# Patient Record
Sex: MALE | Race: BLACK OR AFRICAN AMERICAN | NOT HISPANIC OR LATINO | Employment: FULL TIME | URBAN - METROPOLITAN AREA
[De-identification: names, ages, dates, MRNs, and addresses within clinical notes are randomized per-mention and may not be internally consistent; named-entity substitution may affect disease eponyms.]

---

## 2017-01-24 ENCOUNTER — INITIAL CONSULT (OUTPATIENT)
Dept: DERMATOLOGY | Facility: CLINIC | Age: 57
End: 2017-01-24
Payer: COMMERCIAL

## 2017-01-24 DIAGNOSIS — L98.9 DISEASE OF SKIN AND SUBCUTANEOUS TISSUE: Primary | ICD-10-CM

## 2017-01-24 PROCEDURE — 87107 FUNGI IDENTIFICATION MOLD: CPT | Mod: 59

## 2017-01-24 PROCEDURE — 99202 OFFICE O/P NEW SF 15 MIN: CPT | Mod: S$GLB,,, | Performed by: DERMATOLOGY

## 2017-01-24 PROCEDURE — 87101 SKIN FUNGI CULTURE: CPT

## 2017-01-24 PROCEDURE — 99999 PR PBB SHADOW E&M-EST. PATIENT-LVL II: CPT | Mod: PBBFAC,,, | Performed by: DERMATOLOGY

## 2017-01-24 PROCEDURE — 1159F MED LIST DOCD IN RCRD: CPT | Mod: S$GLB,,, | Performed by: DERMATOLOGY

## 2017-01-24 NOTE — LETTER
January 24, 2017      Vaishali Camacho MD  [address]  [place] [address]           [facility] - Dermatology  [address]  [place] [address]  Phone: [phone]  Fax: [phone]          Patient: Royer Castillo   MR Number: [id number]   YOB: 1960   Date of Visit: 1/24/2017       Dear Dr. Vaishali Camacho:    Thank you for referring Royer Castillo to me for evaluation. Attached you will find relevant portions of my assessment and plan of care.    If you have questions, please do not hesitate to call me. I look forward to following Royer Castillo along with you.    Sincerely,    Radha Cook MD    Enclosure  CC:  No Recipients    If you would like to receive this communication electronically, please contact [email or web address] or [phone] to request more information on [employer] access.    For providers and/or their staff who would like to refer a patient to [facility], please contact us through our one-stop-shop provider referral line, [facility], at [phone].    If you feel you have received this communication in error or would no longer like to receive these types of communications, please e-mail [email or web address]

## 2017-01-24 NOTE — PROGRESS NOTES
Subjective:       Patient ID:  Royer Castillo is a 56 y.o. male who presents for   Chief Complaint   Patient presents with    Rash     both hands x 3 years, dry peeling Tac cream in the past did not help    Nail Problem     left 3rd and 2nd digits x 3 years, lamisil in the past x2 courses did not help     Rash  - Initial  Affected locations: right foot, left foot and left hand  Duration: 3 years  Signs / symptoms: itching  Aggravated by: nothing  Treatments tried: took 2 course of lamisil po (30 days then 60 days) in 2014 and 2015.  Improvement on treatment: no relief    Nail Problem         Review of Systems   Skin: Positive for itching and rash.   Hematologic/Lymphatic: Does not bruise/bleed easily.        Objective:    Physical Exam   Constitutional: He appears well-developed and well-nourished. No distress.   Neurological: He is alert and oriented to person, place, and time. He is not disoriented.   Psychiatric: He has a normal mood and affect.   Skin:   Areas Examined (abnormalities noted in diagram):   RUE Inspected  LUE Inspection Performed  RLE Inspected  LLE Inspection Performed  Nails and Digits Inspection Performed                 Diagram Legend     Erythematous scaling macule/papule c/w actinic keratosis       Vascular papule c/w angioma      Pigmented verrucoid papule/plaque c/w seborrheic keratosis      Yellow umbilicated papule c/w sebaceous hyperplasia      Irregularly shaped tan macule c/w lentigo     1-2 mm smooth white papules consistent with Milia      Movable subcutaneous cyst with punctum c/w epidermal inclusion cyst      Subcutaneous movable cyst c/w pilar cyst      Firm pink to brown papule c/w dermatofibroma      Pedunculated fleshy papule(s) c/w skin tag(s)      Evenly pigmented macule c/w junctional nevus     Mildly variegated pigmented, slightly irregular-bordered macule c/w mildly atypical nevus      Flesh colored to evenly pigmented papule c/w intradermal nevus       Pink  pearly papule/plaque c/w basal cell carcinoma      Erythematous hyperkeratotic cursted plaque c/w SCC      Surgical scar with no sign of skin cancer recurrence      Open and closed comedones      Inflammatory papules and pustules      Verrucoid papule consistent consistent with wart     Erythematous eczematous patches and plaques     Dystrophic onycholytic nail with subungual debris c/w onychomycosis     Umbilicated papule    Erythematous-base heme-crusted tan verrucoid plaque consistent with inflamed seborrheic keratosis     Erythematous Silvery Scaling Plaque c/w Psoriasis     See annotation    Lab Results   Component Value Date    ALT 22 11/28/2016    AST 19 11/28/2016    ALKPHOS 61 11/28/2016    BILITOT 0.6 11/28/2016     Lab Results   Component Value Date    WBC 7.73 10/27/2016    HGB 14.6 10/27/2016    HCT 43.2 10/27/2016    MCV 90 10/27/2016     10/27/2016       Assessment / Plan:        Disease of skin and subcutaneous tissue - R/O tinea manum with onychomycosis  -     Fungal culture , skin, hair, or nails - left hand  -     Fungal culture , skin, hair, or nails - left fingernails    If +, consider lamisil 250mg qd x 3 months vs sporonox               Return if symptoms worsen or fail to improve.

## 2017-02-17 ENCOUNTER — PATIENT MESSAGE (OUTPATIENT)
Dept: DERMATOLOGY | Facility: CLINIC | Age: 57
End: 2017-02-17

## 2017-02-23 LAB
FUNGUS BLD CULT: NORMAL
FUNGUS BLD CULT: NORMAL

## 2017-02-24 ENCOUNTER — TELEPHONE (OUTPATIENT)
Dept: DERMATOLOGY | Facility: CLINIC | Age: 57
End: 2017-02-24

## 2017-02-24 NOTE — TELEPHONE ENCOUNTER
----- Message from Brittney Faith sent at 2/23/2017  4:03 PM CST -----  Contact: pt   JAM-pt- pt is returning Dr. Cook's call can you please call pt at [phone].    BARRIE

## 2017-02-24 NOTE — TELEPHONE ENCOUNTER
Spoke to pt.Inform pt MATHEW is out today but did send her a message letting her know he was returning her call.

## 2017-02-27 ENCOUNTER — PATIENT MESSAGE (OUTPATIENT)
Dept: DERMATOLOGY | Facility: CLINIC | Age: 57
End: 2017-02-27

## 2017-03-01 ENCOUNTER — TELEPHONE (OUTPATIENT)
Dept: DERMATOLOGY | Facility: CLINIC | Age: 57
End: 2017-03-01

## 2017-03-01 RX ORDER — TERBINAFINE HYDROCHLORIDE 250 MG/1
250 TABLET ORAL DAILY
Qty: 90 TABLET | Refills: 0 | Status: SHIPPED | OUTPATIENT
Start: 2017-03-01 | End: 2017-03-31

## 2017-03-01 NOTE — TELEPHONE ENCOUNTER
Fungus Cult, skin, hair or nails TRICHOPHYTON RUBRUM         Called pt. Pt states he took 1 month of lamisil then 2 months consecutively last fall.     Discussed option of sporonox, but will defer 2/2 drug drug interactions.     Lab Results   Component Value Date    ALT 22 11/28/2016    AST 19 11/28/2016    ALKPHOS 61 11/28/2016    BILITOT 0.6 11/28/2016     Will send Rx for lamisil 250mg qd x 3 months    F/u in 3 months with lft's

## 2017-03-08 DIAGNOSIS — Z79.899 ENCOUNTER FOR LONG-TERM (CURRENT) USE OF HIGH-RISK MEDICATION: Primary | ICD-10-CM

## 2017-03-28 ENCOUNTER — OFFICE VISIT (OUTPATIENT)
Dept: INTERNAL MEDICINE | Facility: CLINIC | Age: 57
End: 2017-03-28
Payer: COMMERCIAL

## 2017-03-28 VITALS
TEMPERATURE: 98 F | WEIGHT: 198 LBS | RESPIRATION RATE: 17 BRPM | HEIGHT: 69 IN | HEART RATE: 57 BPM | SYSTOLIC BLOOD PRESSURE: 114 MMHG | BODY MASS INDEX: 29.33 KG/M2 | DIASTOLIC BLOOD PRESSURE: 72 MMHG

## 2017-03-28 DIAGNOSIS — B35.1 ONYCHOMYCOSIS: ICD-10-CM

## 2017-03-28 DIAGNOSIS — I10 BENIGN ESSENTIAL HYPERTENSION: Primary | ICD-10-CM

## 2017-03-28 DIAGNOSIS — R73.03 PRE-DIABETES: ICD-10-CM

## 2017-03-28 DIAGNOSIS — E78.5 HYPERLIPIDEMIA, UNSPECIFIED HYPERLIPIDEMIA TYPE: ICD-10-CM

## 2017-03-28 PROCEDURE — 3074F SYST BP LT 130 MM HG: CPT | Mod: S$GLB,,, | Performed by: INTERNAL MEDICINE

## 2017-03-28 PROCEDURE — 1160F RVW MEDS BY RX/DR IN RCRD: CPT | Mod: S$GLB,,, | Performed by: INTERNAL MEDICINE

## 2017-03-28 PROCEDURE — 3078F DIAST BP <80 MM HG: CPT | Mod: S$GLB,,, | Performed by: INTERNAL MEDICINE

## 2017-03-28 PROCEDURE — 99999 PR PBB SHADOW E&M-EST. PATIENT-LVL III: CPT | Mod: PBBFAC,,, | Performed by: INTERNAL MEDICINE

## 2017-03-28 PROCEDURE — 99214 OFFICE O/P EST MOD 30 MIN: CPT | Mod: S$GLB,,, | Performed by: INTERNAL MEDICINE

## 2017-03-28 NOTE — PROGRESS NOTES
"Subjective:       Patient ID: Royer Castillo is a 56 y.o. male.    Chief Complaint: follow up for HTN, HLD    HPI   HTN - on lisinopril 10mg daily. Has been drastically changing diet. BP 100s-110s. Occasional dizziness that pt attributed to sinus. Assoc w/ getting up too fast or doing something strenuous. Has been doing cardio and exercising regularly.     HLD pravastatin 40mg daily - off x 1 mo. Currently on lamisil 250mg daily for onychomycosis. Plan is for 3 mo of lamisil. Follows w/ Dr. Cook. Reports that the nails are growing back normal.     Review of Systems  no cp/sob/lomeli. Comprehensive review of systems otherwise negative. See history/subjective section for more details.    Objective:      Physical Exam    /72  Pulse (!) 57  Temp 98.1 °F (36.7 °C) (Oral)   Resp 17  Ht 5' 9" (1.753 m)  Wt 89.8 kg (197 lb 15.6 oz)  BMI 29.24 kg/m2    Gen - a+ox4, nad  heent - perrl, op clear. MMM.   Neck - no lad  cv - rrr, no m/r  Chest - ctab, no wheezing/rhonchi/crackles  abd - s/nt/nd/+bs  eXT -2 + b radial pulses. No LE edema.   MSK - no spinal tenderness to palpation. Normal gait.   Skin - L hand scaliness improved. Thickened nails also improving.     Labs reviewed.     Assessment/Plan     Royer was seen today for follow-up.    Diagnoses and all orders for this visit:    Benign essential hypertension - stop lisinopril due to improving BP due to diet and exercise and also as pt w/ some dizziness. Pt to continue and monitor BP at home to make sure it does not get elevated again.   -     Comprehensive metabolic panel; Future    Pre-diabetes - watching carb intake.   -     Hemoglobin A1c; Future    Hyperlipidemia, unspecified hyperlipidemia type - hold pravastatin till done w/ lamisil.  -     Comprehensive metabolic panel; Future    Onychomycosis - on lamisil daily. F/u w/ derm. Hepatic panel today as he's been on lamisil for > 1 mo.  -     Comprehensive metabolic panel; Future      Return in about " 6 months (around 9/28/2017).      Vaishali Camacho MD  Department of Internal Medicine - [address]  10:05 AM

## 2017-04-27 DIAGNOSIS — A60.00 GENITAL HERPES: ICD-10-CM

## 2017-04-28 RX ORDER — ACYCLOVIR 200 MG/1
CAPSULE ORAL
Qty: 60 CAPSULE | Refills: 0 | Status: SHIPPED | OUTPATIENT
Start: 2017-04-28 | End: 2017-05-26 | Stop reason: SDUPTHER

## 2017-05-26 DIAGNOSIS — A60.00 GENITAL HERPES: ICD-10-CM

## 2017-05-28 RX ORDER — ACYCLOVIR 200 MG/1
CAPSULE ORAL
Qty: 60 CAPSULE | Refills: 0 | Status: SHIPPED | OUTPATIENT
Start: 2017-05-28 | End: 2017-07-07 | Stop reason: SDUPTHER

## 2017-06-06 ENCOUNTER — LAB VISIT (OUTPATIENT)
Dept: LAB | Facility: HOSPITAL | Age: 57
End: 2017-06-06
Attending: DERMATOLOGY
Payer: COMMERCIAL

## 2017-06-06 ENCOUNTER — OFFICE VISIT (OUTPATIENT)
Dept: DERMATOLOGY | Facility: CLINIC | Age: 57
End: 2017-06-06
Payer: COMMERCIAL

## 2017-06-06 DIAGNOSIS — B35.3 TINEA PEDIS OF RIGHT FOOT: ICD-10-CM

## 2017-06-06 DIAGNOSIS — B35.1 ONYCHOMYCOSIS: ICD-10-CM

## 2017-06-06 DIAGNOSIS — Z79.899 ENCOUNTER FOR LONG-TERM (CURRENT) USE OF HIGH-RISK MEDICATION: ICD-10-CM

## 2017-06-06 DIAGNOSIS — B35.2 TINEA MANUS: Primary | ICD-10-CM

## 2017-06-06 LAB
ALBUMIN SERPL BCP-MCNC: 3.9 G/DL
ALP SERPL-CCNC: 63 U/L
ALT SERPL W/O P-5'-P-CCNC: 17 U/L
AST SERPL-CCNC: 17 U/L
BILIRUB DIRECT SERPL-MCNC: 0.2 MG/DL
BILIRUB SERPL-MCNC: 0.6 MG/DL
PROT SERPL-MCNC: 6.8 G/DL

## 2017-06-06 PROCEDURE — 87101 SKIN FUNGI CULTURE: CPT

## 2017-06-06 PROCEDURE — 99999 PR PBB SHADOW E&M-EST. PATIENT-LVL II: CPT | Mod: PBBFAC,,, | Performed by: DERMATOLOGY

## 2017-06-06 PROCEDURE — 80076 HEPATIC FUNCTION PANEL: CPT

## 2017-06-06 PROCEDURE — 87107 FUNGI IDENTIFICATION MOLD: CPT | Mod: 59

## 2017-06-06 PROCEDURE — 99213 OFFICE O/P EST LOW 20 MIN: CPT | Mod: S$GLB,,, | Performed by: DERMATOLOGY

## 2017-06-06 PROCEDURE — 36415 COLL VENOUS BLD VENIPUNCTURE: CPT

## 2017-06-06 NOTE — PROGRESS NOTES
Subjective:       Patient ID:  Royer Castillo is a 57 y.o. male who presents for   Chief Complaint   Patient presents with    Follow-up     improving, no new areas not itchy      Pt here for rash F/U.     Last seen here on 1/24/17. Skin and Nail culture + T. Rubrum.     Last dose of Lamisil yesterday (3 months of 250mg qday) with minimal improvement noted. Still itches occ. -- uses lamisil cream. Has taken Lamisil x 2 courses prior.     Pt does not have diabetes      Rash  - Follow-up  Symptom course: unchanged (3 years)  Currently using: Lamisil PO, Last dose yesterday. Also using Topical Lamisil.  Affected locations: right foot, left foot and left hand  Affected locations: right foot, left foot and left hand  Duration: 3 years  Signs / symptoms: itching  Signs / symptoms: itching (Mild)  Aggravated by: nothing  Treatments tried: took 2 course of lamisil po (30 days then 60 days) in 2014 and 2015.  Improvement on treatment: no relief    Nail Problem         Review of Systems   Skin: Positive for itching (occ) and rash.        Objective:    Physical Exam   Constitutional: He appears well-developed and well-nourished. No distress.   Neurological: He is alert and oriented to person, place, and time. He is not disoriented.   Psychiatric: He has a normal mood and affect.   Skin:   Areas Examined (abnormalities noted in diagram):   RUE Inspected  LUE Inspection Performed  RLE Inspected  LLE Inspection Performed  Nails and Digits Inspection Performed                 Diagram Legend     Erythematous scaling macule/papule c/w actinic keratosis       Vascular papule c/w angioma      Pigmented verrucoid papule/plaque c/w seborrheic keratosis      Yellow umbilicated papule c/w sebaceous hyperplasia      Irregularly shaped tan macule c/w lentigo     1-2 mm smooth white papules consistent with Milia      Movable subcutaneous cyst with punctum c/w epidermal inclusion cyst      Subcutaneous movable cyst c/w pilar cyst       Firm pink to brown papule c/w dermatofibroma      Pedunculated fleshy papule(s) c/w skin tag(s)      Evenly pigmented macule c/w junctional nevus     Mildly variegated pigmented, slightly irregular-bordered macule c/w mildly atypical nevus      Flesh colored to evenly pigmented papule c/w intradermal nevus       Pink pearly papule/plaque c/w basal cell carcinoma      Erythematous hyperkeratotic cursted plaque c/w SCC      Surgical scar with no sign of skin cancer recurrence      Open and closed comedones      Inflammatory papules and pustules      Verrucoid papule consistent consistent with wart     Erythematous eczematous patches and plaques     Dystrophic onycholytic nail with subungual debris c/w onychomycosis     Umbilicated papule    Erythematous-base heme-crusted tan verrucoid plaque consistent with inflamed seborrheic keratosis     Erythematous Silvery Scaling Plaque c/w Psoriasis     See annotation      Assessment / Plan:        Tinea manus  -     Fungal culture , skin, hair, or nails - left hand    Tinea pedis of right foot  -     Fungal culture , skin, hair, or nails - right foot  Cont lamisil cream until cx back    Onychomycosis             Return if symptoms worsen or fail to improve.

## 2017-06-29 LAB
FUNGUS BLD CULT: NORMAL
FUNGUS BLD CULT: NORMAL

## 2017-07-02 ENCOUNTER — PATIENT MESSAGE (OUTPATIENT)
Dept: DERMATOLOGY | Facility: CLINIC | Age: 57
End: 2017-07-02

## 2017-07-06 DIAGNOSIS — A60.00 GENITAL HERPES: ICD-10-CM

## 2017-07-06 RX ORDER — ACYCLOVIR 200 MG/1
CAPSULE ORAL
Qty: 60 CAPSULE | Refills: 0 | Status: CANCELLED | OUTPATIENT
Start: 2017-07-06

## 2017-07-07 ENCOUNTER — PATIENT MESSAGE (OUTPATIENT)
Dept: INTERNAL MEDICINE | Facility: CLINIC | Age: 57
End: 2017-07-07

## 2017-07-07 DIAGNOSIS — A60.00 GENITAL HERPES SIMPLEX, UNSPECIFIED SITE: ICD-10-CM

## 2017-07-07 RX ORDER — ACYCLOVIR 200 MG/1
CAPSULE ORAL
Qty: 60 CAPSULE | Refills: 0 | Status: SHIPPED | OUTPATIENT
Start: 2017-07-07 | End: 2017-07-25 | Stop reason: SDUPTHER

## 2017-07-25 ENCOUNTER — PATIENT MESSAGE (OUTPATIENT)
Dept: DERMATOLOGY | Facility: CLINIC | Age: 57
End: 2017-07-25

## 2017-07-25 DIAGNOSIS — A60.00 GENITAL HERPES SIMPLEX, UNSPECIFIED SITE: ICD-10-CM

## 2017-07-26 RX ORDER — ACYCLOVIR 200 MG/1
CAPSULE ORAL
Qty: 60 CAPSULE | Refills: 6 | Status: SHIPPED | OUTPATIENT
Start: 2017-07-26 | End: 2018-04-09 | Stop reason: SDUPTHER

## 2017-07-31 ENCOUNTER — TELEPHONE (OUTPATIENT)
Dept: DERMATOLOGY | Facility: CLINIC | Age: 57
End: 2017-07-31

## 2017-07-31 DIAGNOSIS — B35.1 ONYCHOMYCOSIS: Primary | ICD-10-CM

## 2017-07-31 NOTE — TELEPHONE ENCOUNTER
Fungus Cult, skin, hair or nails  TRICHOPHYTON RUBRUM     Failed lamisil.     Itraconazole 200mg bid x 7 days q month x 3    Pt understands he needs to d/c pravachol while taking sporonox.     Needs lft's in 6 weeks.     F/u 4 months

## 2017-08-01 ENCOUNTER — TELEPHONE (OUTPATIENT)
Dept: DERMATOLOGY | Facility: CLINIC | Age: 57
End: 2017-08-01

## 2017-08-01 NOTE — TELEPHONE ENCOUNTER
Spoke to pt.Scheduled labs in 6 weeks, and a new recall for 4 months f/u with Dr. Cook.Sent reminder to pt.

## 2017-10-04 ENCOUNTER — TELEPHONE (OUTPATIENT)
Dept: INTERNAL MEDICINE | Facility: CLINIC | Age: 57
End: 2017-10-04

## 2017-10-04 DIAGNOSIS — Z00.00 ANNUAL PHYSICAL EXAM: Primary | ICD-10-CM

## 2017-10-07 ENCOUNTER — LAB VISIT (OUTPATIENT)
Dept: LAB | Facility: HOSPITAL | Age: 57
End: 2017-10-07
Attending: INTERNAL MEDICINE
Payer: COMMERCIAL

## 2017-10-07 DIAGNOSIS — Z00.00 ANNUAL PHYSICAL EXAM: ICD-10-CM

## 2017-10-07 LAB
ALBUMIN SERPL BCP-MCNC: 3.7 G/DL
ALP SERPL-CCNC: 61 U/L
ALT SERPL W/O P-5'-P-CCNC: 20 U/L
ANION GAP SERPL CALC-SCNC: 8 MMOL/L
AST SERPL-CCNC: 17 U/L
BASOPHILS # BLD AUTO: 0.02 K/UL
BASOPHILS NFR BLD: 0.3 %
BILIRUB SERPL-MCNC: 0.6 MG/DL
BUN SERPL-MCNC: 12 MG/DL
CALCIUM SERPL-MCNC: 8.8 MG/DL
CHLORIDE SERPL-SCNC: 105 MMOL/L
CHOLEST SERPL-MCNC: 187 MG/DL
CHOLEST/HDLC SERPL: 3.5 {RATIO}
CO2 SERPL-SCNC: 27 MMOL/L
CREAT SERPL-MCNC: 1 MG/DL
DIFFERENTIAL METHOD: NORMAL
EOSINOPHIL # BLD AUTO: 0.2 K/UL
EOSINOPHIL NFR BLD: 2.4 %
ERYTHROCYTE [DISTWIDTH] IN BLOOD BY AUTOMATED COUNT: 12.2 %
EST. GFR  (AFRICAN AMERICAN): >60 ML/MIN/1.73 M^2
EST. GFR  (NON AFRICAN AMERICAN): >60 ML/MIN/1.73 M^2
ESTIMATED AVG GLUCOSE: 117 MG/DL
GLUCOSE SERPL-MCNC: 102 MG/DL
HBA1C MFR BLD HPLC: 5.7 %
HCT VFR BLD AUTO: 42.4 %
HDLC SERPL-MCNC: 53 MG/DL
HDLC SERPL: 28.3 %
HGB BLD-MCNC: 14.3 G/DL
LDLC SERPL CALC-MCNC: 120 MG/DL
LYMPHOCYTES # BLD AUTO: 1.2 K/UL
LYMPHOCYTES NFR BLD: 19.9 %
MCH RBC QN AUTO: 30 PG
MCHC RBC AUTO-ENTMCNC: 33.7 G/DL
MCV RBC AUTO: 89 FL
MONOCYTES # BLD AUTO: 0.7 K/UL
MONOCYTES NFR BLD: 11.6 %
NEUTROPHILS # BLD AUTO: 4 K/UL
NEUTROPHILS NFR BLD: 65.5 %
NONHDLC SERPL-MCNC: 134 MG/DL
PLATELET # BLD AUTO: 224 K/UL
PMV BLD AUTO: 11.4 FL
POTASSIUM SERPL-SCNC: 4.3 MMOL/L
PROT SERPL-MCNC: 6.6 G/DL
RBC # BLD AUTO: 4.77 M/UL
SODIUM SERPL-SCNC: 140 MMOL/L
TRIGL SERPL-MCNC: 70 MG/DL
TSH SERPL DL<=0.005 MIU/L-ACNC: 1.6 UIU/ML
WBC # BLD AUTO: 6.13 K/UL

## 2017-10-07 PROCEDURE — 83036 HEMOGLOBIN GLYCOSYLATED A1C: CPT

## 2017-10-07 PROCEDURE — 80053 COMPREHEN METABOLIC PANEL: CPT

## 2017-10-07 PROCEDURE — 84443 ASSAY THYROID STIM HORMONE: CPT

## 2017-10-07 PROCEDURE — 36415 COLL VENOUS BLD VENIPUNCTURE: CPT

## 2017-10-07 PROCEDURE — 85025 COMPLETE CBC W/AUTO DIFF WBC: CPT

## 2017-10-07 PROCEDURE — 80061 LIPID PANEL: CPT

## 2017-10-10 ENCOUNTER — OFFICE VISIT (OUTPATIENT)
Dept: INTERNAL MEDICINE | Facility: CLINIC | Age: 57
End: 2017-10-10
Payer: COMMERCIAL

## 2017-10-10 VITALS
WEIGHT: 202 LBS | HEIGHT: 69 IN | OXYGEN SATURATION: 96 % | HEART RATE: 48 BPM | BODY MASS INDEX: 29.92 KG/M2 | SYSTOLIC BLOOD PRESSURE: 136 MMHG | DIASTOLIC BLOOD PRESSURE: 80 MMHG | TEMPERATURE: 98 F

## 2017-10-10 DIAGNOSIS — K21.9 GASTROESOPHAGEAL REFLUX DISEASE, ESOPHAGITIS PRESENCE NOT SPECIFIED: ICD-10-CM

## 2017-10-10 DIAGNOSIS — Z00.00 ANNUAL PHYSICAL EXAM: Primary | ICD-10-CM

## 2017-10-10 DIAGNOSIS — R73.03 PRE-DIABETES: ICD-10-CM

## 2017-10-10 PROCEDURE — 99999 PR PBB SHADOW E&M-EST. PATIENT-LVL III: CPT | Mod: PBBFAC,,, | Performed by: INTERNAL MEDICINE

## 2017-10-10 PROCEDURE — 99396 PREV VISIT EST AGE 40-64: CPT | Mod: S$GLB,,, | Performed by: INTERNAL MEDICINE

## 2017-10-10 NOTE — PROGRESS NOTES
INTERNAL MEDICINE ANNUAL VISIT NOTE      CHIEF COMPLAINT     Chief Complaint   Patient presents with    Annual Exam     HPI     Royer Castillo is a 57 y.o. C male who presents for annual exam.    Drastic weight loss previously and HTN resolved on diet so stopped on lisinopril. Still exercising - ellipitical, biking (20 miles twice a week), running daily for about an hour of aerobic. No SOB/CP/lightheadedness/palpitations.     HLD - pravastatin 40mg daily. Was not taking it x 4 mo while on itraconazole (just finished yesterday).    Past Medical History:  Past Medical History:   Diagnosis Date    Cellulitis     left foot    Diverticulosis     Genital herpes     Hyperlipidemia     Onychomycosis     Pre-diabetes        Past Surgical History:  History reviewed. No pertinent surgical history.    Allergies:  Review of patient's allergies indicates:  No Known Allergies    meds reviewed    Family History:  Family History   Problem Relation Age of Onset    Cancer Mother     Heart disease Father 86    Heart disease Sister 64    Hyperlipidemia Sister        Social History:  Social History   Substance Use Topics    Smoking status: Never Smoker    Smokeless tobacco: Never Used    Alcohol use No       Review of Systems:  Review of Systems   Constitutional: Negative for chills and fever.   HENT: Negative.    Respiratory: Negative for shortness of breath and wheezing.    Cardiovascular: Negative for chest pain and palpitations.   Gastrointestinal: Negative for abdominal pain (increased reflux. takes pepcid as needed, works.), constipation, diarrhea, nausea and vomiting.   Genitourinary: Negative.    Musculoskeletal: Positive for arthralgias (hip and shoulder. tolerable). Negative for back pain.   Skin: Negative for rash.   Neurological: Negative.    Psychiatric/Behavioral: Negative.        Health Maintainence:   Td 2014  Flu - yearly through work.   C-SCOPE 11/13/14 - repeat in 5 yrs. Benign polyp.  "Diverticulosis.   Hep C screening 2015 - negative.    PHYSICAL EXAM     /80   Pulse (!) 48   Temp 97.9 °F (36.6 °C) (Oral)   Ht 5' 9" (1.753 m)   Wt 91.6 kg (202 lb)   SpO2 96%   BMI 29.83 kg/m²     GEN - A+OX4, NAD   HEENT - PERRL, EOMI, OP clear. MMM.   Neck - No thyromegaly or cervical LAD. No thyroid masses felt.  CV - RRR, no m/r   Chest - CTAB, no wheezing or rhonchi  Abd - S/NT/ND/+BS.   Ext - 2+LDP, palp RDP, 1+L PT and 2+ R PT and 2+ radial pulses. No LE edema.   Neuro - PERRL, EOMI, no nystagmus, eyebrow raise, facial sensation, hearing, m of mastication, smile, palatal raise, shoulder shrug, tongue protrusion symmetric and intact. 5/5 BUE and BLE strength. Sensation to light touch intact throughout. 2+ DTRs. Normal gait.   MSK - No spinal tenderness to palpation. Normal gait.   Skin - No rash.    LABS     Previous labs reviewed.    ASSESSMENT/PLAN     Royer Castillo is a 57 y.o. male with  Royer was seen today for annual exam.    Diagnoses and all orders for this visit:    Annual physical exam - can stop pravastatin.     Pre-diabetes - watch diet and decrease carb intake.    Gastroesophageal reflux disease, esophagitis presence not specified  Do not lay down for at least 2 hours after eating. Eat smaller, more frequent meals. Avoid trigger foods such as red sauce, caffeine, fatty foods, spicy foods, alcohol, etc.   Take pepcid over the counter as needed. If symptoms are uncontrolled or worsens, please let me know and we can bring you in to be re-evaluated.    will get flu vaccine at work.   RTC in 12 months, sooner if needed and depending on labs.    Vaishali Camacho MD  Department of Internal Medicine - [facility]  8:40 AM  "

## 2017-10-10 NOTE — PATIENT INSTRUCTIONS
Do not lay down for at least 2 hours after eating. Eat smaller, more frequent meals. Avoid trigger foods such as red sauce, caffeine, fatty foods, spicy foods, alcohol, etc.     Take pepcid over the counter as needed. If symptoms are uncontrolled or worsens, please let me know and we can bring you in to be re-evaluated.

## 2018-03-12 ENCOUNTER — PATIENT MESSAGE (OUTPATIENT)
Dept: INTERNAL MEDICINE | Facility: CLINIC | Age: 58
End: 2018-03-12

## 2018-03-13 ENCOUNTER — PATIENT MESSAGE (OUTPATIENT)
Dept: INTERNAL MEDICINE | Facility: CLINIC | Age: 58
End: 2018-03-13

## 2018-03-13 RX ORDER — LISINOPRIL 10 MG/1
10 TABLET ORAL DAILY
Qty: 90 TABLET | Refills: 3 | Status: SHIPPED | OUTPATIENT
Start: 2018-03-13 | End: 2018-07-13

## 2018-03-13 RX ORDER — LISINOPRIL 10 MG/1
10 TABLET ORAL DAILY
Qty: 90 TABLET | Refills: 3
Start: 2018-03-13 | End: 2018-03-13 | Stop reason: SDUPTHER

## 2018-04-06 ENCOUNTER — PATIENT MESSAGE (OUTPATIENT)
Dept: INTERNAL MEDICINE | Facility: CLINIC | Age: 58
End: 2018-04-06

## 2018-04-09 DIAGNOSIS — A60.00 GENITAL HERPES SIMPLEX, UNSPECIFIED SITE: ICD-10-CM

## 2018-04-09 NOTE — TELEPHONE ENCOUNTER
Can you have pt take his BP daily and write it down. Please make sure he has a follow up appt in the next mo. Thanks!

## 2018-04-10 RX ORDER — ACYCLOVIR 200 MG/1
CAPSULE ORAL
Qty: 60 CAPSULE | Refills: 0 | Status: SHIPPED | OUTPATIENT
Start: 2018-04-10 | End: 2018-05-18 | Stop reason: SDUPTHER

## 2018-05-18 DIAGNOSIS — A60.00 GENITAL HERPES SIMPLEX, UNSPECIFIED SITE: ICD-10-CM

## 2018-05-18 RX ORDER — ACYCLOVIR 200 MG/1
CAPSULE ORAL
Qty: 60 CAPSULE | Refills: 0 | Status: SHIPPED | OUTPATIENT
Start: 2018-05-18 | End: 2018-06-24 | Stop reason: SDUPTHER

## 2018-06-24 DIAGNOSIS — A60.00 GENITAL HERPES SIMPLEX, UNSPECIFIED SITE: ICD-10-CM

## 2018-06-25 RX ORDER — ACYCLOVIR 200 MG/1
CAPSULE ORAL
Qty: 60 CAPSULE | Refills: 5 | Status: SHIPPED | OUTPATIENT
Start: 2018-06-25 | End: 2019-01-24 | Stop reason: SDUPTHER

## 2018-07-10 ENCOUNTER — TELEPHONE (OUTPATIENT)
Dept: INTERNAL MEDICINE | Facility: CLINIC | Age: 58
End: 2018-07-10

## 2018-07-10 NOTE — TELEPHONE ENCOUNTER
----- Message from Rei Fischer sent at 7/10/2018  9:35 AM CDT -----  Contact: Patient [phone]  Patient is returning a phone call.  Who left a message for the patient: Keerthi  Does patient know what this is regarding:  Yes, he was waiting for a callback about a sooner appointment

## 2018-07-10 NOTE — TELEPHONE ENCOUNTER
----- Message from Cecelia YESENIA Baez sent at 7/9/2018  4:35 PM CDT -----  Contact: PT Portal Request      Appointment Request From: Vasyl Castillo    With Provider: Vaishali Camacho MD [-Primary Care Physician-]    Would Accept With:Only the person I've selected    Preferred Date Range: From 7/8/2018 To 8/31/2018    Preferred Times: Any    Reason for visit: Request an Appt    Comments:  Need to Make appointment for my wellness Visit  and Follow up Check up

## 2018-07-13 ENCOUNTER — OFFICE VISIT (OUTPATIENT)
Dept: INTERNAL MEDICINE | Facility: CLINIC | Age: 58
End: 2018-07-13
Payer: COMMERCIAL

## 2018-07-13 VITALS
HEIGHT: 69 IN | BODY MASS INDEX: 29.81 KG/M2 | HEART RATE: 95 BPM | OXYGEN SATURATION: 95 % | SYSTOLIC BLOOD PRESSURE: 128 MMHG | WEIGHT: 201.25 LBS | DIASTOLIC BLOOD PRESSURE: 88 MMHG

## 2018-07-13 DIAGNOSIS — Z12.5 PROSTATE CANCER SCREENING: ICD-10-CM

## 2018-07-13 DIAGNOSIS — R73.03 PRE-DIABETES: ICD-10-CM

## 2018-07-13 DIAGNOSIS — K21.9 GASTROESOPHAGEAL REFLUX DISEASE, ESOPHAGITIS PRESENCE NOT SPECIFIED: ICD-10-CM

## 2018-07-13 DIAGNOSIS — A60.00 GENITAL HERPES SIMPLEX, UNSPECIFIED SITE: ICD-10-CM

## 2018-07-13 DIAGNOSIS — Z01.89 ROUTINE LAB DRAW: ICD-10-CM

## 2018-07-13 DIAGNOSIS — Z00.00 ENCOUNTER FOR HEALTH MAINTENANCE EXAMINATION IN ADULT: Primary | ICD-10-CM

## 2018-07-13 LAB
BILIRUB UR QL STRIP: NEGATIVE
CLARITY UR REFRACT.AUTO: CLEAR
COLOR UR AUTO: YELLOW
GLUCOSE UR QL STRIP: NEGATIVE
HGB UR QL STRIP: NEGATIVE
KETONES UR QL STRIP: ABNORMAL
LEUKOCYTE ESTERASE UR QL STRIP: NEGATIVE
NITRITE UR QL STRIP: NEGATIVE
PH UR STRIP: 5 [PH] (ref 5–8)
PROT UR QL STRIP: NEGATIVE
SP GR UR STRIP: 1.02 (ref 1–1.03)
URN SPEC COLLECT METH UR: ABNORMAL
UROBILINOGEN UR STRIP-ACNC: NEGATIVE EU/DL

## 2018-07-13 PROCEDURE — 99999 PR PBB SHADOW E&M-EST. PATIENT-LVL III: CPT | Mod: PBBFAC,,, | Performed by: NURSE PRACTITIONER

## 2018-07-13 PROCEDURE — 99396 PREV VISIT EST AGE 40-64: CPT | Mod: S$GLB,,, | Performed by: NURSE PRACTITIONER

## 2018-07-13 PROCEDURE — 3074F SYST BP LT 130 MM HG: CPT | Mod: CPTII,S$GLB,, | Performed by: NURSE PRACTITIONER

## 2018-07-13 PROCEDURE — 3079F DIAST BP 80-89 MM HG: CPT | Mod: CPTII,S$GLB,, | Performed by: NURSE PRACTITIONER

## 2018-07-13 PROCEDURE — 81003 URINALYSIS AUTO W/O SCOPE: CPT

## 2018-07-13 RX ORDER — PROMETHAZINE HYDROCHLORIDE AND DEXTROMETHORPHAN HYDROBROMIDE 6.25; 15 MG/5ML; MG/5ML
5 SYRUP ORAL NIGHTLY PRN
Refills: 0 | COMMUNITY
Start: 2018-07-09 | End: 2018-07-13

## 2018-07-13 RX ORDER — CARBINOXAMINE MALEATE 4 MG/1
4 TABLET ORAL 2 TIMES DAILY
Refills: 1 | COMMUNITY
Start: 2018-07-09 | End: 2019-11-04 | Stop reason: ALTCHOICE

## 2018-07-13 RX ORDER — LISINOPRIL 2.5 MG/1
2.5 TABLET ORAL DAILY
Refills: 1 | COMMUNITY
Start: 2018-07-09 | End: 2018-09-20

## 2018-07-13 RX ORDER — FLUTICASONE PROPIONATE 50 MCG
2 SPRAY, SUSPENSION (ML) NASAL 2 TIMES DAILY
Refills: 3 | COMMUNITY
Start: 2018-07-09 | End: 2019-11-04 | Stop reason: ALTCHOICE

## 2018-07-13 NOTE — PROGRESS NOTES
Subjective:       Patient ID: Royer Castillo is a 58 y.o. male.    Chief Complaint: Annual Exam    Pt presents for annual exam today, PCP Dr. Camacho.    Pt has no complaints today, recently got over an URI, was seen at an urgent care, resolving.    Pt is fasting for labs today.      Review of Systems   Constitutional: Negative for activity change, appetite change and unexpected weight change.   HENT: Negative for hearing loss and voice change.    Eyes: Negative for visual disturbance.   Respiratory: Negative for apnea, cough, chest tightness and shortness of breath.    Cardiovascular: Negative for chest pain, palpitations and leg swelling.   Gastrointestinal: Negative for abdominal distention, abdominal pain, blood in stool, constipation, diarrhea, nausea and vomiting.   Endocrine: Negative for cold intolerance, heat intolerance, polydipsia, polyphagia and polyuria.   Genitourinary: Negative for difficulty urinating, dysuria and penile pain.   Musculoskeletal: Negative for arthralgias and myalgias.   Skin: Negative for color change, pallor, rash and wound.   Allergic/Immunologic: Negative for environmental allergies, food allergies and immunocompromised state.   Neurological: Negative for dizziness and weakness.   Hematological: Negative for adenopathy. Does not bruise/bleed easily.   Psychiatric/Behavioral: Negative for agitation, behavioral problems, sleep disturbance and suicidal ideas.       Review of patient's allergies indicates:  No Known Allergies      Current Outpatient Prescriptions:     acyclovir (ZOVIRAX) 200 MG capsule, TAKE 1 CAPSULE(200 MG) BY MOUTH TWICE DAILY, Disp: 60 capsule, Rfl: 5    carbinoxamine maleate 4 mg Tab, Take 4 mg by mouth 2 (two) times daily., Disp: , Rfl: 1    fluticasone (FLONASE) 50 mcg/actuation nasal spray, 2 sprays by Each Nare route 2 (two) times daily., Disp: , Rfl: 3    lisinopril (PRINIVIL,ZESTRIL) 2.5 MG tablet, Take 2.5 mg by mouth once daily., Disp: , Rfl:  "1    Patient Active Problem List   Diagnosis    Pre-diabetes    Genital herpes    Gastroesophageal reflux disease     Past Medical History:   Diagnosis Date    Cellulitis     left foot    Diverticulosis     Genital herpes     Hyperlipidemia     Onychomycosis     Pre-diabetes      No past surgical history on file.    Social History     Social History    Marital status:      Spouse name: N/A    Number of children: N/A    Years of education: N/A     Occupational History     [employer]     Social History Main Topics    Smoking status: Never Smoker    Smokeless tobacco: Never Used    Alcohol use No    Drug use: No    Sexual activity: Yes     Partners: Female     Other Topics Concern    None     Social History Narrative    Lives w/ wife and daughter - 24 y/o.      Family History   Problem Relation Age of Onset    Cancer Mother     Heart disease Father 86    Heart disease Sister 64    Hyperlipidemia Sister        Objective:       Vitals:    07/13/18 1314 07/13/18 1337   BP: (!) 128/90 128/88   Pulse: 95    SpO2: 95%    Weight: 91.3 kg (201 lb 4.5 oz)    Height: 5' 9" (1.753 m)    PainSc: 0-No pain        Body mass index is 29.72 kg/m².    Physical Exam   Constitutional: He is oriented to person, place, and time. He appears well-developed and well-nourished.   overweight   HENT:   Head: Normocephalic.   Eyes: Conjunctivae, EOM and lids are normal. Pupils are equal, round, and reactive to light. Lids are everted and swept, no foreign bodies found.   Neck: Trachea normal, normal range of motion and full passive range of motion without pain. Neck supple. No JVD present. Carotid bruit is not present.   Cardiovascular: Normal rate, regular rhythm, normal heart sounds, intact distal pulses and normal pulses.    Pulmonary/Chest: Effort normal and breath sounds normal.   Abdominal: Soft. Normal appearance and bowel sounds are normal. There is no hepatosplenomegaly. There is no CVA " tenderness.   Musculoskeletal: Normal range of motion.   Neurological: He is alert and oriented to person, place, and time.   Skin: Skin is warm, dry and intact. Capillary refill takes less than 2 seconds.   Psychiatric: He has a normal mood and affect. His speech is normal and behavior is normal. Judgment and thought content normal. Cognition and memory are normal.   Vitals reviewed.      Assessment:       1. Encounter for health maintenance examination in adult     2. Pre-diabetes  Comprehensive metabolic panel    Lipid panel    Hemoglobin A1c    TSH    Urinalysis   3. Gastroesophageal reflux disease, esophagitis presence not specified  CBC auto differential   4. Genital herpes simplex, unspecified site     5. Prostate cancer screening  PSA, Screening   6. Routine lab draw  CBC auto differential    Comprehensive metabolic panel    Lipid panel    Hemoglobin A1c    PSA, Screening    TSH    Urinalysis   7. BMI 29.0-29.9,adult           Plan:     Royer was seen today for annual exam.    Diagnoses and all orders for this visit:    Encounter for health maintenance examination in adult  Exam done    Health Maintenance updated    Pre-diabetes   Follow low carb, low fat, high fiber diet and exercise to prevent the development of T2DM    -     Hemoglobin A1c; Future      Gastroesophageal reflux disease, esophagitis presence not specified  -     CBC auto differential; Future    GERD stable on meds, education on dietary triggers done    Genital herpes simplex, unspecified site  Stable on antivirals, no recent outbreaks    Prostate cancer screening  -     PSA, Screening; Future    Routine lab draw  -     CBC auto differential; Future  -     Comprehensive metabolic panel; Future  -     Lipid panel; Future  -     Hemoglobin A1c; Future  -     PSA, Screening; Future  -     TSH; Future  -     Urinalysis    BMI 29.0-29.9,adult  BMI reviewed    If labs ok, RTC in 1 yr for annual PE pending labs

## 2018-07-16 ENCOUNTER — TELEPHONE (OUTPATIENT)
Dept: INTERNAL MEDICINE | Facility: CLINIC | Age: 58
End: 2018-07-16

## 2018-07-16 NOTE — TELEPHONE ENCOUNTER
----- Message from Lolita Rodriguez DNP sent at 7/16/2018  7:57 AM CDT -----  Call pt with labs.     Labs look ok except the following.    A1C=5.8 which is in the prediabetic range. Follow a low Carb diet and exercise to prevent the development of T2DM.    Total cholester is high compared to 9 months ago, Total was 242, goal < 200. LDL bad cholesterol is also up at 169.2, goal < 100. Need to follow a low fat, high fiber, low carb diet and exercise. This will need to be rechecked with PCP in 6 months after lifestyle changes, if still elevated then, may need to consider medication.

## 2018-07-16 NOTE — TELEPHONE ENCOUNTER
----- Message from Rei Fischer sent at 7/16/2018  9:05 AM CDT -----  Contact: Patient [phone]  Patient is returning a phone call.  Who left a message for the patient: Bethany  Does patient know what this is regarding:  No

## 2018-09-20 ENCOUNTER — HOSPITAL ENCOUNTER (EMERGENCY)
Facility: OTHER | Age: 58
Discharge: HOME | End: 2018-09-20
Attending: EMERGENCY MEDICINE
Payer: COMMERCIAL

## 2018-09-20 VITALS
HEIGHT: 69 IN | SYSTOLIC BLOOD PRESSURE: 135 MMHG | DIASTOLIC BLOOD PRESSURE: 87 MMHG | WEIGHT: 187 LBS | TEMPERATURE: 99 F | OXYGEN SATURATION: 99 % | RESPIRATION RATE: 16 BRPM | BODY MASS INDEX: 27.7 KG/M2 | HEART RATE: 78 BPM

## 2018-09-20 DIAGNOSIS — S51.811A LACERATION OF RIGHT FOREARM, INITIAL ENCOUNTER: Primary | ICD-10-CM

## 2018-09-20 PROCEDURE — 12032 INTMD RPR S/A/T/EXT 2.6-7.5: CPT

## 2018-09-20 PROCEDURE — 99283 EMERGENCY DEPT VISIT LOW MDM: CPT | Mod: 25

## 2018-09-20 PROCEDURE — 25000003 PHARM REV CODE 250: Performed by: EMERGENCY MEDICINE

## 2018-09-20 RX ORDER — IBUPROFEN 600 MG/1
600 TABLET ORAL EVERY 6 HOURS PRN
Qty: 20 TABLET | Refills: 0 | Status: SHIPPED | OUTPATIENT
Start: 2018-09-20 | End: 2019-07-15

## 2018-09-20 RX ORDER — LIDOCAINE HYDROCHLORIDE AND EPINEPHRINE 10; 10 MG/ML; UG/ML
10 INJECTION, SOLUTION INFILTRATION; PERINEURAL
Status: COMPLETED | OUTPATIENT
Start: 2018-09-20 | End: 2018-09-20

## 2018-09-20 RX ADMIN — LIDOCAINE HYDROCHLORIDE,EPINEPHRINE BITARTRATE 10 ML: 10; .01 INJECTION, SOLUTION INFILTRATION; PERINEURAL at 12:09

## 2018-09-20 NOTE — ED TRIAGE NOTES
Pt reports having AC dropped on R wrist. Pt with laceration to R wrist. Tendons exposed with normal movement to R hand/R thumb. Bleeding controlled. Reports UTD on tetanus. Pain controlled.
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
## 2018-10-22 ENCOUNTER — TELEPHONE (OUTPATIENT)
Dept: INTERNAL MEDICINE | Facility: CLINIC | Age: 58
End: 2018-10-22

## 2018-10-22 RX ORDER — ALBUTEROL SULFATE 90 UG/1
2 AEROSOL, METERED RESPIRATORY (INHALATION) EVERY 6 HOURS PRN
Qty: 18 G | Refills: 3 | Status: SHIPPED | OUTPATIENT
Start: 2018-10-22 | End: 2019-11-04 | Stop reason: ALTCHOICE

## 2018-12-04 ENCOUNTER — LAB VISIT (OUTPATIENT)
Dept: LAB | Facility: HOSPITAL | Age: 58
End: 2018-12-04
Attending: INTERNAL MEDICINE
Payer: COMMERCIAL

## 2018-12-04 ENCOUNTER — OFFICE VISIT (OUTPATIENT)
Dept: INTERNAL MEDICINE | Facility: CLINIC | Age: 58
End: 2018-12-04
Payer: COMMERCIAL

## 2018-12-04 VITALS
HEART RATE: 51 BPM | HEIGHT: 69 IN | WEIGHT: 183.88 LBS | SYSTOLIC BLOOD PRESSURE: 130 MMHG | BODY MASS INDEX: 27.24 KG/M2 | TEMPERATURE: 98 F | DIASTOLIC BLOOD PRESSURE: 70 MMHG

## 2018-12-04 DIAGNOSIS — R73.03 PRE-DIABETES: ICD-10-CM

## 2018-12-04 DIAGNOSIS — E78.49 OTHER HYPERLIPIDEMIA: Primary | ICD-10-CM

## 2018-12-04 DIAGNOSIS — E78.49 OTHER HYPERLIPIDEMIA: ICD-10-CM

## 2018-12-04 LAB
CHOLEST SERPL-MCNC: 187 MG/DL
CHOLEST/HDLC SERPL: 4.5 {RATIO}
ESTIMATED AVG GLUCOSE: 114 MG/DL
HBA1C MFR BLD HPLC: 5.6 %
HDLC SERPL-MCNC: 42 MG/DL
HDLC SERPL: 22.5 %
LDLC SERPL CALC-MCNC: 130.2 MG/DL
NONHDLC SERPL-MCNC: 145 MG/DL
TRIGL SERPL-MCNC: 74 MG/DL

## 2018-12-04 PROCEDURE — 99213 OFFICE O/P EST LOW 20 MIN: CPT | Mod: S$GLB,,, | Performed by: INTERNAL MEDICINE

## 2018-12-04 PROCEDURE — 3078F DIAST BP <80 MM HG: CPT | Mod: CPTII,S$GLB,, | Performed by: INTERNAL MEDICINE

## 2018-12-04 PROCEDURE — 3075F SYST BP GE 130 - 139MM HG: CPT | Mod: CPTII,S$GLB,, | Performed by: INTERNAL MEDICINE

## 2018-12-04 PROCEDURE — 36415 COLL VENOUS BLD VENIPUNCTURE: CPT

## 2018-12-04 PROCEDURE — 99999 PR PBB SHADOW E&M-EST. PATIENT-LVL III: CPT | Mod: PBBFAC,,, | Performed by: INTERNAL MEDICINE

## 2018-12-04 PROCEDURE — 80061 LIPID PANEL: CPT

## 2018-12-04 PROCEDURE — 3008F BODY MASS INDEX DOCD: CPT | Mod: CPTII,S$GLB,, | Performed by: INTERNAL MEDICINE

## 2018-12-04 PROCEDURE — 83036 HEMOGLOBIN GLYCOSYLATED A1C: CPT

## 2018-12-04 NOTE — PROGRESS NOTES
"Subjective:       Patient ID: Royer Castillo is a 58 y.o. male.    Chief Complaint: Follow-up    HPI   Complete dietary changes. Cut out red meats and fats. Cardio and weight training. Lost about 20lbs.    Was not on medicine for PDM or cholesterol.     Review of Systems  Comprehensive review of systems otherwise negative. See history/subjective section for more details.    Objective:      Physical Exam    /70 (BP Location: Left arm, Patient Position: Sitting, BP Method: Medium (Manual))   Pulse (!) 51   Temp 97.9 °F (36.6 °C)   Ht 5' 9" (1.753 m)   Wt 83.4 kg (183 lb 13.8 oz)   BMI 27.15 kg/m²     GEN - A+OX4, NAD   HEENT - PERRL, EOMI, OP clear  Neck - No thyromegaly or cervical LAD. No thyroid masses felt.  CV - RRR, no m/r   Chest - CTAB, no wheezing or rhonchi  Abd - S/NT/ND/+BS.   Ext - 2+BDP and radial pulses. No C/C/E.  Skin - No rash.    Assessment/Plan     Royer was seen today for follow-up.    Diagnoses and all orders for this visit:    Other hyperlipidemia - off medicine (previously on pravastatin). 20lbs weight loss. On diet and exercise.   -     Lipid panel; Future    Pre-diabetes - cont diet and exercise. Repeat a1c.  -     Hemoglobin A1c; Future    Has flu vaccine at outside facility last mo.    Follow-up in about 1 year (around 12/4/2019). sooner if needed depending on labs.      Vaishali Camacho MD  Department of Internal Medicine - [facility]  7:27 AM  "

## 2018-12-28 ENCOUNTER — PATIENT MESSAGE (OUTPATIENT)
Dept: INTERNAL MEDICINE | Facility: CLINIC | Age: 58
End: 2018-12-28

## 2019-01-24 DIAGNOSIS — A60.00 GENITAL HERPES SIMPLEX, UNSPECIFIED SITE: ICD-10-CM

## 2019-01-25 RX ORDER — ACYCLOVIR 200 MG/1
CAPSULE ORAL
Qty: 60 CAPSULE | Refills: 3 | Status: SHIPPED | OUTPATIENT
Start: 2019-01-25 | End: 2019-07-02 | Stop reason: SDUPTHER

## 2019-04-25 ENCOUNTER — PATIENT MESSAGE (OUTPATIENT)
Dept: INTERNAL MEDICINE | Facility: CLINIC | Age: 59
End: 2019-04-25

## 2019-05-21 ENCOUNTER — TELEPHONE (OUTPATIENT)
Dept: INTERNAL MEDICINE | Facility: CLINIC | Age: 59
End: 2019-05-21

## 2019-05-21 NOTE — TELEPHONE ENCOUNTER
----- Message from Katie Mishra sent at 5/21/2019  4:53 PM CDT -----  Contact: Pt Portal Request  From: Royer Castillo     Sent: 5/21/2019  7:53 AM CDT       To: Patient Appointment Schedule Request Mailing List  Subject: Appointment Request    Appointment Request From: Royer Castillo    With Provider: Vaishali Camacho MD [[facility] - Internal Medicine]    Preferred Date Range: 8/1/2019 - 8/31/2019    Preferred Times: Any time    Reason for visit: Existing Patient    Comments:  Annual follow up

## 2019-05-22 ENCOUNTER — PATIENT MESSAGE (OUTPATIENT)
Dept: INTERNAL MEDICINE | Facility: CLINIC | Age: 59
End: 2019-05-22

## 2019-07-01 ENCOUNTER — OFFICE VISIT (OUTPATIENT)
Dept: URGENT CARE | Facility: CLINIC | Age: 59
End: 2019-07-01
Payer: OTHER MISCELLANEOUS

## 2019-07-01 VITALS
BODY MASS INDEX: 27.11 KG/M2 | DIASTOLIC BLOOD PRESSURE: 87 MMHG | WEIGHT: 183 LBS | SYSTOLIC BLOOD PRESSURE: 152 MMHG | HEART RATE: 73 BPM | OXYGEN SATURATION: 98 % | HEIGHT: 69 IN | RESPIRATION RATE: 20 BRPM

## 2019-07-01 DIAGNOSIS — M25.561 PAIN AND SWELLING OF KNEE, RIGHT: ICD-10-CM
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
## 2019-07-01 DIAGNOSIS — S83.421A SPRAIN OF LATERAL COLLATERAL LIGAMENT OF RIGHT KNEE, INITIAL ENCOUNTER: ICD-10-CM

## 2019-07-01 DIAGNOSIS — M25.461 PAIN AND SWELLING OF KNEE, RIGHT: ICD-10-CM

## 2019-07-01 PROCEDURE — 99203 PR OFFICE/OUTPT VISIT, NEW, LEVL III, 30-44 MIN: ICD-10-PCS | Mod: S$GLB,,, | Performed by: NURSE PRACTITIONER

## 2019-07-01 PROCEDURE — 99203 OFFICE O/P NEW LOW 30 MIN: CPT | Mod: S$GLB,,, | Performed by: NURSE PRACTITIONER

## 2019-07-01 PROCEDURE — 73564 XR KNEE COMP 4 OR MORE VIEWS RIGHT: ICD-10-PCS | Mod: RT,S$GLB,, | Performed by: RADIOLOGY

## 2019-07-01 PROCEDURE — 73564 X-RAY EXAM KNEE 4 OR MORE: CPT | Mod: RT,S$GLB,, | Performed by: RADIOLOGY

## 2019-07-01 RX ORDER — IBUPROFEN 200 MG
400 TABLET ORAL EVERY 6 HOURS PRN
Refills: 0 | COMMUNITY
Start: 2019-07-01 | End: 2019-07-08 | Stop reason: SDUPTHER

## 2019-07-01 RX ORDER — DEXTROMETHORPHAN HYDROBROMIDE, GUAIFENESIN 5; 100 MG/5ML; MG/5ML
650 LIQUID ORAL EVERY 8 HOURS
Refills: 0 | COMMUNITY
Start: 2019-07-01 | End: 2019-07-08 | Stop reason: SDUPTHER

## 2019-07-01 NOTE — PROGRESS NOTES
Subjective:       Patient ID: Royer Castillo is a 59 y.o. male.    Chief Complaint: Knee Injury    Pt states that walked into a hole which caused him to twist his right knee. Reports that he felt a pop which caused severe pain. Denies a h/o knee injury in the past, has been taking tylenol. States the pain is making him limp.  States the pain is worse with squatting and kneeling.    Knee Injury   This is a new problem. The current episode started yesterday. The problem occurs constantly. The problem has been unchanged. Associated symptoms include joint swelling. Pertinent negatives include no abdominal pain, chest pain, neck pain, numbness or weakness. The symptoms are aggravated by twisting, bending and walking. He has tried relaxation and acetaminophen (elevation ) for the symptoms. The treatment provided no relief.   Knee Pain    The incident occurred 12 to 24 hours ago. The incident occurred at work. The injury mechanism was a twisting injury. The pain is present in the right knee. The quality of the pain is described as aching. The pain is at a severity of 5/10. The pain is moderate. The pain has been constant since onset. Associated symptoms include an inability to bear weight. Pertinent negatives include no numbness. He reports no foreign bodies present. The symptoms are aggravated by movement. He has tried elevation for the symptoms. The treatment provided no relief.     Review of Systems   Constitution: Negative for malaise/fatigue.   HENT: Negative for nosebleeds.    Cardiovascular: Negative for chest pain and syncope.   Respiratory: Negative for shortness of breath.    Musculoskeletal: Positive for joint pain, joint swelling and stiffness. Negative for back pain and neck pain.   Gastrointestinal: Negative for abdominal pain.   Genitourinary: Negative for hematuria.   Neurological: Negative for dizziness, numbness and weakness.   All other systems reviewed and are negative.      Objective:       Physical Exam   Constitutional: He is oriented to person, place, and time. He appears well-developed and well-nourished. He is cooperative.  Non-toxic appearance. He does not appear ill. No distress.   Hypertensive in clinic   HENT:   Head: Normocephalic and atraumatic.   Right Ear: Hearing and external ear normal.   Left Ear: Hearing and external ear normal.   Eyes: Conjunctivae and lids are normal. Right eye exhibits no discharge. Left eye exhibits no discharge. No scleral icterus.   Sclera clear bilat   Neck: Trachea normal, normal range of motion, full passive range of motion without pain and phonation normal. Neck supple.   Cardiovascular: Normal rate, intact distal pulses and normal pulses.   Pulses:       Dorsalis pedis pulses are 2+ on the right side.        Posterior tibial pulses are 2+ on the right side.   Pulmonary/Chest: Effort normal. No respiratory distress.   Abdominal: Normal appearance. He exhibits no pulsatile midline mass.   Musculoskeletal: He exhibits tenderness. He exhibits no edema or deformity.        Right hip: Normal.        Right knee: He exhibits decreased range of motion and swelling. Tenderness found. Lateral joint line and LCL tenderness noted.        Right ankle: Normal.        Legs:  Antalgic gait is appreciated on exam.   Neurological: He is alert and oriented to person, place, and time. He displays normal reflexes. He exhibits normal muscle tone. Coordination normal.   Skin: Skin is warm, dry and intact. He is not diaphoretic. No erythema. No pallor.   Psychiatric: He has a normal mood and affect. His speech is normal and behavior is normal. Judgment and thought content normal. Cognition and memory are normal.   Nursing note and vitals reviewed.    X-ray Knee Complete 4 Or More Views Right    Result Date: 7/1/2019  EXAMINATION: XR KNEE COMP 4 OR MORE VIEWS RIGHT CLINICAL HISTORY: Civilian activity done for income or pay TECHNIQUE: AP, oblique, lateral and sunrise views of the  right knee. COMPARISON: None FINDINGS: There is mild narrowing of the medial femoral tibial compartment.  Small osteophytes.  No acute fracture or effusion.  There is some soft tissue calcifications about the anterior aspect of the patella as seen on the lateral.  No convincing fracture.     Mild degenerative change.  There are soft tissue calcifications anterior to the patella as seen on the lateral.  Correlation with point tenderness would be helpful. Electronically signed by: Olman Solis MD Date:    07/01/2019 Time:    09:42  Assessment:       1. Work related injury    2. Sprain of lateral collateral ligament of right knee, initial encounter    3. Pain and swelling of knee, right        Plan:         Medications Ordered This Encounter   Medications    acetaminophen (TYLENOL) 650 MG TbSR     Sig: Take 1 tablet (650 mg total) by mouth every 8 (eight) hours.     Refill:  0    ibuprofen (ADVIL,MOTRIN) 200 MG tablet     Sig: Take 2 tablets (400 mg total) by mouth every 6 (six) hours as needed for Pain.     Refill:  0     Patient Instructions: Attention not to aggravate affected area, Apply ice 24-48 hours then apply heat/warm soaks, Elevated affected area, Use splint as directed(please take tylenol or ibuprofen for pain and discomfort.)   Restrictions: No driving company vehicles, No Prolonged standing/walking, Avoid frequent bending/lifting/twisting, Avoid climbing/kneeling/squatting, Sit or stand as needed(No lifting/pulling/pushing over 5 lb; light duty; 07/01/2019)  Follow up in about 1 week (around 7/8/2019).

## 2019-07-02 ENCOUNTER — LAB VISIT (OUTPATIENT)
Dept: LAB | Facility: HOSPITAL | Age: 59
End: 2019-07-02
Attending: INTERNAL MEDICINE
Payer: COMMERCIAL

## 2019-07-02 ENCOUNTER — OFFICE VISIT (OUTPATIENT)
Dept: INTERNAL MEDICINE | Facility: CLINIC | Age: 59
End: 2019-07-02
Payer: COMMERCIAL

## 2019-07-02 ENCOUNTER — TELEPHONE (OUTPATIENT)
Dept: INTERNAL MEDICINE | Facility: CLINIC | Age: 59
End: 2019-07-02

## 2019-07-02 VITALS
WEIGHT: 188.69 LBS | HEIGHT: 69 IN | SYSTOLIC BLOOD PRESSURE: 132 MMHG | DIASTOLIC BLOOD PRESSURE: 86 MMHG | HEART RATE: 62 BPM | BODY MASS INDEX: 27.95 KG/M2 | TEMPERATURE: 98 F

## 2019-07-02 DIAGNOSIS — A60.00 GENITAL HERPES SIMPLEX, UNSPECIFIED SITE: ICD-10-CM

## 2019-07-02 DIAGNOSIS — Z12.5 PROSTATE CANCER SCREENING: ICD-10-CM

## 2019-07-02 DIAGNOSIS — Z00.00 ANNUAL PHYSICAL EXAM: ICD-10-CM

## 2019-07-02 DIAGNOSIS — Z00.00 ANNUAL PHYSICAL EXAM: Primary | ICD-10-CM

## 2019-07-02 DIAGNOSIS — Z12.11 COLON CANCER SCREENING: ICD-10-CM

## 2019-07-02 LAB
ALBUMIN SERPL BCP-MCNC: 4.3 G/DL (ref 3.5–5.2)
ALP SERPL-CCNC: 68 U/L (ref 55–135)
ALT SERPL W/O P-5'-P-CCNC: 24 U/L (ref 10–44)
ANION GAP SERPL CALC-SCNC: 8 MMOL/L (ref 8–16)
AST SERPL-CCNC: 19 U/L (ref 10–40)
BASOPHILS # BLD AUTO: 0.02 K/UL (ref 0–0.2)
BASOPHILS NFR BLD: 0.3 % (ref 0–1.9)
BILIRUB SERPL-MCNC: 0.9 MG/DL (ref 0.1–1)
BUN SERPL-MCNC: 11 MG/DL (ref 6–20)
CALCIUM SERPL-MCNC: 9.9 MG/DL (ref 8.7–10.5)
CHLORIDE SERPL-SCNC: 102 MMOL/L (ref 95–110)
CHOLEST SERPL-MCNC: 228 MG/DL (ref 120–199)
CHOLEST/HDLC SERPL: 4.7 {RATIO} (ref 2–5)
CO2 SERPL-SCNC: 29 MMOL/L (ref 23–29)
COMPLEXED PSA SERPL-MCNC: 0.49 NG/ML (ref 0–4)
CREAT SERPL-MCNC: 1 MG/DL (ref 0.5–1.4)
DIFFERENTIAL METHOD: NORMAL
EOSINOPHIL # BLD AUTO: 0.1 K/UL (ref 0–0.5)
EOSINOPHIL NFR BLD: 1.3 % (ref 0–8)
ERYTHROCYTE [DISTWIDTH] IN BLOOD BY AUTOMATED COUNT: 12.3 % (ref 11.5–14.5)
EST. GFR  (AFRICAN AMERICAN): >60 ML/MIN/1.73 M^2
EST. GFR  (NON AFRICAN AMERICAN): >60 ML/MIN/1.73 M^2
ESTIMATED AVG GLUCOSE: 120 MG/DL (ref 68–131)
GLUCOSE SERPL-MCNC: 99 MG/DL (ref 70–110)
HBA1C MFR BLD HPLC: 5.8 % (ref 4–5.6)
HCT VFR BLD AUTO: 43.7 % (ref 40–54)
HDLC SERPL-MCNC: 49 MG/DL (ref 40–75)
HDLC SERPL: 21.5 % (ref 20–50)
HGB BLD-MCNC: 14.9 G/DL (ref 14–18)
LDLC SERPL CALC-MCNC: 161 MG/DL (ref 63–159)
LYMPHOCYTES # BLD AUTO: 1.8 K/UL (ref 1–4.8)
LYMPHOCYTES NFR BLD: 24.7 % (ref 18–48)
MCH RBC QN AUTO: 30.5 PG (ref 27–31)
MCHC RBC AUTO-ENTMCNC: 34.1 G/DL (ref 32–36)
MCV RBC AUTO: 90 FL (ref 82–98)
MONOCYTES # BLD AUTO: 0.5 K/UL (ref 0.3–1)
MONOCYTES NFR BLD: 7.6 % (ref 4–15)
NEUTROPHILS # BLD AUTO: 4.7 K/UL (ref 1.8–7.7)
NEUTROPHILS NFR BLD: 66.1 % (ref 38–73)
NONHDLC SERPL-MCNC: 179 MG/DL
PLATELET # BLD AUTO: 247 K/UL (ref 150–350)
PMV BLD AUTO: 10.2 FL (ref 9.2–12.9)
POTASSIUM SERPL-SCNC: 4.7 MMOL/L (ref 3.5–5.1)
PROT SERPL-MCNC: 7.1 G/DL (ref 6–8.4)
RBC # BLD AUTO: 4.88 M/UL (ref 4.6–6.2)
SODIUM SERPL-SCNC: 139 MMOL/L (ref 136–145)
TRIGL SERPL-MCNC: 90 MG/DL (ref 30–150)
WBC # BLD AUTO: 7.13 K/UL (ref 3.9–12.7)

## 2019-07-02 PROCEDURE — 84153 ASSAY OF PSA TOTAL: CPT

## 2019-07-02 PROCEDURE — 80061 LIPID PANEL: CPT

## 2019-07-02 PROCEDURE — 3079F DIAST BP 80-89 MM HG: CPT | Mod: CPTII,S$GLB,, | Performed by: INTERNAL MEDICINE

## 2019-07-02 PROCEDURE — 85025 COMPLETE CBC W/AUTO DIFF WBC: CPT

## 2019-07-02 PROCEDURE — 80053 COMPREHEN METABOLIC PANEL: CPT

## 2019-07-02 PROCEDURE — 3079F PR MOST RECENT DIASTOLIC BLOOD PRESSURE 80-89 MM HG: ICD-10-PCS | Mod: CPTII,S$GLB,, | Performed by: INTERNAL MEDICINE

## 2019-07-02 PROCEDURE — 99396 PREV VISIT EST AGE 40-64: CPT | Mod: S$GLB,,, | Performed by: INTERNAL MEDICINE

## 2019-07-02 PROCEDURE — 3075F SYST BP GE 130 - 139MM HG: CPT | Mod: CPTII,S$GLB,, | Performed by: INTERNAL MEDICINE

## 2019-07-02 PROCEDURE — 99999 PR PBB SHADOW E&M-EST. PATIENT-LVL III: ICD-10-PCS | Mod: PBBFAC,,, | Performed by: INTERNAL MEDICINE

## 2019-07-02 PROCEDURE — 99396 PR PREVENTIVE VISIT,EST,40-64: ICD-10-PCS | Mod: S$GLB,,, | Performed by: INTERNAL MEDICINE

## 2019-07-02 PROCEDURE — 36415 COLL VENOUS BLD VENIPUNCTURE: CPT

## 2019-07-02 PROCEDURE — 99999 PR PBB SHADOW E&M-EST. PATIENT-LVL III: CPT | Mod: PBBFAC,,, | Performed by: INTERNAL MEDICINE

## 2019-07-02 PROCEDURE — 83036 HEMOGLOBIN GLYCOSYLATED A1C: CPT

## 2019-07-02 PROCEDURE — 3075F PR MOST RECENT SYSTOLIC BLOOD PRESS GE 130-139MM HG: ICD-10-PCS | Mod: CPTII,S$GLB,, | Performed by: INTERNAL MEDICINE

## 2019-07-02 RX ORDER — ACYCLOVIR 200 MG/1
CAPSULE ORAL
Qty: 60 CAPSULE | Refills: 11 | Status: SHIPPED | OUTPATIENT
Start: 2019-07-02 | End: 2020-07-24
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
## 2019-07-03 ENCOUNTER — TELEPHONE (OUTPATIENT)
Dept: INTERNAL MEDICINE | Facility: CLINIC | Age: 59
End: 2019-07-03

## 2019-07-03 NOTE — TELEPHONE ENCOUNTER
Please call and notify pt:    PSA, Blood counts, liver, kidney and thyroid functions are normal.    His a1c is back in the prediabetes range and cholesterol are high again. We can either restart the cholesterol medicine at this time or we can do the trial of diet/exercise/weight loss again and recheck levels in 3 mo carly Odom and determine if meds necessary.

## 2019-07-03 NOTE — TELEPHONE ENCOUNTER
----- Message from Shalonda Simpson sent at 7/3/2019  4:14 PM CDT -----  Contact: self/[phone]  Type: Returning a call    Who left a message? Maddy MATA     When did the practice call? Today     Comments: Please call and advise.         Thank You

## 2019-07-08 ENCOUNTER — OFFICE VISIT (OUTPATIENT)
Dept: URGENT CARE | Facility: CLINIC | Age: 59
End: 2019-07-08
Payer: OTHER MISCELLANEOUS

## 2019-07-08 VITALS
RESPIRATION RATE: 18 BRPM | TEMPERATURE: 98 F | OXYGEN SATURATION: 98 % | HEIGHT: 69 IN | HEART RATE: 60 BPM | SYSTOLIC BLOOD PRESSURE: 151 MMHG | WEIGHT: 188.69 LBS | DIASTOLIC BLOOD PRESSURE: 88 MMHG | BODY MASS INDEX: 27.95 KG/M2
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
## 2019-07-08 DIAGNOSIS — M25.561 PAIN AND SWELLING OF KNEE, RIGHT: ICD-10-CM

## 2019-07-08 DIAGNOSIS — M25.461 PAIN AND SWELLING OF KNEE, RIGHT: ICD-10-CM

## 2019-07-08 DIAGNOSIS — S83.421D SPRAIN OF LATERAL COLLATERAL LIGAMENT OF RIGHT KNEE, SUBSEQUENT ENCOUNTER: ICD-10-CM

## 2019-07-08 PROCEDURE — 99214 OFFICE O/P EST MOD 30 MIN: CPT | Mod: S$GLB,,, | Performed by: NURSE PRACTITIONER

## 2019-07-08 PROCEDURE — 99214 PR OFFICE/OUTPT VISIT, EST, LEVL IV, 30-39 MIN: ICD-10-PCS | Mod: S$GLB,,, | Performed by: NURSE PRACTITIONER

## 2019-07-08 RX ORDER — DEXTROMETHORPHAN HYDROBROMIDE, GUAIFENESIN 5; 100 MG/5ML; MG/5ML
650 LIQUID ORAL EVERY 8 HOURS
Refills: 0 | COMMUNITY
Start: 2019-07-08 | End: 2019-07-15 | Stop reason: SDUPTHER

## 2019-07-08 RX ORDER — IBUPROFEN 200 MG
400 TABLET ORAL EVERY 6 HOURS PRN
Refills: 0 | COMMUNITY
Start: 2019-07-08 | End: 2019-07-15

## 2019-07-08 NOTE — PROGRESS NOTES
"Subjective:       Patient ID: Royer Castillo is a 59 y.o. male.    Vitals:  height is 5' 9" (1.753 m) and weight is 85.6 kg (188 lb 11.4 oz). His oral temperature is 98 °F (36.7 °C). His blood pressure is 151/88 (abnormal) and his pulse is 60. His respiration is 18 and oxygen saturation is 98%.     Chief Complaint: Knee Injury    Patient here today for a follow up visit on Right knee injury. Patient reports the pain is a little better since last visit.    Knee Injury   This is a chronic problem. The current episode started 1 to 4 weeks ago. The problem occurs intermittently. The problem has been gradually improving. Associated symptoms include arthralgias, joint swelling and weakness. Pertinent negatives include no abdominal pain, numbness or vertigo. The symptoms are aggravated by bending, standing and walking. He has tried acetaminophen, NSAIDs, position changes and ice for the symptoms. The treatment provided mild relief.       HENT: Negative for facial swelling and facial trauma.    Neck: Negative for neck stiffness.   Cardiovascular: Negative for chest trauma.   Eyes: Negative for eye trauma, double vision and blurred vision.   Gastrointestinal: Negative for abdominal trauma, abdominal pain and rectal bleeding.   Genitourinary: Negative for hematuria, genital trauma and pelvic pain.   Musculoskeletal: Positive for pain, joint pain, joint swelling, abnormal ROM of joint and pain with walking. Negative for trauma.        Right knee   Skin: Negative for color change, wound, abrasion, laceration and erythema.   Neurological: Negative for dizziness, history of vertigo, light-headedness, coordination disturbances, altered mental status, loss of consciousness and numbness.   Hematologic/Lymphatic: Negative for history of bleeding disorder.   Psychiatric/Behavioral: Negative for altered mental status.       Objective:      Physical Exam   Constitutional: He is oriented to person, place, and time. He appears " well-developed and well-nourished. He is cooperative.  Non-toxic appearance. He does not appear ill. No distress.   Hypertensive in clinic   HENT:   Head: Normocephalic and atraumatic.   Right Ear: Hearing and external ear normal.   Left Ear: Hearing and external ear normal.   Eyes: Conjunctivae and lids are normal. Right eye exhibits no discharge. Left eye exhibits no discharge. No scleral icterus.   Sclera clear bilat   Neck: Trachea normal, normal range of motion, full passive range of motion without pain and phonation normal. Neck supple.   Cardiovascular: Normal rate, intact distal pulses and normal pulses.   Pulses:       Dorsalis pedis pulses are 2+ on the right side.        Posterior tibial pulses are 2+ on the right side.   Pulmonary/Chest: Effort normal. No respiratory distress.   Abdominal: Normal appearance. He exhibits no pulsatile midline mass.   Musculoskeletal: He exhibits tenderness. He exhibits no edema or deformity.        Right hip: Normal.        Right knee: He exhibits decreased range of motion and swelling. Tenderness found. Lateral joint line and LCL tenderness noted.        Right ankle: Normal.        Legs:  Neurological: He is alert and oriented to person, place, and time. He displays normal reflexes. He exhibits normal muscle tone. Coordination normal.   Skin: Skin is warm, dry and intact. He is not diaphoretic. No erythema. No pallor.   Psychiatric: He has a normal mood and affect. His speech is normal and behavior is normal. Judgment and thought content normal. Cognition and memory are normal.   Nursing note and vitals reviewed.      Assessment:       1. Work related injury    2. Sprain of lateral collateral ligament of right knee, subsequent encounter    3. Pain and swelling of knee, right        Plan:         Work related injury  -     ibuprofen (ADVIL,MOTRIN) 200 MG tablet; Take 2 tablets (400 mg total) by mouth every 6 (six) hours as needed for Pain.; Refill: 0  -     acetaminophen  (TYLENOL) 650 MG TbSR; Take 1 tablet (650 mg total) by mouth every 8 (eight) hours.; Refill: 0  -     MRI Knee Without Contrast Right; Future; Expected date: 07/08/2019    Sprain of lateral collateral ligament of right knee, subsequent encounter  -     ibuprofen (ADVIL,MOTRIN) 200 MG tablet; Take 2 tablets (400 mg total) by mouth every 6 (six) hours as needed for Pain.; Refill: 0  -     acetaminophen (TYLENOL) 650 MG TbSR; Take 1 tablet (650 mg total) by mouth every 8 (eight) hours.; Refill: 0  -     MRI Knee Without Contrast Right; Future; Expected date: 07/08/2019    Pain and swelling of knee, right  -     ibuprofen (ADVIL,MOTRIN) 200 MG tablet; Take 2 tablets (400 mg total) by mouth every 6 (six) hours as needed for Pain.; Refill: 0  -     acetaminophen (TYLENOL) 650 MG TbSR; Take 1 tablet (650 mg total) by mouth every 8 (eight) hours.; Refill: 0  -     MRI Knee Without Contrast Right; Future; Expected date: 07/08/2019

## 2019-07-08 NOTE — LETTER
[facility] [address] [address]  Phone: [phone]  Fax: [phone]  [facility]: [id number]    Pt Name: Royer Castillo  Injury Date: 06/30/2019   Employee ID:  Date of First Treatment: 07/08/2019   Company: Networked reference to record EEP 1000[[facility] DEPARTMENT      Appointment Time: 08:00 AM Arrived: 8am   Provider: Sandie Manning NP Time Out:835am     Office Treatment:   1. Work related injury    2. Sprain of lateral collateral ligament of right knee, subsequent encounter    3. Pain and swelling of knee, right      Medications Ordered This Encounter   Medications    acetaminophen (TYLENOL) 650 MG TbSR    ibuprofen (ADVIL,MOTRIN) 200 MG tablet      Patient Instructions: Attention not to aggravate affected area, Daily home exercises/warm soaks, Apply ice 24-48 hours then apply heat/warm soaks, Elevated affected area, MRI to be scheduled once authorized, Use splint as directed(Please take Tylenol or ibuprofen for pain and discomfort)    Restrictions: Sedentary work only(07/08/19)     Return Appointment: 07/15/19 at 815am

## 2019-07-10 ENCOUNTER — HOSPITAL ENCOUNTER (OUTPATIENT)
Dept: RADIOLOGY | Facility: HOSPITAL | Age: 59
Discharge: HOME | End: 2019-07-10
Attending: NURSE PRACTITIONER
Payer: OTHER MISCELLANEOUS

## 2019-07-10 DIAGNOSIS — S83.421D SPRAIN OF LATERAL COLLATERAL LIGAMENT OF RIGHT KNEE, SUBSEQUENT ENCOUNTER: ICD-10-CM

## 2019-07-10 DIAGNOSIS — M25.561 PAIN AND SWELLING OF KNEE, RIGHT: ICD-10-CM
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
## 2019-07-10 DIAGNOSIS — M25.461 PAIN AND SWELLING OF KNEE, RIGHT: ICD-10-CM

## 2019-07-10 PROCEDURE — 73721 MRI JNT OF LWR EXTRE W/O DYE: CPT | Mod: TC,RT

## 2019-07-10 PROCEDURE — 73721 MRI JNT OF LWR EXTRE W/O DYE: CPT | Mod: 26,RT,, | Performed by: RADIOLOGY

## 2019-07-10 PROCEDURE — 73721 MRI KNEE WITHOUT CONTRAST RIGHT: ICD-10-PCS | Mod: 26,RT,, | Performed by: RADIOLOGY

## 2019-07-11 ENCOUNTER — TELEPHONE (OUTPATIENT)
Dept: URGENT CARE | Facility: CLINIC | Age: 59
End: 2019-07-11

## 2019-07-11 DIAGNOSIS — S83.241D TEAR OF MEDIAL MENISCUS OF RIGHT KNEE, CURRENT, UNSPECIFIED TEAR TYPE, SUBSEQUENT ENCOUNTER: Primary | ICD-10-CM

## 2019-07-11 DIAGNOSIS — S83.281D TEAR OF LATERAL MENISCUS OF RIGHT KNEE, CURRENT, UNSPECIFIED TEAR TYPE, SUBSEQUENT ENCOUNTER: ICD-10-CM

## 2019-07-11 NOTE — TELEPHONE ENCOUNTER
called with MRI results. he has a medial and lateral menical tear. ref to ortho placed in [facility]. LM to call clinic.-NICA Simms      Mri Knee Without Contrast Right    Result Date: 7/10/2019  EXAMINATION: MRI KNEE WITHOUT CONTRAST RIGHT CLINICAL HISTORY: Knee trauma, xray neg or Segond fx, internal derangement suspected;Civilian activity done for income or pay TECHNIQUE: Multiplanar, multisequence images were performed about the knee. COMPARISON: Radiograph 07/01/2019 FINDINGS: Low grade tear at the origin of the ACL. The PCL is intact. The MCL and LCL complex are intact. There is a horizontal tear at the junction of body and posterior horn of the medial meniscus which appears to extend to the inferior surface of the meniscus. There is truncation of the inner edge of the body of the lateral meniscus with a small apparent horizontal tear at the body of the lateral meniscus.  Small complex para meniscal cyst noted adjacent to the body and anterior horn of the lateral meniscus. The quadriceps and patellar tendons appear normal. The patellar cartilage is intact.  The trochlear cartilage is intact. The medial knee compartment cartilage is intact. Focal high-grade area of chondromalacia involving the posterior lateral femoral condyle cartilage, an area measuring 1.0 x 0.7 cm.  No subchondral edema. No osseous lesions, no fracture. No Baker cyst. There is edema of the soft tissue just posterior to the PCL along the posterior capsule where I suspect a small  partially ossified osseous body which can be seen on the radiograph projecting posterior to the knee joint. There is edema of the prepatellar soft tissues. There is edema of the Kager's fat. No joint effusion.     Horizontal tear involving the body of the lateral meniscus with truncation of its inner edge and adjacent para meniscal cyst. Horizontal tear at the junction of body and posterior horn of the medial meniscus. Small focal area of high-grade  chondromalacia posterolateral femoral condyle without subchondral edema. Ill-defined soft tissue edema posterior to the PCL along the posterior capsule where I suspect a small partially ossified body as seen on the radiograph. Electronically signed by: Chandni Samaniego MD Date:    07/10/2019 Time:    14:07

## 2019-07-15 ENCOUNTER — OFFICE VISIT (OUTPATIENT)
Dept: URGENT CARE | Facility: CLINIC | Age: 59
End: 2019-07-15
Payer: OTHER MISCELLANEOUS

## 2019-07-15 VITALS
HEART RATE: 72 BPM | SYSTOLIC BLOOD PRESSURE: 142 MMHG | BODY MASS INDEX: 27.85 KG/M2 | DIASTOLIC BLOOD PRESSURE: 86 MMHG | HEIGHT: 69 IN | WEIGHT: 188 LBS | RESPIRATION RATE: 20 BRPM | OXYGEN SATURATION: 98 % | TEMPERATURE: 99 F

## 2019-07-15 DIAGNOSIS — S83.241D TEAR OF MEDIAL MENISCUS OF RIGHT KNEE, CURRENT, UNSPECIFIED TEAR TYPE, SUBSEQUENT ENCOUNTER: ICD-10-CM
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
## 2019-07-15 DIAGNOSIS — S83.281D TEAR OF LATERAL MENISCUS OF RIGHT KNEE, CURRENT, UNSPECIFIED TEAR TYPE, SUBSEQUENT ENCOUNTER: ICD-10-CM

## 2019-07-15 DIAGNOSIS — M25.461 PAIN AND SWELLING OF KNEE, RIGHT: ICD-10-CM

## 2019-07-15 DIAGNOSIS — M25.561 PAIN AND SWELLING OF KNEE, RIGHT: ICD-10-CM

## 2019-07-15 PROCEDURE — 99214 PR OFFICE/OUTPT VISIT, EST, LEVL IV, 30-39 MIN: ICD-10-PCS | Mod: S$GLB,,, | Performed by: NURSE PRACTITIONER

## 2019-07-15 PROCEDURE — 99214 OFFICE O/P EST MOD 30 MIN: CPT | Mod: S$GLB,,, | Performed by: NURSE PRACTITIONER

## 2019-07-15 RX ORDER — IBUPROFEN 200 MG
400 TABLET ORAL EVERY 6 HOURS PRN
Refills: 0 | COMMUNITY
Start: 2019-07-15 | End: 2019-11-04 | Stop reason: ALTCHOICE

## 2019-07-15 RX ORDER — DEXTROMETHORPHAN HYDROBROMIDE, GUAIFENESIN 5; 100 MG/5ML; MG/5ML
650 LIQUID ORAL EVERY 8 HOURS
Refills: 0 | COMMUNITY
Start: 2019-07-15 | End: 2019-11-04 | Stop reason: ALTCHOICE

## 2019-07-15 NOTE — PROGRESS NOTES
Subjective:       Patient ID: Royer Castillo is a 59 y.o. male.    Chief Complaint: Work Related Injury    3 weeks ago pt stepped inside a whole at work and twisted his right knee. Pt states he is doing a little better, its able to bear weight fully. He hasnt taken anything for the pain.    Knee Injury   This is a new problem. The current episode started 1 to 4 weeks ago. The problem occurs constantly. The problem has been gradually improving. Associated symptoms include joint swelling. Pertinent negatives include no abdominal pain, chest pain, neck pain, numbness or weakness. The symptoms are aggravated by walking, twisting and bending. He has tried immobilization and relaxation for the symptoms. The treatment provided mild relief.     Review of Systems   Constitution: Negative for malaise/fatigue.   HENT: Negative for nosebleeds.    Cardiovascular: Negative for chest pain and syncope.   Respiratory: Negative for shortness of breath.    Musculoskeletal: Positive for joint pain and joint swelling. Negative for back pain and neck pain.   Gastrointestinal: Negative for abdominal pain.   Genitourinary: Negative for hematuria.   Neurological: Negative for dizziness, numbness and weakness.   All other systems reviewed and are negative.      Objective:      Physical Exam   Constitutional: He is oriented to person, place, and time. He appears well-developed and well-nourished. He is cooperative.  Non-toxic appearance. He does not appear ill. No distress.   Hypertensive in clinic   HENT:   Head: Normocephalic and atraumatic.   Right Ear: Hearing and external ear normal.   Left Ear: Hearing and external ear normal.   Eyes: Conjunctivae and lids are normal. Right eye exhibits no discharge. Left eye exhibits no discharge. No scleral icterus.   Sclera clear bilat   Neck: Trachea normal, normal range of motion, full passive range of motion without pain and phonation normal. Neck supple.   Cardiovascular: Normal rate,  intact distal pulses and normal pulses.   Pulses:       Dorsalis pedis pulses are 2+ on the right side.        Posterior tibial pulses are 2+ on the right side.   Pulmonary/Chest: Effort normal. No respiratory distress.   Abdominal: Normal appearance. He exhibits no pulsatile midline mass.   Musculoskeletal: He exhibits tenderness. He exhibits no edema or deformity.        Right hip: Normal.        Right knee: He exhibits decreased range of motion and swelling. Tenderness found. Lateral joint line and LCL tenderness noted.        Right ankle: Normal.        Legs:  There is no change in assessment from previous visit. Ortho referral was placed.   Neurological: He is alert and oriented to person, place, and time. He displays normal reflexes. He exhibits normal muscle tone. Coordination normal.   Skin: Skin is warm, dry and intact. He is not diaphoretic. No erythema. No pallor.   Psychiatric: He has a normal mood and affect. His speech is normal and behavior is normal. Judgment and thought content normal. Cognition and memory are normal.   Nursing note and vitals reviewed.      Assessment:       1. Work related injury    2. Tear of lateral meniscus of right knee, current, unspecified tear type, subsequent encounter    3. Tear of medial meniscus of right knee, current, unspecified tear type, subsequent encounter    4. Pain and swelling of knee, right        Plan:         Medications Ordered This Encounter   Medications    acetaminophen (TYLENOL) 650 MG TbSR     Sig: Take 1 tablet (650 mg total) by mouth every 8 (eight) hours.     Refill:  0    ibuprofen (ADVIL,MOTRIN) 200 MG tablet     Sig: Take 2 tablets (400 mg total) by mouth every 6 (six) hours as needed for Pain.     Refill:  0     Patient Instructions: Attention not to aggravate affected area, Apply ice 24-48 hours then apply heat/warm soaks, Elevated affected area, Referral to specialist to be scheduled, once authorized, Use splint as directed(you may take  tylenol or ibuprofen for pain and discomfort)   Restrictions: Sedentary work only(light duty; 07/15/19)  Follow up in about 2 weeks (around 7/29/2019).

## 2019-07-15 NOTE — LETTER
[facility] [address] [address]  Phone: [phone]  Fax: [phone]  [facility]: [id number]    Pt Name: Royer Castillo  Injury Date: 06/30/2019   Employee ID:  Date of First Treatment: 07/15/2019   Company: [employer] reference to record [employer] DEPARTMENT      Appointment Time: 08:00 AM Arrived: 8am   Provider: Sandie Manning NP Time Out:820am     Office Treatment:   1. Work related injury    2. Tear of lateral meniscus of right knee, current, unspecified tear type, subsequent encounter    3. Tear of medial meniscus of right knee, current, unspecified tear type, subsequent encounter    4. Pain and swelling of knee, right      Medications Ordered This Encounter   Medications    acetaminophen (TYLENOL) 650 MG TbSR    ibuprofen (ADVIL,MOTRIN) 200 MG tablet      Patient Instructions: Attention not to aggravate affected area, Apply ice 24-48 hours then apply heat/warm soaks, Elevated affected area, Referral to specialist to be scheduled, once authorized, Use splint as directed(you may take tylenol or ibuprofen for pain and discomfort)    Restrictions: Sedentary work only(light duty; 07/15/19)     Return Appointment: 07/29/19 at 8am

## 2019-07-29 ENCOUNTER — OFFICE VISIT (OUTPATIENT)
Dept: URGENT CARE | Facility: CLINIC | Age: 59
End: 2019-07-29
Payer: OTHER MISCELLANEOUS

## 2019-07-29 VITALS
HEART RATE: 61 BPM | HEIGHT: 69 IN | WEIGHT: 188 LBS | SYSTOLIC BLOOD PRESSURE: 151 MMHG | DIASTOLIC BLOOD PRESSURE: 101 MMHG | TEMPERATURE: 97 F | BODY MASS INDEX: 27.85 KG/M2 | OXYGEN SATURATION: 98 % | RESPIRATION RATE: 20 BRPM

## 2019-07-29 DIAGNOSIS — S83.241S ACUTE MEDIAL MENISCAL TEAR, RIGHT, SEQUELA: ICD-10-CM

## 2019-07-29 DIAGNOSIS — S83.281S ACUTE LATERAL MENISCAL TEAR, RIGHT, SEQUELA: Primary | ICD-10-CM
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
## 2019-07-29 PROCEDURE — 99214 PR OFFICE/OUTPT VISIT, EST, LEVL IV, 30-39 MIN: ICD-10-PCS | Mod: S$GLB,,, | Performed by: PHYSICIAN ASSISTANT

## 2019-07-29 PROCEDURE — 99214 OFFICE O/P EST MOD 30 MIN: CPT | Mod: S$GLB,,, | Performed by: PHYSICIAN ASSISTANT

## 2019-07-29 NOTE — LETTER
[facility] [address] [address]  Phone: [phone]  Fax: [phone]  [facility]: [id number]    Pt Name: Royer Castillo  Injury Date: 06/30/2019   Employee ID:  Date of First Treatment: 07/29/2019   Company: [employer] reference to record [employer] DEPARTMENT      Appointment Time: 07:45 AM Arrived: 8:08 am   Provider: Royer Monson PA-C Time Out:9:08 am     Office Treatment:   1. Acute lateral meniscal tear, right, sequela    2. Acute medial meniscal tear, right, sequela    3. Work related injury          Patient Instructions: Referral to specialist to be scheduled, once authorized, Daily home exercises/warm soaks    Restrictions: Discharged to Ortho/Neuro/Opthomologist/Surgeon, Disabled until next office visit     Return Appointment:

## 2019-07-29 NOTE — PROGRESS NOTES
Subjective:       Patient ID: Royer Castillo is a 59 y.o. male.    Chief Complaint: Work Related Injury    Pt is returning from a knee injury. He states its feeling a lot better since he has been off of it. He has taken anything.    Knee Injury   This is a new problem. The current episode started 1 to 4 weeks ago. The problem occurs constantly. The problem has been gradually improving. Pertinent negatives include no abdominal pain, chest pain, chills, coughing, fever, neck pain, numbness or rash. Nothing aggravates the symptoms. He has tried NSAIDs for the symptoms.     Review of Systems   Constitution: Negative for chills and fever.   HENT: Negative for hearing loss and nosebleeds.    Eyes: Negative for blurred vision and redness.   Cardiovascular: Negative for chest pain and syncope.   Respiratory: Negative for cough and shortness of breath.    Hematologic/Lymphatic: Negative for bleeding problem.   Skin: Negative for color change and rash.   Musculoskeletal: Positive for joint pain. Negative for back pain and neck pain.   Gastrointestinal: Negative for abdominal pain.   Neurological: Negative for numbness and paresthesias.   Psychiatric/Behavioral: The patient is not nervous/anxious.        Objective:      Physical Exam   Constitutional: He appears well-developed and well-nourished. He is active. No distress.   HENT:   Head: Normocephalic and atraumatic.   Right Ear: Hearing and external ear normal.   Left Ear: Hearing and external ear normal.   Nose: Nose normal. No nasal deformity. No epistaxis.   Mouth/Throat: Oropharynx is clear and moist and mucous membranes are normal.   Eyes: Conjunctivae and lids are normal. No scleral icterus.   Neck: Trachea normal and normal range of motion.   Cardiovascular: Intact distal pulses and normal pulses.   Pulmonary/Chest: Effort normal. No stridor. No respiratory distress.   Musculoskeletal:        Right knee: He exhibits decreased range of motion. Tenderness found.    Neurological: He is alert. He has normal strength. He is not disoriented. No sensory deficit. GCS eye subscore is 4. GCS verbal subscore is 5. GCS motor subscore is 6.   Skin: Skin is warm, dry and intact. Capillary refill takes less than 2 seconds. He is not diaphoretic.   Psychiatric: He has a normal mood and affect. His speech is normal and behavior is normal. He is attentive.   Nursing note and vitals reviewed.        X-ray Knee Complete 4 Or More Views Right    Result Date: 7/1/2019  EXAMINATION: XR KNEE COMP 4 OR MORE VIEWS RIGHT CLINICAL HISTORY: Civilian activity done for income or pay TECHNIQUE: AP, oblique, lateral and sunrise views of the right knee. COMPARISON: None FINDINGS: There is mild narrowing of the medial femoral tibial compartment.  Small osteophytes.  No acute fracture or effusion.  There is some soft tissue calcifications about the anterior aspect of the patella as seen on the lateral.  No convincing fracture.     Mild degenerative change.  There are soft tissue calcifications anterior to the patella as seen on the lateral.  Correlation with point tenderness would be helpful. Electronically signed by: Olman Solis MD Date:    07/01/2019 Time:    09:42    Mri Knee Without Contrast Right    Result Date: 7/10/2019  EXAMINATION: MRI KNEE WITHOUT CONTRAST RIGHT CLINICAL HISTORY: Knee trauma, xray neg or Segond fx, internal derangement suspected;Civilian activity done for income or pay TECHNIQUE: Multiplanar, multisequence images were performed about the knee. COMPARISON: Radiograph 07/01/2019 FINDINGS: Low grade tear at the origin of the ACL. The PCL is intact. The MCL and LCL complex are intact. There is a horizontal tear at the junction of body and posterior horn of the medial meniscus which appears to extend to the inferior surface of the meniscus. There is truncation of the inner edge of the body of the lateral meniscus with a small apparent horizontal tear at the body of the lateral  meniscus.  Small complex para meniscal cyst noted adjacent to the body and anterior horn of the lateral meniscus. The quadriceps and patellar tendons appear normal. The patellar cartilage is intact.  The trochlear cartilage is intact. The medial knee compartment cartilage is intact. Focal high-grade area of chondromalacia involving the posterior lateral femoral condyle cartilage, an area measuring 1.0 x 0.7 cm.  No subchondral edema. No osseous lesions, no fracture. No Baker cyst. There is edema of the soft tissue just posterior to the PCL along the posterior capsule where I suspect a small  partially ossified osseous body which can be seen on the radiograph projecting posterior to the knee joint. There is edema of the prepatellar soft tissues. There is edema of the Kager's fat. No joint effusion.     Horizontal tear involving the body of the lateral meniscus with truncation of its inner edge and adjacent para meniscal cyst. Horizontal tear at the junction of body and posterior horn of the medial meniscus. Small focal area of high-grade chondromalacia posterolateral femoral condyle without subchondral edema. Ill-defined soft tissue edema posterior to the PCL along the posterior capsule where I suspect a small partially ossified body as seen on the radiograph. Electronically signed by: Chandni Samaniego MD Date:    07/10/2019 Time:    14:07    Assessment:       1. Acute lateral meniscal tear, right, sequela    2. Acute medial meniscal tear, right, sequela    3. Work related injury        Plan:            Patient Instructions: Referral to specialist to be scheduled, once authorized, Daily home exercises/warm soaks   Restrictions: Discharged to Ortho/Neuro/Opthomologist/Surgeon, Disabled until next office visit  Follow up if symptoms worsen or fail to improve.

## 2019-08-09 NOTE — PROGRESS NOTES
Subjective:          Chief Complaint: Royer Castillo is a 59 y.o. male who had concerns including Pain of the Right Knee.    Patient is a 59-year-old  who presents for evaluation of right knee pain. Patient reports that he sustained a knee injury on June 30th, when he was stepping on even ground while at work twisted his knee heard a pop and had immediate pain and swelling. He has not tried any physical therapy injections or anti-inflammatories since the injury.  He denies any right knee injuries or any right knee pain prior to this injury.  He has no past medical history that does not take any medications has not had any prior surgeries.  He denies any groin pain or any calf pain. Pain has been consistent since the injury.          Review of Systems   Constitution: Negative for fever and night sweats.   HENT: Negative for hearing loss.    Eyes: Negative for blurred vision and visual disturbance.   Cardiovascular: Negative for chest pain and leg swelling.   Respiratory: Negative for shortness of breath.    Endocrine: Negative for polyuria.   Hematologic/Lymphatic: Negative for bleeding problem.   Skin: Negative for rash.   Musculoskeletal: Negative for back pain, joint pain, joint swelling, muscle cramps and muscle weakness.   Gastrointestinal: Negative for melena.   Genitourinary: Negative for hematuria.   Neurological: Negative for loss of balance, numbness and paresthesias.   Psychiatric/Behavioral: Negative for altered mental status.                   Objective:        General: Royer is well-developed, well-nourished, appears stated age, in no acute distress, alert and oriented to time, place and person.     General    Vitals reviewed.  Constitutional: He is oriented to person, place, and time. He appears well-developed and well-nourished. No distress.   HENT:   Mouth/Throat: No oropharyngeal exudate.   Eyes: Right eye exhibits no discharge. Left eye exhibits no discharge.   Neck: Normal range  of motion.   Pulmonary/Chest: Effort normal and breath sounds normal. No respiratory distress.   Neurological: He is alert and oriented to person, place, and time. He has normal reflexes. No cranial nerve deficit. Coordination normal.   Psychiatric: He has a normal mood and affect. His behavior is normal. Judgment and thought content normal.     General Musculoskeletal Exam   Gait: normal       Right Knee Exam     Inspection   Erythema: absent  Scars: absent  Swelling: absent  Effusion: absent  Deformity: absent  Bruising: absent    Tenderness   The patient is tender to palpation of the no tenderness and LCL.    Range of Motion   Extension: 0   Flexion: 140     Tests   Meniscus   Dena:  Medial - negative Lateral - negative  Ligament Examination Lachman: normal (-1 to 2mm) PCL-Posterior Drawer: normal (0 to 2mm)     MCL - Valgus: normal (0 to 2mm)  LCL - Varus: normalPivot Shift: normal (Equal)Reverse Pivot Shift: normal (Equal)Dial Test at 30 degrees: normal (< 5 degrees)Dial Test at 90 degrees: normal (< 5 degrees)  Posterior Sag Test: negative  Posterolateral Corner: unstable (>15 degrees difference)  Patella   Patellar apprehension: negative  Passive Patellar Tilt: neutral  Patellar Tracking: normal  Patellar Glide (quadrants): Lateral - 1   Medial - 2  Q-Angle at 90 degrees: normal  Patellar Grind: negative  J-Sign: none    Other   Meniscal Cyst: absent  Popliteal (Baker's) Cyst: absent  Sensation: normal    Left Knee Exam     Inspection   Erythema: absent  Scars: absent  Swelling: absent  Effusion: absent  Deformity: absent  Bruising: absent    Tenderness   The patient is experiencing no tenderness.     Range of Motion   Extension: 0   Flexion: 140     Tests   Meniscus   Dena:  Medial - negative Lateral - negative  Stability Lachman: normal (-1 to 2mm) PCL-Posterior Drawer: normal (0 to 2mm)  MCL - Valgus: normal (0 to 2mm)  LCL - Varus: normal (0 to 2mm)Pivot Shift: normal (Equal)Reverse Pivot Shift:  normal (Equal)Dial Test at 30 degrees: normal (< 5 degrees)Dial Test at 90 degrees: normal (< 5 degrees)  Posterior Sag Test: negative  Posterolateral Corner: unstable (>15 degrees difference)  Patella   Patellar apprehension: negative  Passive Patellar Tilt: neutral  Patellar Tracking: normal  Patellar Glide (Quadrants): Lateral - 1 Medial - 2  Q-Angle at 90 degrees: normal  Patellar Grind: negative  J-Sign: J sign absent    Other   Meniscal Cyst: absent  Popliteal (Baker's) Cyst: absent  Sensation: normal    Right Hip Exam     Tests   Owen: negative  Left Hip Exam     Tests   Owen: negative          Muscle Strength   Right Lower Extremity   Quadriceps:  5/5   Hamstrin/5   Left Lower Extremity   Quadriceps:  5/5   Hamstrin/5     Reflexes     Left Side  Quadriceps:  2+  Achilles:  2+    Right Side   Quadriceps:  2+  Achilles:  2+    Vascular Exam     Right Pulses  Dorsalis Pedis:      2+  Posterior Tibial:      2+        Left Pulses  Dorsalis Pedis:      2+  Posterior Tibial:      2+          IMAGING  MRI of the right knee from July 10, 2019 was personally reviewed and reveals increased signal in the posterior horn of the medial meniscus.  ACL and PCL are intact. No evidence of chondral injury or damage.  There is a slight increase in signal around the lateral collateral ligament, however the LCL appears to be intact.    Radiographs ordered and reviewed today in clinic of the bilateral knee demonstrates no fracture, dislocation, swelling or degenerative changes noted.  No evidence of cortical avulsion off the lateral knee.  Mild opacity present in the body of the lateral meniscus of the Right knee, consistent with pseudogout.        Assessment:       Encounter Diagnoses   Name Primary?    Pain in both knees, unspecified chronicity     Acute internal derangement of right knee     Tear of LCL (lateral collateral ligament) of knee, right, initial encounter Yes          Plan:       1.  Right Knee brace  provided    2.  PT prescribed for modalities, ROM, strengthening, and gait training    3.  Recommended light duty work for the next 6 wks    4.  Celebrex 200 mg BID, 60 pills provided    4.  RTC in 6 wks for interval exam, and likely return to work without restrictions if symptoms have improved                  [facility] patient questionnaires have been collected today.

## 2019-08-12 ENCOUNTER — HOSPITAL ENCOUNTER (OUTPATIENT)
Dept: RADIOLOGY | Facility: HOSPITAL | Age: 59
Discharge: HOME | End: 2019-08-12
Attending: ORTHOPAEDIC SURGERY
Payer: COMMERCIAL

## 2019-08-12 ENCOUNTER — OFFICE VISIT (OUTPATIENT)
Dept: SPORTS MEDICINE | Facility: CLINIC | Age: 59
End: 2019-08-12
Payer: OTHER MISCELLANEOUS

## 2019-08-12 VITALS
BODY MASS INDEX: 27.85 KG/M2 | DIASTOLIC BLOOD PRESSURE: 96 MMHG | WEIGHT: 188 LBS | HEIGHT: 69 IN | SYSTOLIC BLOOD PRESSURE: 157 MMHG | HEART RATE: 78 BPM

## 2019-08-12 DIAGNOSIS — M25.561 PAIN IN BOTH KNEES, UNSPECIFIED CHRONICITY: ICD-10-CM

## 2019-08-12 DIAGNOSIS — M25.562 PAIN IN BOTH KNEES, UNSPECIFIED CHRONICITY: ICD-10-CM

## 2019-08-12 DIAGNOSIS — S83.421A TEAR OF LCL (LATERAL COLLATERAL LIGAMENT) OF KNEE, RIGHT, INITIAL ENCOUNTER: Primary | ICD-10-CM

## 2019-08-12 DIAGNOSIS — M23.91 ACUTE INTERNAL DERANGEMENT OF RIGHT KNEE: ICD-10-CM

## 2019-08-12 PROCEDURE — 99999 PR PBB SHADOW E&M-EST. PATIENT-LVL III: CPT | Mod: PBBFAC,,, | Performed by: ORTHOPAEDIC SURGERY

## 2019-08-12 PROCEDURE — 73564 PR  X-RAY KNEE 4+ VIEW: ICD-10-PCS | Mod: 26,LT,, | Performed by: RADIOLOGY

## 2019-08-12 PROCEDURE — 99999 PR PBB SHADOW E&M-EST. PATIENT-LVL III: ICD-10-PCS | Mod: PBBFAC,,, | Performed by: ORTHOPAEDIC SURGERY

## 2019-08-12 PROCEDURE — 99214 PR OFFICE/OUTPT VISIT, EST, LEVL IV, 30-39 MIN: ICD-10-PCS | Mod: S$GLB,,, | Performed by: ORTHOPAEDIC SURGERY

## 2019-08-12 PROCEDURE — 73564 X-RAY EXAM KNEE 4 OR MORE: CPT | Mod: 26,LT,, | Performed by: RADIOLOGY

## 2019-08-12 PROCEDURE — 73564 X-RAY EXAM KNEE 4 OR MORE: CPT | Mod: TC,50,FY,PO

## 2019-08-12 PROCEDURE — 73564 X-RAY EXAM KNEE 4 OR MORE: CPT | Mod: 26,RT,, | Performed by: RADIOLOGY

## 2019-08-12 PROCEDURE — 99214 OFFICE O/P EST MOD 30 MIN: CPT | Mod: S$GLB,,, | Performed by: ORTHOPAEDIC SURGERY

## 2019-08-12 RX ORDER — CELECOXIB 200 MG/1
200 CAPSULE ORAL 2 TIMES DAILY
Qty: 60 CAPSULE | Refills: 0 | Status: SHIPPED | OUTPATIENT
Start: 2019-08-12 | End: 2019-11-04 | Stop reason: ALTCHOICE

## 2019-08-12 NOTE — LETTER
August 12, 2019      Sandie Manning, YUMIKO  [address] [place] [address]           [place] [address] [address] [facility] [address]  Phone: [phone]          Patient: Royer Castillo   MR Number: [id number]   YOB: 1960   Date of Visit: 8/12/2019       Dear Sandie Manning:    Thank you for referring Royer Castillo to me for evaluation. Attached you will find relevant portions of my assessment and plan of care.    If you have questions, please do not hesitate to call me. I look forward to following Royer Castillo along with you.    Sincerely,    Casey Rebolledo MD    Enclosure  CC:  No Recipients    If you would like to receive this communication electronically, please contact [email or web address] or [phone] to request more information on [employer] Link access.    For providers and/or their staff who would like to refer a patient to [facility], please contact us through our one-stop-shop provider referral line, [facility] Martins, at [phone].    If you feel you have received this communication in error or would no longer like to receive these types of communications, please e-mail [email or web address]

## 2019-08-12 NOTE — LETTER
Patient: Royer Castillo   YOB: 1960   Clinic Number: [id number]   Today's Date: August 12, 2019     To: Patient   Fax Number: N/A        Work Status Summary     Date of Injury: June 30, 2019  Diagnosis/ICD-10: S83.421A    Work Status: ABLE to work --- transitional duty (as follows): LIGHT WORK: Lifting 20 lbs maximum with frequent lifting and/or carrying of objects weighing up to 10 lbs. Even though the weight lifted may be only a negligible amount, a job is in this category when it requires walking or standing to a significant degree, or when it involves sitting most of the time with a degree of pushing and pulling of arm and/or leg controls.    Therapy Recomendations: Physical Therapy 2 times a week for 6 weeks.         Next Appointment: No follow-ups on file. Royer will be seen by Dr. Gerard Tello.    Comments: Recommend light duty for the next 6 weeks.    ____________________________         August 12, 2019    Gerard Tello MD  Signed (Provider)

## 2019-08-14 ENCOUNTER — TELEPHONE (OUTPATIENT)
Dept: SPORTS MEDICINE | Facility: CLINIC | Age: 59
End: 2019-08-14

## 2019-08-14 NOTE — TELEPHONE ENCOUNTER
Received STD forms. Forms completed. Patient notified. He will come to pick them up. They are at the  for him.    Janie Crowley MA  [facility]

## 2019-08-14 NOTE — TELEPHONE ENCOUNTER
Medical Condtion/Body Part Injured: R Knee  Estimated Date of Onset of Condition or Injury: 6/30/19  Last Day of Work: 6/30/19    Employer Name: [employer]   Employee Job Title:   Job Functions/Tasks: Fire Fighting Duties    When do you plan to return to work: 8/12/19  Are you trying to return to work: NoRestrictions/Restrictions: Restriction as listed: Transitional Duty/Light Duty    How do you want the forms returned:     Colleen Amedee MA [facility]

## 2019-08-22 ENCOUNTER — CLINICAL SUPPORT (OUTPATIENT)
Dept: REHABILITATION | Facility: HOSPITAL | Age: 59
End: 2019-08-22
Attending: ORTHOPAEDIC SURGERY
Payer: OTHER MISCELLANEOUS

## 2019-08-22 DIAGNOSIS — S86.911D STRAIN OF RIGHT KNEE, SUBSEQUENT ENCOUNTER: ICD-10-CM

## 2019-08-22 PROBLEM — S86.911A STRAIN OF RIGHT KNEE: Status: ACTIVE | Noted: 2019-08-22

## 2019-08-22 PROCEDURE — 97110 THERAPEUTIC EXERCISES: CPT

## 2019-08-22 PROCEDURE — 97161 PT EVAL LOW COMPLEX 20 MIN: CPT

## 2019-08-23 NOTE — PLAN OF CARE
[facility]  Physical Therapy Initial Evaluation    Name: Royer Castillo  Clinic Number: [id number]    Therapy Diagnosis:   Encounter Diagnosis   Name Primary?    Strain of right knee, subsequent encounter      Physician: Gerard Tello MD    Physician Orders: PT Eval and Treat   Medical Diagnosis: S83.421A (ICD-10-CM) - Tear of LCL (lateral collateral ligament) of knee, right, initial encounter  Evaluation Date: 8/22/2019  Authorization Period Expiration: 9/14/2019  Plan of Care Certification Period: 12/12/2019  Visit # / Visits authorized: 1/ 12  Date of Surgery: NA  Precautions: Standard    Time In: 1000a  Time Out: 1100a  Total Billable Time: 60 minutes    Subjective     Date of onset: 6/30/2019  History of current condition - Vasyl reports: that he sustained a fall at work when walking on an uneven surface on 6/30/2019.  Patient states that he went to urgent care the next day.  He states that he has been trying to mostly rest and use his brace.  He states that he feels much better, but still has difficulty and pain with squatting, getting in and out of car, stairs.  He states that he is okay walking and standing for the most part.  He states that he is working on ROM as well.     Past Medical History:   Diagnosis Date    Cellulitis     left foot    Diverticulosis     Genital herpes     Hyperlipidemia     Onychomycosis     Pre-diabetes      Royer Castillo  has no past surgical history on file.    Royer has a current medication list which includes the following prescription(s): acetaminophen, acyclovir, albuterol, carbinoxamine maleate, celecoxib, fluticasone propionate, and ibuprofen.    Review of patient's allergies indicates:  No Known Allergies     Prior Therapy: No  Social History: Lives in a house  Occupation: Fir chief  Prior Level of Function: No limitation  Current Level of Function: Limited ambulation, standing, squatting, stairs, work    Pain:  Current  1/10, worst 4/10, best 0/10   Location: right knee   Description: Aching, Dull and Tight    Pts goals: To return to work safely    Objective     Functional tests:    SL Squat: Pain and valg\us   DL Squat: Pain at endrange     Range of Motion:   Knee Right (AROM/PROM) Left (AROM/PROM)   Flexion 135 degrees 143 degrees   Extension 6 degrees 7 degrees     Lower Extremity Strength:  Right LE  Left LE    Quadriceps: 5/5 Quadriceps: 5/5   Hamstrings: 5/5 Hamstrings: 5/5   Hip flexion (supine): 4+/5 Hip flexion (supine): 5/5   Hip extension:  4/5 Hip extension: 4+/5   Glute Med: 3+/5 Glute Med:  4+/5     Special Tests:   Right Left   Valgus Stress Test - -   Varus Stress test + -   Lachman's test - -   Posterior Lachman - -   Janet's Test - -   Thessaly's Test - -     Patellar Mobility: Slightly limited in M/L on affected side     TREATMENT     Treatment Time In: 1020a  Treatment Time Out: 1055a  Total Treatment time separate from Evaluation time:35 minutes    Vasyl received therapeutic exercises to develop strength, endurance, ROM and core stabilization for 35 minutes including:  SLR - 3 x 10  Half squat - 3 x 10, SB  SL bridge - 2 x 10  Hip abduction - 3 x 15    Vasyl received the following manual therapy techniques for 0 minutes including:    Home Exercises and Patient Education Provided    Education provided re: therapeutic diagnosis and plan of care    Written Home Exercises Provided: Yes  Exercises were reviewed and Vasyl was able to demonstrate them prior to the end of the session.   Pt received a written copy of exercises to perform at home. Vasyl demonstrated good  understanding of the education provided.     See EMR under patient instructions for exercises given.     Assessment     Royer is a 59 y.o. male referred to outpatient Physical Therapy with a medical diagnosis of right knee LCL tear after a fall at work on 6/30/2019. Pt presents with objective deficits in affected knee flexion AROM, hamstrings and  quadriceps flexibility, gluteus medius and yfn strength that limits functional ability to stand, walk, stairs, squat, carry.    Pt prognosis is Excellent.   Pt will benefit from skilled outpatient Physical Therapy to address the deficits stated above and in the chart below, provide pt/family education, and to maximize pt's level of independence.     Plan of care discussed with patient: Yes  Pt's spiritual, cultural and educational needs considered and patient is agreeable to the plan of care and goals as stated below:     Anticipated Barriers for therapy: None    Medical Necessity is demonstrated by the following  History  Co-morbidities and personal factors that may impact the plan of care Co-morbidities:   See chart2    Personal Factors:   no deficits     low   Examination  Body Structures and Functions, activity limitations and participation restrictions that may impact the plan of care Body Regions:   lower extremities    Body Systems:    strength  balance  gait    Participation Restrictions:   None    Activity limitations:   Learning and applying knowledge  no deficits    General Tasks and Commands  no deficits    Communication  no deficits    Mobility  lifting and carrying objects  walking  moving around using equipment (WC)    Self care  no deficits    Domestic Life  no deficits    Interactions/Relationships  no deficits    Life Areas  no deficits    Community and Social Life  no deficits         low   Clinical Presentation stable and uncomplicated low   Decision Making/ Complexity Score: low       Goals:  Short Term Goals: 4 weeks   1. The patient will demonstrate 140 degrees of affected knee flexion to increase ease with stairs, squatting.  2. The patient will demonstrate 4/5 strength of affected gluteus medius to increase ease with squatting, stairs.    Long Term Goals: 16 weeks   1. The patient will demonstrate 143 degrees of affected knee flexion AROM to increase ease with squatting to lift objects  at work.  2. The patient will demonstrate ability to pull 100# sled 40' with no discomfort.  3. The patient will demonstrate 4+/5 strength of affected gluteus medius to increase ease with squatting, running at work.    Plan   Certification Period/Plan of care expiration: 8/22/2019 to 12/12/2019.    Outpatient Physical Therapy 2 times weekly for 20 weeks to include the following interventions: manual therapy as needed, therapeutic exercises to address functional deficits, modalities prn, wound care prn, dry needling prn, treatment by a skilled PTA at times.    Moises Hernandez, PT

## 2019-08-27 ENCOUNTER — CLINICAL SUPPORT (OUTPATIENT)
Dept: REHABILITATION | Facility: HOSPITAL | Age: 59
End: 2019-08-27
Attending: ORTHOPAEDIC SURGERY
Payer: OTHER MISCELLANEOUS

## 2019-08-27 DIAGNOSIS — S86.911D STRAIN OF RIGHT KNEE, SUBSEQUENT ENCOUNTER: ICD-10-CM

## 2019-08-27 PROCEDURE — 97110 THERAPEUTIC EXERCISES: CPT

## 2019-08-28 ENCOUNTER — TELEPHONE (OUTPATIENT)
Dept: SPORTS MEDICINE | Facility: CLINIC | Age: 59
End: 2019-08-28

## 2019-08-28 NOTE — PROGRESS NOTES
"                            Physical Therapy Daily Treatment Note     Name: Royer Castillo  Clinic Number: [id number]    Therapy Diagnosis:   Encounter Diagnosis   Name Primary?    Strain of right knee, subsequent encounter      Physician: Gerard Tello MD    Visit Date: 8/27/2019  Physician Orders: PT Eval and Treat   Medical Diagnosis: S83.421A (ICD-10-CM) - Tear of LCL (lateral collateral ligament) of knee, right, initial encounter  Evaluation Date: 8/22/2019  Authorization Period Expiration: 9/14/2019  Plan of Care Certification Period: 12/12/2019  Visit # / Visits authorized: 1/ 12  Date of Surgery: NA  Precautions: Standard    Time In: 1000a  Time Out: 1100a  Total Billable Time: 60 minutes    Subjective      Pt reports: he was compliant with home exercise program given last session.   Response to previous treatment: Patient states that he is still sore and has returned to light duty work    Pain: 2/10  Location: right knee      Objective     Vasyl received therapeutic exercises to develop strength, endurance, ROM and flexibility for 55 minutes including:  Bicycle for ROM - 10'  SLR - 2 x 10, 3"  Bridge with add - 2 x 10, 3"  HS stretch - 3 x 30"  Step up with balance - 2 x 10, 6"  SL KB pass - 2 x 10, 10#  SL RDL - 2 x 10  Squat with knee ext - 2 x 15  DL HR with band - 20x, green  SL press - 2 x 10, 80#    Vasyl received the following manual therapy techniques for 0 minutes, including:      Home Exercises Provided and Patient Education Provided     Education provided:   Continue current HEP    Written Home Exercises Provided: Continue with current HEP  Exercises were reviewed and Vasyl was able to demonstrate them prior to the end of the session.      Pt received a written copy of exercises to perform at home.   See EMR under patient instructions for exercises given.     Vasyl demonstrated good  understanding of the education provided.     Assessment     Patient demonstrates improvement in ability to " perform knee flexion AROM, still painful at endrange and with palpation of LCL  Vasyl is progressing well towards his goals.   Pt prognosis is Excellent.     Pt will continue to benefit from skilled outpatient physical therapy to address the deficits listed in the problem list box on initial evaluation, provide pt/family education and to maximize pt's level of independence in the home and community environment.     Pt's spiritual, cultural and educational needs considered and pt agreeable to plan of care and goals.    Anticipated barriers to physical therapy: None    Goals:   Short Term Goals: 4 weeks   1. The patient will demonstrate 140 degrees of affected knee flexion to increase ease with stairs, squatting.  2. The patient will demonstrate 4/5 strength of affected gluteus medius to increase ease with squatting, stairs.     Long Term Goals: 16 weeks   1. The patient will demonstrate 143 degrees of affected knee flexion AROM to increase ease with squatting to lift objects at work.  2. The patient will demonstrate ability to pull 100# sled 40' with no discomfort.  3. The patient will demonstrate 4+/5 strength of affected gluteus medius to increase ease with squatting, running at work.    Plan     Progress patient flexibility and knee flexion ROM for improved patellofemoral mechanics in weightbearing activity    Moises Hernandez, PT

## 2019-08-28 NOTE — TELEPHONE ENCOUNTER
Received [employer] Transitional/Light Duty forms. Forms completed. Patient notified they would be at the  for him to .    Janie Crowley MA  [facility]

## 2019-08-29 ENCOUNTER — CLINICAL SUPPORT (OUTPATIENT)
Dept: REHABILITATION | Facility: HOSPITAL | Age: 59
End: 2019-08-29
Attending: ORTHOPAEDIC SURGERY
Payer: OTHER MISCELLANEOUS

## 2019-08-29 DIAGNOSIS — S86.911D STRAIN OF RIGHT KNEE, SUBSEQUENT ENCOUNTER: ICD-10-CM

## 2019-08-29 PROCEDURE — 97110 THERAPEUTIC EXERCISES: CPT

## 2019-08-29 NOTE — PROGRESS NOTES
"                            Physical Therapy Daily Treatment Note     Name: Royer Castillo  Clinic Number: [id number]    Therapy Diagnosis:   Encounter Diagnosis   Name Primary?    Strain of right knee, subsequent encounter      Physician: Gerard Tello MD    Visit Date: 8/29/2019  Physician Orders: PT Eval and Treat   Medical Diagnosis: S83.421A (ICD-10-CM) - Tear of LCL (lateral collateral ligament) of knee, right, initial encounter  Evaluation Date: 8/22/2019  Authorization Period Expiration: 9/14/2019  Plan of Care Certification Period: 12/12/2019  Visit # / Visits authorized: 3/ 12  Date of Surgery: NA  Precautions: Standard    Time In: 850a  Time Out: 1000a  Total Billable Time: 60 minutes    Subjective      Pt reports: he was compliant with home exercise program given last session.   Response to previous treatment: Patient states that he had increased pain with stairs since last visit    Pain: 2/10  Location: right knee      Objective     Vasyl received therapeutic exercises to develop strength, endurance, ROM and flexibility for 55 minutes including:  Bicycle for ROM - 10'  SLR - 2 x 10, 3"  Bridge with add - 2 x 10, 3"  HS stretch - 3 x 30"  Step up with balance - 2 x 10, 6"  SL KB pass - 2 x 10, 10#  Shuttle DLP - 20x, 2.5c  Shuttle SLP - 2 x 15, 1.5c  Plyo toss - 2 x 20, single leg, 6#  Tandem chop - 2 x 10, 17  DL HR with band - 20x, green  SL press - 2 x 10, 80#    Vasyl received the following manual therapy techniques for 0 minutes, including:      Home Exercises Provided and Patient Education Provided     Education provided:   Continue current HEP    Written Home Exercises Provided: Continue with current HEP  Exercises were reviewed and Vasyl was able to demonstrate them prior to the end of the session.      Pt received a written copy of exercises to perform at home.   See EMR under patient instructions for exercises given.     Vasyl demonstrated good  understanding of the education provided. "     Assessment     Patient demonstrates improved single leg tibiofemoral control with verbal cueing for step ups    Vasyl is progressing well towards his goals.   Pt prognosis is Excellent.     Pt will continue to benefit from skilled outpatient physical therapy to address the deficits listed in the problem list box on initial evaluation, provide pt/family education and to maximize pt's level of independence in the home and community environment.     Pt's spiritual, cultural and educational needs considered and pt agreeable to plan of care and goals.    Anticipated barriers to physical therapy: None    Goals:   Short Term Goals: 4 weeks   1. The patient will demonstrate 140 degrees of affected knee flexion to increase ease with stairs, squatting.  2. The patient will demonstrate 4/5 strength of affected gluteus medius to increase ease with squatting, stairs.     Long Term Goals: 16 weeks   1. The patient will demonstrate 143 degrees of affected knee flexion AROM to increase ease with squatting to lift objects at work.  2. The patient will demonstrate ability to pull 100# sled 40' with no discomfort.  3. The patient will demonstrate 4+/5 strength of affected gluteus medius to increase ease with squatting, running at work.    Plan     Progress patient flexibility and knee flexion ROM for improved patellofemoral mechanics in weightbearing activity    Moises Hernandez, PT

## 2019-09-04 ENCOUNTER — CLINICAL SUPPORT (OUTPATIENT)
Dept: REHABILITATION | Facility: HOSPITAL | Age: 59
End: 2019-09-04
Attending: ORTHOPAEDIC SURGERY
Payer: OTHER MISCELLANEOUS

## 2019-09-04 DIAGNOSIS — S86.911D STRAIN OF RIGHT KNEE, SUBSEQUENT ENCOUNTER: ICD-10-CM

## 2019-09-04 PROCEDURE — 97110 THERAPEUTIC EXERCISES: CPT

## 2019-09-05 NOTE — PROGRESS NOTES
"                            Physical Therapy Daily Treatment Note     Name: Royer Castillo  Clinic Number: [id number]    Therapy Diagnosis:   Encounter Diagnosis   Name Primary?    Strain of right knee, subsequent encounter      Physician: Gerard Tello MD    Visit Date: 9/4/2019  Physician Orders: PT Eval and Treat   Medical Diagnosis: S83.421A (ICD-10-CM) - Tear of LCL (lateral collateral ligament) of knee, right, initial encounter  Evaluation Date: 8/22/2019  Authorization Period Expiration: 9/14/2019  Plan of Care Certification Period: 12/12/2019  Visit # / Visits authorized: 4/ 12  Date of Surgery: NA  Precautions: Standard    Time In: 100p  Time Out: 200p  Total Billable Time: 60 minutes    Subjective      Pt reports: he was compliant with home exercise program given last session.   Response to previous treatment: Patient states that he has not had pain with stairs, improved general stiffness    Pain: 2/10  Location: right knee      Objective     Vasyl received therapeutic exercises to develop strength, endurance, ROM and flexibility for 55 minutes including:  Bicycle for ROM - 10'  SLR - 2 x 10, 3"  Bridge with add - 2 x 10, 3"  HS stretch - 3 x 30"  Step up with balance - 2 x 10, 6"  SL KB pass - 2 x 10, 10#  Goblet squat - 2 x 15, 15#  Plyo toss - 2 x 20, single leg, 6#  Heel tap - 2 x 10, 6"  DL HR with band - 20x, green  SL press - 2 x 10, 100#    Vasyl received the following manual therapy techniques for 0 minutes, including:      Home Exercises Provided and Patient Education Provided     Education provided:   Continue current HEP    Written Home Exercises Provided: Continue with current HEP  Exercises were reviewed and Vasyl was able to demonstrate them prior to the end of the session.      Pt received a written copy of exercises to perform at home.   See EMR under patient instructions for exercises given.     Vasyl demonstrated good  understanding of the education provided.     Assessment "     Patient demonstrates decreased discomfort with stepping down and closed chain strength    Vasyl is progressing well towards his goals.   Pt prognosis is Excellent.     Pt will continue to benefit from skilled outpatient physical therapy to address the deficits listed in the problem list box on initial evaluation, provide pt/family education and to maximize pt's level of independence in the home and community environment.     Pt's spiritual, cultural and educational needs considered and pt agreeable to plan of care and goals.    Anticipated barriers to physical therapy: None    Goals:   Short Term Goals: 4 weeks   1. The patient will demonstrate 140 degrees of affected knee flexion to increase ease with stairs, squatting.  2. The patient will demonstrate 4/5 strength of affected gluteus medius to increase ease with squatting, stairs.     Long Term Goals: 16 weeks   1. The patient will demonstrate 143 degrees of affected knee flexion AROM to increase ease with squatting to lift objects at work.  2. The patient will demonstrate ability to pull 100# sled 40' with no discomfort.  3. The patient will demonstrate 4+/5 strength of affected gluteus medius to increase ease with squatting, running at work.    Plan     Progress patient flexibility and knee flexion ROM for improved patellofemoral mechanics in weightbearing activity    Moises Hernandez, PT

## 2019-09-06 ENCOUNTER — CLINICAL SUPPORT (OUTPATIENT)
Dept: REHABILITATION | Facility: HOSPITAL | Age: 59
End: 2019-09-06
Attending: ORTHOPAEDIC SURGERY
Payer: OTHER MISCELLANEOUS

## 2019-09-06 DIAGNOSIS — S86.911D STRAIN OF RIGHT KNEE, SUBSEQUENT ENCOUNTER: ICD-10-CM

## 2019-09-06 PROCEDURE — 97110 THERAPEUTIC EXERCISES: CPT

## 2019-09-06 NOTE — PROGRESS NOTES
"                            Physical Therapy Daily Treatment Note     Name: Royer Castillo  Clinic Number: [id number]    Therapy Diagnosis:   Encounter Diagnosis   Name Primary?    Strain of right knee, subsequent encounter      Physician: Gerard Tello MD    Visit Date: 9/6/2019  Physician Orders: PT Eval and Treat   Medical Diagnosis: S83.421A (ICD-10-CM) - Tear of LCL (lateral collateral ligament) of knee, right, initial encounter  Evaluation Date: 8/22/2019  Authorization Period Expiration: 9/14/2019  Plan of Care Certification Period: 12/12/2019  Visit # / Visits authorized: 5/ 12  Date of Surgery: NA  Precautions: Standard    Time In: 100p  Time Out: 200p  Total Billable Time: 60 minutes    Subjective      Pt reports: he was compliant with home exercise program given last session.   Response to previous treatment: Patient states that he has noticed a lot less soreness and better motion    Pain: 2/10  Location: right knee      Objective     Vasyl received therapeutic exercises to develop strength, endurance, ROM and flexibility for 55 minutes including:  Bicycle for ROM - 10'  HS stretch - 3 x 30"  Step up with balance - 2 x 10, 12"  SL KB pass - 2 x 10, 10#  Goblet squat - 2 x 15, 15#  Lateral lunge - 2 x 15  Heel tap - 2 x 10, 6"  DL HR with band - 20x, green  SL press - 2 x 10, 140# ecc  SL RDL - 2 x 10, 10#    Vasyl received the following manual therapy techniques for 0 minutes, including:      Home Exercises Provided and Patient Education Provided     Education provided:   Continue current HEP    Written Home Exercises Provided: Continue with current HEP  Exercises were reviewed and Vasyl was able to demonstrate them prior to the end of the session.      Pt received a written copy of exercises to perform at home.   See EMR under patient instructions for exercises given.     Vasyl demonstrated good  understanding of the education provided.     Assessment     Patient demonstrates improvement in eccentric " quadriceps control, decreased tenderness to palpation    Vasyl is progressing well towards his goals.   Pt prognosis is Excellent.     Pt will continue to benefit from skilled outpatient physical therapy to address the deficits listed in the problem list box on initial evaluation, provide pt/family education and to maximize pt's level of independence in the home and community environment.     Pt's spiritual, cultural and educational needs considered and pt agreeable to plan of care and goals.    Anticipated barriers to physical therapy: None    Goals:   Short Term Goals: 4 weeks   1. The patient will demonstrate 140 degrees of affected knee flexion to increase ease with stairs, squatting.  2. The patient will demonstrate 4/5 strength of affected gluteus medius to increase ease with squatting, stairs.     Long Term Goals: 16 weeks   1. The patient will demonstrate 143 degrees of affected knee flexion AROM to increase ease with squatting to lift objects at work.  2. The patient will demonstrate ability to pull 100# sled 40' with no discomfort.  3. The patient will demonstrate 4+/5 strength of affected gluteus medius to increase ease with squatting, running at work.    Plan     Progress patient flexibility and knee flexion ROM for improved patellofemoral mechanics in weightbearing activity    Moises Hernandez, PT

## 2019-09-10 ENCOUNTER — CLINICAL SUPPORT (OUTPATIENT)
Dept: REHABILITATION | Facility: HOSPITAL | Age: 59
End: 2019-09-10
Payer: OTHER MISCELLANEOUS

## 2019-09-10 DIAGNOSIS — S86.911D STRAIN OF RIGHT KNEE, SUBSEQUENT ENCOUNTER: ICD-10-CM

## 2019-09-10 PROCEDURE — 97110 THERAPEUTIC EXERCISES: CPT

## 2019-09-10 NOTE — PROGRESS NOTES
"                            Physical Therapy Daily Treatment Note     Name: Royer Castillo  Clinic Number: [id number]    Therapy Diagnosis:   Encounter Diagnosis   Name Primary?    Strain of right knee, subsequent encounter      Physician: Gerard Tello MD    Visit Date: 9/10/2019  Physician Orders: PT Eval and Treat   Medical Diagnosis: S83.421A (ICD-10-CM) - Tear of LCL (lateral collateral ligament) of knee, right, initial encounter  Evaluation Date: 8/22/2019  Authorization Period Expiration: 9/14/2019  Plan of Care Certification Period: 12/12/2019  Visit # / Visits authorized: 7/ 12  Date of Surgery: NA  Precautions: Standard    Time In: 800a  Time Out: 905a  Total Billable Time: 65 minutes    Subjective      Pt reports: he was compliant with home exercise program given last session.   Response to previous treatment: Patient states that he is feeling good, no pain and no tenderness    Pain: 2/10  Location: right knee      Objective     Vasyl received therapeutic exercises to develop strength, endurance, ROM and flexibility for 55 minutes including:  Bicycle for ROM - 10'  HS stretch - 3 x 30"  Step up with balance - 2 x 10, 12"  SL KB pass - 2 x 10, 10#  Goblet squat - 2 x 15, 15#  Lateral lunge - 2 x 15  Heel tap - 2 x 10, 6"  DL HR with band - 20x, green  SL press - 2 x 10, 140# ecc  SL RDL - 2 x 10, 10#    Vasyl received the following manual therapy techniques for 0 minutes, including:      Home Exercises Provided and Patient Education Provided     Education provided:   Continue current HEP    Written Home Exercises Provided: Continue with current HEP  Exercises were reviewed and Vasyl was able to demonstrate them prior to the end of the session.      Pt received a written copy of exercises to perform at home.   See EMR under patient instructions for exercises given.     Vasyl demonstrated good  understanding of the education provided.     Assessment     Patient demonstrates improving stability in single " leg stance and improved endurance of quadriceps    Vasyl is progressing well towards his goals.   Pt prognosis is Excellent.     Pt will continue to benefit from skilled outpatient physical therapy to address the deficits listed in the problem list box on initial evaluation, provide pt/family education and to maximize pt's level of independence in the home and community environment.     Pt's spiritual, cultural and educational needs considered and pt agreeable to plan of care and goals.    Anticipated barriers to physical therapy: None    Goals:   Short Term Goals: 4 weeks   1. The patient will demonstrate 140 degrees of affected knee flexion to increase ease with stairs, squatting.  2. The patient will demonstrate 4/5 strength of affected gluteus medius to increase ease with squatting, stairs.     Long Term Goals: 16 weeks   1. The patient will demonstrate 143 degrees of affected knee flexion AROM to increase ease with squatting to lift objects at work.  2. The patient will demonstrate ability to pull 100# sled 40' with no discomfort.  3. The patient will demonstrate 4+/5 strength of affected gluteus medius to increase ease with squatting, running at work.    Plan     Progress patient flexibility and knee flexion ROM for improved patellofemoral mechanics in weightbearing activity    Moises Hernandez, PT

## 2019-09-13 ENCOUNTER — CLINICAL SUPPORT (OUTPATIENT)
Dept: REHABILITATION | Facility: HOSPITAL | Age: 59
End: 2019-09-13
Payer: OTHER MISCELLANEOUS

## 2019-09-13 DIAGNOSIS — S86.911D STRAIN OF RIGHT KNEE, SUBSEQUENT ENCOUNTER: ICD-10-CM

## 2019-09-13 PROCEDURE — 97110 THERAPEUTIC EXERCISES: CPT

## 2019-09-13 NOTE — PROGRESS NOTES
"                            Physical Therapy Daily Treatment Note     Name: Royer Castillo  Clinic Number: [id number]    Therapy Diagnosis:   Encounter Diagnosis   Name Primary?    Strain of right knee, subsequent encounter      Physician: Gerard Tello MD    Visit Date: 9/13/2019  Physician Orders: PT Eval and Treat   Medical Diagnosis: S83.421A (ICD-10-CM) - Tear of LCL (lateral collateral ligament) of knee, right, initial encounter  Evaluation Date: 8/22/2019  Authorization Period Expiration: 9/14/2019  Plan of Care Certification Period: 12/12/2019  Visit # / Visits authorized: 8/ 12  Date of Surgery: NA  Precautions: Standard    Time In: 800a  Time Out: 905a  Total Billable Time: 65 minutes    Subjective      Pt reports: he was compliant with home exercise program given last session.   Response to previous treatment: Patient states that he is ready to work again, feels stronger but still is concerned with walking over and around debris at work    Pain: 2/10  Location: right knee      Objective     Vasyl received therapeutic exercises to develop strength, endurance, ROM and flexibility for 55 minutes including:  Elliptical warm up 15'  SL shuttle leg press 2x10 3 bands  **all wearing  1-2 15# weight belts:  Marching step ups w/60# sand bag over shoulders 3x30"  --SA sandbag carry down/back  Slide board reverse lunges 3x8ea  Balance w/offset KB perturbations 3x30"ea  Offset KB carry w/hurdles 3x FWD/BKWD, 3x S/S  Slide board quadruped mtn climbers 3x8  HS stretch - 3 x 30"    Vasyl received the following manual therapy techniques for 0 minutes, including:      Home Exercises Provided and Patient Education Provided     Education provided:   Continue current HEP    Written Home Exercises Provided: Continue with current HEP  Exercises were reviewed and Vasyl was able to demonstrate them prior to the end of the session.      Pt received a written copy of exercises to perform at home.   See EMR under patient " instructions for exercises given.     Vasyl demonstrated good  understanding of the education provided.     Assessment     Patient demonstrates improvement in LE endurance, still slight laxity with varus stress of affected knee    Vasyl is progressing well towards his goals.   Pt prognosis is Excellent.     Pt will continue to benefit from skilled outpatient physical therapy to address the deficits listed in the problem list box on initial evaluation, provide pt/family education and to maximize pt's level of independence in the home and community environment.     Pt's spiritual, cultural and educational needs considered and pt agreeable to plan of care and goals.    Anticipated barriers to physical therapy: None    Goals:   Short Term Goals: 4 weeks   1. The patient will demonstrate 140 degrees of affected knee flexion to increase ease with stairs, squatting.  2. The patient will demonstrate 4/5 strength of affected gluteus medius to increase ease with squatting, stairs.     Long Term Goals: 16 weeks   1. The patient will demonstrate 143 degrees of affected knee flexion AROM to increase ease with squatting to lift objects at work.  2. The patient will demonstrate ability to pull 100# sled 40' with no discomfort.  3. The patient will demonstrate 4+/5 strength of affected gluteus medius to increase ease with squatting, running at work.    Plan     Progress patient flexibility and knee flexion ROM for improved patellofemoral mechanics in weightbearing activity    Moises Hernandez, PT

## 2019-09-17 ENCOUNTER — CLINICAL SUPPORT (OUTPATIENT)
Dept: REHABILITATION | Facility: HOSPITAL | Age: 59
End: 2019-09-17
Payer: OTHER MISCELLANEOUS

## 2019-09-17 DIAGNOSIS — S86.911D STRAIN OF RIGHT KNEE, SUBSEQUENT ENCOUNTER: ICD-10-CM

## 2019-09-17 PROCEDURE — 97110 THERAPEUTIC EXERCISES: CPT

## 2019-09-17 NOTE — PROGRESS NOTES
"                            Physical Therapy Daily Treatment Note     Name: Royer Castillo  Clinic Number: [id number]    Therapy Diagnosis:   Encounter Diagnosis   Name Primary?    Strain of right knee, subsequent encounter      Physician: Gerard Tello MD    Visit Date: 9/17/2019  Physician Orders: PT Eval and Treat   Medical Diagnosis: S83.421A (ICD-10-CM) - Tear of LCL (lateral collateral ligament) of knee, right, initial encounter  Evaluation Date: 8/22/2019  Authorization Period Expiration: 9/14/2019  Plan of Care Certification Period: 12/12/2019  Visit # / Visits authorized: 9/ 12  Date of Surgery: NA  Precautions: Standard    Time In: 800a  Time Out: 905a  Total Billable Time: 65 minutes    Subjective      Pt reports: he was compliant with home exercise program given last session.   Response to previous treatment: Patient states that he is gaining confidence in his leg daily    Pain: 2/10  Location: right knee      Objective     Vasyl received therapeutic exercises to develop strength, endurance, ROM and flexibility for 55 minutes including:  Elliptical warm up 15'  SL squat - 2 x 20  Resisted walk - fwd/back - 2 laps, green  Lunge perturbation - 2 x 20"  HS curl on SB - 2 x 10  Marching step ups - 2 x 1', 12"  --SA sandbag carry down/back with perturbation, 60#  Side lunge with press - 2 x 10, 10#  Balance w/offset KB perturbations 3x30"ea  Ambulation with offset kettlebell - 2 laps, 35#/25#  HS stretch - 3 x 30"    Vasyl received the following manual therapy techniques for 0 minutes, including:      Home Exercises Provided and Patient Education Provided     Education provided:   Continue current HEP    Written Home Exercises Provided: Continue with current HEP  Exercises were reviewed and Vasyl was able to demonstrate them prior to the end of the session.      Pt received a written copy of exercises to perform at home.   See EMR under patient instructions for exercises given.     Vasyl demonstrated good  " understanding of the education provided.     Assessment     Patient demonstrates continued improvement in LE strength, good tolerance for resistance to perturbation    Vasyl is progressing well towards his goals.   Pt prognosis is Excellent.     Pt will continue to benefit from skilled outpatient physical therapy to address the deficits listed in the problem list box on initial evaluation, provide pt/family education and to maximize pt's level of independence in the home and community environment.     Pt's spiritual, cultural and educational needs considered and pt agreeable to plan of care and goals.    Anticipated barriers to physical therapy: None    Goals:   Short Term Goals: 4 weeks   1. The patient will demonstrate 140 degrees of affected knee flexion to increase ease with stairs, squatting.  2. The patient will demonstrate 4/5 strength of affected gluteus medius to increase ease with squatting, stairs.     Long Term Goals: 16 weeks   1. The patient will demonstrate 143 degrees of affected knee flexion AROM to increase ease with squatting to lift objects at work.  2. The patient will demonstrate ability to pull 100# sled 40' with no discomfort.  3. The patient will demonstrate 4+/5 strength of affected gluteus medius to increase ease with squatting, running at work.    Plan     Progress patient flexibility and knee flexion ROM for improved patellofemoral mechanics in weightbearing activity    Moises Hernandez, PT

## 2019-09-19 ENCOUNTER — CLINICAL SUPPORT (OUTPATIENT)
Dept: REHABILITATION | Facility: HOSPITAL | Age: 59
End: 2019-09-19
Payer: OTHER MISCELLANEOUS

## 2019-09-19 DIAGNOSIS — S86.911D STRAIN OF RIGHT KNEE, SUBSEQUENT ENCOUNTER: ICD-10-CM

## 2019-09-19 PROCEDURE — 97110 THERAPEUTIC EXERCISES: CPT

## 2019-09-20 NOTE — PROGRESS NOTES
"                            Physical Therapy Daily Treatment Note     Name: Royer Castillo  Clinic Number: [id number]    Therapy Diagnosis:   Encounter Diagnosis   Name Primary?    Strain of right knee, subsequent encounter      Physician: Gerard Tello MD    Visit Date: 9/19/2019  Physician Orders: PT Eval and Treat   Medical Diagnosis: S83.421A (ICD-10-CM) - Tear of LCL (lateral collateral ligament) of knee, right, initial encounter  Evaluation Date: 8/22/2019  Authorization Period Expiration: 9/14/2019  Plan of Care Certification Period: 12/12/2019  Visit # / Visits authorized: 10/ 12  Date of Surgery: NA  Precautions: Standard    Time In: 850a  Time Out: 1005a  Total Billable Time: 65 minutes    Subjective      Pt reports: he was compliant with home exercise program given last session.   Response to previous treatment: Patient states that his endurance for being on his feet is getting a lot better    Pain: 2/10  Location: right knee      Objective     Vasyl received therapeutic exercises to develop strength, endurance, ROM and flexibility for 55 minutes including:  Elliptical warm up 15'  SL squat - 2 x 20  Resisted walk - fwd/back - 2 laps, green  Lunge perturbation - 2 x 20"  HS curl on SB - 2 x 10  Marching step ups - 2 x 1', 12"  --SA sandbag carry down/back with perturbation, 60#  Side lunge with press - 2 x 10, 10#  Balance w/offset KB perturbations 3x30"ea  Ambulation with offset kettlebell - 2 laps, 35#/25#  HS stretch - 3 x 30"    Vasyl received the following manual therapy techniques for 0 minutes, including:      Home Exercises Provided and Patient Education Provided     Education provided:   Continue current HEP    Written Home Exercises Provided: Continue with current HEP  Exercises were reviewed and Vasyl was able to demonstrate them prior to the end of the session.      Pt received a written copy of exercises to perform at home.   See EMR under patient instructions for exercises given. "     Vasyl demonstrated good  understanding of the education provided.     Assessment     Patient demonstrates slight laxity with varus stress, no pain    Vasyl is progressing well towards his goals.   Pt prognosis is Excellent.     Pt will continue to benefit from skilled outpatient physical therapy to address the deficits listed in the problem list box on initial evaluation, provide pt/family education and to maximize pt's level of independence in the home and community environment.     Pt's spiritual, cultural and educational needs considered and pt agreeable to plan of care and goals.    Anticipated barriers to physical therapy: None    Goals:   Short Term Goals: 4 weeks   1. The patient will demonstrate 140 degrees of affected knee flexion to increase ease with stairs, squatting.  2. The patient will demonstrate 4/5 strength of affected gluteus medius to increase ease with squatting, stairs.     Long Term Goals: 16 weeks   1. The patient will demonstrate 143 degrees of affected knee flexion AROM to increase ease with squatting to lift objects at work.  2. The patient will demonstrate ability to pull 100# sled 40' with no discomfort.  3. The patient will demonstrate 4+/5 strength of affected gluteus medius to increase ease with squatting, running at work.    Plan     Progress loading and instability exercises for safe return to work, patient may benefit from continued therapy 1x/week with return to work in 1-2 weeks    Moises Hernandez, PT

## 2019-09-20 NOTE — PROGRESS NOTES
Subjective:          Chief Complaint: Royer Castillo is a 59 y.o. male who is following for right knee pain after a work sustained injury on 6/30/19.  He was last seen 8/12/19 at which time we initiated conservative management consisting of physical therapy, bracing and NSAID therapy (discontinued Celebrex secondary to reflux).  Reports moderate relief.  Does have some mild residual tenderness near the distal aspect of the IT band.  Continues to work with therapy; has two more visits and then wants to transition to a home exercise program. No new complaints.       Review of Systems   Constitution: Negative for fever and night sweats.   HENT: Negative for hearing loss.    Eyes: Negative for blurred vision and visual disturbance.   Cardiovascular: Negative for chest pain and leg swelling.   Respiratory: Negative for shortness of breath.    Endocrine: Negative for polyuria.   Hematologic/Lymphatic: Negative for bleeding problem.   Skin: Negative for rash.   Musculoskeletal: Negative for back pain, joint pain, joint swelling, muscle cramps and muscle weakness.   Gastrointestinal: Negative for melena.   Genitourinary: Negative for hematuria.   Neurological: Negative for loss of balance, numbness and paresthesias.   Psychiatric/Behavioral: Negative for altered mental status.     Other  How many nights a week are you awakened by your affected body part?: 0  Was the patient's HEIGHT measured or patient reported?: Patient Reported  Was the patient's WEIGHT measured or patient reported?: Measured      Objective:        General: Royer is well-developed, well-nourished, appears stated age, in no acute distress, alert and oriented to time, place and person.     General    Vitals reviewed.  Constitutional: He is oriented to person, place, and time. He appears well-developed and well-nourished. No distress.   HENT:   Mouth/Throat: No oropharyngeal exudate.   Eyes: Right eye exhibits no discharge. Left eye exhibits no  discharge.   Neck: Normal range of motion.   Pulmonary/Chest: Effort normal and breath sounds normal. No respiratory distress.   Neurological: He is alert and oriented to person, place, and time. He has normal reflexes. No cranial nerve deficit. Coordination normal.   Psychiatric: He has a normal mood and affect. His behavior is normal. Judgment and thought content normal.     General Musculoskeletal Exam   Gait: normal       Right Knee Exam     Inspection   Erythema: absent  Scars: absent  Swelling: absent  Effusion: absent  Deformity: absent  Bruising: absent    Tenderness   The patient is tender to palpation of the LCL and iliotibial band.    Range of Motion   Extension: 0   Flexion: 140     Tests   Meniscus   Dena:  Medial - negative Lateral - negative  Ligament Examination Lachman: normal (-1 to 2mm) PCL-Posterior Drawer: normal (0 to 2mm)     MCL - Valgus: normal (0 to 2mm)  LCL - Varus: normalPivot Shift: normal (Equal)Reverse Pivot Shift: normal (Equal)Dial Test at 30 degrees: normal (< 5 degrees)Dial Test at 90 degrees: normal (< 5 degrees)  Posterior Sag Test: negative  Posterolateral Corner: unstable (>15 degrees difference)  Patella   Patellar apprehension: negative  Passive Patellar Tilt: neutral  Patellar Tracking: normal  Patellar Glide (quadrants): Lateral - 1   Medial - 2  Q-Angle at 90 degrees: normal  Patellar Grind: negative  J-Sign: none    Other   Meniscal Cyst: absent  Popliteal (Baker's) Cyst: absent  Sensation: normal    Left Knee Exam     Inspection   Erythema: absent  Scars: absent  Swelling: absent  Effusion: absent  Deformity: absent  Bruising: absent    Tenderness   The patient is experiencing no tenderness.     Range of Motion   Extension: 0   Flexion: 140     Tests   Meniscus   Dena:  Medial - negative Lateral - negative  Stability Lachman: normal (-1 to 2mm) PCL-Posterior Drawer: normal (0 to 2mm)  MCL - Valgus: normal (0 to 2mm)  LCL - Varus: normal (0 to 2mm)Pivot Shift:  normal (Equal)Reverse Pivot Shift: normal (Equal)Dial Test at 30 degrees: normal (< 5 degrees)Dial Test at 90 degrees: normal (< 5 degrees)  Posterior Sag Test: negative  Posterolateral Corner: unstable (>15 degrees difference)  Patella   Patellar apprehension: negative  Passive Patellar Tilt: neutral  Patellar Tracking: normal  Patellar Glide (Quadrants): Lateral - 1 Medial - 2  Q-Angle at 90 degrees: normal  Patellar Grind: negative  J-Sign: J sign absent    Other   Meniscal Cyst: absent  Popliteal (Baker's) Cyst: absent  Sensation: normal    Right Hip Exam     Tests   Owen: negative  Left Hip Exam     Tests   Owen: negative          Muscle Strength   Right Lower Extremity   Hip Abduction: 4/5   Quadriceps:  5/5   Hamstrin/5   Left Lower Extremity   Quadriceps:  5/5   Hamstrin/5     Reflexes     Left Side  Quadriceps:  2+  Achilles:  2+    Right Side   Quadriceps:  2+  Achilles:  2+    Vascular Exam     Right Pulses  Dorsalis Pedis:      2+  Posterior Tibial:      2+        Left Pulses  Dorsalis Pedis:      2+  Posterior Tibial:      2+          IMAGING  MRI of the right knee from July 10, 2019 was personally reviewed and reveals increased signal in the posterior horn of the medial meniscus.  ACL and PCL are intact. No evidence of chondral injury or damage.  There is a slight increase in signal around the lateral collateral ligament, however the LCL appears to be intact.          Assessment:       Encounter Diagnoses   Name Primary?    Acute internal derangement of right knee Yes    Sprain of lateral collateral ligament of right knee, subsequent encounter     It band syndrome, right     Acute gastritis without hemorrhage, unspecified gastritis type           Plan:       1.  IKDC, SF-12 and KOOS was not filled out today in clinic.     RTC in 3 months with Joan Ley PA-C Patient will not fill out IKDC, SF-12 and KOOS on return.    2. Continue knee brace as needed    3.  Finish PT prescribed for  modalities, ROM, strengthening, and gait training; can then transition to home exercise program at that time    4.  Return to work without restrictions on 9/30/19, paperwork provided    5.  Mobic 15mg qDaily, Voltaren gel and omeprazole prescribed at today's visit

## 2019-09-22 ENCOUNTER — PATIENT OUTREACH (OUTPATIENT)
Dept: ADMINISTRATIVE | Facility: OTHER | Age: 59
End: 2019-09-22

## 2019-09-23 ENCOUNTER — OFFICE VISIT (OUTPATIENT)
Dept: SPORTS MEDICINE | Facility: CLINIC | Age: 59
End: 2019-09-23
Payer: OTHER MISCELLANEOUS

## 2019-09-23 VITALS
BODY MASS INDEX: 28.14 KG/M2 | HEIGHT: 69 IN | WEIGHT: 190 LBS | SYSTOLIC BLOOD PRESSURE: 155 MMHG | HEART RATE: 75 BPM | DIASTOLIC BLOOD PRESSURE: 97 MMHG

## 2019-09-23 DIAGNOSIS — M23.91 ACUTE INTERNAL DERANGEMENT OF RIGHT KNEE: Primary | ICD-10-CM

## 2019-09-23 DIAGNOSIS — K29.00 ACUTE GASTRITIS WITHOUT HEMORRHAGE, UNSPECIFIED GASTRITIS TYPE: ICD-10-CM

## 2019-09-23 DIAGNOSIS — M76.31 IT BAND SYNDROME, RIGHT: ICD-10-CM

## 2019-09-23 DIAGNOSIS — S83.421D SPRAIN OF LATERAL COLLATERAL LIGAMENT OF RIGHT KNEE, SUBSEQUENT ENCOUNTER: ICD-10-CM

## 2019-09-23 PROCEDURE — 99213 PR OFFICE/OUTPT VISIT, EST, LEVL III, 20-29 MIN: ICD-10-PCS | Mod: S$GLB,,, | Performed by: ORTHOPAEDIC SURGERY

## 2019-09-23 PROCEDURE — 99999 PR PBB SHADOW E&M-EST. PATIENT-LVL III: CPT | Mod: PBBFAC,,, | Performed by: ORTHOPAEDIC SURGERY

## 2019-09-23 PROCEDURE — 99213 OFFICE O/P EST LOW 20 MIN: CPT | Mod: S$GLB,,, | Performed by: ORTHOPAEDIC SURGERY

## 2019-09-23 PROCEDURE — 99999 PR PBB SHADOW E&M-EST. PATIENT-LVL III: ICD-10-PCS | Mod: PBBFAC,,, | Performed by: ORTHOPAEDIC SURGERY

## 2019-09-23 RX ORDER — OMEPRAZOLE 40 MG/1
40 CAPSULE, DELAYED RELEASE ORAL DAILY
Qty: 30 CAPSULE | Refills: 11 | Status: SHIPPED | OUTPATIENT
Start: 2019-09-23 | End: 2020-12-16

## 2019-09-23 RX ORDER — MELOXICAM 15 MG/1
15 TABLET ORAL DAILY
Qty: 30 TABLET | Refills: 6 | Status: SHIPPED | OUTPATIENT
Start: 2019-09-23 | End: 2020-03-01

## 2019-09-23 RX ORDER — DICLOFENAC SODIUM 10 MG/G
2 GEL TOPICAL 2 TIMES DAILY
Qty: 1 TUBE | Refills: 3 | Status: SHIPPED | OUTPATIENT
Start: 2019-09-23 | End: 2019-12-23

## 2019-09-24 ENCOUNTER — CLINICAL SUPPORT (OUTPATIENT)
Dept: REHABILITATION | Facility: HOSPITAL | Age: 59
End: 2019-09-24
Payer: OTHER MISCELLANEOUS

## 2019-09-24 DIAGNOSIS — S86.911D STRAIN OF RIGHT KNEE, SUBSEQUENT ENCOUNTER: ICD-10-CM

## 2019-09-24 PROCEDURE — 97110 THERAPEUTIC EXERCISES: CPT

## 2019-09-24 NOTE — PROGRESS NOTES
"                            Physical Therapy Daily Treatment Note     Name: Royer Castillo  Clinic Number: [id number]    Therapy Diagnosis:   Encounter Diagnosis   Name Primary?    Strain of right knee, subsequent encounter      Physician: Gerard Tello MD    Visit Date: 9/24/2019  Physician Orders: PT Eval and Treat   Medical Diagnosis: S83.421A (ICD-10-CM) - Tear of LCL (lateral collateral ligament) of knee, right, initial encounter  Evaluation Date: 8/22/2019  Authorization Period Expiration: 9/14/2019  Plan of Care Certification Period: 12/12/2019  Visit # / Visits authorized: 11/ 12  Date of Surgery: NA  Precautions: Standard    Time In: 740a  Time Out: 845a  Total Billable Time: 65 minutes    Subjective      Pt reports: he was compliant with home exercise program given last session.   Response to previous treatment: Patient states that he had some pain after cutting his grass this past weekend for about 24 hrs    Pain: 2/10  Location: right knee      Objective     Vasyl received therapeutic exercises to develop strength, endurance, ROM and flexibility for 55 minutes including:  Elliptical warm up 15'  SL squat - 2 x 20  Resisted walk - fwd/back - 2 laps, green  Lunge perturbation - 2 x 20"  HS curl on SB - 2 x 10  Marching step ups - 2 x 1', 12"  --SA sandbag carry down/back with perturbation, 60#  Side lunge with press - 2 x 10, 10#  Balance w/offset KB perturbations 3x30"ea  Ambulation with offset kettlebell - 2 laps, 35#/25#  HS stretch - 3 x 30"    Vasyl received the following manual therapy techniques for 0 minutes, including:      Home Exercises Provided and Patient Education Provided     Education provided:   Continue current HEP    Written Home Exercises Provided: Continue with current HEP  Exercises were reviewed and Vasyl was able to demonstrate them prior to the end of the session.      Pt received a written copy of exercises to perform at home.   See EMR under patient instructions for exercises " given.     Vasyl demonstrated good  understanding of the education provided.     Assessment     Patient demonstrates no pain with increased load, progressing strength in closed chain    Vasyl is progressing well towards his goals.   Pt prognosis is Excellent.     Pt will continue to benefit from skilled outpatient physical therapy to address the deficits listed in the problem list box on initial evaluation, provide pt/family education and to maximize pt's level of independence in the home and community environment.     Pt's spiritual, cultural and educational needs considered and pt agreeable to plan of care and goals.    Anticipated barriers to physical therapy: None    Goals:   Short Term Goals: 4 weeks   1. The patient will demonstrate 140 degrees of affected knee flexion to increase ease with stairs, squatting.  2. The patient will demonstrate 4/5 strength of affected gluteus medius to increase ease with squatting, stairs.     Long Term Goals: 16 weeks   1. The patient will demonstrate 143 degrees of affected knee flexion AROM to increase ease with squatting to lift objects at work.  2. The patient will demonstrate ability to pull 100# sled 40' with no discomfort.  3. The patient will demonstrate 4+/5 strength of affected gluteus medius to increase ease with squatting, running at work.    Plan     Plan for discharge HEP next visit    Moises Hernandez, PT

## 2019-09-26 ENCOUNTER — CLINICAL SUPPORT (OUTPATIENT)
Dept: REHABILITATION | Facility: HOSPITAL | Age: 59
End: 2019-09-26
Payer: OTHER MISCELLANEOUS

## 2019-09-26 DIAGNOSIS — S86.911D STRAIN OF RIGHT KNEE, SUBSEQUENT ENCOUNTER: ICD-10-CM

## 2019-09-26 PROBLEM — S83.421A SPRAIN OF LATERAL COLLATERAL LIGAMENT OF RIGHT KNEE: Status: ACTIVE | Noted: 2019-09-26

## 2019-09-26 PROBLEM — K29.00 ACUTE GASTRITIS WITHOUT HEMORRHAGE: Status: ACTIVE | Noted: 2019-09-26

## 2019-09-26 PROBLEM — M76.31 IT BAND SYNDROME, RIGHT: Status: ACTIVE | Noted: 2019-09-26

## 2019-09-26 PROCEDURE — 97110 THERAPEUTIC EXERCISES: CPT

## 2019-09-26 NOTE — PROGRESS NOTES
"                            Physical Therapy Daily Treatment Note     Name: Royer Castillo  Clinic Number: [id number]    Therapy Diagnosis:   Encounter Diagnosis   Name Primary?    Strain of right knee, subsequent encounter      Physician: Gerard Tello MD    Visit Date: 9/26/2019  Physician Orders: PT Eval and Treat   Medical Diagnosis: S83.421A (ICD-10-CM) - Tear of LCL (lateral collateral ligament) of knee, right, initial encounter  Evaluation Date: 8/22/2019  Authorization Period Expiration: 9/14/2019  Plan of Care Certification Period: 12/12/2019  Visit # / Visits authorized: 12/ 12  Date of Surgery: NA  Precautions: Standard    Time In: 900a  Time Out: 1005a  Total Billable Time: 65 minutes    Subjective      Pt reports: he was compliant with home exercise program given last session.   Response to previous treatment: Patient states that he is still having some soreness and discomfort at times, but feels better overall    Pain: 2/10  Location: right knee      Objective     Vasyl received therapeutic exercises to develop strength, endurance, ROM and flexibility for 55 minutes including:    Elliptical warm up 15'  Ecc focus quad extension 2 x 10 15#  Step ups - marching x 10  Step ups - lateral x 10  Step ups - constant tension x 10  DL leg press x 10  DL -> SL leg press x 10  SL leg press x 10  Split squat w/KB x 10  Suitcase deadlift from 12" x 10  SL RDL x 10  Quadruped plank 2 x 8  Goblet lateral lunge 2 x 10  Rodey carry 3x    Vasyl received the following manual therapy techniques for 0 minutes, including:      Home Exercises Provided and Patient Education Provided     Education provided:   Continue current HEP    Written Home Exercises Provided: Continue with current HEP  Exercises were reviewed and Vasyl was able to demonstrate them prior to the end of the session.      Pt received a written copy of exercises to perform at home.   See EMR under patient instructions for exercises given.     Vasyl " demonstrated good  understanding of the education provided.     Assessment     Patient demonstrates slight remaining laxity and pain with palpation, is returning to work next week and was given finalized HEP    Vasyl is progressing well towards his goals.   Pt prognosis is Excellent.     Pt will continue to benefit from skilled outpatient physical therapy to address the deficits listed in the problem list box on initial evaluation, provide pt/family education and to maximize pt's level of independence in the home and community environment.     Pt's spiritual, cultural and educational needs considered and pt agreeable to plan of care and goals.    Anticipated barriers to physical therapy: None    Goals:   Short Term Goals: 4 weeks   1. The patient will demonstrate 140 degrees of affected knee flexion to increase ease with stairs, squatting.  2. The patient will demonstrate 4/5 strength of affected gluteus medius to increase ease with squatting, stairs.     Long Term Goals: 16 weeks   1. The patient will demonstrate 143 degrees of affected knee flexion AROM to increase ease with squatting to lift objects at work.  2. The patient will demonstrate ability to pull 100# sled 40' with no discomfort.  3. The patient will demonstrate 4+/5 strength of affected gluteus medius to increase ease with squatting, running at work.    Plan     Plan for discharge HEP next visit    Moises Hernandez, PT

## 2019-10-16 ENCOUNTER — TELEPHONE (OUTPATIENT)
Dept: SPORTS MEDICINE | Facility: CLINIC | Age: 59
End: 2019-10-16

## 2019-10-16 NOTE — TELEPHONE ENCOUNTER
Patient notified that his forms for the [employer] have been completed. Patient will come to pick them up. Placed forms at the .    [facility]

## 2019-11-04 ENCOUNTER — LAB VISIT (OUTPATIENT)
Dept: LAB | Facility: HOSPITAL | Age: 59
End: 2019-11-04
Attending: PHYSICIAN ASSISTANT
Payer: COMMERCIAL

## 2019-11-04 ENCOUNTER — OFFICE VISIT (OUTPATIENT)
Dept: INTERNAL MEDICINE | Facility: CLINIC | Age: 59
End: 2019-11-04
Payer: COMMERCIAL

## 2019-11-04 VITALS
HEIGHT: 69 IN | HEART RATE: 70 BPM | TEMPERATURE: 98 F | WEIGHT: 208.75 LBS | SYSTOLIC BLOOD PRESSURE: 128 MMHG | OXYGEN SATURATION: 99 % | DIASTOLIC BLOOD PRESSURE: 80 MMHG | BODY MASS INDEX: 30.92 KG/M2

## 2019-11-04 DIAGNOSIS — E78.49 OTHER HYPERLIPIDEMIA: ICD-10-CM

## 2019-11-04 DIAGNOSIS — R73.03 PRE-DIABETES: ICD-10-CM

## 2019-11-04 DIAGNOSIS — E78.49 OTHER HYPERLIPIDEMIA: Primary | ICD-10-CM

## 2019-11-04 LAB
ALBUMIN SERPL BCP-MCNC: 4.1 G/DL (ref 3.5–5.2)
ALP SERPL-CCNC: 68 U/L (ref 55–135)
ALT SERPL W/O P-5'-P-CCNC: 21 U/L (ref 10–44)
ANION GAP SERPL CALC-SCNC: 9 MMOL/L (ref 8–16)
AST SERPL-CCNC: 20 U/L (ref 10–40)
BILIRUB SERPL-MCNC: 0.6 MG/DL (ref 0.1–1)
BUN SERPL-MCNC: 11 MG/DL (ref 6–20)
CALCIUM SERPL-MCNC: 9.3 MG/DL (ref 8.7–10.5)
CHLORIDE SERPL-SCNC: 105 MMOL/L (ref 95–110)
CHOLEST SERPL-MCNC: 197 MG/DL (ref 120–199)
CHOLEST/HDLC SERPL: 4.7 {RATIO} (ref 2–5)
CO2 SERPL-SCNC: 29 MMOL/L (ref 23–29)
CREAT SERPL-MCNC: 1 MG/DL (ref 0.5–1.4)
EST. GFR  (AFRICAN AMERICAN): >60 ML/MIN/1.73 M^2
EST. GFR  (NON AFRICAN AMERICAN): >60 ML/MIN/1.73 M^2
ESTIMATED AVG GLUCOSE: 123 MG/DL (ref 68–131)
GLUCOSE SERPL-MCNC: 96 MG/DL (ref 70–110)
HBA1C MFR BLD HPLC: 5.9 % (ref 4–5.6)
HDLC SERPL-MCNC: 42 MG/DL (ref 40–75)
HDLC SERPL: 21.3 % (ref 20–50)
LDLC SERPL CALC-MCNC: 132.6 MG/DL (ref 63–159)
NONHDLC SERPL-MCNC: 155 MG/DL
POTASSIUM SERPL-SCNC: 4.5 MMOL/L (ref 3.5–5.1)
PROT SERPL-MCNC: 7.2 G/DL (ref 6–8.4)
SODIUM SERPL-SCNC: 143 MMOL/L (ref 136–145)
TRIGL SERPL-MCNC: 112 MG/DL (ref 30–150)

## 2019-11-04 PROCEDURE — 80053 COMPREHEN METABOLIC PANEL: CPT

## 2019-11-04 PROCEDURE — 3079F PR MOST RECENT DIASTOLIC BLOOD PRESSURE 80-89 MM HG: ICD-10-PCS | Mod: CPTII,S$GLB,, | Performed by: PHYSICIAN ASSISTANT

## 2019-11-04 PROCEDURE — 83036 HEMOGLOBIN GLYCOSYLATED A1C: CPT

## 2019-11-04 PROCEDURE — 3079F DIAST BP 80-89 MM HG: CPT | Mod: CPTII,S$GLB,, | Performed by: PHYSICIAN ASSISTANT

## 2019-11-04 PROCEDURE — 3074F PR MOST RECENT SYSTOLIC BLOOD PRESSURE < 130 MM HG: ICD-10-PCS | Mod: CPTII,S$GLB,, | Performed by: PHYSICIAN ASSISTANT

## 2019-11-04 PROCEDURE — 99999 PR PBB SHADOW E&M-EST. PATIENT-LVL IV: CPT | Mod: PBBFAC,,, | Performed by: PHYSICIAN ASSISTANT

## 2019-11-04 PROCEDURE — 3008F BODY MASS INDEX DOCD: CPT | Mod: CPTII,S$GLB,, | Performed by: PHYSICIAN ASSISTANT

## 2019-11-04 PROCEDURE — 3008F PR BODY MASS INDEX (BMI) DOCUMENTED: ICD-10-PCS | Mod: CPTII,S$GLB,, | Performed by: PHYSICIAN ASSISTANT

## 2019-11-04 PROCEDURE — 36415 COLL VENOUS BLD VENIPUNCTURE: CPT

## 2019-11-04 PROCEDURE — 80061 LIPID PANEL: CPT

## 2019-11-04 PROCEDURE — 99999 PR PBB SHADOW E&M-EST. PATIENT-LVL IV: ICD-10-PCS | Mod: PBBFAC,,, | Performed by: PHYSICIAN ASSISTANT

## 2019-11-04 PROCEDURE — 3074F SYST BP LT 130 MM HG: CPT | Mod: CPTII,S$GLB,, | Performed by: PHYSICIAN ASSISTANT

## 2019-11-04 PROCEDURE — 99213 PR OFFICE/OUTPT VISIT, EST, LEVL III, 20-29 MIN: ICD-10-PCS | Mod: S$GLB,,, | Performed by: PHYSICIAN ASSISTANT

## 2019-11-04 PROCEDURE — 99213 OFFICE O/P EST LOW 20 MIN: CPT | Mod: S$GLB,,, | Performed by: PHYSICIAN ASSISTANT

## 2019-11-04 NOTE — PROGRESS NOTES
Subjective:       Patient ID: Royer Castillo is a 59 y.o. male.    Chief Complaint: Follow-up (cholesterol)    Patient presents for cholesterol check. Labs were elevated when checked in July 2019 so he presents for repeat blood work. He is fasting today and ready to complete these labs. He states he recently got his flu shot. He has no other ocmplaints today.     As of July 2019:    The 10-year ASCVD risk score (Bridger SU Jr., et al., 2013) is: 8.2%    Values used to calculate the score:      Age: 59 years      Sex: Male      Is Non- : Yes      Diabetic: No      Tobacco smoker: No      Systolic Blood Pressure: 128 mmHg      Is BP treated: No      HDL Cholesterol: 49 mg/dL      Total Cholesterol: 228 mg/dL    Review of Systems   Constitutional: Positive for activity change.   HENT: Negative for congestion, hearing loss, rhinorrhea and trouble swallowing.    Eyes: Negative for discharge and visual disturbance.   Respiratory: Negative for chest tightness and wheezing.    Cardiovascular: Negative for chest pain and palpitations.   Gastrointestinal: Negative for blood in stool, constipation, diarrhea and vomiting.   Endocrine: Negative for polydipsia and polyuria.   Genitourinary: Negative for difficulty urinating, hematuria and urgency.   Musculoskeletal: Positive for arthralgias and joint swelling. Negative for neck pain.   Neurological: Negative for weakness and headaches.   Psychiatric/Behavioral: Negative for confusion and dysphoric mood.       Objective:      Physical Exam   Constitutional: He is oriented to person, place, and time. He appears well-developed and well-nourished. No distress.   HENT:   Head: Normocephalic and atraumatic.   Nose: Nose normal.   Mouth/Throat: Oropharynx is clear and moist.   Eyes: Pupils are equal, round, and reactive to light. Conjunctivae and EOM are normal.   Neck: Normal range of motion. Neck supple.   Cardiovascular: Normal rate, regular rhythm and  normal heart sounds.   No murmur heard.  Pulmonary/Chest: Effort normal and breath sounds normal. No respiratory distress.   Abdominal: Soft. Bowel sounds are normal. He exhibits no distension.   Musculoskeletal: He exhibits no edema or deformity.   Neurological: He is alert and oriented to person, place, and time.   Skin: Skin is warm and dry.       Assessment:       1. Other hyperlipidemia    2. Pre-diabetes        Plan:         Royer was seen today for follow-up.    Diagnoses and all orders for this visit:    Other hyperlipidemia  -     Comprehensive metabolic panel; Future  -     Lipid panel; Future    Pre-diabetes  -     Hemoglobin A1c; Future    Will call with results.

## 2019-11-14 DIAGNOSIS — Z12.11 SPECIAL SCREENING FOR MALIGNANT NEOPLASMS, COLON: Primary | ICD-10-CM

## 2019-11-14 RX ORDER — POLYETHYLENE GLYCOL 3350, SODIUM SULFATE ANHYDROUS, SODIUM BICARBONATE, SODIUM CHLORIDE, POTASSIUM CHLORIDE 236; 22.74; 6.74; 5.86; 2.97 G/4L; G/4L; G/4L; G/4L; G/4L
4 POWDER, FOR SOLUTION ORAL ONCE
Qty: 4000 ML | Refills: 0 | Status: SHIPPED | OUTPATIENT
Start: 2019-11-14 | End: 2019-11-14

## 2019-12-11 ENCOUNTER — HOSPITAL ENCOUNTER (OUTPATIENT)
Facility: HOSPITAL | Age: 59
Discharge: HOME | End: 2019-12-11
Attending: INTERNAL MEDICINE | Admitting: INTERNAL MEDICINE
Payer: COMMERCIAL

## 2019-12-11 ENCOUNTER — ANESTHESIA (OUTPATIENT)
Dept: ENDOSCOPY | Facility: HOSPITAL | Age: 59
End: 2019-12-11
Payer: COMMERCIAL

## 2019-12-11 ENCOUNTER — ANESTHESIA EVENT (OUTPATIENT)
Dept: ENDOSCOPY | Facility: HOSPITAL | Age: 59
End: 2019-12-11
Payer: COMMERCIAL

## 2019-12-11 VITALS
BODY MASS INDEX: 28.14 KG/M2 | WEIGHT: 190 LBS | SYSTOLIC BLOOD PRESSURE: 159 MMHG | HEART RATE: 64 BPM | DIASTOLIC BLOOD PRESSURE: 99 MMHG | OXYGEN SATURATION: 97 % | HEIGHT: 69 IN | TEMPERATURE: 98 F | RESPIRATION RATE: 18 BRPM

## 2019-12-11 DIAGNOSIS — Z86.010 PERSONAL HISTORY OF COLONIC POLYPS: Primary | ICD-10-CM

## 2019-12-11 DIAGNOSIS — K63.5 COLON POLYPS: ICD-10-CM

## 2019-12-11 PROCEDURE — 63600175 PHARM REV CODE 636 W HCPCS: Performed by: NURSE ANESTHETIST, CERTIFIED REGISTERED

## 2019-12-11 PROCEDURE — 45385 COLONOSCOPY W/LESION REMOVAL: CPT | Mod: 33,,, | Performed by: INTERNAL MEDICINE

## 2019-12-11 PROCEDURE — E9220 PRA ENDO ANESTHESIA: ICD-10-PCS | Mod: 33,,, | Performed by: NURSE ANESTHETIST, CERTIFIED REGISTERED

## 2019-12-11 PROCEDURE — 45385 PR COLONOSCOPY,REMV LESN,SNARE: ICD-10-PCS | Mod: 33,,, | Performed by: INTERNAL MEDICINE

## 2019-12-11 PROCEDURE — 88305 TISSUE EXAM BY PATHOLOGIST: CPT | Performed by: PATHOLOGY

## 2019-12-11 PROCEDURE — 63600175 PHARM REV CODE 636 W HCPCS: Performed by: INTERNAL MEDICINE

## 2019-12-11 PROCEDURE — 88305 TISSUE EXAM BY PATHOLOGIST: CPT | Mod: 26,,, | Performed by: PATHOLOGY

## 2019-12-11 PROCEDURE — 37000008 HC ANESTHESIA 1ST 15 MINUTES: Performed by: INTERNAL MEDICINE

## 2019-12-11 PROCEDURE — 27200997: Performed by: INTERNAL MEDICINE

## 2019-12-11 PROCEDURE — 37000009 HC ANESTHESIA EA ADD 15 MINS: Performed by: INTERNAL MEDICINE

## 2019-12-11 PROCEDURE — 27201089 HC SNARE, DISP (ANY): Performed by: INTERNAL MEDICINE

## 2019-12-11 PROCEDURE — 45385 COLONOSCOPY W/LESION REMOVAL: CPT | Performed by: INTERNAL MEDICINE

## 2019-12-11 PROCEDURE — E9220 PRA ENDO ANESTHESIA: HCPCS | Mod: 33,,, | Performed by: NURSE ANESTHETIST, CERTIFIED REGISTERED

## 2019-12-11 PROCEDURE — 88305 TISSUE EXAM BY PATHOLOGIST: ICD-10-PCS | Mod: 26,,, | Performed by: PATHOLOGY

## 2019-12-11 RX ORDER — LIDOCAINE HCL/PF 100 MG/5ML
SYRINGE (ML) INTRAVENOUS
Status: DISCONTINUED | OUTPATIENT
Start: 2019-12-11 | End: 2019-12-11

## 2019-12-11 RX ORDER — PROPOFOL 10 MG/ML
VIAL (ML) INTRAVENOUS
Status: DISCONTINUED | OUTPATIENT
Start: 2019-12-11 | End: 2019-12-11

## 2019-12-11 RX ORDER — PROPOFOL 10 MG/ML
VIAL (ML) INTRAVENOUS CONTINUOUS PRN
Status: DISCONTINUED | OUTPATIENT
Start: 2019-12-11 | End: 2019-12-11

## 2019-12-11 RX ORDER — SODIUM CHLORIDE 9 MG/ML
INJECTION, SOLUTION INTRAVENOUS CONTINUOUS
Status: DISCONTINUED | OUTPATIENT
Start: 2019-12-11 | End: 2019-12-11 | Stop reason: HOSPADM

## 2019-12-11 RX ADMIN — SODIUM CHLORIDE: 0.9 INJECTION, SOLUTION INTRAVENOUS at 11:12

## 2019-12-11 RX ADMIN — PROPOFOL 150 MCG/KG/MIN: 10 INJECTION, EMULSION INTRAVENOUS at 11:12

## 2019-12-11 RX ADMIN — LIDOCAINE HYDROCHLORIDE 50 MG: 20 INJECTION, SOLUTION INTRAVENOUS at 11:12

## 2019-12-11 RX ADMIN — PROPOFOL 100 MG: 10 INJECTION, EMULSION INTRAVENOUS at 11:12

## 2019-12-11 NOTE — [employer] PATIENT INSTRUCTIONS
Discharge Summary/Instructions after an Endoscopic Procedure  Patient Name: Royer Castillo  Patient MRN: [id number]  Patient YOB: 1960 Wednesday, December 11, 2019  Devaughn Johnson MD  RESTRICTIONS:  During your procedure today, you received medications for sedation.  These   medications may affect your judgment, balance and coordination.  Therefore,   for 24 hours, you have the following restrictions:   - DO NOT drive a car, operate machinery, make legal/financial decisions,   sign important papers or drink alcohol.    ACTIVITY:  Today: no heavy lifting, straining or running due to procedural   sedation/anesthesia.  The following day: return to full activity including work.  DIET:  Eat and drink normally unless instructed otherwise.     TREATMENT FOR COMMON SIDE EFFECTS:  - Mild abdominal pain, nausea, belching, bloating or excessive gas:  rest,   eat lightly and use a heating pad.  - Sore Throat: treat with throat lozenges and/or gargle with warm salt   water.  - Because air was used during the procedure, expelling large amounts of air   from your rectum or belching is normal.  - If a bowel prep was taken, you may not have a bowel movement for 1-3 days.    This is normal.  SYMPTOMS TO WATCH FOR AND REPORT TO YOUR PHYSICIAN:  1. Abdominal pain or bloating, other than gas cramps.  2. Chest pain.  3. Back pain.  4. Signs of infection such as: chills or fever occurring within 24 hours   after the procedure.  5. Rectal bleeding, which would show as bright red, maroon, or black stools.   (A tablespoon of blood from the rectum is not serious, especially if   hemorrhoids are present.)  6. Vomiting.  7. Weakness or dizziness.  GO DIRECTLY TO THE NEAREST EMERGENCY ROOM IF YOU HAVE ANY OF THE FOLLOWING:      Difficulty breathing              Chills and/or fever over 101 F   Persistent vomiting and/or vomiting blood   Severe abdominal pain   Severe chest pain   Black, tarry stools   Bleeding- more than one  tablespoon   Any other symptom or condition that you feel may need urgent attention  Your doctor recommends these additional instructions:  If any biopsies were taken, your doctors clinic will contact you in 1 to 2   weeks with any results.  - Patient has a contact number available for emergencies.  The signs and   symptoms of potential delayed complications were discussed with the   patient.  Return to normal activities tomorrow.  Written discharge   instructions were provided to the patient.   - Discharge patient to home.   - Await pathology results.   - Repeat colonoscopy in 5 years for surveillance.   - The findings and recommendations were discussed with the designated   responsible adult.   For questions, problems or results please call your physician - Devaughn Johnson MD at Work:  [phone].  [facility], EMERGENCY ROOM PHONE NUMBER: [phone]  IF A COMPLICATION OR EMERGENCY SITUATION ARISES AND YOU ARE UNABLE TO REACH   YOUR PHYSICIAN - GO DIRECTLY TO THE EMERGENCY ROOM.  Devaughn Johnson MD  12/11/2019 11:50:04 AM  This report has been verified and signed electronically.  [employer]

## 2019-12-11 NOTE — ANESTHESIA PREPROCEDURE EVALUATION
12/11/2019  Royer Castillo is a 59 y.o., male.    Past Medical History:   Diagnosis Date    Cellulitis     left foot    Diverticulosis     Genital herpes     Hyperlipidemia     Onychomycosis     Pre-diabetes      History reviewed. No pertinent surgical history.    Anesthesia Evaluation    I have reviewed the Patient Summary Reports.    I have reviewed the Nursing Notes.   I have reviewed the Medications.     Review of Systems  Anesthesia Hx:  No problems with previous Anesthesia   Denies Personal Hx of Anesthesia complications.   Social:  No Alcohol Use, Non-Smoker    Hematology/Oncology:  Hematology Normal   Oncology Normal     EENT/Dental:EENT/Dental Normal   Cardiovascular:  Cardiovascular Normal Exercise tolerance: good     Pulmonary:  Pulmonary Normal    Renal/:  Renal/ Normal     Hepatic/GI:   GERD    Musculoskeletal:  Musculoskeletal Normal    Neurological:  Neurology Normal    Endocrine:  Endocrine Normal    Dermatological:  Skin Normal    Psych:  Psychiatric Normal           Physical Exam  General:  Well nourished    Airway/Jaw/Neck:  Airway Findings: Mouth Opening: Normal Tongue: Normal  General Airway Assessment: Adult  Mallampati: II  Improves to II, I with phonation.  TM Distance: Normal, at least 6 cm  Jaw/Neck Findings:  Neck ROM: Normal ROM     Eyes/Ears/Nose:  EYES/EARS/NOSE FINDINGS: Normal   Dental:  Dental Findings: In tact   Chest/Lungs:  Chest/Lungs Findings: Clear to auscultation, Normal Respiratory Rate     Heart/Vascular:  Heart Findings: Rate: Normal  Rhythm: Regular Rhythm  Sounds: Normal  Heart murmur: negative    Abdomen:  Abdomen Findings: Normal      Mental Status:  Mental Status Findings:  Cooperative, Alert and Oriented         Anesthesia Plan  Type of Anesthesia, risks & benefits discussed:  Anesthesia Type:  general  Patient's Preference: general  Intra-op  Monitoring Plan: standard ASA monitors  Intra-op Monitoring Plan Comments:   Post Op Pain Control Plan: IV/PO Opioids PRN  Post Op Pain Control Plan Comments:   Induction:   IV  Beta Blocker:  Patient is not currently on a Beta-Blocker (No further documentation required).       Informed Consent: Patient understands risks and agrees with Anesthesia plan.  Questions answered. Anesthesia consent signed with patient.  ASA Score: 2     Day of Surgery Review of History & Physical:  There are no significant changes.  H&P update referred to the provider.         Ready For Surgery From Anesthesia Perspective.

## 2019-12-11 NOTE — TRANSFER OF CARE
"Anesthesia Transfer of Care Note    Patient: Royer Castillo    Procedure(s) Performed: Procedure(s) (LRB):  COLONOSCOPY (N/A)    Patient location: OPS    Anesthesia Type: general    Transport from OR: Transported from OR on room air with adequate spontaneous ventilation    Post pain: adequate analgesia    Post assessment: no apparent anesthetic complications and tolerated procedure well    Post vital signs: stable    Level of consciousness: sedated    Nausea/Vomiting: no nausea/vomiting    Complications: none          Last vitals:   Visit Vitals  BP (!) 188/102 (BP Location: Left arm, Patient Position: Lying)   Pulse 66   Temp 36.7 °C (98.1 °F) (Temporal)   Resp 14   Ht 5' 9" (1.753 m)   Wt 86.2 kg (190 lb)   SpO2 96%   BMI 28.06 kg/m²     "

## 2019-12-11 NOTE — H&P
Short Stay Endoscopy History and Physical    PCP - Vaishali Camacho MD    Procedure - Colonoscopy  ASA - per anesthesia  Mallampati - per anesthesia  History of Anesthesia problems - no  Family history Anesthesia problems - no   Plan of anesthesia - General    HPI:  This is a 59 y.o. male here for evaluation of : personal history of colon polyps      ROS:  Constitutional: No fevers, chills, No weight loss  CV: No chest pain  Pulm: No cough, No shortness of breath  GI: see HPI  Derm: No rash    Medical History:  has a past medical history of Cellulitis, Diverticulosis, Genital herpes, Hyperlipidemia, Onychomycosis, and Pre-diabetes.    Surgical History:  has no past surgical history on file.    Family History: family history includes Cancer in his mother; Heart disease (age of onset: 64) in his sister; Heart disease (age of onset: 86) in his father; Hyperlipidemia in his sister.. Otherwise no colon cancer, inflammatory bowel disease, or GI malignancies.    Social History:  reports that he has never smoked. He has never used smokeless tobacco. He reports that he does not drink alcohol or use drugs.    Review of patient's allergies indicates:  No Known Allergies    Medications:   Medications Prior to Admission   Medication Sig Dispense Refill Last Dose    omeprazole (PRILOSEC) 40 MG capsule Take 1 capsule (40 mg total) by mouth once daily. 30 capsule 11 Past Week at Unknown time    acyclovir (ZOVIRAX) 200 MG capsule TAKE 1 CAPSULE(200 MG) BY MOUTH TWICE DAILY 60 capsule 11 More than a month at Unknown time    diclofenac sodium (VOLTAREN) 1 % Gel Apply 2 g topically 2 (two) times daily. for 10 days 1 Tube 3          Physical Exam:    Vital Signs:   Vitals:    12/11/19 1110   BP: (!) 188/102   Pulse: 66   Resp: 14   Temp: 98.1 °F (36.7 °C)       General Appearance: Well appearing in no acute distress  Eyes:    No scleral icterus  ENT: Neck supple, Lips, mucosa, and tongue normal; teeth and gums normal  Lungs: CTA  bilaterally  Heart:  S1, S2 normal, no murmurs heard  Abdomen: Soft, non tender, non distended with positive bowel sounds. No hepatosplenomegaly, ascites, or mass.  Extremities: 2+ pulses, no clubbing, cyanosis or edema  Skin: No rash      Labs:  Lab Results   Component Value Date    WBC 7.13 07/02/2019    HGB 14.9 07/02/2019    HCT 43.7 07/02/2019     07/02/2019    CHOL 197 11/04/2019    TRIG 112 11/04/2019    HDL 42 11/04/2019    ALT 21 11/04/2019    AST 20 11/04/2019     11/04/2019    K 4.5 11/04/2019     11/04/2019    CREATININE 1.0 11/04/2019    BUN 11 11/04/2019    CO2 29 11/04/2019    TSH 1.517 07/13/2018    PSA 0.49 07/02/2019    HGBA1C 5.9 (H) 11/04/2019       I have explained the risks and benefits of endoscopy procedures to the patient including but not limited to bleeding, perforation, infection, and death.  The patient was asked if they understand and allowed to ask any further questions to their satisfaction.    Devaughn Johnson MD

## 2019-12-11 NOTE — ANESTHESIA RELEASE NOTE
"Anesthesia Release from PACU Note    Patient: Royer Castillo    Procedure(s) Performed: Procedure(s) (LRB):  COLONOSCOPY (N/A)    Anesthesia type: general    Post pain: Adequate analgesia    Post assessment: no apparent anesthetic complications and tolerated procedure well    Last Vitals:   Visit Vitals  BP (!) 159/99 (BP Location: Left arm, Patient Position: Lying)   Pulse 64   Temp 36.7 °C (98.1 °F) (Temporal)   Resp 18   Ht 5' 9" (1.753 m)   Wt 86.2 kg (190 lb)   SpO2 97%   BMI 28.06 kg/m²       Post vital signs: stable    Level of consciousness: awake and alert     Nausea/Vomiting: no nausea/no vomiting    Complications: none    Airway Patency: patent    Respiratory: unassisted, spontaneous ventilation, room air    Cardiovascular: stable and blood pressure at baseline    Hydration: euvolemic  "

## 2019-12-11 NOTE — ANESTHESIA POSTPROCEDURE EVALUATION
Anesthesia Post Evaluation    Patient: Royer Castillo    Procedure(s) Performed: Procedure(s) (LRB):  COLONOSCOPY (N/A)    Final Anesthesia Type: general    Patient location during evaluation: GI PACU  Patient participation: Yes- Able to Participate  Level of consciousness: awake and alert  Post-procedure vital signs: reviewed and stable  Pain management: adequate  Airway patency: patent    PONV status at discharge: No PONV  Anesthetic complications: no      Cardiovascular status: hemodynamically stable  Respiratory status: unassisted, spontaneous ventilation and room air  Hydration status: euvolemic  Follow-up not needed.          Vitals Value Taken Time   /99 12/11/2019 12:22 PM   Temp 36.7 °C (98.1 °F) 12/11/2019 11:53 AM   Pulse 64 12/11/2019 12:22 PM   Resp 18 12/11/2019 12:22 PM   SpO2 97 % 12/11/2019 12:22 PM         Event Time     Out of Recovery 12:28:10          Pain/Jim Score: Jim Score: 10 (12/11/2019 12:08 PM)

## 2019-12-20 ENCOUNTER — TELEPHONE (OUTPATIENT)
Dept: SPORTS MEDICINE | Facility: CLINIC | Age: 59
End: 2019-12-20

## 2019-12-23 ENCOUNTER — PATIENT MESSAGE (OUTPATIENT)
Dept: INTERNAL MEDICINE | Facility: CLINIC | Age: 59
End: 2019-12-23

## 2019-12-23 ENCOUNTER — OFFICE VISIT (OUTPATIENT)
Dept: INTERNAL MEDICINE | Facility: CLINIC | Age: 59
End: 2019-12-23
Payer: COMMERCIAL

## 2019-12-23 VITALS
HEIGHT: 69 IN | DIASTOLIC BLOOD PRESSURE: 88 MMHG | BODY MASS INDEX: 30.96 KG/M2 | HEART RATE: 84 BPM | TEMPERATURE: 98 F | SYSTOLIC BLOOD PRESSURE: 160 MMHG | WEIGHT: 209 LBS

## 2019-12-23 DIAGNOSIS — R73.03 PREDIABETES: ICD-10-CM

## 2019-12-23 DIAGNOSIS — I10 BENIGN ESSENTIAL HYPERTENSION: Primary | ICD-10-CM

## 2019-12-23 DIAGNOSIS — M25.552 LEFT HIP PAIN: ICD-10-CM

## 2019-12-23 PROBLEM — K29.00 ACUTE GASTRITIS WITHOUT HEMORRHAGE: Status: RESOLVED | Noted: 2019-09-26 | Resolved: 2019-12-23

## 2019-12-23 LAB
FINAL PATHOLOGIC DIAGNOSIS: NORMAL
GROSS: NORMAL

## 2019-12-23 PROCEDURE — 3079F DIAST BP 80-89 MM HG: CPT | Mod: CPTII,S$GLB,, | Performed by: INTERNAL MEDICINE

## 2019-12-23 PROCEDURE — 3008F BODY MASS INDEX DOCD: CPT | Mod: CPTII,S$GLB,, | Performed by: INTERNAL MEDICINE

## 2019-12-23 PROCEDURE — 99214 PR OFFICE/OUTPT VISIT, EST, LEVL IV, 30-39 MIN: ICD-10-PCS | Mod: S$GLB,,, | Performed by: INTERNAL MEDICINE

## 2019-12-23 PROCEDURE — 3008F PR BODY MASS INDEX (BMI) DOCUMENTED: ICD-10-PCS | Mod: CPTII,S$GLB,, | Performed by: INTERNAL MEDICINE

## 2019-12-23 PROCEDURE — 3079F PR MOST RECENT DIASTOLIC BLOOD PRESSURE 80-89 MM HG: ICD-10-PCS | Mod: CPTII,S$GLB,, | Performed by: INTERNAL MEDICINE

## 2019-12-23 PROCEDURE — 99999 PR PBB SHADOW E&M-EST. PATIENT-LVL III: ICD-10-PCS | Mod: PBBFAC,,, | Performed by: INTERNAL MEDICINE

## 2019-12-23 PROCEDURE — 99214 OFFICE O/P EST MOD 30 MIN: CPT | Mod: S$GLB,,, | Performed by: INTERNAL MEDICINE

## 2019-12-23 PROCEDURE — 99999 PR PBB SHADOW E&M-EST. PATIENT-LVL III: CPT | Mod: PBBFAC,,, | Performed by: INTERNAL MEDICINE

## 2019-12-23 PROCEDURE — 3077F PR MOST RECENT SYSTOLIC BLOOD PRESSURE >= 140 MM HG: ICD-10-PCS | Mod: CPTII,S$GLB,, | Performed by: INTERNAL MEDICINE

## 2019-12-23 PROCEDURE — 3077F SYST BP >= 140 MM HG: CPT | Mod: CPTII,S$GLB,, | Performed by: INTERNAL MEDICINE

## 2019-12-23 RX ORDER — AMLODIPINE BESYLATE 5 MG/1
5 TABLET ORAL DAILY
Qty: 30 TABLET | Refills: 3 | Status: SHIPPED | OUTPATIENT
Start: 2019-12-23 | End: 2020-01-31

## 2019-12-23 NOTE — PROGRESS NOTES
"Subjective:       Patient ID: Royer Castillo is a 59 y.o. male.    Chief Complaint: Blood Pressure Check    HPI   Saw dentist twice and BP high both times. During Cscope, blood pressure was also high. Started tracking at home. Checks BP every other day - running around 160s/100s.   Some HA. No SOB/CP/palpitations/blurry vision/lightheadedness/leg swelling.     PDM - last a1c 11/4/19 is 5.9.    Had issues w/ R meniscus. Weight gain of 20lbs. Eating about the same. That is now resolved but now w/ L hip pain. Will be seeing someone for it soon. Back at work.     Review of Systems  Comprehensive review of systems otherwise negative. See history/subjective section for more details.    Objective:      Physical Exam    BP (!) 160/88 (BP Location: Right arm, Patient Position: Sitting, BP Method: Large (Manual))   Pulse 84   Temp 97.9 °F (36.6 °C)   Ht 5' 9" (1.753 m)   Wt 94.8 kg (208 lb 15.9 oz)   BMI 30.86 kg/m²     GEN - A+OX4, NAD   HEENT - PERRL, EOMI, OP clear  Neck - No thyromegaly or cervical LAD. No thyroid masses felt.  CV - RRR, no m/r   Chest - CTAB, no wheezing or rhonchi  Abd - S/NT/ND/+BS.   Ext - 2+BDP and radial pulses. No C/C/E.  MSK - antalgic gait. 5/5 BLE m strength.   Skin - No rash.    Previous labs reviewed.    Assessment/Plan     Royer was seen today for blood pressure check.    Diagnoses and all orders for this visit:    Benign essential hypertension - add amlo. Keep log and bring log w/ BP cuff to 6-8 wk BP f/u w/ Ilene.   Low sodium diet.  -     amLODIPine (NORVASC) 5 MG tablet; Take 1 tablet (5 mg total) by mouth once daily.    Prediabetes - recommend getting back to exercise. Watch diet. Repeat in May-ignacio.     Left hip pain - f/u w/ ortho.       Follow up in about 6 weeks (around 2/3/2020).      Vaishali Camacho MD  Department of Internal Medicine - [facility]  1:40 PM  "

## 2019-12-30 ENCOUNTER — PATIENT MESSAGE (OUTPATIENT)
Dept: INTERNAL MEDICINE | Facility: CLINIC | Age: 59
End: 2019-12-30

## 2019-12-30 DIAGNOSIS — I10 BENIGN ESSENTIAL HYPERTENSION: Primary | ICD-10-CM

## 2019-12-30 RX ORDER — HYDROCHLOROTHIAZIDE 25 MG/1
25 TABLET ORAL DAILY
Qty: 30 TABLET | Refills: 2 | Status: SHIPPED | OUTPATIENT
Start: 2019-12-30 | End: 2020-03-23

## 2019-12-30 NOTE — TELEPHONE ENCOUNTER
Keerthi, instead of the 6-8 week f/u skip/ Ilene, can you see if she has something available next week for BP follow up?

## 2020-01-06 ENCOUNTER — OFFICE VISIT (OUTPATIENT)
Dept: SPORTS MEDICINE | Facility: CLINIC | Age: 60
End: 2020-01-06
Payer: OTHER MISCELLANEOUS

## 2020-01-06 ENCOUNTER — HOSPITAL ENCOUNTER (OUTPATIENT)
Dept: RADIOLOGY | Facility: HOSPITAL | Age: 60
Discharge: HOME | End: 2020-01-06
Attending: PHYSICIAN ASSISTANT
Payer: COMMERCIAL

## 2020-01-06 VITALS
HEART RATE: 81 BPM | WEIGHT: 208 LBS | SYSTOLIC BLOOD PRESSURE: 133 MMHG | DIASTOLIC BLOOD PRESSURE: 87 MMHG | HEIGHT: 69 IN | BODY MASS INDEX: 30.81 KG/M2

## 2020-01-06 DIAGNOSIS — M25.552 LEFT HIP PAIN: ICD-10-CM

## 2020-01-06 DIAGNOSIS — M16.12 PRIMARY OSTEOARTHRITIS OF LEFT HIP: Primary | ICD-10-CM

## 2020-01-06 DIAGNOSIS — M76.31 IT BAND SYNDROME, RIGHT: ICD-10-CM

## 2020-01-06 PROCEDURE — 73502 X-RAY EXAM HIP UNI 2-3 VIEWS: CPT | Mod: 26,LT,, | Performed by: RADIOLOGY

## 2020-01-06 PROCEDURE — 99999 PR PBB SHADOW E&M-EST. PATIENT-LVL III: CPT | Mod: PBBFAC,,, | Performed by: PHYSICIAN ASSISTANT

## 2020-01-06 PROCEDURE — 73502 XR HIP 2 VIEW LEFT: ICD-10-PCS | Mod: 26,LT,, | Performed by: RADIOLOGY

## 2020-01-06 PROCEDURE — 99214 PR OFFICE/OUTPT VISIT, EST, LEVL IV, 30-39 MIN: ICD-10-PCS | Mod: S$GLB,,, | Performed by: PHYSICIAN ASSISTANT

## 2020-01-06 PROCEDURE — 99999 PR PBB SHADOW E&M-EST. PATIENT-LVL III: ICD-10-PCS | Mod: PBBFAC,,, | Performed by: PHYSICIAN ASSISTANT

## 2020-01-06 PROCEDURE — 99214 OFFICE O/P EST MOD 30 MIN: CPT | Mod: S$GLB,,, | Performed by: PHYSICIAN ASSISTANT

## 2020-01-06 PROCEDURE — 73502 X-RAY EXAM HIP UNI 2-3 VIEWS: CPT | Mod: TC,LT

## 2020-01-06 NOTE — PROGRESS NOTES
Subjective:          Chief Complaint:     Royer Castillo is a 59 y.o. male who is following up for right knee pain after a work sustained injury on 6/30/19. He was last seen 9/23/19 by Dr. Tello. He has tried conservative management consisting of physical therapy, bracing, and NSAID therapy (discontinued Celebrex secondary to reflux).  He was discharged from PT on 9/26/19. Reports significant relief in pain in right knee.  Pain in right knee is 2/10 today. Does have some mild residual tenderness near the distal aspect of the IT band. He has been completing a home exercise program 3-4 times week.     He states that he has also been experiencing left hip pain since the knee injury. He was told by his PT and Dr. Tello that pain was likely from compensation. He states that he is walking with a painful limp due to the left hip pain. He is here today to discuss if these pains are related.     Review of Systems   Constitution: Negative for fever and night sweats.   HENT: Negative for hearing loss.    Eyes: Negative for blurred vision and visual disturbance.   Cardiovascular: Negative for chest pain and leg swelling.   Respiratory: Negative for shortness of breath.    Endocrine: Negative for polyuria.   Hematologic/Lymphatic: Negative for bleeding problem.   Skin: Negative for rash.   Musculoskeletal: Positive for joint pain and stiffness. Negative for back pain, joint swelling, muscle cramps and muscle weakness.   Gastrointestinal: Negative for melena.   Genitourinary: Negative for hematuria.   Neurological: Negative for loss of balance, numbness and paresthesias.   Psychiatric/Behavioral: Negative for altered mental status.            Objective:        General: Royer is well-developed, well-nourished, appears stated age, in no acute distress, alert and oriented to time, place and person.     General    Vitals reviewed.  Constitutional: He is oriented to person, place, and time. He appears well-developed and  well-nourished. No distress.   HENT:   Head: Normocephalic and atraumatic.   Mouth/Throat: No oropharyngeal exudate.   Eyes: EOM are normal. Right eye exhibits no discharge. Left eye exhibits no discharge.   Neck: Normal range of motion.   Cardiovascular: Normal rate.    Pulmonary/Chest: Effort normal and breath sounds normal. No respiratory distress.   Neurological: He is alert and oriented to person, place, and time. He has normal reflexes. No cranial nerve deficit. Coordination normal.   Psychiatric: He has a normal mood and affect. His behavior is normal. Judgment and thought content normal.     General Musculoskeletal Exam   Gait: abnormal and antalgic   Pelvic Obliquity: none      Right Knee Exam     Inspection   Alignment:  normal  Erythema: absent  Scars: absent  Swelling: absent  Effusion: absent  Deformity: absent  Bruising: absent    Tenderness   The patient is tender to palpation of the iliotibial band.    Range of Motion   Extension: 0   Flexion: 140     Tests   Meniscus   Dena:  Medial - negative Lateral - negative  Ligament Examination Lachman: normal (-1 to 2mm) PCL-Posterior Drawer: normal (0 to 2mm)     MCL - Valgus: normal (0 to 2mm)  LCL - Varus: normalPivot Shift: normal (Equal)Reverse Pivot Shift: normal (Equal)  Posterior Sag Test: negative  Posterolateral Corner: unstable (>15 degrees difference)  Patella   Patellar apprehension: negative  Passive Patellar Tilt: neutral  Patellar Tracking: normal  Patellar Glide (quadrants): Lateral - 1   Medial - 2  Q-Angle at 90 degrees: normal  Patellar Grind: negative  J-Sign: none    Other   Meniscal Cyst: absent  Popliteal (Baker's) Cyst: absent  Sensation: normal    Left Knee Exam     Inspection   Alignment:  normal  Erythema: absent  Scars: absent  Swelling: absent  Effusion: absent  Deformity: absent  Bruising: absent    Tenderness   The patient is experiencing no tenderness.     Range of Motion   Extension: 0   Flexion: 140     Tests   Meniscus    Dena:  Medial - negative Lateral - negative  Stability Lachman: normal (-1 to 2mm) PCL-Posterior Drawer: normal (0 to 2mm)  MCL - Valgus: normal (0 to 2mm)  LCL - Varus: normal (0 to 2mm)Pivot Shift: normal (Equal)Reverse Pivot Shift: normal (Equal)  Posterior Sag Test: negative  Posterolateral Corner: unstable (>15 degrees difference)  Patella   Patellar apprehension: negative  Passive Patellar Tilt: neutral  Patellar Tracking: normal  Patellar Glide (Quadrants): Lateral - 1 Medial - 2  Q-Angle at 90 degrees: normal  Patellar Grind: negative  J-Sign: J sign absent    Other   Meniscal Cyst: absent  Popliteal (Baker's) Cyst: absent  Sensation: normal    Right Hip Exam     Inspection   Scars: absent  Swelling: absent  Bruising: absent  No deformity of hip.  Quadriceps Atrophy:  Negative  Erythema: absent    Range of Motion   The patient has normal right hip ROM.  Abduction:  30 normal   Adduction:  20 normal   Extension:  0 normal   Flexion:  110 normal   External rotation:  40 normal   Internal rotation:  20 normal     Muscle Strength   The patient has normal right hip strength.    Tests   Pain w/ forced internal rotation (CAN): absent  Pain w/ forced external rotation (FADIR): absent  Owen: negative  Trendelenburg Test: negative  Circumduction test: negative  Stinchfield test: negative  Log Roll: negative  Step-down test: negative  Resisted sit-up pain: negative    Other   Sensation: normal  Left Hip Exam     Inspection   Scars: absent  Swelling: absent  No deformity of hip.  Quadriceps Atrophy:  negative  Erythema: absent  Bruising: absent    Tenderness   The patient tender to palpation of the trochanteric bursa.    Range of Motion   Abduction:  30 normal   Adduction:  20 normal   Extension:  0 normal   Flexion:  110 (pain) normal   External rotation:  40 (pain) normal   Internal rotation: 5 (pain) abnormal     Muscle Strength   The patient has normal left hip strength.     Tests   Pain w/ forced internal  rotation (CAN): present  Pain w/ forced external rotation (FADIR): present  Owen: negative  Trendelenburg Test: negative  Circumduction test: negative  Stinchfield test: positive  Log Roll: negative  Step-down test: negative  Resisted sit-up pain: negative    Other   Sensation: normal      Back (L-Spine & T-Spine) / Neck (C-Spine) Exam   Back exam is normal.      Muscle Strength   Right Lower Extremity   Hip Abduction: 4/5   Hip Adduction: 5/5   Hip Flexion: 5/5   Quadriceps:  5/5   Hamstrin/5   Left Lower Extremity   Hip Abduction: 4/5   Hip Adduction: 5/5   Hip Flexion: 4/5 (pain)   Quadriceps:  5/5   Hamstrin/5     Reflexes     Left Side  Quadriceps:  2+  Achilles:  2+    Right Side   Quadriceps:  2+  Achilles:  2+    Vascular Exam     Right Pulses  Dorsalis Pedis:      2+  Posterior Tibial:      2+        Left Pulses  Dorsalis Pedis:      2+  Posterior Tibial:      2+          IMAGING  MRI of the right knee from July 10, 2019 was personally reviewed and reveals increased signal in the posterior horn of the medial meniscus.  ACL and PCL are intact. No evidence of chondral injury or damage.  There is a slight increase in signal around the lateral collateral ligament, however the LCL appears to be intact.    Left hip Radiographs:  FINDINGS:  There is mild-moderate DJD and impingement change.  No fracture dislocation bone destruction seen.        Assessment:       Encounter Diagnoses   Name Primary?    Left hip pain     It band syndrome, right     Primary osteoarthritis of left hip Yes          Plan:       1.  IKDC, SF-12 and KOOS was not filled out today in clinic.     RTC prn with Dr. Rodriguez for left hip intra-articular steroid injection. Patient will not fill out IKDC, SF-12 and KOOS on return.    2. Continue knee brace as needed    3. Continue  home exercise program at that time    4.  I made the decision to obtain old records of the patient including previous notes and imaging. New imaging  was ordered today of the extremity or extremities evaluated. I independently reviewed and interpreted the radiographs  today as well as prior imaging. Reviewed radiographs with patient in detail. Discussed that his left hip pain could have been exacerbated due to right knee injury. However, he has significant osteoarthritis in his left hip which is why he is in pain.     5.  Mobic 15mg qDaily, Voltaren gel and omeprazole as needed

## 2020-01-31 ENCOUNTER — PATIENT MESSAGE (OUTPATIENT)
Dept: ADMINISTRATIVE | Facility: OTHER | Age: 60
End: 2020-01-31

## 2020-01-31 ENCOUNTER — OFFICE VISIT (OUTPATIENT)
Dept: INTERNAL MEDICINE | Facility: CLINIC | Age: 60
End: 2020-01-31
Payer: COMMERCIAL

## 2020-01-31 VITALS
SYSTOLIC BLOOD PRESSURE: 134 MMHG | TEMPERATURE: 98 F | OXYGEN SATURATION: 98 % | WEIGHT: 212.5 LBS | HEIGHT: 69 IN | DIASTOLIC BLOOD PRESSURE: 86 MMHG | BODY MASS INDEX: 31.47 KG/M2 | HEART RATE: 76 BPM

## 2020-01-31 DIAGNOSIS — I10 BENIGN ESSENTIAL HYPERTENSION: ICD-10-CM

## 2020-01-31 PROCEDURE — 3008F PR BODY MASS INDEX (BMI) DOCUMENTED: ICD-10-PCS | Mod: CPTII,S$GLB,, | Performed by: PHYSICIAN ASSISTANT

## 2020-01-31 PROCEDURE — 3079F PR MOST RECENT DIASTOLIC BLOOD PRESSURE 80-89 MM HG: ICD-10-PCS | Mod: CPTII,S$GLB,, | Performed by: PHYSICIAN ASSISTANT

## 2020-01-31 PROCEDURE — 99999 PR PBB SHADOW E&M-EST. PATIENT-LVL III: ICD-10-PCS | Mod: PBBFAC,,, | Performed by: PHYSICIAN ASSISTANT

## 2020-01-31 PROCEDURE — 99999 PR PBB SHADOW E&M-EST. PATIENT-LVL III: CPT | Mod: PBBFAC,,, | Performed by: PHYSICIAN ASSISTANT

## 2020-01-31 PROCEDURE — 99213 OFFICE O/P EST LOW 20 MIN: CPT | Mod: S$GLB,,, | Performed by: PHYSICIAN ASSISTANT

## 2020-01-31 PROCEDURE — 3075F SYST BP GE 130 - 139MM HG: CPT | Mod: CPTII,S$GLB,, | Performed by: PHYSICIAN ASSISTANT

## 2020-01-31 PROCEDURE — 3079F DIAST BP 80-89 MM HG: CPT | Mod: CPTII,S$GLB,, | Performed by: PHYSICIAN ASSISTANT

## 2020-01-31 PROCEDURE — 3075F PR MOST RECENT SYSTOLIC BLOOD PRESS GE 130-139MM HG: ICD-10-PCS | Mod: CPTII,S$GLB,, | Performed by: PHYSICIAN ASSISTANT

## 2020-01-31 PROCEDURE — 3008F BODY MASS INDEX DOCD: CPT | Mod: CPTII,S$GLB,, | Performed by: PHYSICIAN ASSISTANT

## 2020-01-31 PROCEDURE — 99213 PR OFFICE/OUTPT VISIT, EST, LEVL III, 20-29 MIN: ICD-10-PCS | Mod: S$GLB,,, | Performed by: PHYSICIAN ASSISTANT

## 2020-01-31 RX ORDER — AMLODIPINE BESYLATE 10 MG/1
10 TABLET ORAL DAILY
Qty: 30 TABLET | Refills: 3 | Status: SHIPPED | OUTPATIENT
Start: 2020-01-31 | End: 2020-05-25

## 2020-01-31 NOTE — PROGRESS NOTES
Subjective:       Patient ID: Royer Castillo is a 59 y.o. male.    Chief Complaint: Blood Pressure Check    Patient returns to clinic after being started on amlodipine 5 mg and his physical last month.  He has been consistently monitoring his blood pressure at home and states that most readings are in the 140s to 150s over 90s.  He denies any chest pain or headache.  He states he is tolerating the medication well and denies any swelling.    Hypertension   This is a recurrent problem. The current episode started 1 to 4 weeks ago. The problem has been waxing and waning since onset. The problem is resistant. Associated symptoms include malaise/fatigue. Pertinent negatives include no anxiety, blurred vision, chest pain, headaches, neck pain, orthopnea, palpitations, PND or shortness of breath. There are no associated agents to hypertension. Risk factors for coronary artery disease include dyslipidemia and family history. Past treatments include lifestyle changes. The current treatment provides moderate improvement. There are no compliance problems.      Review of Systems   Constitutional: Positive for malaise/fatigue. Negative for activity change, appetite change, fatigue and unexpected weight change.   HENT: Negative for nosebleeds.    Eyes: Negative for blurred vision, pain and visual disturbance.   Respiratory: Negative for chest tightness, shortness of breath and wheezing.    Cardiovascular: Negative for chest pain, palpitations, orthopnea, leg swelling and PND.   Musculoskeletal: Negative for neck pain.   Neurological: Negative for dizziness, syncope, light-headedness and headaches.       Objective:      Physical Exam   Constitutional: He is oriented to person, place, and time. He appears well-developed and well-nourished. No distress.   HENT:   Head: Normocephalic and atraumatic.   Eyes: Pupils are equal, round, and reactive to light. Conjunctivae are normal.   Neck: Normal range of motion. Neck supple. No  JVD present. No thyromegaly present.   Cardiovascular: Normal rate and regular rhythm. Exam reveals no gallop and no friction rub.   No murmur heard.  Pulmonary/Chest: Effort normal. No respiratory distress. He has no wheezes. He has no rales. He exhibits no tenderness.   Neurological: He is alert and oriented to person, place, and time.   Skin: He is not diaphoretic.       Assessment:       1. Benign essential hypertension        Plan:       Royer was seen today for blood pressure check.    Diagnoses and all orders for this visit:    Benign essential hypertension  -     amLODIPine (NORVASC) 10 MG tablet; Take 1 tablet (10 mg total) by mouth once daily.  -     Hypertension Digital Medicine (HDMP) Enrollment Order  -     Hypertension Digital Medicine (HDMP): Assign Onboarding Questionnaires

## 2020-02-18 ENCOUNTER — PATIENT OUTREACH (OUTPATIENT)
Dept: OTHER | Facility: OTHER | Age: 60
End: 2020-02-18

## 2020-02-18 NOTE — LETTER
March 3, 2020     Royer Stewart Jonathan  [address] [address]       Dear Royer,    Welcome to [facility]! Our goal is to make care effective, proactive and convenient by using data you send us from home to better treat your chronic conditions.              My name is Cristobal PruettYuniorBarrera, and I am your dedicated Digital Medicine clinician. As an expert in medication management, I will help ensure that the medications you are taking continue to provide the intended benefits and help you reach your goals. You can reach me directly at [phone] or by sending me a message directly through your [facility] account.      I am Karon Jones and I will be your health . My job is to help you identify lifestyle changes to improve your disease control. We will talk about nutrition, exercise, and other ways you may be able to adjust your current habits to better your health. Additionally, we will help ensure you are completing the tests and screenings that are necessary to help manage your conditions. You can reach me directly at [phone] or by sending me a message directly through your [facility] account.    Most importantly, YOU are at the center of this team. Together, we will work to improve your overall health and encourage you to meet your goals for a healthier lifestyle.     What we expect from YOU:  · Please take frequent home blood pressure measurements. We ask that you take at least 1 blood pressure reading per week, but more information will better help us get you know you. Be sure you rest for a few minutes before taking the reading in a quiet, comfortable place.     Be available to receive phone calls or [employer] messages, when appropriate, from your care team. Please let us know if there are any specific days or times that work best for us to reach you via phone.     Complete routine tests and screenings. Dont worry, we will help keep you on  track!           What you should expect from your Digital Medicine Care Team:   We will work with you to create a personalized plan of care and provide you with encouragement and education, including regarding lifestyle changes, that could help you manage your disease states.     We will adjust your current medications, if needed, and continue to monitor your long-term progress.     We will provide you and your physician with monthly progress reports after you have been in the program for more than 30 days.     We will send you reminders through [employer] and text messages to help ensure you do not miss any testing deadlines to help manage your disease states.    You will be able to reach us by phone or through your [employer] account by clicking our names under Care Team on the right side of the home screen.    I look forward to working with you to achieve your blood pressure goals!    We look forward to working with you to help manage your health,    Sincerely,    Your Digital Medicine Team    Please visit our websites to learn more:   · Hypertension: [email or web address]      Remember, we are not available for emergencies. If you have an emergency, please contact your doctors office directly or call [facility] on-call ([phone] or [phone]) or [phone].

## 2020-02-18 NOTE — LETTER
February 18, 2020     Royer Terry Jonathan  [address] [place] [address]       Dear Royer,    Welcome to [facility]! Our goal is to make care effective, proactive and convenient by using data you send us from home to better treat your chronic conditions.              My name is Cristobal PruettYuniorBarrera, and I am your dedicated Digital Medicine clinician. As an expert in medication management, I will help ensure that the medications you are taking continue to provide the intended benefits and help you reach your goals. You can reach me directly at [phone] or by sending me a message directly through your [facility] account.      I am Karon Jones and I will be your health . My job is to help you identify lifestyle changes to improve your disease control. We will talk about nutrition, exercise, and other ways you may be able to adjust your current habits to better your health. Additionally, we will help ensure you are completing the tests and screenings that are necessary to help manage your conditions. You can reach me directly at [phone] or by sending me a message directly through your [facility] account.    Most importantly, YOU are at the center of this team. Together, we will work to improve your overall health and encourage you to meet your goals for a healthier lifestyle.     What we expect from YOU:  · Please take frequent home blood pressure measurements. We ask that you take at least 1 blood pressure reading per week, but more information will better help us get you know you. Be sure you rest for a few minutes before taking the reading in a quiet, comfortable place.     Be available to receive phone calls or [employer] messages, when appropriate, from your care team. Please let us know if there are any specific days or times that work best for us to reach you via phone.     Complete routine tests and screenings. Dont worry, we will help keep you on  track!           What you should expect from your Digital Medicine Care Team:   We will work with you to create a personalized plan of care and provide you with encouragement and education, including regarding lifestyle changes, that could help you manage your disease states.     We will adjust your current medications, if needed, and continue to monitor your long-term progress.     We will provide you and your physician with monthly progress reports after you have been in the program for more than 30 days.     We will send you reminders through [employer] and text messages to help ensure you do not miss any testing deadlines to help manage your disease states.    You will be able to reach us by phone or through your [employer] account by clicking our names under Care Team on the right side of the home screen.    I look forward to working with you to achieve your blood pressure goals!    We look forward to working with you to help manage your health,    Sincerely,    Your Digital Medicine Team    Please visit our websites to learn more:   · Hypertension: [email or web address]      Remember, we are not available for emergencies. If you have an emergency, please contact your doctors office directly or call [facility] on-call ([phone] or [phone]) or [phone].

## 2020-02-18 NOTE — PROGRESS NOTES
Digital Medicine: Health  Introduction    Introduced Royer Castillo to Digital Medicine. Discussed health  role and recommended lifestyle modifications.    The history is provided by the patient.     HYPERTENSION  Our goal is to get BP to consistently below 130/80mmHg and make the process convenient so patient can avoid extra trips to the office. Getting your blood pressure below 130/80mmHg (definition of control) will reduce your risk for heart attack, kidney failure, stroke and death (as well as kidney failure, eye disease, & dementia)      Reviewed that the Digital Medicine care team - consisting of a clinician and a health  - will follow the most current evidence-based national guidelines for treating your condition.  The health  will focus on lifestyle modifications and motivation while the clinician will focus on medication therapy.  The care team will review all data on a regular basis and reach out as needed.      Explained that one of the key parts of the program is communication with the care team.  Asked patient to respond to outreach attempts and complete questionnaires.  Stressed importance of medication adherence.    Explained that we expect patient to obtain several blood pressures per week at random times of day.  Instructed patient not to allow anyone else to use phone and monitoring device.  Confirmed appropriate BP monitoring technique.      Explained to patient that the digital medicine team is not available for emergencies.  Patient will call [facility] on-call ([phone] or [phone]) or [phone] if needed.      Patient's BP goal is 130/80.Patient's BP average is 133/87 mmHg, which is above goal, per 2017 ACC/AHA Hypertension Guidelines.          Last 5 Patient Entered Readings                                      Current 30 Day Average: 133/87     Recent Readings 2/18/2020 2/17/2020 2/16/2020 2/16/2020 2/16/2020    SBP (mmHg) 128 130 146 153 139    DBP (mmHg) 83 80 87 88 81     Pulse 65 64 78 77 67            Intervention/Plan    There are no preventive care reminders to display for this patient.    Reviewed the importance of self-monitoring, medication adherence, and that the health  can be used as a resource for lifestyle modifications to help reduce or maintain a healthy lifestyle.    Sent link to [facility] [email or web address] webpages and my contact information via [employer] for future questions. Follow up scheduled.         Screenings    SDOH

## 2020-02-19 DIAGNOSIS — M76.31 IT BAND SYNDROME, RIGHT: ICD-10-CM

## 2020-02-19 DIAGNOSIS — M23.91 ACUTE INTERNAL DERANGEMENT OF RIGHT KNEE: ICD-10-CM

## 2020-02-19 DIAGNOSIS — S83.421D SPRAIN OF LATERAL COLLATERAL LIGAMENT OF RIGHT KNEE, SUBSEQUENT ENCOUNTER: ICD-10-CM

## 2020-02-20 ENCOUNTER — PATIENT OUTREACH (OUTPATIENT)
Dept: OTHER | Facility: OTHER | Age: 60
End: 2020-02-20

## 2020-02-20 NOTE — PROGRESS NOTES
02/20/2020 11:41 AM   Called patient, I was able to leave a voicemail. I will reach back out in 2 weeks.

## 2020-03-01 RX ORDER — MELOXICAM 15 MG/1
TABLET ORAL
Qty: 90 TABLET | Refills: 2 | Status: SHIPPED | OUTPATIENT
Start: 2020-03-01 | End: 2020-12-16

## 2020-03-02 NOTE — PROGRESS NOTES
Digital Medicine: Clinician Introduction    Royer Castillo is a 59 y.o. male who is newly enrolled in the Digital Medicine Clinic.    The following information was reviewed and updated:  Preferred pharmacy   [id number] - [place], [place] - [address] AT [facility] [address]  [address]  [facility] [address]  Phone: [phone] Fax: [phone]    [facility] #[id number] - [place], [place] - [address] [facility] [address] [facility]  [address] [facility] [address]  Phone: [phone] Fax: [phone]    [id number] - [place], [place] - [address] [facility]  [address] [facility] [address]  Phone: [phone] Fax: [phone]    [id number] - [place], [place] - [address]  [address]  [facility] [address]  Phone: [phone] Fax: [phone]      Patient prefers a 90 days supply.     Review of patient's allergies indicates:  No Known Allergies    Incoming call, welcomed patient into [facility]. Patient endorses adherence to medication regimen. Patient denies hypotensive s/sx (lightheadedness, dizziness, nausea, fatigue); patient denies hypertensive s/sx (SOB, CP, severe headaches, changes in vision). Instructed patient to seek medical care if BP > 180/110 and is accompanied by hypertensive s/sx associated, patient confirms understanding.     Overall, patient is well. He has completely changed his diet around and does not add any salt to his food, additionally he has lost 12-15lbs recently. He also has adequate water intake and drinks 3 large Aquafina bottles    Patient denies having questions or concerns. Patient has my contact information and knows to call with any concerns or clinical changes.        The history is provided by the patient. No  was used.     HYPERTENSION  Our goal is to get BP to consistently below 130/80mmHg and make the process convenient so  patient can avoid extra trips to the office. Getting your blood pressure below 130/80mmHg (definition of control) will reduce your risk for heart attack, kidney failure, stroke and death (as well as kidney failure, eye disease, & dementia)      Reviewed non-pharmacologic therapies and impact on BP      Explained that we expect patient to obtain several blood pressures per week at random times of day.  Instructed patient not to allow anyone else to use phone and monitoring device.  Confirmed appropriate BP monitoring technique.      Explained to patient that the digital medicine team is not available for emergencies.  Patient will call [facility] on-call ([phone] or [phone]) or 911 if needed.    Patient's BP goal is 130/80. Patients BP average is 129/86 mmHg, which is above goal, per 2017 ACC/AHA Hypertension Guidelines.          Last 5 Patient Entered Readings                                      Current 30 Day Average: 129/86     Recent Readings 3/2/2020 3/2/2020 3/1/2020 3/1/2020 2/29/2020    SBP (mmHg) 133 142 134 131 133    DBP (mmHg) 89 94 93 91 88    Pulse 72 72 75 72 66            INTERVENTION(S)  reviewed appropriate dose schedule, reviewed monitoring technique, encouragement/support and goal setting    PLAN  patient verbalizes understanding, demonstrates understanding via teach back, Health  follow up and continue monitoring    Assessment:  Reviewed recent readings. Per 2017 ACC/ AHA HTN guidelines (goal of BP < 130/80), current 30-day average is well controlled. DBP is slightly elevated but near goal.    Plan:  Continue current medication regimen. I will continue to monitor regularly and will follow-up in 6-8 weeks, sooner if blood pressure begins to trend upward or downward.       There are no preventive care reminders to display for this patient.    Current Medication Regimen:  Hypertension Medications             amLODIPine (NORVASC) 10 MG tablet Take 1 tablet (10 mg total) by mouth once  daily.    hydroCHLOROthiazide (HYDRODIURIL) 25 MG tablet Take 1 tablet (25 mg total) by mouth once daily.            Reviewed the importance of self-monitoring, medication adherence, and that the health  can be used as a resource for lifestyle modifications to help reduce or maintain a healthy lifestyle.    Sent link to [facility] [place] webpages and my contact information via [email or web address] for future questions. Follow up scheduled.         Screenings

## 2020-03-06 ENCOUNTER — PATIENT OUTREACH (OUTPATIENT)
Dept: OTHER | Facility: OTHER | Age: 60
End: 2020-03-06

## 2020-03-13 NOTE — PROGRESS NOTES
Digital Medicine: Health  Follow-Up    The history is provided by the patient.     HYPERTENSION    Patient's BP goal is 130/80.Patient's BP average is 127/84 mmHg, which is above goal, per 2017 ACC/AHA Hypertension Guidelines.          INTERVENTION(S)  encouragement/support and denied questions    PLAN  patient verbalizes understanding and continue monitoring      There are no preventive care reminders to display for this patient.    Last 5 Patient Entered Readings                                      Current 30 Day Average: 127/84     Recent Readings 3/12/2020 3/12/2020 3/11/2020 3/11/2020 3/10/2020    SBP (mmHg) 120 119 121 121 118    DBP (mmHg) 78 82 76 80 80    Pulse 72 71 77 80 81                      Diet Screening   Breakfast is typically between. Premier protein shake .  Lunch is typically between. Salad  (lettuce, tomatoes, onoin, bell pepper).    Dinner is typically between. Fish or chicken with vegetable & salad .    Snacks are typically. Fruit or almonds or mozarella cheese stick, hummus .    Patient reports eating or drinking the following: fruit, water, meal replacements, fresh vegetables and packaged/processed foodsHe has the following dietary restrictions: low sodium diet    The patient states that he typically eats a non-home cooked meal (ex: dining out, take out, frozen meals) 0 times per week.      Barriers to a Healthy Diet: no barriers to healthy eating    Patient denies eating out. He states he may stop at a salad bar at a grocery store and get a salad.     Physical Activity Screening   When asked if exercising, patient responded: yes6 day(s) a week.  His level of intensity when exercising is high.    Patient participates in the following activities: elliptical, swimming/water aerobics, biking and walking    He states he goes to a gym and uses the elliptical, treadmill, and swims on Monday, Wednesday and Friday     Medication Adherence Screening   He did not miss a dose this  month.    Tobacco and Alcohol Screening       No tobacco use    No alcohol use      SDOH

## 2020-03-23 DIAGNOSIS — I10 BENIGN ESSENTIAL HYPERTENSION: ICD-10-CM

## 2020-03-23 RX ORDER — HYDROCHLOROTHIAZIDE 25 MG/1
TABLET ORAL
Qty: 30 TABLET | Refills: 2 | Status: SHIPPED | OUTPATIENT
Start: 2020-03-23 | End: 2020-05-30

## 2020-04-17 ENCOUNTER — PATIENT OUTREACH (OUTPATIENT)
Dept: OTHER | Facility: OTHER | Age: 60
End: 2020-04-17

## 2020-04-17 NOTE — PROGRESS NOTES
Digital Medicine: Health  Follow-Up    The history is provided by the patient.     Follow Up  Follow-up reason(s): reading review and goal follow-up          INTERVENTION(S)  encouragement/support and denied questions    PLAN  patient verbalizes understanding and continue monitoring    Patient reports he's doing good. He states he's been very busy lately and working long hours. He states he got 108 hours overtimes last week. He noticed his BP was spiking a little bit and attributes the long working hours and stress to this.  Patient denied any questions or concerns, will follow up in 4-5 weeks.      There are no preventive care reminders to display for this patient.    Last 5 Patient Entered Readings                                      Current 30 Day Average: 130/86     Recent Readings 4/16/2020 4/16/2020 4/15/2020 4/14/2020 4/14/2020    SBP (mmHg) 119 138 133 129 127    DBP (mmHg) 80 90 86 88 77    Pulse 80 66 81 81 68                      Physical Activity Screening   When asked if exercising, patient responded: yesHis level of intensity when exercising is moderate.    Patient participates in the following activities: elliptical and walking    Since patient's gym is closed due to covid-19, he's been using his elliptical and treadmill at home for 3-4 days during the week.         SDOH

## 2020-04-29 ENCOUNTER — PATIENT OUTREACH (OUTPATIENT)
Dept: OTHER | Facility: OTHER | Age: 60
End: 2020-04-29

## 2020-04-29 NOTE — PROGRESS NOTES
Digital Medicine: Clinician Follow-Up    Called patient to follow up. Patient endorses adherence to medication regimen, denied missing any doses. Patient denies hypotensive s/sx (lightheadedness, dizziness, nausea, fatigue); patient denies hypertensive s/sx (SOB, CP, severe headaches, changes in vision).     Patient overall is doing well, falsely low reading yesterday d/t cuff issues. Reading this morning (141/90) was before patient had taken meds, he was going from full-time job to part-time job. I encouraged patient to take readings at least 1 hr after meds, pt verbally agreed.      Patient denies having questions or concerns. Patient has my contact information and knows to call with any concerns or clinical changes.        The history is provided by the patient. No  was used.     Follow Up  Follow-up reason(s): reading review          Assessment:  Reviewed recent readings. Per 2017 ACC/ AHA HTN guidelines (goal of BP < 130/80), current 30-day average needs to be addressed more thoroughly today. Lifestyle emphasized at this time, DBP still elevated.        INTERVENTION(S)  reviewed appropriate dose schedule, reviewed monitoring technique and encouragement/support    PLAN  patient verbalizes understanding, patient amenable to changes and continue monitoring    Continue current medication regimen. I will continue to monitor regularly and will follow-up in 2 to 3 months, sooner if blood pressure begins to trend upward or downward.       There are no preventive care reminders to display for this patient.    Last 5 Patient Entered Readings                                      Current 30 Day Average: 130/86     Recent Readings 4/29/2020 4/28/2020 4/28/2020 4/28/2020 4/28/2020    SBP (mmHg) 141 145 140 88 148    DBP (mmHg) 90 94 85 49 96    Pulse 67 86 81 81 65             Hypertension Medications             amLODIPine (NORVASC) 10 MG tablet Take 1 tablet (10 mg total) by mouth once daily.     hydroCHLOROthiazide (HYDRODIURIL) 25 MG tablet TAKE 1 TABLET(25 MG) BY MOUTH EVERY DAY                 Screenings

## 2020-04-29 NOTE — PROGRESS NOTES
Digital Medicine: Clinician Follow-Up    HPI    Intervention/Plan    There are no preventive care reminders to display for this patient.    Last 5 Patient Entered Readings                                      Current 30 Day Average: 130/86     Recent Readings 4/29/2020 4/28/2020 4/28/2020 4/28/2020 4/28/2020    SBP (mmHg) 141 145 140 88 148    DBP (mmHg) 90 94 85 49 96    Pulse 67 86 81 81 65             Hypertension Medications             amLODIPine (NORVASC) 10 MG tablet Take 1 tablet (10 mg total) by mouth once daily.    hydroCHLOROthiazide (HYDRODIURIL) 25 MG tablet TAKE 1 TABLET(25 MG) BY MOUTH EVERY DAY                 Screenings

## 2020-05-25 DIAGNOSIS — I10 BENIGN ESSENTIAL HYPERTENSION: ICD-10-CM

## 2020-05-25 RX ORDER — AMLODIPINE BESYLATE 10 MG/1
TABLET ORAL
Qty: 30 TABLET | Refills: 3 | Status: SHIPPED | OUTPATIENT
Start: 2020-05-25 | End: 2020-09-22

## 2020-05-29 DIAGNOSIS — I10 BENIGN ESSENTIAL HYPERTENSION: ICD-10-CM

## 2020-05-30 RX ORDER — HYDROCHLOROTHIAZIDE 25 MG/1
TABLET ORAL
Qty: 30 TABLET | Refills: 2 | Status: SHIPPED | OUTPATIENT
Start: 2020-05-30 | End: 2020-09-01

## 2020-06-05 ENCOUNTER — PATIENT OUTREACH (OUTPATIENT)
Dept: OTHER | Facility: OTHER | Age: 60
End: 2020-06-05

## 2020-06-22 NOTE — PROGRESS NOTES
"Digital Medicine: Health  Follow-Up    The history is provided by the patient.   Follow Up  Follow-up reason(s): reading review and goal follow-up    Called to follow up with patient. SBP appears to be slightly trending down and DBP is creeping up.        INTERVENTION(S)  recommended diet modifications, encouragement/support and denied questions    PLAN  patient verbalizes understanding, patient amenable to changes and continue monitoring    Encouraged patient to be more cautious of his sodium intake and to start utilizing some type of calorie tracking tami to help monitor his sodium.     Plan to follow up in 6 weeks.       There are no preventive care reminders to display for this patient.    Last 5 Patient Entered Readings                                      Current 30 Day Average: 130/85     Recent Readings 6/22/2020 6/21/2020 6/18/2020 6/17/2020 6/16/2020    SBP (mmHg) 131 138 127 121 125    DBP (mmHg) 89 89 89 86 87    Pulse 62 61 68 76 61                      Diet Screening   Patient reports eating or drinking the following: fast food, water, packaged/processed foods and restaurant foodHe has the following dietary restrictions: low sodium dietHe cooks for self and utilizes a .      Eating style: relies on convenience items    Barriers to a Healthy Diet: time/convenience    Noted that patient's DBP is creeping up. 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    Meals consist of "a lot of country cooking", gravies, pizza, and chicken.    Patient states he "fell off but is working on his diet." he states he's going to start bringing his own meals from now on.     Patient states he's not really watching nutrition labels. Encouraged patient to do so to monitor his sodium intake.      Patient asked what his sodium goal for the day should be. Reviewed with patient that a patient with HTN should aim for 3642-6607 mg of sodium per " day according to AHA guidelines.     Recommended patient use a calories tracking tami so he can track his sodium intake per meal and during the day. Patient confirmed he already has the [employer] tami, just doesn't use it.     Patient is staying well hydrated. He only drinks water    Intervention(s): calorie tracking, low sodium diet education, reducing sodium intake, reading food labels and reducing dining out    Physical Activity Screening   When asked if exercising, patient responded: yesHis level of intensity when exercising is moderate.      SDOH

## 2020-07-23 ENCOUNTER — PATIENT OUTREACH (OUTPATIENT)
Dept: OTHER | Facility: OTHER | Age: 60
End: 2020-07-23

## 2020-07-23 NOTE — PROGRESS NOTES
Digital Medicine: Clinician Follow-Up    Called pt to f/u. He is doing well with no complaints.     He has been increasing his physical activity now that gyms have reopened, he goes approx 4x/week and focuses on cardio. He generally eats well, but is more conscious of food when he is at home and off work. He has been working w/ the [employer], where food is supplied for long shifts.       Follow-up reason(s): routine follow up.     Patient readings are stable     Patient is not experiencing signs/symptoms of hypotension.  Patient is not experiencing signs/symptoms of hypertension.    Additional Follow-up details: Reviewed lifestyle goals such as 150 min/week per ACC/AHA of moderate-intensity exercise, and importance of limiting sodium intake.       Last 5 Patient Entered Readings                                      Current 30 Day Average: 131/85     Recent Readings 7/22/2020 7/21/2020 7/21/2020 7/21/2020 7/20/2020    SBP (mmHg) 135 132 137 141 133    DBP (mmHg) 81 84 86 91 91    Pulse 60 75 68 62 69               Depression Screening  Did not address depression screening.    Sleep Apnea Screening    Did not address sleep apnea screening.     Medication Affordability Screening  Did not address medication affordability screening.     Medication Adherence-Medication adherence was assessed.          ASSESSMENT(S)  Patients BP average is 131/85 mmHg, which is at goal. Patient's BP goal is less than or equal to 130/80 per 2017 ACC/AHA Hypertension Guidelines.  Avg BP within 5mmHg of goal, can continue to be controlled w/ lifestyle modifications. No med changes recommended at this time.     PLAN  Continue current therapy: Continue HTN meds as prescribed. f/u in 3 months.  Continue current diet/physical activity routine:  Provided patient education: lifestyle    Patient verbalizes understanding. Patient did not express questions or concerns and patient has contact information if needed.          There are no preventive  care reminders to display for this patient.      Hypertension Medications             amLODIPine (NORVASC) 10 MG tablet TAKE 1 TABLET(10 MG) BY MOUTH EVERY DAY    hydroCHLOROthiazide (HYDRODIURIL) 25 MG tablet TAKE 1 TABLET(25 MG) BY MOUTH EVERY DAY

## 2020-08-24 ENCOUNTER — PATIENT OUTREACH (OUTPATIENT)
Dept: OTHER | Facility: OTHER | Age: 60
End: 2020-08-24

## 2020-08-24 NOTE — PROGRESS NOTES
"Digital Medicine: Health  Follow-Up    The history is provided by the patient.             Reason for review: Blood pressure not at goal        Topics Covered on Call: physical activity and Diet          Diet-Change  No 24 hour dietary recall    Dietary Improvements:Patient states he has changed his dietary habits and is eating "mostly veggies and a low sodium diet"  Patient was previously eating whatever was supplied at his work but is now meal prepping. Offered patient praise and encouraged him to keep up with the meal prepping.  Patient also reports he's lost 20 lbs.    BP is trending down, DBP slightly above goal. Patient continue to make improvements to diet and exercise.    Dietary Indiscretions:          Physical Activity-Change      He added walking on the treadmill, using the eliptical and stair stepper. to His physical activity routine.        Additional physical activity details: Patient has been going to the gym doing lots of cardio, he states he's hoping to add some weight training into his routine next week            Continue current diet/physical activity routine.       Addressed any questions or concerns and patient has my contact information if needed prior to next outreach. Patient verbalizes understanding.      Explained the importance of self-monitoring and medication adherence. Encouraged the patient to communicate with their health  for lifestyle modifications to help improve or maintain a healthy lifestyle.            There are no preventive care reminders to display for this patient.    Last 5 Patient Entered Readings                                      Current 30 Day Average: 124/82     Recent Readings 8/22/2020 8/22/2020 8/21/2020 8/20/2020 8/20/2020    SBP (mmHg) 117 118 119 123 125    DBP (mmHg) 80 80 82 72 76    Pulse 77 80 72 75 57               "

## 2020-09-01 DIAGNOSIS — I10 BENIGN ESSENTIAL HYPERTENSION: ICD-10-CM

## 2020-09-01 RX ORDER — HYDROCHLOROTHIAZIDE 25 MG/1
TABLET ORAL
Qty: 30 TABLET | Refills: 2 | Status: SHIPPED | OUTPATIENT
Start: 2020-09-01 | End: 2020-12-02

## 2020-09-01 NOTE — TELEPHONE ENCOUNTER
Please call and remind the patient it's time for the annual in Nov and schedule in Nov/Dec if possible.  Please schedule lab appt 1 week prior and let me know when done and I'll order and link labs.

## 2020-09-03 ENCOUNTER — TELEPHONE (OUTPATIENT)
Dept: INTERNAL MEDICINE | Facility: CLINIC | Age: 60
End: 2020-09-03

## 2020-09-03 DIAGNOSIS — Z00.00 ANNUAL PHYSICAL EXAM: Primary | ICD-10-CM

## 2020-09-03 DIAGNOSIS — Z13.220 ENCOUNTER FOR LIPID SCREENING FOR CARDIOVASCULAR DISEASE: ICD-10-CM

## 2020-09-03 DIAGNOSIS — Z13.6 ENCOUNTER FOR LIPID SCREENING FOR CARDIOVASCULAR DISEASE: ICD-10-CM

## 2020-09-03 DIAGNOSIS — R73.03 PRE-DIABETES: ICD-10-CM

## 2020-09-03 DIAGNOSIS — E78.49 OTHER HYPERLIPIDEMIA: ICD-10-CM

## 2020-09-03 DIAGNOSIS — Z12.5 PROSTATE CANCER SCREENING: ICD-10-CM

## 2020-09-03 NOTE — TELEPHONE ENCOUNTER
----- Message from Laila Garrett sent at 9/2/2020  3:34 PM CDT -----  Regarding: scheduling and lab work  Spoke to patient and scheduled him for Dec.  Please place lab orders and link to appointment made.  Thanks

## 2020-10-08 ENCOUNTER — PATIENT OUTREACH (OUTPATIENT)
Dept: OTHER | Facility: OTHER | Age: 60
End: 2020-10-08

## 2020-10-08 NOTE — PROGRESS NOTES
Digital Medicine: Health  Follow-Up    The history is provided by the patient.             Reason for review: Blood pressure at goal        Topics Covered on Call: physical activity and Diet    Additional Follow-up details: Patient states he's doing well today.               Diet-no change to diet  No 24 hour dietary recall  No change to diet.  Patient reports eating or drinking the following: Patient continues to meal prep to minimize eating out.       Physical Activity-no change to routine  No change to exercise routine.       Additional physical activity details: Patient continues to go to the gym.       Medication Adherence-Medication Adherence not addressed.      Substance, Sleep, Stress-Not assessed      Continue current diet/physical activity routine.       Addressed patient questions and patient has my contact information if needed prior to next outreach. Patient verbalizes understanding.      Explained the importance of self-monitoring and medication adherence. Encouraged the patient to communicate with their health  for lifestyle modifications to help improve or maintain a healthy lifestyle.               There are no preventive care reminders to display for this patient.      Last 5 Patient Entered Readings                                      Current 30 Day Average: 126/81     Recent Readings 10/8/2020 10/7/2020 10/5/2020 10/4/2020 10/4/2020    SBP (mmHg) 129 129 121 142 133    DBP (mmHg) 80 81 81 82 81    Pulse 56 67 59 56 60

## 2020-10-15 ENCOUNTER — PATIENT OUTREACH (OUTPATIENT)
Dept: OTHER | Facility: OTHER | Age: 60
End: 2020-10-15

## 2020-10-15 NOTE — PROGRESS NOTES
Digital Medicine: Clinician Follow-Up    Patient is doing well. He lost 21 lbs over 4 months by eat a more plant-based diet. He noticed that a little chicken or fish will slightly raise his BP, but he is much happier with his recent readings. 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     The history is provided by the patient.   Follow-up reason(s): routine follow up.     Hypertension    Patient's blood pressure is stable.   Patient is not experiencing signs/symptoms of hypotension.  Patient is not experiencing signs/symptoms of hypertension.            Last 5 Patient Entered Readings                                      Current 30 Day Average: 128/81     Recent Readings 10/13/2020 10/12/2020 10/10/2020 10/8/2020 10/7/2020    SBP (mmHg) 132 118 138 129 129    DBP (mmHg) 85 82 79 80 81    Pulse 65 81 56 56 67                 Depression Screening  Did not address depression screening.    Sleep Apnea Screening    Did not address sleep apnea screening.     Medication Affordability Screening  Did not address medication affordability screening.     Medication Adherence-Medication adherence was assessed.        Verified HTN meds and doses.      ASSESSMENT(S)  Patients BP average is 128/81 mmHg, which is at goal. Patient's BP goal is less than or equal to 130/80.    No med changes recommended at this time.       Hypertension Plan  Continue current therapy.  Continue current diet/physical activity routine.  Instructed to charge device.  F/u in 6 months.     Addressed patient questions and patient has my contact information if needed prior to next outreach. Patient verbalizes understanding.      Explained the importance of self-monitoring and medication adherence. Encouraged the patient to communicate with their health  for lifestyle modifications to help improve or maintain a healthy lifestyle.               There are no preventive care reminders to display for this  patient.  There are no preventive care reminders to display for this patient.      Hypertension Medications             amLODIPine (NORVASC) 10 MG tablet TAKE 1 TABLET(10 MG) BY MOUTH EVERY DAY    hydroCHLOROthiazide (HYDRODIURIL) 25 MG tablet TAKE 1 TABLET(25 MG) BY MOUTH EVERY DAY

## 2020-11-30 ENCOUNTER — PATIENT MESSAGE (OUTPATIENT)
Dept: INTERNAL MEDICINE | Facility: CLINIC | Age: 60
End: 2020-11-30

## 2020-12-01 ENCOUNTER — PATIENT MESSAGE (OUTPATIENT)
Dept: INTERNAL MEDICINE | Facility: CLINIC | Age: 60
End: 2020-12-01

## 2020-12-01 DIAGNOSIS — H93.A9 PULSATILE TINNITUS: Primary | ICD-10-CM

## 2020-12-01 NOTE — TELEPHONE ENCOUNTER
Referral to ENT. Keerthi can you see if pt wants to schedule an ENT appt on the same day that he sees me if one's available?

## 2020-12-08 ENCOUNTER — PATIENT OUTREACH (OUTPATIENT)
Dept: OTHER | Facility: OTHER | Age: 60
End: 2020-12-08

## 2020-12-08 NOTE — PROGRESS NOTES
"Digital Medicine: Health  Follow-Up    The history is provided by the patient.             Reason for review: Blood pressure not at goal        Topics Covered on Call: physical activity    Additional Follow-up details: Patient is doing well overall, but does complain of a "pulse" in his ear which is disrupting his sleep at night. He denies having any pain or headache associated with it, describes it as a "pounding" in his ear. He has an appointment scheduled with ENT this week.                   Diet-Change      Dietary Indiscretions:Patient attributes elevated BP readings to eating off of his diet around Thanksgiving.       Physical Activity-no change to routine  No change to exercise routine.       Additional physical activity details: Patient continues to workout, states he was just leaving the gym as I called      Medication Adherence-Medication Adherence not addressed.      Substance, Sleep, Stress-Not assessed      Continue current diet/physical activity routine.       Addressed patient questions and patient has my contact information if needed prior to next outreach. Patient verbalizes understanding.      Explained the importance of self-monitoring and medication adherence. Encouraged the patient to communicate with their health  for lifestyle modifications to help improve or maintain a healthy lifestyle.                   Topic    Lipid (Cholesterol) Test          Last 5 Patient Entered Readings                                      Current 30 Day Average: 123/85     Recent Readings 12/2/2020 11/30/2020 11/29/2020 11/29/2020 11/29/2020    SBP (mmHg) 124 127 119 124 134    DBP (mmHg) 78 83 87 90 90    Pulse 88 72 80 81 70               "

## 2020-12-09 ENCOUNTER — PATIENT MESSAGE (OUTPATIENT)
Dept: INTERNAL MEDICINE | Facility: CLINIC | Age: 60
End: 2020-12-09

## 2020-12-09 ENCOUNTER — PATIENT OUTREACH (OUTPATIENT)
Dept: ADMINISTRATIVE | Facility: OTHER | Age: 60
End: 2020-12-09

## 2020-12-09 DIAGNOSIS — Z01.84 ANTIBODY RESPONSE EXAM: Primary | ICD-10-CM

## 2020-12-09 NOTE — TELEPHONE ENCOUNTER
As far as I know, yes. He can check with insurance if he wants to be certain. Ordered. Link please.

## 2020-12-11 ENCOUNTER — LAB VISIT (OUTPATIENT)
Dept: LAB | Facility: HOSPITAL | Age: 60
End: 2020-12-11
Attending: INTERNAL MEDICINE
Payer: COMMERCIAL

## 2020-12-11 ENCOUNTER — CLINICAL SUPPORT (OUTPATIENT)
Dept: AUDIOLOGY | Facility: CLINIC | Age: 60
End: 2020-12-11
Payer: COMMERCIAL

## 2020-12-11 ENCOUNTER — PATIENT MESSAGE (OUTPATIENT)
Dept: ADMINISTRATIVE | Facility: OTHER | Age: 60
End: 2020-12-11

## 2020-12-11 ENCOUNTER — OFFICE VISIT (OUTPATIENT)
Dept: OTOLARYNGOLOGY | Facility: CLINIC | Age: 60
End: 2020-12-11
Payer: COMMERCIAL

## 2020-12-11 DIAGNOSIS — H61.22 IMPACTED CERUMEN OF LEFT EAR: ICD-10-CM

## 2020-12-11 DIAGNOSIS — H90.3 HEARING LOSS, SENSORINEURAL, ASYMMETRICAL: Primary | ICD-10-CM

## 2020-12-11 DIAGNOSIS — E78.49 OTHER HYPERLIPIDEMIA: ICD-10-CM

## 2020-12-11 DIAGNOSIS — Z00.00 ANNUAL PHYSICAL EXAM: ICD-10-CM

## 2020-12-11 DIAGNOSIS — H93.A9 PULSATILE TINNITUS: ICD-10-CM

## 2020-12-11 DIAGNOSIS — Z01.84 ANTIBODY RESPONSE EXAM: ICD-10-CM

## 2020-12-11 DIAGNOSIS — R73.03 PRE-DIABETES: ICD-10-CM

## 2020-12-11 DIAGNOSIS — H83.3X3 NOISE-INDUCED HEARING LOSS OF BOTH EARS: Primary | ICD-10-CM

## 2020-12-11 DIAGNOSIS — Z12.5 PROSTATE CANCER SCREENING: ICD-10-CM

## 2020-12-11 LAB
ALBUMIN SERPL BCP-MCNC: 4 G/DL (ref 3.5–5.2)
ALP SERPL-CCNC: 74 U/L (ref 55–135)
ALT SERPL W/O P-5'-P-CCNC: 16 U/L (ref 10–44)
ANION GAP SERPL CALC-SCNC: 9 MMOL/L (ref 8–16)
AST SERPL-CCNC: 19 U/L (ref 10–40)
BASOPHILS # BLD AUTO: 0.04 K/UL (ref 0–0.2)
BASOPHILS NFR BLD: 0.6 % (ref 0–1.9)
BILIRUB SERPL-MCNC: 0.8 MG/DL (ref 0.1–1)
BUN SERPL-MCNC: 12 MG/DL (ref 6–20)
CALCIUM SERPL-MCNC: 9.2 MG/DL (ref 8.7–10.5)
CHLORIDE SERPL-SCNC: 101 MMOL/L (ref 95–110)
CHOLEST SERPL-MCNC: 204 MG/DL (ref 120–199)
CHOLEST/HDLC SERPL: 5.2 {RATIO} (ref 2–5)
CO2 SERPL-SCNC: 28 MMOL/L (ref 23–29)
COMPLEXED PSA SERPL-MCNC: 0.61 NG/ML (ref 0–4)
CREAT SERPL-MCNC: 0.9 MG/DL (ref 0.5–1.4)
DIFFERENTIAL METHOD: NORMAL
EOSINOPHIL # BLD AUTO: 0.2 K/UL (ref 0–0.5)
EOSINOPHIL NFR BLD: 2.2 % (ref 0–8)
ERYTHROCYTE [DISTWIDTH] IN BLOOD BY AUTOMATED COUNT: 11.9 % (ref 11.5–14.5)
EST. GFR  (AFRICAN AMERICAN): >60 ML/MIN/1.73 M^2
EST. GFR  (NON AFRICAN AMERICAN): >60 ML/MIN/1.73 M^2
ESTIMATED AVG GLUCOSE: 120 MG/DL (ref 68–131)
GLUCOSE SERPL-MCNC: 107 MG/DL (ref 70–110)
HBA1C MFR BLD HPLC: 5.8 % (ref 4–5.6)
HCT VFR BLD AUTO: 44.3 % (ref 40–54)
HDLC SERPL-MCNC: 39 MG/DL (ref 40–75)
HDLC SERPL: 19.1 % (ref 20–50)
HGB BLD-MCNC: 14.6 G/DL (ref 14–18)
IMM GRANULOCYTES # BLD AUTO: 0.01 K/UL (ref 0–0.04)
IMM GRANULOCYTES NFR BLD AUTO: 0.1 % (ref 0–0.5)
LDLC SERPL CALC-MCNC: 136.6 MG/DL (ref 63–159)
LYMPHOCYTES # BLD AUTO: 1.6 K/UL (ref 1–4.8)
LYMPHOCYTES NFR BLD: 24.3 % (ref 18–48)
MCH RBC QN AUTO: 29.9 PG (ref 27–31)
MCHC RBC AUTO-ENTMCNC: 33 G/DL (ref 32–36)
MCV RBC AUTO: 91 FL (ref 82–98)
MONOCYTES # BLD AUTO: 0.6 K/UL (ref 0.3–1)
MONOCYTES NFR BLD: 8.1 % (ref 4–15)
NEUTROPHILS # BLD AUTO: 4.4 K/UL (ref 1.8–7.7)
NEUTROPHILS NFR BLD: 64.7 % (ref 38–73)
NONHDLC SERPL-MCNC: 165 MG/DL
NRBC BLD-RTO: 0 /100 WBC
PLATELET # BLD AUTO: 277 K/UL (ref 150–350)
PMV BLD AUTO: 11.5 FL (ref 9.2–12.9)
POTASSIUM SERPL-SCNC: 3.9 MMOL/L (ref 3.5–5.1)
PROT SERPL-MCNC: 7.2 G/DL (ref 6–8.4)
RBC # BLD AUTO: 4.88 M/UL (ref 4.6–6.2)
SARS-COV-2 IGG SERPLBLD QL IA.RAPID: NEGATIVE
SODIUM SERPL-SCNC: 138 MMOL/L (ref 136–145)
TRIGL SERPL-MCNC: 142 MG/DL (ref 30–150)
WBC # BLD AUTO: 6.76 K/UL (ref 3.9–12.7)

## 2020-12-11 PROCEDURE — 99203 OFFICE O/P NEW LOW 30 MIN: CPT | Mod: 25,S$GLB,, | Performed by: NURSE PRACTITIONER

## 2020-12-11 PROCEDURE — 69210 EAR CERUMEN REMOVAL: ICD-10-PCS | Mod: S$GLB,,, | Performed by: NURSE PRACTITIONER

## 2020-12-11 PROCEDURE — 84153 ASSAY OF PSA TOTAL: CPT

## 2020-12-11 PROCEDURE — 1126F AMNT PAIN NOTED NONE PRSNT: CPT | Mod: S$GLB,,, | Performed by: NURSE PRACTITIONER

## 2020-12-11 PROCEDURE — 85025 COMPLETE CBC W/AUTO DIFF WBC: CPT

## 2020-12-11 PROCEDURE — 92557 COMPREHENSIVE HEARING TEST: CPT | Mod: S$GLB,,, | Performed by: AUDIOLOGIST

## 2020-12-11 PROCEDURE — 83036 HEMOGLOBIN GLYCOSYLATED A1C: CPT

## 2020-12-11 PROCEDURE — 99999 PR PBB SHADOW E&M-EST. PATIENT-LVL III: ICD-10-PCS | Mod: PBBFAC,,, | Performed by: NURSE PRACTITIONER

## 2020-12-11 PROCEDURE — 1126F PR PAIN SEVERITY QUANTIFIED, NO PAIN PRESENT: ICD-10-PCS | Mod: S$GLB,,, | Performed by: NURSE PRACTITIONER

## 2020-12-11 PROCEDURE — 36415 COLL VENOUS BLD VENIPUNCTURE: CPT

## 2020-12-11 PROCEDURE — 99999 PR PBB SHADOW E&M-EST. PATIENT-LVL III: CPT | Mod: PBBFAC,,, | Performed by: NURSE PRACTITIONER

## 2020-12-11 PROCEDURE — 92557 PR COMPREHENSIVE HEARING TEST: ICD-10-PCS | Mod: S$GLB,,, | Performed by: AUDIOLOGIST

## 2020-12-11 PROCEDURE — 99203 PR OFFICE/OUTPT VISIT, NEW, LEVL III, 30-44 MIN: ICD-10-PCS | Mod: 25,S$GLB,, | Performed by: NURSE PRACTITIONER

## 2020-12-11 PROCEDURE — 92567 TYMPANOMETRY: CPT | Mod: S$GLB,,, | Performed by: AUDIOLOGIST

## 2020-12-11 PROCEDURE — 86769 SARS-COV-2 COVID-19 ANTIBODY: CPT

## 2020-12-11 PROCEDURE — 80061 LIPID PANEL: CPT

## 2020-12-11 PROCEDURE — 80053 COMPREHEN METABOLIC PANEL: CPT

## 2020-12-11 PROCEDURE — 92567 PR TYMPA2METRY: ICD-10-PCS | Mod: S$GLB,,, | Performed by: AUDIOLOGIST

## 2020-12-11 PROCEDURE — 69210 REMOVE IMPACTED EAR WAX UNI: CPT | Mod: S$GLB,,, | Performed by: NURSE PRACTITIONER

## 2020-12-11 NOTE — PROCEDURES
Ear Cerumen Removal    Date/Time: 12/11/2020 10:30 AM  Performed by: Evan Lees NP  Authorized by: Evan Lees NP     Consent Done?:  Yes (Verbal)  Location details:  Left ear  Procedure type: curette    Cerumen  Removal Results:  Cerumen completely removed  Patient tolerance:  Patient tolerated the procedure well with no immediate complications     Procedure Note:    The patient was brought to the minor procedure room and placed under the operating microscope of the left ear canal which was cleaned of ceruminous debris and cotton foreign body. Using a combination of suction, curettes and cup forceps the patient's cerumen impaction and cotton foreign body was removed. The tympanic membrane was evaluated and was unremarkable. The patient tolerated the procedure well. There were no complications.

## 2020-12-11 NOTE — PATIENT INSTRUCTIONS
Tinnitus (Ringing in the Ears)     Treatment may include maskers and hearing aids.     Tinnitus is the term for a noise in your ear not caused by an outside sound. The noise might be a ringing, clicking, hiss, or roar. It can vary in pitch and may be soft or quite loud. For some people, tinnitus is a minor nuisance. But for others, the noise can make it hard to hear, work, and even sleep. When tinnitus can't be cured, a number of treatments may offer relief.  What causes tinnitus?  Loud noises, hearing loss, and ear wax can cause tinnitus. So can certain medicines. Large amounts of aspirin or caffeine are sometimes to blame. In many cases, the exact cause of tinnitus is unknown.  How is tinnitus treated?  Identifying and removing the cause is the best way to treat tinnitus. For that reason, your healthcare provider may refer you to an otolaryngologist (ear, nose, and throat doctor). Your hearing may also be checked by an audiologist (hearing specialist). If you have hearing loss, wearing a hearing aid may help your tinnitus. When the cause can't be found, the tinnitus itself may be treated. Some of the treatments are listed below, and your healthcare provider can tell you more about them:  · Maskers are small devices that look like hearing aids. They emit a pleasant sound that helps cover up the ringing in your ears. Hearing aids and maskers are sometimes used together.  · Medicines that treat anxiety and depression may ease tinnitus in some people.  · Hypnosis or relaxation therapy may help head noise seem less severe.  · Tinnitus retraining therapy combines counseling and maskers. Both can help take your mind off the tinnitus.  For more information  · American Speech-Hearing-Language Association 741-045-2888 www.iris.org  · American Tinnitus Association 679-009-7859 www.maximiliano.org  · National [place] on Deafness and other Communication Disorders 173-027-4930 www.nidcd.nih.gov   Date Last Reviewed: 7/1/2016  ©  0624-8709 [employer]. [address] [place] [address], [place] [address]. All rights reserved. This information is not intended as a substitute for professional medical care. Always follow your healthcare professional's instructions.

## 2020-12-11 NOTE — PROGRESS NOTES
Subjective:      Royer Castillo is a 60 y.o. male who was referred to me by Dr. Vaishali Camacho in consultation for hearing loss.    Mr. Castillo who is a  reports decreased hearing in both ears. He reports intermittent ringing in both ears. He also has noted a pulsation in left ear for the past month. He currently does not hear the pulsations at this time.  He denies ear pain, ear drainage or dizziness.  There is not a family history of hearing loss at a young age.  There is not a prior history of ear surgery.  There is not a prior history of ear infections .  He admits to a history of significant noise exposure, .  He does not wear hearing aids currently.  He has not had a hearing test recently.        Past Medical History  He has a past medical history of Cellulitis, Diverticulosis, Genital herpes, Hyperlipidemia, Onychomycosis, and Pre-diabetes.    Past Surgical History  He has a past surgical history that includes Colonoscopy (N/A, 12/11/2019).    Family History  His family history includes Cancer in his mother; Heart disease (age of onset: 64) in his sister; Heart disease (age of onset: 86) in his father; Hyperlipidemia in his sister.    Social History  He reports that he has never smoked. He has never used smokeless tobacco. He reports that he does not drink alcohol or use drugs.    Allergies  He has No Known Allergies.    Medications  He has a current medication list which includes the following prescription(s): acyclovir, amlodipine, hydrochlorothiazide, meloxicam, and omeprazole.    Review of Systems   Constitutional: Negative.  Negative for chills, fever and unexpected weight change.   HENT: Positive for hearing loss, nosebleeds, sinus pressure and tinnitus. Negative for ear discharge, ear pain, sore throat and trouble swallowing.    Eyes: Positive for itching. Negative for pain and visual disturbance.   Respiratory: Negative for apnea and shortness of breath.    Cardiovascular: Negative  for chest pain and palpitations.   Gastrointestinal: Negative.  Negative for abdominal pain and nausea.   Endocrine: Negative.  Negative for cold intolerance and heat intolerance.   Musculoskeletal: Positive for neck pain. Negative for joint swelling and neck stiffness.   Skin: Negative.  Negative for color change and rash.   Neurological: Negative.  Negative for dizziness, facial asymmetry and headaches.   Hematological: Negative.  Negative for adenopathy. Does not bruise/bleed easily.   Psychiatric/Behavioral: Positive for sleep disturbance. Negative for agitation. The patient is not nervous/anxious.           Objective:     There were no vitals taken for this visit.     Constitutional:   Vital signs are normal. He appears well-developed and well-nourished.     Head:  Normocephalic and atraumatic.     Ears:    Right Ear: No lacerations. No drainage, swelling or tenderness. No foreign bodies. No mastoid tenderness. Tympanic membrane is not injected, not scarred, not perforated, not erythematous, not retracted and not bulging. Tympanic membrane mobility is normal. No middle ear effusion. No hemotympanum. Decreased hearing is noted.   Left Ear: No lacerations. No drainage, swelling or tenderness. No foreign bodies. No mastoid tenderness. Tympanic membrane is not injected, not scarred, not perforated, not erythematous, not retracted and not bulging. Tympanic membrane mobility is normal.  No middle ear effusion. No hemotympanum. Decreased hearing is noted.   Ears:      Neck:  Neck normal without thyromegaly masses, asymmetry, normal tracheal structure, crepitus, and tenderness and no adenopathy.     Psychiatric:   He has a normal mood and affect.       Procedure    Cerumen removal performed.  See procedure note.    Data Reviewed    WBC (K/uL)   Date Value   12/11/2020 6.76     Platelets (K/uL)   Date Value   12/11/2020 277      Creatinine (mg/dL)   Date Value   11/04/2019 1.0     TSH (uIU/mL)   Date Value   07/13/2018  1.517     Glucose (mg/dL)   Date Value   11/04/2019 96     Hemoglobin A1C (%)   Date Value   11/04/2019 5.9 (H)       I independently reviewed the tracings of the complete audiometric evaluation performed today.  I reviewed the audiogram with the patient as well.  Pertinent findings include bilateral high frequency SNHL. Right SRT 20 with 84% discrimination at 60db. Left SRT 20 with 68% discrimination at 60db. Type A tympanogram in both ears..         Assessment:     1. Noise-induced hearing loss of both ears    2. Pulsatile tinnitus    3. Impacted cerumen of left ear         Plan:     I had a long discussion with the patient regarding his condition and the further workup and management options.    Royer was seen today for tinnitus.    Diagnoses and all orders for this visit:    Noise-induced hearing loss of both ears        -     Audiogram Reviewed: Bilateral HF SNHL.  Noise and hearing Protection pamphlet provided.  Hearing conservation strongly recommended.  Trial of amplification bilaterally also recommended(patient is not interested at this time).  Re-check of hearing in 18-24 months or sooner if subjective change noted.      Pulsatile tinnitus  -     Ambulatory referral/consult to ENT  -     I reassured him and provided literature and reputable web-based resources for his perusal.  I asked him to return for re-evaluation should symptoms become worse.    Impacted cerumen of left ear  -     Ear Cerumen Removal      Follow up in about 1 year (around 12/11/2021), or if symptoms worsen or fail to improve.

## 2020-12-11 NOTE — PROGRESS NOTES
Mr. Castillo was seen in the clinic today for a hearing evaluation. He reported pulsatile tinnitus and crackling in his left ear.     Audiological testing revealed a normal sloping to moderately severe rising to mild sensorineural hearing loss in the right ear and a normal sloping to severe rising to moderate sensorineural hearing loss in the left ear. A speech reception threshold was obtained at 20 dBHL, bilaterally. Speech discrimination was 84% in the right ear and 68% in the left ear.    Tympanometry revealed Type A tympanograms, bilaterally.    Recommendations:  1. Otologic evaluation  2. Annual hearing evaluation  3. Noise protection  4. Hearing aid consultation

## 2020-12-11 NOTE — LETTER
December 11, 2020      Vaishali Camacho MD  [address]  [facility] [address]           [facility] 4th Fl  [address]  [facility] [address]  Phone: [phone]  Fax: [phone]          Patient: Royer Castillo   MR Number: [id number]   YOB: 1960   Date of Visit: 12/11/2020       Dear Dr. Vaishali Camacho:    Thank you for referring Royer Castillo to me for evaluation. Attached you will find relevant portions of my assessment and plan of care.    If you have questions, please do not hesitate to call me. I look forward to following Royer Castillo along with you.    Sincerely,    Evan Lees, NP    Enclosure  CC:  No Recipients    If you would like to receive this communication electronically, please contact [email or web address] or [phone] to request more information on [employer] access.    For providers and/or their staff who would like to refer a patient to [facility], please contact us through our one-stop-shop provider referral line, [facility] , at [phone].    If you feel you have received this communication in error or would no longer like to receive these types of communications, please e-mail [email or web address]

## 2020-12-16 ENCOUNTER — OFFICE VISIT (OUTPATIENT)
Dept: INTERNAL MEDICINE | Facility: CLINIC | Age: 60
End: 2020-12-16
Payer: COMMERCIAL

## 2020-12-16 VITALS
DIASTOLIC BLOOD PRESSURE: 86 MMHG | HEIGHT: 69 IN | OXYGEN SATURATION: 99 % | WEIGHT: 196.88 LBS | HEART RATE: 60 BPM | BODY MASS INDEX: 29.16 KG/M2 | SYSTOLIC BLOOD PRESSURE: 134 MMHG

## 2020-12-16 DIAGNOSIS — R73.03 PRE-DIABETES: ICD-10-CM

## 2020-12-16 DIAGNOSIS — E78.49 OTHER HYPERLIPIDEMIA: ICD-10-CM

## 2020-12-16 DIAGNOSIS — I10 BENIGN ESSENTIAL HYPERTENSION: ICD-10-CM

## 2020-12-16 DIAGNOSIS — Z00.00 ANNUAL PHYSICAL EXAM: Primary | ICD-10-CM

## 2020-12-16 PROCEDURE — 99999 PR PBB SHADOW E&M-EST. PATIENT-LVL III: CPT | Mod: PBBFAC,,, | Performed by: INTERNAL MEDICINE

## 2020-12-16 PROCEDURE — 3079F PR MOST RECENT DIASTOLIC BLOOD PRESSURE 80-89 MM HG: ICD-10-PCS | Mod: CPTII,S$GLB,, | Performed by: INTERNAL MEDICINE

## 2020-12-16 PROCEDURE — 3008F BODY MASS INDEX DOCD: CPT | Mod: CPTII,S$GLB,, | Performed by: INTERNAL MEDICINE

## 2020-12-16 PROCEDURE — 3075F PR MOST RECENT SYSTOLIC BLOOD PRESS GE 130-139MM HG: ICD-10-PCS | Mod: CPTII,S$GLB,, | Performed by: INTERNAL MEDICINE

## 2020-12-16 PROCEDURE — 99396 PREV VISIT EST AGE 40-64: CPT | Mod: S$GLB,,, | Performed by: INTERNAL MEDICINE

## 2020-12-16 PROCEDURE — 3008F PR BODY MASS INDEX (BMI) DOCUMENTED: ICD-10-PCS | Mod: CPTII,S$GLB,, | Performed by: INTERNAL MEDICINE

## 2020-12-16 PROCEDURE — 3075F SYST BP GE 130 - 139MM HG: CPT | Mod: CPTII,S$GLB,, | Performed by: INTERNAL MEDICINE

## 2020-12-16 PROCEDURE — 3079F DIAST BP 80-89 MM HG: CPT | Mod: CPTII,S$GLB,, | Performed by: INTERNAL MEDICINE

## 2020-12-16 PROCEDURE — 99396 PR PREVENTIVE VISIT,EST,40-64: ICD-10-PCS | Mod: S$GLB,,, | Performed by: INTERNAL MEDICINE

## 2020-12-16 PROCEDURE — 1125F PR PAIN SEVERITY QUANTIFIED, PAIN PRESENT: ICD-10-PCS | Mod: S$GLB,,, | Performed by: INTERNAL MEDICINE

## 2020-12-16 PROCEDURE — 1125F AMNT PAIN NOTED PAIN PRSNT: CPT | Mod: S$GLB,,, | Performed by: INTERNAL MEDICINE

## 2020-12-16 PROCEDURE — 99999 PR PBB SHADOW E&M-EST. PATIENT-LVL III: ICD-10-PCS | Mod: PBBFAC,,, | Performed by: INTERNAL MEDICINE

## 2020-12-16 NOTE — PROGRESS NOTES
INTERNAL MEDICINE ANNUAL VISIT NOTE      CHIEF COMPLAINT     Chief Complaint   Patient presents with    Annual Exam       HPI     Royer Castillo is a 60 y.o. C male who presents for annual.  Working full time as .     HTN - amlodipine 10mg and HCTZ 25mg daily.   Part of digital HTN. Reviewed flow sheet and doing well.     Has continued to exercise.     Hasn't been watching diet as closely due to pandemic.     PDM - a1c 5.8.     Past Medical History:  Past Medical History:   Diagnosis Date    Cellulitis     left foot    Diverticulosis     Genital herpes     Hyperlipidemia     Onychomycosis     Pre-diabetes        Past Surgical History:  Past Surgical History:   Procedure Laterality Date    COLONOSCOPY N/A 12/11/2019    Procedure: COLONOSCOPY;  Surgeon: Devaughn Johnson MD;  Location: [facility] ([address] [place] [address]);  Service: Endoscopy;  Laterality: N/A;       Allergies:  Review of patient's allergies indicates:  No Known Allergies    Home Medications:  Prior to Admission medications    Medication Sig Start Date End Date Taking? Authorizing Provider   acyclovir (ZOVIRAX) 200 MG capsule TAKE 1 CAPSULE(200 MG) BY MOUTH TWICE DAILY 7/24/20  Yes Vaishali Camacho MD   amLODIPine (NORVASC) 10 MG tablet TAKE 1 TABLET(10 MG) BY MOUTH EVERY DAY 9/22/20  Yes Vaishali Camacho MD   hydroCHLOROthiazide (HYDRODIURIL) 25 MG tablet TAKE 1 TABLET(25 MG) BY MOUTH EVERY DAY 12/2/20  Yes Vaishali Camacho MD   meloxicam (MOBIC) 15 MG tablet TAKE 1 TABLET BY MOUTH EVERY DAY 3/1/20   Gerard Tello MD   omeprazole (PRILOSEC) 40 MG capsule Take 1 capsule (40 mg total) by mouth once daily. 9/23/19 9/22/20  Gerard Tello MD       Family History:  Family History   Problem Relation Age of Onset    Cancer Mother     Heart disease Father 86    Heart disease Sister 64    Hyperlipidemia Sister        Social History:  Social History     Tobacco Use    Smoking status: Never Smoker    Smokeless tobacco: Never Used   Substance Use Topics    Alcohol use: No     " Frequency: Never     Drinks per session: Patient refused     Binge frequency: Never    Drug use: No       Review of Systems:  Review of Systems   Constitutional: Negative for chills and fever.   HENT: Negative for congestion, postnasal drip, rhinorrhea and sore throat.    Respiratory: Negative for cough, shortness of breath and wheezing.    Cardiovascular: Negative for chest pain, palpitations and leg swelling.   Gastrointestinal: Negative for abdominal pain, blood in stool, constipation, diarrhea, nausea and vomiting.   Genitourinary: Negative for dysuria, frequency and hematuria.   Musculoskeletal: Positive for arthralgias (OA of hips. doesn't take anything for it.).   Skin: Negative for rash.   Neurological: Negative for dizziness, weakness, light-headedness, numbness and headaches.   Psychiatric/Behavioral: Negative for dysphoric mood and sleep disturbance. The patient is not nervous/anxious.        Health Maintainence:   HM reviewed.    PHYSICAL EXAM     /86 (BP Location: Right arm, Patient Position: Sitting, BP Method: Large (Manual))   Pulse 60   Ht 5' 9" (1.753 m)   Wt 89.3 kg (196 lb 13.9 oz)   SpO2 99%   BMI 29.07 kg/m²     GEN - A+OX4, NAD   HEENT - PERRL, EOMI, OP clear. MMM. TM normal.   Neck - No thyromegaly or cervical LAD. No thyroid masses felt.  CV - RRR, no m/r   Chest - CTAB, no wheezing or rhonchi  Abd - S/NT/ND/+BS.   Ext - 2+BDP and radial pulses. No LE edema.   Neuro - PERRL, EOMI, no nystagmus, eyebrow raise, facial sensation, hearing, m of mastication, smile, palatal raise, shoulder shrug, tongue protrusion symmetric and intact. 5/5 BUE and BLE strength. Sensation to light touch intact throughout. 2+ DTRs. Normal gait.   MSK - No spinal tenderness to palpation. Normal gait.   Skin - No rash.    LABS     Previous labs reviewed.    ASSESSMENT/PLAN     Royer Castillo is a 60 y.o. male with  Royer was seen today for annual exam.    Diagnoses and all orders for this " visit:    Annual physical exam - UTD. Will be getting COVID vaccine 12/26.    Benign essential hypertension - Stable and controlled. Continue current medications.    Other hyperlipidemia - will try diet modification first.   -     Lipid Panel; Future; Expected date: 06/16/2021    Pre-diabetes - diet and exercise.   -     Hemoglobin A1C; Future; Expected date: 06/16/2021    Repeat a1c and FLp in 6 mo.   RTC in 12 months, sooner if needed and depending on labs.    Vaishali Camacho MD  Department of Internal Medicine - [facility]  8:47 AM

## 2021-02-22 ENCOUNTER — OFFICE VISIT (OUTPATIENT)
Dept: URGENT CARE | Facility: CLINIC | Age: 61
End: 2021-02-22
Payer: OTHER MISCELLANEOUS

## 2021-02-22 VITALS
RESPIRATION RATE: 18 BRPM | OXYGEN SATURATION: 97 % | TEMPERATURE: 98 F | BODY MASS INDEX: 28.79 KG/M2 | SYSTOLIC BLOOD PRESSURE: 122 MMHG | HEIGHT: 68 IN | WEIGHT: 190 LBS | HEART RATE: 71 BPM | DIASTOLIC BLOOD PRESSURE: 79 MMHG

## 2021-02-22 DIAGNOSIS — S76.011A STRAIN OF RIGHT HIP, INITIAL ENCOUNTER: ICD-10-CM
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
## 2021-02-22 DIAGNOSIS — S83.421A SPRAIN OF LATERAL COLLATERAL LIGAMENT OF RIGHT KNEE, INITIAL ENCOUNTER: Primary | ICD-10-CM
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
## 2021-02-22 PROCEDURE — 73502 X-RAY EXAM HIP UNI 2-3 VIEWS: CPT | Mod: LT,S$GLB,, | Performed by: RADIOLOGY

## 2021-02-22 PROCEDURE — 73502 XR HIP 2 VIEW LEFT: ICD-10-PCS | Mod: LT,S$GLB,, | Performed by: RADIOLOGY

## 2021-02-22 PROCEDURE — 99214 OFFICE O/P EST MOD 30 MIN: CPT | Mod: S$GLB,,, | Performed by: NURSE PRACTITIONER

## 2021-02-22 PROCEDURE — 73564 XR KNEE COMP 4 OR MORE VIEWS RIGHT: ICD-10-PCS | Mod: RT,S$GLB,, | Performed by: RADIOLOGY

## 2021-02-22 PROCEDURE — 73564 X-RAY EXAM KNEE 4 OR MORE: CPT | Mod: RT,S$GLB,, | Performed by: RADIOLOGY

## 2021-02-22 PROCEDURE — 99214 PR OFFICE/OUTPT VISIT, EST, LEVL IV, 30-39 MIN: ICD-10-PCS | Mod: S$GLB,,, | Performed by: NURSE PRACTITIONER

## 2021-02-22 RX ORDER — TRAMADOL HYDROCHLORIDE 50 MG/1
50 TABLET ORAL EVERY 6 HOURS PRN
Qty: 28 TABLET | Refills: 0 | Status: SHIPPED | OUTPATIENT
Start: 2021-02-22 | End: 2021-12-21

## 2021-02-22 RX ORDER — DICLOFENAC SODIUM 50 MG/1
50 TABLET, DELAYED RELEASE ORAL 2 TIMES DAILY
Qty: 30 TABLET | Refills: 0 | Status: SHIPPED | OUTPATIENT
Start: 2021-02-22 | End: 2021-05-18

## 2021-02-22 RX ORDER — MELOXICAM 15 MG/1
15 TABLET ORAL DAILY
COMMUNITY
Start: 2021-01-05 | End: 2021-05-18

## 2021-02-26 ENCOUNTER — HOSPITAL ENCOUNTER (OUTPATIENT)
Dept: RADIOLOGY | Facility: OTHER | Age: 61
Discharge: HOME | End: 2021-02-26
Attending: NURSE PRACTITIONER
Payer: OTHER MISCELLANEOUS
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
## 2021-02-26 DIAGNOSIS — S83.421A SPRAIN OF LATERAL COLLATERAL LIGAMENT OF RIGHT KNEE, INITIAL ENCOUNTER: ICD-10-CM
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
## 2021-02-26 PROCEDURE — 73721 MRI JNT OF LWR EXTRE W/O DYE: CPT | Mod: 26,RT,, | Performed by: RADIOLOGY

## 2021-02-26 PROCEDURE — 73721 MRI KNEE WITHOUT CONTRAST RIGHT: ICD-10-PCS | Mod: 26,RT,, | Performed by: RADIOLOGY

## 2021-02-26 PROCEDURE — 73721 MRI JNT OF LWR EXTRE W/O DYE: CPT | Mod: TC,RT

## 2021-03-01 ENCOUNTER — OFFICE VISIT (OUTPATIENT)
Dept: URGENT CARE | Facility: CLINIC | Age: 61
End: 2021-03-01
Payer: OTHER MISCELLANEOUS

## 2021-03-01 VITALS
BODY MASS INDEX: 28.79 KG/M2 | HEIGHT: 68 IN | RESPIRATION RATE: 16 BRPM | OXYGEN SATURATION: 97 % | HEART RATE: 62 BPM | DIASTOLIC BLOOD PRESSURE: 89 MMHG | SYSTOLIC BLOOD PRESSURE: 137 MMHG | WEIGHT: 190 LBS

## 2021-03-01 DIAGNOSIS — S83.241A ACUTE MEDIAL MENISCUS TEAR OF RIGHT KNEE, INITIAL ENCOUNTER: Primary | ICD-10-CM
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
## 2021-03-01 PROCEDURE — 99214 OFFICE O/P EST MOD 30 MIN: CPT | Mod: S$GLB,,, | Performed by: NURSE PRACTITIONER

## 2021-03-01 PROCEDURE — 99214 PR OFFICE/OUTPT VISIT, EST, LEVL IV, 30-39 MIN: ICD-10-PCS | Mod: S$GLB,,, | Performed by: NURSE PRACTITIONER

## 2021-03-08 ENCOUNTER — TELEPHONE (OUTPATIENT)
Dept: SPORTS MEDICINE | Facility: CLINIC | Age: 61
End: 2021-03-08

## 2021-03-08 ENCOUNTER — OFFICE VISIT (OUTPATIENT)
Dept: URGENT CARE | Facility: CLINIC | Age: 61
End: 2021-03-08
Payer: OTHER MISCELLANEOUS

## 2021-03-08 VITALS
WEIGHT: 190 LBS | BODY MASS INDEX: 28.79 KG/M2 | HEIGHT: 68 IN | RESPIRATION RATE: 16 BRPM | OXYGEN SATURATION: 96 % | DIASTOLIC BLOOD PRESSURE: 93 MMHG | HEART RATE: 60 BPM | SYSTOLIC BLOOD PRESSURE: 145 MMHG | TEMPERATURE: 98 F

## 2021-03-08 DIAGNOSIS — S83.241D ACUTE MEDIAL MENISCUS TEAR OF RIGHT KNEE, SUBSEQUENT ENCOUNTER: Primary | ICD-10-CM
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
## 2021-03-08 PROCEDURE — 99213 PR OFFICE/OUTPT VISIT, EST, LEVL III, 20-29 MIN: ICD-10-PCS | Mod: S$GLB,,, | Performed by: NURSE PRACTITIONER

## 2021-03-08 PROCEDURE — 99213 OFFICE O/P EST LOW 20 MIN: CPT | Mod: S$GLB,,, | Performed by: NURSE PRACTITIONER

## 2021-03-10 ENCOUNTER — OFFICE VISIT (OUTPATIENT)
Dept: SPORTS MEDICINE | Facility: CLINIC | Age: 61
End: 2021-03-10
Payer: OTHER MISCELLANEOUS

## 2021-03-10 VITALS
HEIGHT: 68 IN | HEART RATE: 74 BPM | WEIGHT: 196 LBS | SYSTOLIC BLOOD PRESSURE: 125 MMHG | BODY MASS INDEX: 29.7 KG/M2 | DIASTOLIC BLOOD PRESSURE: 77 MMHG

## 2021-03-10 DIAGNOSIS — S83.206D ACUTE MENISCAL TEAR OF RIGHT KNEE, SUBSEQUENT ENCOUNTER: ICD-10-CM

## 2021-03-10 DIAGNOSIS — Z01.818 PRE-OP TESTING: ICD-10-CM

## 2021-03-10 DIAGNOSIS — M94.261 CHONDROMALACIA OF RIGHT KNEE: ICD-10-CM

## 2021-03-10 DIAGNOSIS — M23.91 ACUTE INTERNAL DERANGEMENT OF RIGHT KNEE: Primary | ICD-10-CM

## 2021-03-10 PROBLEM — S83.421A SPRAIN OF LATERAL COLLATERAL LIGAMENT OF RIGHT KNEE: Status: RESOLVED | Noted: 2019-09-26 | Resolved: 2021-03-10

## 2021-03-10 PROBLEM — S83.206A ACUTE MENISCAL TEAR OF RIGHT KNEE: Status: ACTIVE | Noted: 2021-03-10

## 2021-03-10 PROCEDURE — 99999 PR PBB SHADOW E&M-EST. PATIENT-LVL III: CPT | Mod: PBBFAC,,, | Performed by: ORTHOPAEDIC SURGERY

## 2021-03-10 PROCEDURE — 99214 PR OFFICE/OUTPT VISIT, EST, LEVL IV, 30-39 MIN: ICD-10-PCS | Mod: S$GLB,,, | Performed by: ORTHOPAEDIC SURGERY

## 2021-03-10 PROCEDURE — 99214 OFFICE O/P EST MOD 30 MIN: CPT | Mod: S$GLB,,, | Performed by: ORTHOPAEDIC SURGERY

## 2021-03-10 PROCEDURE — 99999 PR PBB SHADOW E&M-EST. PATIENT-LVL III: ICD-10-PCS | Mod: PBBFAC,,, | Performed by: ORTHOPAEDIC SURGERY

## 2021-03-10 RX ORDER — CELECOXIB 200 MG/1
200 CAPSULE ORAL 2 TIMES DAILY
Qty: 60 CAPSULE | Refills: 2 | Status: SHIPPED | OUTPATIENT
Start: 2021-03-10 | End: 2021-04-09

## 2021-03-11 DIAGNOSIS — M22.41 CHONDROMALACIA PATELLAE, RIGHT KNEE: ICD-10-CM

## 2021-03-11 DIAGNOSIS — S83.241A ACUTE MEDIAL MENISCUS TEAR OF RIGHT KNEE: ICD-10-CM

## 2021-03-11 DIAGNOSIS — S83.281A ACUTE LATERAL MENISCUS TEAR OF RIGHT KNEE: Primary | ICD-10-CM

## 2021-03-11 DIAGNOSIS — M94.261 CHONDROMALACIA OF KNEE, RIGHT: ICD-10-CM

## 2021-03-16 ENCOUNTER — TELEPHONE (OUTPATIENT)
Dept: SPORTS MEDICINE | Facility: CLINIC | Age: 61
End: 2021-03-16

## 2021-04-09 ENCOUNTER — TELEPHONE (OUTPATIENT)
Dept: SPORTS MEDICINE | Facility: CLINIC | Age: 61
End: 2021-04-09

## 2021-04-16 ENCOUNTER — PATIENT MESSAGE (OUTPATIENT)
Dept: RESEARCH | Facility: HOSPITAL | Age: 61
End: 2021-04-16

## 2021-05-04 ENCOUNTER — TELEPHONE (OUTPATIENT)
Dept: SPORTS MEDICINE | Facility: CLINIC | Age: 61
End: 2021-05-04

## 2021-05-17 ENCOUNTER — PATIENT MESSAGE (OUTPATIENT)
Dept: INTERNAL MEDICINE | Facility: CLINIC | Age: 61
End: 2021-05-17

## 2021-05-17 ENCOUNTER — OFFICE VISIT (OUTPATIENT)
Dept: SPORTS MEDICINE | Facility: CLINIC | Age: 61
End: 2021-05-17
Payer: OTHER MISCELLANEOUS

## 2021-05-17 VITALS
WEIGHT: 199 LBS | HEART RATE: 68 BPM | DIASTOLIC BLOOD PRESSURE: 79 MMHG | HEIGHT: 68 IN | BODY MASS INDEX: 30.16 KG/M2 | SYSTOLIC BLOOD PRESSURE: 120 MMHG

## 2021-05-17 DIAGNOSIS — M94.261 CHONDROMALACIA OF KNEE, RIGHT: ICD-10-CM

## 2021-05-17 DIAGNOSIS — S83.241D ACUTE MEDIAL MENISCUS TEAR OF RIGHT KNEE, SUBSEQUENT ENCOUNTER: ICD-10-CM

## 2021-05-17 DIAGNOSIS — S83.281D ACUTE LATERAL MENISCUS TEAR OF RIGHT KNEE, SUBSEQUENT ENCOUNTER: Primary | ICD-10-CM

## 2021-05-17 PROCEDURE — 99999 PR PBB SHADOW E&M-EST. PATIENT-LVL III: CPT | Mod: PBBFAC,,, | Performed by: PHYSICIAN ASSISTANT

## 2021-05-17 PROCEDURE — 99499 UNLISTED E&M SERVICE: CPT | Mod: S$GLB,,, | Performed by: PHYSICIAN ASSISTANT

## 2021-05-17 PROCEDURE — 99499 NO LOS: ICD-10-PCS | Mod: S$GLB,,, | Performed by: PHYSICIAN ASSISTANT

## 2021-05-17 PROCEDURE — 99999 PR PBB SHADOW E&M-EST. PATIENT-LVL III: ICD-10-PCS | Mod: PBBFAC,,, | Performed by: PHYSICIAN ASSISTANT

## 2021-05-17 RX ORDER — HYDROCODONE BITARTRATE AND ACETAMINOPHEN 10; 325 MG/1; MG/1
1 TABLET ORAL EVERY 6 HOURS PRN
Qty: 28 TABLET | Refills: 0 | Status: SHIPPED | OUTPATIENT
Start: 2021-05-17 | End: 2021-12-21

## 2021-05-17 RX ORDER — PROMETHAZINE HYDROCHLORIDE 25 MG/1
25 TABLET ORAL EVERY 4 HOURS PRN
Qty: 30 TABLET | Refills: 0 | Status: SHIPPED | OUTPATIENT
Start: 2021-05-17 | End: 2021-12-21

## 2021-05-17 RX ORDER — CEFAZOLIN SODIUM 2 G/50ML
2 SOLUTION INTRAVENOUS
Status: CANCELLED | OUTPATIENT
Start: 2021-05-17

## 2021-05-17 RX ORDER — ASPIRIN 325 MG
TABLET ORAL
Qty: 42 TABLET | Refills: 0 | Status: SHIPPED | OUTPATIENT
Start: 2021-05-17 | End: 2021-12-21

## 2021-05-17 RX ORDER — ROPIVACAINE/EPI/CLONIDINE/KET 2.46-0.005
SYRINGE (ML) INJECTION ONCE
Status: CANCELLED | OUTPATIENT
Start: 2021-05-17 | End: 2021-05-17

## 2021-05-17 RX ORDER — CELECOXIB 200 MG/1
200 CAPSULE ORAL 2 TIMES DAILY WITH MEALS
Qty: 60 CAPSULE | Refills: 0 | Status: SHIPPED | OUTPATIENT
Start: 2021-05-17 | End: 2021-06-08

## 2021-05-17 RX ORDER — SODIUM CHLORIDE 0.9 % (FLUSH) 0.9 %
10 SYRINGE (ML) INJECTION
Status: CANCELLED | OUTPATIENT
Start: 2021-05-17

## 2021-05-17 RX ORDER — MUPIROCIN 20 MG/G
OINTMENT TOPICAL
Status: CANCELLED | OUTPATIENT
Start: 2021-05-17

## 2021-05-18 ENCOUNTER — HOSPITAL ENCOUNTER (OUTPATIENT)
Dept: CARDIOLOGY | Facility: HOSPITAL | Age: 61
Discharge: HOME | End: 2021-05-18
Attending: INTERNAL MEDICINE
Payer: COMMERCIAL

## 2021-05-18 ENCOUNTER — OFFICE VISIT (OUTPATIENT)
Dept: INTERNAL MEDICINE | Facility: CLINIC | Age: 61
End: 2021-05-18
Payer: COMMERCIAL

## 2021-05-18 VITALS
HEART RATE: 82 BPM | SYSTOLIC BLOOD PRESSURE: 122 MMHG | HEIGHT: 68 IN | TEMPERATURE: 98 F | DIASTOLIC BLOOD PRESSURE: 76 MMHG | BODY MASS INDEX: 30.37 KG/M2 | OXYGEN SATURATION: 99 % | WEIGHT: 200.38 LBS

## 2021-05-18 VITALS
HEART RATE: 80 BPM | SYSTOLIC BLOOD PRESSURE: 122 MMHG | HEIGHT: 68 IN | BODY MASS INDEX: 30.31 KG/M2 | WEIGHT: 200 LBS | DIASTOLIC BLOOD PRESSURE: 76 MMHG

## 2021-05-18 DIAGNOSIS — I10 BENIGN ESSENTIAL HYPERTENSION: ICD-10-CM

## 2021-05-18 DIAGNOSIS — R94.31 ABNORMAL EKG: ICD-10-CM

## 2021-05-18 DIAGNOSIS — R73.03 PRE-DIABETES: ICD-10-CM

## 2021-05-18 DIAGNOSIS — S83.206D ACUTE MENISCAL TEAR OF RIGHT KNEE, SUBSEQUENT ENCOUNTER: ICD-10-CM

## 2021-05-18 DIAGNOSIS — Z01.810 PREOP CARDIOVASCULAR EXAM: Primary | ICD-10-CM

## 2021-05-18 LAB
ASCENDING AORTA: 3.51 CM
AV INDEX (PROSTH): 0.89
AV MEAN GRADIENT: 2 MMHG
AV PEAK GRADIENT: 3 MMHG
AV VALVE AREA: 3.65 CM2
AV VELOCITY RATIO: 0.85
BSA FOR ECHO PROCEDURE: 2.09 M2
CV ECHO LV RWT: 0.34 CM
DOP CALC AO PEAK VEL: 0.92 M/S
DOP CALC AO VTI: 18.28 CM
DOP CALC LVOT AREA: 4.1 CM2
DOP CALC LVOT DIAMETER: 2.29 CM
DOP CALC LVOT PEAK VEL: 0.78 M/S
DOP CALC LVOT STROKE VOLUME: 66.65 CM3
DOP CALCLVOT PEAK VEL VTI: 16.19 CM
E WAVE DECELERATION TIME: 177.13 MSEC
E/A RATIO: 1.33
E/E' RATIO: 7.63 M/S
ECHO LV POSTERIOR WALL: 0.9 CM (ref 0.6–1.1)
EJECTION FRACTION: 60 %
FRACTIONAL SHORTENING: 34 % (ref 28–44)
INTERVENTRICULAR SEPTUM: 0.79 CM (ref 0.6–1.1)
IVRT: 91.34 MSEC
LA MAJOR: 5.76 CM
LA MINOR: 5.82 CM
LA WIDTH: 4.57 CM
LEFT ATRIUM SIZE: 3.89 CM
LEFT ATRIUM VOLUME INDEX MOD: 37.1 ML/M2
LEFT ATRIUM VOLUME INDEX: 42.9 ML/M2
LEFT ATRIUM VOLUME MOD: 75.64 CM3
LEFT ATRIUM VOLUME: 87.49 CM3
LEFT INTERNAL DIMENSION IN SYSTOLE: 3.45 CM (ref 2.1–4)
LEFT VENTRICLE DIASTOLIC VOLUME INDEX: 64.99 ML/M2
LEFT VENTRICLE DIASTOLIC VOLUME: 132.57 ML
LEFT VENTRICLE MASS INDEX: 78 G/M2
LEFT VENTRICLE SYSTOLIC VOLUME INDEX: 24.1 ML/M2
LEFT VENTRICLE SYSTOLIC VOLUME: 49.09 ML
LEFT VENTRICULAR INTERNAL DIMENSION IN DIASTOLE: 5.25 CM (ref 3.5–6)
LEFT VENTRICULAR MASS: 158.31 G
LV LATERAL E/E' RATIO: 6.78 M/S
LV SEPTAL E/E' RATIO: 8.71 M/S
MV A" WAVE DURATION": 20.27 MSEC
MV PEAK A VEL: 0.46 M/S
MV PEAK E VEL: 0.61 M/S
MV STENOSIS PRESSURE HALF TIME: 51.37 MS
MV VALVE AREA P 1/2 METHOD: 4.28 CM2
PISA TR MAX VEL: 2.34 M/S
PULM VEIN S/D RATIO: 0.95
PV PEAK D VEL: 0.58 M/S
PV PEAK S VEL: 0.55 M/S
RA MAJOR: 5.14 CM
RA WIDTH: 4.11 CM
RIGHT VENTRICULAR END-DIASTOLIC DIMENSION: 4.03 CM
RV TISSUE DOPPLER FREE WALL SYSTOLIC VELOCITY 1 (APICAL 4 CHAMBER VIEW): 16.03 CM/S
SINUS: 3.91 CM
STJ: 3.39 CM
TDI LATERAL: 0.09 M/S
TDI SEPTAL: 0.07 M/S
TDI: 0.08 M/S
TR MAX PG: 22 MMHG
TRICUSPID ANNULAR PLANE SYSTOLIC EXCURSION: 2.74 CM

## 2021-05-18 PROCEDURE — 93010 ELECTROCARDIOGRAM REPORT: CPT | Mod: S$GLB,,, | Performed by: INTERNAL MEDICINE

## 2021-05-18 PROCEDURE — 3008F PR BODY MASS INDEX (BMI) DOCUMENTED: ICD-10-PCS | Mod: CPTII,S$GLB,, | Performed by: INTERNAL MEDICINE

## 2021-05-18 PROCEDURE — 93306 TTE W/DOPPLER COMPLETE: CPT

## 2021-05-18 PROCEDURE — 99999 PR PBB SHADOW E&M-EST. PATIENT-LVL IV: CPT | Mod: PBBFAC,,, | Performed by: INTERNAL MEDICINE

## 2021-05-18 PROCEDURE — 3008F BODY MASS INDEX DOCD: CPT | Mod: CPTII,S$GLB,, | Performed by: INTERNAL MEDICINE

## 2021-05-18 PROCEDURE — 1125F AMNT PAIN NOTED PAIN PRSNT: CPT | Mod: S$GLB,,, | Performed by: INTERNAL MEDICINE

## 2021-05-18 PROCEDURE — 99214 PR OFFICE/OUTPT VISIT, EST, LEVL IV, 30-39 MIN: ICD-10-PCS | Mod: S$GLB,,, | Performed by: INTERNAL MEDICINE

## 2021-05-18 PROCEDURE — 93005 ELECTROCARDIOGRAM TRACING: CPT | Mod: S$GLB,,, | Performed by: INTERNAL MEDICINE

## 2021-05-18 PROCEDURE — 99214 OFFICE O/P EST MOD 30 MIN: CPT | Mod: S$GLB,,, | Performed by: INTERNAL MEDICINE

## 2021-05-18 PROCEDURE — 1125F PR PAIN SEVERITY QUANTIFIED, PAIN PRESENT: ICD-10-PCS | Mod: S$GLB,,, | Performed by: INTERNAL MEDICINE

## 2021-05-18 PROCEDURE — 93010 EKG 12-LEAD: ICD-10-PCS | Mod: S$GLB,,, | Performed by: INTERNAL MEDICINE

## 2021-05-18 PROCEDURE — 93005 EKG 12-LEAD: ICD-10-PCS | Mod: S$GLB,,, | Performed by: INTERNAL MEDICINE

## 2021-05-18 PROCEDURE — 99999 PR PBB SHADOW E&M-EST. PATIENT-LVL IV: ICD-10-PCS | Mod: PBBFAC,,, | Performed by: INTERNAL MEDICINE

## 2021-05-18 PROCEDURE — 93306 ECHO (CUPID ONLY): ICD-10-PCS | Mod: 26,,, | Performed by: INTERNAL MEDICINE

## 2021-05-18 PROCEDURE — 93306 TTE W/DOPPLER COMPLETE: CPT | Mod: 26,,, | Performed by: INTERNAL MEDICINE

## 2021-05-24 ENCOUNTER — LAB VISIT (OUTPATIENT)
Dept: INTERNAL MEDICINE | Facility: CLINIC | Age: 61
End: 2021-05-24
Payer: OTHER MISCELLANEOUS

## 2021-05-24 DIAGNOSIS — Z01.818 PRE-OP TESTING: ICD-10-CM

## 2021-05-24 LAB — SARS-COV-2 RNA RESP QL NAA+PROBE: NOT DETECTED

## 2021-05-24 PROCEDURE — U0005 INFEC AGEN DETEC AMPLI PROBE: HCPCS | Performed by: ORTHOPAEDIC SURGERY

## 2021-05-24 PROCEDURE — U0003 INFECTIOUS AGENT DETECTION BY NUCLEIC ACID (DNA OR RNA); SEVERE ACUTE RESPIRATORY SYNDROME CORONAVIRUS 2 (SARS-COV-2) (CORONAVIRUS DISEASE [COVID-19]), AMPLIFIED PROBE TECHNIQUE, MAKING USE OF HIGH THROUGHPUT TECHNOLOGIES AS DESCRIBED BY CMS-2020-01-R: HCPCS | Performed by: ORTHOPAEDIC SURGERY

## 2021-05-26 ENCOUNTER — ANESTHESIA EVENT (OUTPATIENT)
Dept: SURGERY | Facility: HOSPITAL | Age: 61
End: 2021-05-26
Payer: OTHER MISCELLANEOUS

## 2021-05-26 ENCOUNTER — PATIENT MESSAGE (OUTPATIENT)
Dept: SURGERY | Facility: HOSPITAL | Age: 61
End: 2021-05-26

## 2021-05-26 ENCOUNTER — TELEPHONE (OUTPATIENT)
Dept: SPORTS MEDICINE | Facility: CLINIC | Age: 61
End: 2021-05-26

## 2021-05-27 ENCOUNTER — HOSPITAL ENCOUNTER (OUTPATIENT)
Facility: HOSPITAL | Age: 61
Discharge: HOME | End: 2021-05-27
Attending: ORTHOPAEDIC SURGERY | Admitting: ORTHOPAEDIC SURGERY
Payer: OTHER MISCELLANEOUS

## 2021-05-27 ENCOUNTER — ANESTHESIA (OUTPATIENT)
Dept: SURGERY | Facility: HOSPITAL | Age: 61
End: 2021-05-27
Payer: OTHER MISCELLANEOUS

## 2021-05-27 VITALS
DIASTOLIC BLOOD PRESSURE: 81 MMHG | RESPIRATION RATE: 19 BRPM | SYSTOLIC BLOOD PRESSURE: 125 MMHG | OXYGEN SATURATION: 94 % | TEMPERATURE: 98 F | HEIGHT: 68 IN | BODY MASS INDEX: 29.55 KG/M2 | WEIGHT: 195 LBS | HEART RATE: 49 BPM

## 2021-05-27 DIAGNOSIS — S83.241D ACUTE MEDIAL MENISCUS TEAR OF RIGHT KNEE, SUBSEQUENT ENCOUNTER: ICD-10-CM

## 2021-05-27 DIAGNOSIS — M94.261 CHONDROMALACIA OF KNEE, RIGHT: ICD-10-CM

## 2021-05-27 DIAGNOSIS — S83.281D ACUTE LATERAL MENISCUS TEAR OF RIGHT KNEE, SUBSEQUENT ENCOUNTER: ICD-10-CM

## 2021-05-27 PROBLEM — S83.281A ACUTE LATERAL MENISCUS TEAR OF RIGHT KNEE: Status: ACTIVE | Noted: 2021-05-27

## 2021-05-27 LAB — POCT GLUCOSE: 112 MG/DL (ref 70–110)

## 2021-05-27 PROCEDURE — 25000003 PHARM REV CODE 250: Performed by: ANESTHESIOLOGY

## 2021-05-27 PROCEDURE — 37000008 HC ANESTHESIA 1ST 15 MINUTES: Performed by: ORTHOPAEDIC SURGERY

## 2021-05-27 PROCEDURE — 71000015 HC POSTOP RECOV 1ST HR: Performed by: ORTHOPAEDIC SURGERY

## 2021-05-27 PROCEDURE — D9220A PRA ANESTHESIA: ICD-10-PCS | Mod: ANES,,, | Performed by: ANESTHESIOLOGY

## 2021-05-27 PROCEDURE — 63600175 PHARM REV CODE 636 W HCPCS: Performed by: PHYSICIAN ASSISTANT

## 2021-05-27 PROCEDURE — 37000009 HC ANESTHESIA EA ADD 15 MINS: Performed by: ORTHOPAEDIC SURGERY

## 2021-05-27 PROCEDURE — 63600175 PHARM REV CODE 636 W HCPCS: Performed by: NURSE ANESTHETIST, CERTIFIED REGISTERED

## 2021-05-27 PROCEDURE — 29880 ARTHRS KNE SRG MNISECTMY M&L: CPT | Mod: AS,RT,, | Performed by: PHYSICIAN ASSISTANT

## 2021-05-27 PROCEDURE — 29880 ARTHRS KNE SRG MNISECTMY M&L: CPT | Mod: RT,,, | Performed by: ORTHOPAEDIC SURGERY

## 2021-05-27 PROCEDURE — 25000003 PHARM REV CODE 250: Performed by: STUDENT IN AN ORGANIZED HEALTH CARE EDUCATION/TRAINING PROGRAM

## 2021-05-27 PROCEDURE — 25000003 PHARM REV CODE 250: Performed by: NURSE ANESTHETIST, CERTIFIED REGISTERED

## 2021-05-27 PROCEDURE — 71000033 HC RECOVERY, INTIAL HOUR: Performed by: ORTHOPAEDIC SURGERY

## 2021-05-27 PROCEDURE — 99900035 HC TECH TIME PER 15 MIN (STAT)

## 2021-05-27 PROCEDURE — D9220A PRA ANESTHESIA: Mod: CRNA,,, | Performed by: NURSE ANESTHETIST, CERTIFIED REGISTERED

## 2021-05-27 PROCEDURE — 94761 N-INVAS EAR/PLS OXIMETRY MLT: CPT

## 2021-05-27 PROCEDURE — 27200651 HC AIRWAY, LMA: Performed by: ANESTHESIOLOGY

## 2021-05-27 PROCEDURE — 82962 GLUCOSE BLOOD TEST: CPT | Performed by: ORTHOPAEDIC SURGERY

## 2021-05-27 PROCEDURE — D9220A PRA ANESTHESIA: Mod: ANES,,, | Performed by: ANESTHESIOLOGY

## 2021-05-27 PROCEDURE — 27201423 OPTIME MED/SURG SUP & DEVICES STERILE SUPPLY: Performed by: ORTHOPAEDIC SURGERY

## 2021-05-27 PROCEDURE — 29880 PR KNEE SCOPE MED/LAT MENISCECTOMY: ICD-10-PCS | Mod: AS,RT,, | Performed by: PHYSICIAN ASSISTANT

## 2021-05-27 PROCEDURE — 25000003 PHARM REV CODE 250: Performed by: PHYSICIAN ASSISTANT

## 2021-05-27 PROCEDURE — 36000711: Performed by: ORTHOPAEDIC SURGERY

## 2021-05-27 PROCEDURE — 36000710: Performed by: ORTHOPAEDIC SURGERY

## 2021-05-27 PROCEDURE — D9220A PRA ANESTHESIA: ICD-10-PCS | Mod: CRNA,,, | Performed by: NURSE ANESTHETIST, CERTIFIED REGISTERED

## 2021-05-27 PROCEDURE — 63600175 PHARM REV CODE 636 W HCPCS: Performed by: ORTHOPAEDIC SURGERY

## 2021-05-27 PROCEDURE — 29880 PR KNEE SCOPE MED/LAT MENISCECTOMY: ICD-10-PCS | Mod: RT,,, | Performed by: ORTHOPAEDIC SURGERY

## 2021-05-27 PROCEDURE — 25000003 PHARM REV CODE 250: Performed by: ORTHOPAEDIC SURGERY

## 2021-05-27 RX ORDER — SODIUM CHLORIDE 9 MG/ML
INJECTION, SOLUTION INTRAVENOUS CONTINUOUS
Status: DISCONTINUED | OUTPATIENT
Start: 2021-05-27 | End: 2021-05-27 | Stop reason: HOSPADM

## 2021-05-27 RX ORDER — OXYCODONE HYDROCHLORIDE 5 MG/1
5 TABLET ORAL ONCE
Status: COMPLETED | OUTPATIENT
Start: 2021-05-27 | End: 2021-05-27

## 2021-05-27 RX ORDER — ONDANSETRON 2 MG/ML
4 INJECTION INTRAMUSCULAR; INTRAVENOUS ONCE AS NEEDED
Status: DISCONTINUED | OUTPATIENT
Start: 2021-05-27 | End: 2021-05-27 | Stop reason: HOSPADM

## 2021-05-27 RX ORDER — MUPIROCIN 20 MG/G
OINTMENT TOPICAL
Status: DISCONTINUED | OUTPATIENT
Start: 2021-05-27 | End: 2021-05-27 | Stop reason: HOSPADM

## 2021-05-27 RX ORDER — CEFAZOLIN SODIUM 1 G/3ML
2 INJECTION, POWDER, FOR SOLUTION INTRAMUSCULAR; INTRAVENOUS
Status: DISCONTINUED | OUTPATIENT
Start: 2021-05-27 | End: 2021-05-27 | Stop reason: HOSPADM

## 2021-05-27 RX ORDER — OXYCODONE HYDROCHLORIDE 5 MG/1
5 TABLET ORAL EVERY 6 HOURS PRN
Status: DISCONTINUED | OUTPATIENT
Start: 2021-05-27 | End: 2021-05-27 | Stop reason: HOSPADM

## 2021-05-27 RX ORDER — METHOCARBAMOL 500 MG/1
1000 TABLET, FILM COATED ORAL ONCE
Status: COMPLETED | OUTPATIENT
Start: 2021-05-27 | End: 2021-05-27

## 2021-05-27 RX ORDER — SODIUM CHLORIDE 0.9 % (FLUSH) 0.9 %
3 SYRINGE (ML) INJECTION
Status: DISCONTINUED | OUTPATIENT
Start: 2021-05-27 | End: 2021-05-27 | Stop reason: HOSPADM

## 2021-05-27 RX ORDER — ROPIVACAINE/EPI/CLONIDINE/KET 2.46-0.005
SYRINGE (ML) INJECTION ONCE
Status: DISCONTINUED | OUTPATIENT
Start: 2021-05-27 | End: 2021-05-27 | Stop reason: HOSPADM

## 2021-05-27 RX ORDER — EPINEPHRINE 1 MG/ML
INJECTION INTRAMUSCULAR; INTRAVENOUS; SUBCUTANEOUS
Status: DISCONTINUED | OUTPATIENT
Start: 2021-05-27 | End: 2021-05-27 | Stop reason: HOSPADM

## 2021-05-27 RX ORDER — ACETAMINOPHEN 500 MG
1000 TABLET ORAL
Status: COMPLETED | OUTPATIENT
Start: 2021-05-27 | End: 2021-05-27

## 2021-05-27 RX ORDER — CARBOXYMETHYLCELLULOSE SODIUM 5 MG/ML
SOLUTION/ DROPS OPHTHALMIC
Status: DISCONTINUED | OUTPATIENT
Start: 2021-05-27 | End: 2021-05-27

## 2021-05-27 RX ORDER — PROPOFOL 10 MG/ML
VIAL (ML) INTRAVENOUS
Status: DISCONTINUED | OUTPATIENT
Start: 2021-05-27 | End: 2021-05-27

## 2021-05-27 RX ORDER — SODIUM CHLORIDE 0.9 % (FLUSH) 0.9 %
10 SYRINGE (ML) INJECTION
Status: DISCONTINUED | OUTPATIENT
Start: 2021-05-27 | End: 2021-05-27 | Stop reason: HOSPADM

## 2021-05-27 RX ORDER — MIDAZOLAM HYDROCHLORIDE 1 MG/ML
INJECTION INTRAMUSCULAR; INTRAVENOUS
Status: DISCONTINUED | OUTPATIENT
Start: 2021-05-27 | End: 2021-05-27

## 2021-05-27 RX ORDER — CELECOXIB 200 MG/1
400 CAPSULE ORAL ONCE
Status: COMPLETED | OUTPATIENT
Start: 2021-05-27 | End: 2021-05-27

## 2021-05-27 RX ORDER — ONDANSETRON HYDROCHLORIDE 2 MG/ML
INJECTION, SOLUTION INTRAMUSCULAR; INTRAVENOUS
Status: DISCONTINUED | OUTPATIENT
Start: 2021-05-27 | End: 2021-05-27

## 2021-05-27 RX ORDER — LIDOCAINE HCL/PF 100 MG/5ML
SYRINGE (ML) INTRAVENOUS
Status: DISCONTINUED | OUTPATIENT
Start: 2021-05-27 | End: 2021-05-27

## 2021-05-27 RX ORDER — FENTANYL CITRATE 50 UG/ML
INJECTION, SOLUTION INTRAMUSCULAR; INTRAVENOUS
Status: DISCONTINUED | OUTPATIENT
Start: 2021-05-27 | End: 2021-05-27

## 2021-05-27 RX ADMIN — MIDAZOLAM HYDROCHLORIDE 2 MG: 1 INJECTION, SOLUTION INTRAMUSCULAR; INTRAVENOUS at 06:05

## 2021-05-27 RX ADMIN — METHOCARBAMOL 1000 MG: 500 TABLET ORAL at 08:05

## 2021-05-27 RX ADMIN — OXYCODONE 5 MG: 5 TABLET ORAL at 08:05

## 2021-05-27 RX ADMIN — LIDOCAINE HYDROCHLORIDE 100 MG: 20 INJECTION, SOLUTION INTRAVENOUS at 06:05

## 2021-05-27 RX ADMIN — MUPIROCIN: 20 OINTMENT TOPICAL at 06:05

## 2021-05-27 RX ADMIN — CEFAZOLIN 2 G: 330 INJECTION, POWDER, FOR SOLUTION INTRAMUSCULAR; INTRAVENOUS at 07:05

## 2021-05-27 RX ADMIN — ACETAMINOPHEN 1000 MG: 500 TABLET, FILM COATED ORAL at 06:05

## 2021-05-27 RX ADMIN — PROPOFOL 200 MG: 10 INJECTION, EMULSION INTRAVENOUS at 06:05

## 2021-05-27 RX ADMIN — ONDANSETRON 4 MG: 2 INJECTION, SOLUTION INTRAMUSCULAR; INTRAVENOUS at 07:05

## 2021-05-27 RX ADMIN — CELECOXIB 400 MG: 200 CAPSULE ORAL at 06:05

## 2021-05-27 RX ADMIN — CARBOXYMETHYLCELLULOSE SODIUM 2 DROP: 5 SOLUTION/ DROPS OPHTHALMIC at 07:05

## 2021-05-27 RX ADMIN — SODIUM CHLORIDE: 0.9 INJECTION, SOLUTION INTRAVENOUS at 06:05

## 2021-05-27 RX ADMIN — FENTANYL CITRATE 100 MCG: 50 INJECTION, SOLUTION INTRAMUSCULAR; INTRAVENOUS at 07:05

## 2021-05-31 ENCOUNTER — CLINICAL SUPPORT (OUTPATIENT)
Dept: REHABILITATION | Facility: HOSPITAL | Age: 61
End: 2021-05-31
Payer: COMMERCIAL

## 2021-05-31 DIAGNOSIS — R26.89 IMPAIRED GAIT AND MOBILITY: ICD-10-CM

## 2021-05-31 DIAGNOSIS — S83.206D ACUTE MENISCAL TEAR OF RIGHT KNEE, SUBSEQUENT ENCOUNTER: Primary | ICD-10-CM

## 2021-05-31 DIAGNOSIS — R29.898 DECREASED STRENGTH OF LOWER EXTREMITY: ICD-10-CM

## 2021-05-31 PROCEDURE — 97161 PT EVAL LOW COMPLEX 20 MIN: CPT

## 2021-05-31 PROCEDURE — 97110 THERAPEUTIC EXERCISES: CPT

## 2021-06-04 ENCOUNTER — CLINICAL SUPPORT (OUTPATIENT)
Dept: REHABILITATION | Facility: HOSPITAL | Age: 61
End: 2021-06-04
Payer: OTHER MISCELLANEOUS

## 2021-06-04 DIAGNOSIS — R29.898 DECREASED STRENGTH OF LOWER EXTREMITY: ICD-10-CM

## 2021-06-04 DIAGNOSIS — R26.89 IMPAIRED GAIT AND MOBILITY: ICD-10-CM

## 2021-06-04 PROCEDURE — 97110 THERAPEUTIC EXERCISES: CPT

## 2021-06-07 ENCOUNTER — CLINICAL SUPPORT (OUTPATIENT)
Dept: REHABILITATION | Facility: HOSPITAL | Age: 61
End: 2021-06-07
Payer: OTHER MISCELLANEOUS

## 2021-06-07 DIAGNOSIS — R26.89 IMPAIRED GAIT AND MOBILITY: ICD-10-CM

## 2021-06-07 DIAGNOSIS — R29.898 DECREASED STRENGTH OF LOWER EXTREMITY: ICD-10-CM

## 2021-06-07 PROCEDURE — 97110 THERAPEUTIC EXERCISES: CPT

## 2021-06-09 ENCOUNTER — OFFICE VISIT (OUTPATIENT)
Dept: SPORTS MEDICINE | Facility: CLINIC | Age: 61
End: 2021-06-09
Payer: COMMERCIAL

## 2021-06-09 VITALS
WEIGHT: 195 LBS | HEIGHT: 68 IN | HEART RATE: 64 BPM | DIASTOLIC BLOOD PRESSURE: 81 MMHG | BODY MASS INDEX: 29.55 KG/M2 | SYSTOLIC BLOOD PRESSURE: 116 MMHG

## 2021-06-09 DIAGNOSIS — S83.206D ACUTE MENISCAL TEAR OF RIGHT KNEE, SUBSEQUENT ENCOUNTER: ICD-10-CM

## 2021-06-09 DIAGNOSIS — Z09 SURGERY FOLLOW-UP EXAMINATION: ICD-10-CM

## 2021-06-09 DIAGNOSIS — Z98.890 S/P ARTHROSCOPY OF KNEE: Primary | ICD-10-CM

## 2021-06-09 PROCEDURE — 99999 PR PBB SHADOW E&M-EST. PATIENT-LVL III: ICD-10-PCS | Mod: PBBFAC,,, | Performed by: PHYSICIAN ASSISTANT

## 2021-06-09 PROCEDURE — 1126F AMNT PAIN NOTED NONE PRSNT: CPT | Mod: S$GLB,,, | Performed by: PHYSICIAN ASSISTANT

## 2021-06-09 PROCEDURE — 3008F PR BODY MASS INDEX (BMI) DOCUMENTED: ICD-10-PCS | Mod: CPTII,S$GLB,, | Performed by: PHYSICIAN ASSISTANT

## 2021-06-09 PROCEDURE — 99999 PR PBB SHADOW E&M-EST. PATIENT-LVL III: CPT | Mod: PBBFAC,,, | Performed by: PHYSICIAN ASSISTANT

## 2021-06-09 PROCEDURE — 99024 POSTOP FOLLOW-UP VISIT: CPT | Mod: S$GLB,,, | Performed by: PHYSICIAN ASSISTANT

## 2021-06-09 PROCEDURE — 1126F PR PAIN SEVERITY QUANTIFIED, NO PAIN PRESENT: ICD-10-PCS | Mod: S$GLB,,, | Performed by: PHYSICIAN ASSISTANT

## 2021-06-09 PROCEDURE — 3008F BODY MASS INDEX DOCD: CPT | Mod: CPTII,S$GLB,, | Performed by: PHYSICIAN ASSISTANT

## 2021-06-09 PROCEDURE — 99024 PR POST-OP FOLLOW-UP VISIT: ICD-10-PCS | Mod: S$GLB,,, | Performed by: PHYSICIAN ASSISTANT

## 2021-06-10 ENCOUNTER — PATIENT MESSAGE (OUTPATIENT)
Dept: SPORTS MEDICINE | Facility: CLINIC | Age: 61
End: 2021-06-10

## 2021-06-11 ENCOUNTER — CLINICAL SUPPORT (OUTPATIENT)
Dept: REHABILITATION | Facility: HOSPITAL | Age: 61
End: 2021-06-11
Payer: OTHER MISCELLANEOUS

## 2021-06-11 DIAGNOSIS — R29.898 DECREASED STRENGTH OF LOWER EXTREMITY: ICD-10-CM

## 2021-06-11 DIAGNOSIS — R26.89 IMPAIRED GAIT AND MOBILITY: ICD-10-CM

## 2021-06-11 PROCEDURE — 97110 THERAPEUTIC EXERCISES: CPT | Mod: CQ

## 2021-06-15 ENCOUNTER — CLINICAL SUPPORT (OUTPATIENT)
Dept: REHABILITATION | Facility: HOSPITAL | Age: 61
End: 2021-06-15
Payer: OTHER MISCELLANEOUS

## 2021-06-15 DIAGNOSIS — R29.898 DECREASED STRENGTH OF LOWER EXTREMITY: ICD-10-CM

## 2021-06-15 DIAGNOSIS — R26.89 IMPAIRED GAIT AND MOBILITY: ICD-10-CM

## 2021-06-15 PROCEDURE — 97110 THERAPEUTIC EXERCISES: CPT

## 2021-06-18 ENCOUNTER — CLINICAL SUPPORT (OUTPATIENT)
Dept: REHABILITATION | Facility: HOSPITAL | Age: 61
End: 2021-06-18
Payer: OTHER MISCELLANEOUS

## 2021-06-18 DIAGNOSIS — R29.898 DECREASED STRENGTH OF LOWER EXTREMITY: ICD-10-CM

## 2021-06-18 DIAGNOSIS — R26.89 IMPAIRED GAIT AND MOBILITY: ICD-10-CM

## 2021-06-18 PROCEDURE — 97110 THERAPEUTIC EXERCISES: CPT

## 2021-06-21 ENCOUNTER — CLINICAL SUPPORT (OUTPATIENT)
Dept: REHABILITATION | Facility: HOSPITAL | Age: 61
End: 2021-06-21
Payer: OTHER MISCELLANEOUS

## 2021-06-21 ENCOUNTER — PATIENT MESSAGE (OUTPATIENT)
Dept: SPORTS MEDICINE | Facility: CLINIC | Age: 61
End: 2021-06-21

## 2021-06-21 DIAGNOSIS — R26.89 IMPAIRED GAIT AND MOBILITY: ICD-10-CM

## 2021-06-21 DIAGNOSIS — R29.898 DECREASED STRENGTH OF LOWER EXTREMITY: ICD-10-CM

## 2021-06-21 PROCEDURE — 97110 THERAPEUTIC EXERCISES: CPT

## 2021-06-21 PROCEDURE — 97112 NEUROMUSCULAR REEDUCATION: CPT

## 2021-06-25 ENCOUNTER — PATIENT MESSAGE (OUTPATIENT)
Dept: SPORTS MEDICINE | Facility: CLINIC | Age: 61
End: 2021-06-25

## 2021-06-25 ENCOUNTER — CLINICAL SUPPORT (OUTPATIENT)
Dept: REHABILITATION | Facility: HOSPITAL | Age: 61
End: 2021-06-25
Payer: OTHER MISCELLANEOUS

## 2021-06-25 DIAGNOSIS — R26.89 IMPAIRED GAIT AND MOBILITY: ICD-10-CM

## 2021-06-25 DIAGNOSIS — R29.898 DECREASED STRENGTH OF LOWER EXTREMITY: ICD-10-CM

## 2021-06-25 PROCEDURE — 97110 THERAPEUTIC EXERCISES: CPT

## 2021-06-25 PROCEDURE — 97112 NEUROMUSCULAR REEDUCATION: CPT

## 2021-06-28 ENCOUNTER — CLINICAL SUPPORT (OUTPATIENT)
Dept: REHABILITATION | Facility: HOSPITAL | Age: 61
End: 2021-06-28
Payer: OTHER MISCELLANEOUS

## 2021-06-28 DIAGNOSIS — R29.898 DECREASED STRENGTH OF LOWER EXTREMITY: ICD-10-CM

## 2021-06-28 DIAGNOSIS — R26.89 IMPAIRED GAIT AND MOBILITY: ICD-10-CM

## 2021-06-28 PROCEDURE — 97110 THERAPEUTIC EXERCISES: CPT

## 2021-06-28 PROCEDURE — 97112 NEUROMUSCULAR REEDUCATION: CPT

## 2021-06-30 ENCOUNTER — OFFICE VISIT (OUTPATIENT)
Dept: SPORTS MEDICINE | Facility: CLINIC | Age: 61
End: 2021-06-30
Payer: COMMERCIAL

## 2021-06-30 VITALS
HEIGHT: 68 IN | SYSTOLIC BLOOD PRESSURE: 129 MMHG | BODY MASS INDEX: 29.55 KG/M2 | HEART RATE: 75 BPM | DIASTOLIC BLOOD PRESSURE: 82 MMHG | WEIGHT: 195 LBS

## 2021-06-30 DIAGNOSIS — Z09 SURGERY FOLLOW-UP EXAMINATION: ICD-10-CM

## 2021-06-30 DIAGNOSIS — Z98.890 S/P ARTHROSCOPY OF KNEE: Primary | ICD-10-CM

## 2021-06-30 PROCEDURE — 99024 PR POST-OP FOLLOW-UP VISIT: ICD-10-PCS | Mod: S$GLB,,, | Performed by: ORTHOPAEDIC SURGERY

## 2021-06-30 PROCEDURE — 99999 PR PBB SHADOW E&M-EST. PATIENT-LVL III: ICD-10-PCS | Mod: PBBFAC,,, | Performed by: ORTHOPAEDIC SURGERY

## 2021-06-30 PROCEDURE — 99999 PR PBB SHADOW E&M-EST. PATIENT-LVL III: CPT | Mod: PBBFAC,,, | Performed by: ORTHOPAEDIC SURGERY

## 2021-06-30 PROCEDURE — 1126F AMNT PAIN NOTED NONE PRSNT: CPT | Mod: S$GLB,,, | Performed by: ORTHOPAEDIC SURGERY

## 2021-06-30 PROCEDURE — 99024 POSTOP FOLLOW-UP VISIT: CPT | Mod: S$GLB,,, | Performed by: ORTHOPAEDIC SURGERY

## 2021-06-30 PROCEDURE — 3008F BODY MASS INDEX DOCD: CPT | Mod: CPTII,S$GLB,, | Performed by: ORTHOPAEDIC SURGERY

## 2021-06-30 PROCEDURE — 3008F PR BODY MASS INDEX (BMI) DOCUMENTED: ICD-10-PCS | Mod: CPTII,S$GLB,, | Performed by: ORTHOPAEDIC SURGERY

## 2021-06-30 PROCEDURE — 1126F PR PAIN SEVERITY QUANTIFIED, NO PAIN PRESENT: ICD-10-PCS | Mod: S$GLB,,, | Performed by: ORTHOPAEDIC SURGERY

## 2021-07-02 ENCOUNTER — CLINICAL SUPPORT (OUTPATIENT)
Dept: REHABILITATION | Facility: HOSPITAL | Age: 61
End: 2021-07-02
Payer: OTHER MISCELLANEOUS

## 2021-07-02 ENCOUNTER — TELEPHONE (OUTPATIENT)
Dept: INTERNAL MEDICINE | Facility: CLINIC | Age: 61
End: 2021-07-02

## 2021-07-02 DIAGNOSIS — R26.89 IMPAIRED GAIT AND MOBILITY: ICD-10-CM

## 2021-07-02 DIAGNOSIS — R29.898 DECREASED STRENGTH OF LOWER EXTREMITY: ICD-10-CM

## 2021-07-02 PROCEDURE — 97110 THERAPEUTIC EXERCISES: CPT

## 2021-07-09 ENCOUNTER — PATIENT MESSAGE (OUTPATIENT)
Dept: SPORTS MEDICINE | Facility: CLINIC | Age: 61
End: 2021-07-09

## 2021-07-29 ENCOUNTER — PATIENT MESSAGE (OUTPATIENT)
Dept: SPORTS MEDICINE | Facility: CLINIC | Age: 61
End: 2021-07-29

## 2021-07-29 ENCOUNTER — PATIENT MESSAGE (OUTPATIENT)
Dept: ADMINISTRATIVE | Facility: OTHER | Age: 61
End: 2021-07-29

## 2021-07-30 ENCOUNTER — PATIENT MESSAGE (OUTPATIENT)
Dept: SPORTS MEDICINE | Facility: CLINIC | Age: 61
End: 2021-07-30

## 2021-08-01 ENCOUNTER — PATIENT MESSAGE (OUTPATIENT)
Dept: ADMINISTRATIVE | Facility: OTHER | Age: 61
End: 2021-08-01

## 2021-09-08 ENCOUNTER — PATIENT MESSAGE (OUTPATIENT)
Dept: INTERNAL MEDICINE | Facility: CLINIC | Age: 61
End: 2021-09-08

## 2021-09-30 ENCOUNTER — TELEPHONE (OUTPATIENT)
Dept: INTERNAL MEDICINE | Facility: CLINIC | Age: 61
End: 2021-09-30

## 2021-10-27 NOTE — LETTER
[facility] [address] [address]  Phone: [phone]  Fax: [phone]  [facility]: [email or web address]    Pt Name: Royer Castillo  Injury Date: 06/30/2019   Employee ID:  Date of First Treatment: 07/01/2019   Company: Networked reference to record [id number]PARTMENT      Appointment Time: 07:35 AM Arrived: 8am   Provider: Sandie Manning NP Time Out:955am     Office Treatment:   1. Work related injury    2. Sprain of lateral collateral ligament of right knee, initial encounter    3. Pain and swelling of knee, right      Medications Ordered This Encounter   Medications    acetaminophen (TYLENOL) 650 MG TbSR    ibuprofen (ADVIL,MOTRIN) 200 MG tablet      Patient Instructions: Attention not to aggravate affected area, Apply ice 24-48 hours then apply heat/warm soaks, Elevated affected area, Use splint as directed(please take tylenol or ibuprofen for pain and discomfort.)    Restrictions: No driving company vehicles, No Prolonged standing/walking, Avoid frequent bending/lifting/twisting, Avoid climbing/kneeling/squatting, Sit or stand as needed(No lifting/pulling/pushing over 5 lb; light duty; 07/01/2019)     Return Appointment: 07/08/19 at 815am          Pt appears comfortable on HFNC 6L FiO2 28% since arrival to floor. Infrequent coarse cough, breath sounds diminished but clear. Pt resting in bed with mother, tolerating sips of fluids and had bites to eat in ED before tx. IVF running, wet diaper x1. Continuing to maintain SpO2 >88% with HFNC and assess work of breathing and comfort.

## 2021-12-21 ENCOUNTER — LAB VISIT (OUTPATIENT)
Dept: LAB | Facility: HOSPITAL | Age: 61
End: 2021-12-21
Payer: COMMERCIAL

## 2021-12-21 ENCOUNTER — OFFICE VISIT (OUTPATIENT)
Dept: INTERNAL MEDICINE | Facility: CLINIC | Age: 61
End: 2021-12-21
Payer: COMMERCIAL

## 2021-12-21 VITALS
HEIGHT: 68 IN | DIASTOLIC BLOOD PRESSURE: 104 MMHG | SYSTOLIC BLOOD PRESSURE: 140 MMHG | HEART RATE: 75 BPM | WEIGHT: 204.56 LBS | BODY MASS INDEX: 31 KG/M2 | OXYGEN SATURATION: 98 %

## 2021-12-21 DIAGNOSIS — Z00.00 ANNUAL PHYSICAL EXAM: Primary | ICD-10-CM

## 2021-12-21 DIAGNOSIS — Z82.49 FAMILY HISTORY OF HEART DISEASE: ICD-10-CM

## 2021-12-21 DIAGNOSIS — I51.7 LAE (LEFT ATRIAL ENLARGEMENT): ICD-10-CM

## 2021-12-21 DIAGNOSIS — E78.49 OTHER HYPERLIPIDEMIA: ICD-10-CM

## 2021-12-21 DIAGNOSIS — K21.9 GASTROESOPHAGEAL REFLUX DISEASE, UNSPECIFIED WHETHER ESOPHAGITIS PRESENT: ICD-10-CM

## 2021-12-21 DIAGNOSIS — I10 BENIGN ESSENTIAL HYPERTENSION: ICD-10-CM

## 2021-12-21 DIAGNOSIS — Z00.00 ANNUAL PHYSICAL EXAM: ICD-10-CM

## 2021-12-21 LAB
25(OH)D3+25(OH)D2 SERPL-MCNC: 35 NG/ML (ref 30–96)
ALBUMIN SERPL BCP-MCNC: 4.4 G/DL (ref 3.5–5.2)
ALP SERPL-CCNC: 76 U/L (ref 55–135)
ALT SERPL W/O P-5'-P-CCNC: 21 U/L (ref 10–44)
ANION GAP SERPL CALC-SCNC: 9 MMOL/L (ref 8–16)
AST SERPL-CCNC: 20 U/L (ref 10–40)
BASOPHILS # BLD AUTO: 0.06 K/UL (ref 0–0.2)
BASOPHILS NFR BLD: 0.8 % (ref 0–1.9)
BILIRUB SERPL-MCNC: 0.6 MG/DL (ref 0.1–1)
BUN SERPL-MCNC: 13 MG/DL (ref 8–23)
CALCIUM SERPL-MCNC: 10.1 MG/DL (ref 8.7–10.5)
CHLORIDE SERPL-SCNC: 99 MMOL/L (ref 95–110)
CHOLEST SERPL-MCNC: 234 MG/DL (ref 120–199)
CHOLEST/HDLC SERPL: 5.4 {RATIO} (ref 2–5)
CO2 SERPL-SCNC: 33 MMOL/L (ref 23–29)
COMPLEXED PSA SERPL-MCNC: 0.85 NG/ML (ref 0–4)
CREAT SERPL-MCNC: 1.1 MG/DL (ref 0.5–1.4)
DIFFERENTIAL METHOD: NORMAL
EOSINOPHIL # BLD AUTO: 0.2 K/UL (ref 0–0.5)
EOSINOPHIL NFR BLD: 2.2 % (ref 0–8)
ERYTHROCYTE [DISTWIDTH] IN BLOOD BY AUTOMATED COUNT: 12 % (ref 11.5–14.5)
EST. GFR  (AFRICAN AMERICAN): >60 ML/MIN/1.73 M^2
EST. GFR  (NON AFRICAN AMERICAN): >60 ML/MIN/1.73 M^2
ESTIMATED AVG GLUCOSE: 123 MG/DL (ref 68–131)
GLUCOSE SERPL-MCNC: 122 MG/DL (ref 70–110)
HBA1C MFR BLD: 5.9 % (ref 4–5.6)
HCT VFR BLD AUTO: 49.7 % (ref 40–54)
HCV AB SERPL QL IA: NEGATIVE
HDLC SERPL-MCNC: 43 MG/DL (ref 40–75)
HDLC SERPL: 18.4 % (ref 20–50)
HGB BLD-MCNC: 16.5 G/DL (ref 14–18)
IMM GRANULOCYTES # BLD AUTO: 0.02 K/UL (ref 0–0.04)
IMM GRANULOCYTES NFR BLD AUTO: 0.3 % (ref 0–0.5)
LDLC SERPL CALC-MCNC: 151.4 MG/DL (ref 63–159)
LYMPHOCYTES # BLD AUTO: 2 K/UL (ref 1–4.8)
LYMPHOCYTES NFR BLD: 27.2 % (ref 18–48)
MCH RBC QN AUTO: 30 PG (ref 27–31)
MCHC RBC AUTO-ENTMCNC: 33.2 G/DL (ref 32–36)
MCV RBC AUTO: 90 FL (ref 82–98)
MONOCYTES # BLD AUTO: 0.6 K/UL (ref 0.3–1)
MONOCYTES NFR BLD: 8.3 % (ref 4–15)
NEUTROPHILS # BLD AUTO: 4.5 K/UL (ref 1.8–7.7)
NEUTROPHILS NFR BLD: 61.2 % (ref 38–73)
NONHDLC SERPL-MCNC: 191 MG/DL
NRBC BLD-RTO: 0 /100 WBC
PLATELET # BLD AUTO: 318 K/UL (ref 150–450)
PMV BLD AUTO: 11.2 FL (ref 9.2–12.9)
POTASSIUM SERPL-SCNC: 5.1 MMOL/L (ref 3.5–5.1)
PROT SERPL-MCNC: 7.9 G/DL (ref 6–8.4)
RBC # BLD AUTO: 5.5 M/UL (ref 4.6–6.2)
SODIUM SERPL-SCNC: 141 MMOL/L (ref 136–145)
TRIGL SERPL-MCNC: 198 MG/DL (ref 30–150)
TSH SERPL DL<=0.005 MIU/L-ACNC: 2.35 UIU/ML (ref 0.4–4)
WBC # BLD AUTO: 7.39 K/UL (ref 3.9–12.7)

## 2021-12-21 PROCEDURE — 85025 COMPLETE CBC W/AUTO DIFF WBC: CPT | Performed by: NURSE PRACTITIONER

## 2021-12-21 PROCEDURE — 83036 HEMOGLOBIN GLYCOSYLATED A1C: CPT | Performed by: NURSE PRACTITIONER

## 2021-12-21 PROCEDURE — 84153 ASSAY OF PSA TOTAL: CPT | Performed by: NURSE PRACTITIONER

## 2021-12-21 PROCEDURE — 36415 COLL VENOUS BLD VENIPUNCTURE: CPT | Performed by: NURSE PRACTITIONER

## 2021-12-21 PROCEDURE — 82306 VITAMIN D 25 HYDROXY: CPT | Performed by: NURSE PRACTITIONER

## 2021-12-21 PROCEDURE — 80061 LIPID PANEL: CPT | Performed by: NURSE PRACTITIONER

## 2021-12-21 PROCEDURE — 99999 PR PBB SHADOW E&M-EST. PATIENT-LVL IV: ICD-10-PCS | Mod: PBBFAC,,, | Performed by: NURSE PRACTITIONER

## 2021-12-21 PROCEDURE — 86803 HEPATITIS C AB TEST: CPT | Performed by: NURSE PRACTITIONER

## 2021-12-21 PROCEDURE — 99396 PR PREVENTIVE VISIT,EST,40-64: ICD-10-PCS | Mod: S$GLB,,, | Performed by: NURSE PRACTITIONER

## 2021-12-21 PROCEDURE — 99396 PREV VISIT EST AGE 40-64: CPT | Mod: S$GLB,,, | Performed by: NURSE PRACTITIONER

## 2021-12-21 PROCEDURE — 84443 ASSAY THYROID STIM HORMONE: CPT | Performed by: NURSE PRACTITIONER

## 2021-12-21 PROCEDURE — 99999 PR PBB SHADOW E&M-EST. PATIENT-LVL IV: CPT | Mod: PBBFAC,,, | Performed by: NURSE PRACTITIONER

## 2021-12-21 PROCEDURE — 80053 COMPREHEN METABOLIC PANEL: CPT | Performed by: NURSE PRACTITIONER

## 2021-12-21 RX ORDER — PANTOPRAZOLE SODIUM 20 MG/1
20 TABLET, DELAYED RELEASE ORAL DAILY
Qty: 30 TABLET | Refills: 2 | Status: ON HOLD | OUTPATIENT
Start: 2021-12-21 | End: 2022-03-02 | Stop reason: HOSPADM

## 2021-12-22 ENCOUNTER — PATIENT MESSAGE (OUTPATIENT)
Dept: INTERNAL MEDICINE | Facility: CLINIC | Age: 61
End: 2021-12-22
Payer: COMMERCIAL

## 2021-12-27 ENCOUNTER — OFFICE VISIT (OUTPATIENT)
Dept: CARDIOLOGY | Facility: CLINIC | Age: 61
End: 2021-12-27
Payer: COMMERCIAL

## 2021-12-27 VITALS
OXYGEN SATURATION: 97 % | HEIGHT: 68 IN | SYSTOLIC BLOOD PRESSURE: 120 MMHG | DIASTOLIC BLOOD PRESSURE: 84 MMHG | BODY MASS INDEX: 30.99 KG/M2 | WEIGHT: 204.5 LBS | HEART RATE: 66 BPM

## 2021-12-27 DIAGNOSIS — I51.7 LAE (LEFT ATRIAL ENLARGEMENT): ICD-10-CM

## 2021-12-27 DIAGNOSIS — K21.9 GASTROESOPHAGEAL REFLUX DISEASE, UNSPECIFIED WHETHER ESOPHAGITIS PRESENT: ICD-10-CM

## 2021-12-27 DIAGNOSIS — R94.31 ABNORMAL ECG: Primary | ICD-10-CM

## 2021-12-27 PROCEDURE — 99999 PR PBB SHADOW E&M-EST. PATIENT-LVL III: ICD-10-PCS | Mod: PBBFAC,,, | Performed by: INTERNAL MEDICINE

## 2021-12-27 PROCEDURE — 3044F PR MOST RECENT HEMOGLOBIN A1C LEVEL <7.0%: ICD-10-PCS | Mod: CPTII,S$GLB,, | Performed by: INTERNAL MEDICINE

## 2021-12-27 PROCEDURE — 1159F MED LIST DOCD IN RCRD: CPT | Mod: CPTII,S$GLB,, | Performed by: INTERNAL MEDICINE

## 2021-12-27 PROCEDURE — 1159F PR MEDICATION LIST DOCUMENTED IN MEDICAL RECORD: ICD-10-PCS | Mod: CPTII,S$GLB,, | Performed by: INTERNAL MEDICINE

## 2021-12-27 PROCEDURE — 1160F RVW MEDS BY RX/DR IN RCRD: CPT | Mod: CPTII,S$GLB,, | Performed by: INTERNAL MEDICINE

## 2021-12-27 PROCEDURE — 3079F PR MOST RECENT DIASTOLIC BLOOD PRESSURE 80-89 MM HG: ICD-10-PCS | Mod: CPTII,S$GLB,, | Performed by: INTERNAL MEDICINE

## 2021-12-27 PROCEDURE — 3074F PR MOST RECENT SYSTOLIC BLOOD PRESSURE < 130 MM HG: ICD-10-PCS | Mod: CPTII,S$GLB,, | Performed by: INTERNAL MEDICINE

## 2021-12-27 PROCEDURE — 3008F BODY MASS INDEX DOCD: CPT | Mod: CPTII,S$GLB,, | Performed by: INTERNAL MEDICINE

## 2021-12-27 PROCEDURE — 93010 EKG 12-LEAD: ICD-10-PCS | Mod: S$GLB,,, | Performed by: INTERNAL MEDICINE

## 2021-12-27 PROCEDURE — 3044F HG A1C LEVEL LT 7.0%: CPT | Mod: CPTII,S$GLB,, | Performed by: INTERNAL MEDICINE

## 2021-12-27 PROCEDURE — 99999 PR PBB SHADOW E&M-EST. PATIENT-LVL III: CPT | Mod: PBBFAC,,, | Performed by: INTERNAL MEDICINE

## 2021-12-27 PROCEDURE — 3008F PR BODY MASS INDEX (BMI) DOCUMENTED: ICD-10-PCS | Mod: CPTII,S$GLB,, | Performed by: INTERNAL MEDICINE

## 2021-12-27 PROCEDURE — 3079F DIAST BP 80-89 MM HG: CPT | Mod: CPTII,S$GLB,, | Performed by: INTERNAL MEDICINE

## 2021-12-27 PROCEDURE — 99203 OFFICE O/P NEW LOW 30 MIN: CPT | Mod: S$GLB,,, | Performed by: INTERNAL MEDICINE

## 2021-12-27 PROCEDURE — 1160F PR REVIEW ALL MEDS BY PRESCRIBER/CLIN PHARMACIST DOCUMENTED: ICD-10-PCS | Mod: CPTII,S$GLB,, | Performed by: INTERNAL MEDICINE

## 2021-12-27 PROCEDURE — 3074F SYST BP LT 130 MM HG: CPT | Mod: CPTII,S$GLB,, | Performed by: INTERNAL MEDICINE

## 2021-12-27 PROCEDURE — 93005 ELECTROCARDIOGRAM TRACING: CPT

## 2021-12-27 PROCEDURE — 99203 PR OFFICE/OUTPT VISIT, NEW, LEVL III, 30-44 MIN: ICD-10-PCS | Mod: S$GLB,,, | Performed by: INTERNAL MEDICINE

## 2021-12-27 PROCEDURE — 93010 ELECTROCARDIOGRAM REPORT: CPT | Mod: S$GLB,,, | Performed by: INTERNAL MEDICINE

## 2022-01-27 ENCOUNTER — OFFICE VISIT (OUTPATIENT)
Dept: GASTROENTEROLOGY | Facility: CLINIC | Age: 62
End: 2022-01-27
Payer: COMMERCIAL

## 2022-01-27 ENCOUNTER — PATIENT MESSAGE (OUTPATIENT)
Dept: ENDOSCOPY | Facility: HOSPITAL | Age: 62
End: 2022-01-27
Payer: COMMERCIAL

## 2022-01-27 VITALS
BODY MASS INDEX: 30.3 KG/M2 | WEIGHT: 204.56 LBS | HEART RATE: 60 BPM | SYSTOLIC BLOOD PRESSURE: 127 MMHG | DIASTOLIC BLOOD PRESSURE: 83 MMHG | HEIGHT: 69 IN

## 2022-01-27 DIAGNOSIS — K21.9 GASTROESOPHAGEAL REFLUX DISEASE, UNSPECIFIED WHETHER ESOPHAGITIS PRESENT: Primary | ICD-10-CM

## 2022-01-27 DIAGNOSIS — Z01.818 PRE-OP TESTING: ICD-10-CM

## 2022-01-27 PROCEDURE — 3008F BODY MASS INDEX DOCD: CPT | Mod: CPTII,S$GLB,, | Performed by: INTERNAL MEDICINE

## 2022-01-27 PROCEDURE — 3074F SYST BP LT 130 MM HG: CPT | Mod: CPTII,S$GLB,, | Performed by: INTERNAL MEDICINE

## 2022-01-27 PROCEDURE — 3008F PR BODY MASS INDEX (BMI) DOCUMENTED: ICD-10-PCS | Mod: CPTII,S$GLB,, | Performed by: INTERNAL MEDICINE

## 2022-01-27 PROCEDURE — 3074F PR MOST RECENT SYSTOLIC BLOOD PRESSURE < 130 MM HG: ICD-10-PCS | Mod: CPTII,S$GLB,, | Performed by: INTERNAL MEDICINE

## 2022-01-27 PROCEDURE — 99214 PR OFFICE/OUTPT VISIT, EST, LEVL IV, 30-39 MIN: ICD-10-PCS | Mod: S$GLB,,, | Performed by: INTERNAL MEDICINE

## 2022-01-27 PROCEDURE — 3079F DIAST BP 80-89 MM HG: CPT | Mod: CPTII,S$GLB,, | Performed by: INTERNAL MEDICINE

## 2022-01-27 PROCEDURE — 99999 PR PBB SHADOW E&M-EST. PATIENT-LVL III: CPT | Mod: PBBFAC,,, | Performed by: INTERNAL MEDICINE

## 2022-01-27 PROCEDURE — 99999 PR PBB SHADOW E&M-EST. PATIENT-LVL III: ICD-10-PCS | Mod: PBBFAC,,, | Performed by: INTERNAL MEDICINE

## 2022-01-27 PROCEDURE — 99214 OFFICE O/P EST MOD 30 MIN: CPT | Mod: S$GLB,,, | Performed by: INTERNAL MEDICINE

## 2022-01-27 PROCEDURE — 3079F PR MOST RECENT DIASTOLIC BLOOD PRESSURE 80-89 MM HG: ICD-10-PCS | Mod: CPTII,S$GLB,, | Performed by: INTERNAL MEDICINE

## 2022-01-27 RX ORDER — PANTOPRAZOLE SODIUM 40 MG/1
40 TABLET, DELAYED RELEASE ORAL DAILY
Qty: 90 TABLET | Refills: 3 | Status: SHIPPED | OUTPATIENT
Start: 2022-01-27 | End: 2023-01-03 | Stop reason: SDUPTHER

## 2022-01-27 NOTE — PROGRESS NOTES
Reason for visit: GERD    HPI:  is a 61 year old gentleman with diverticulosis, HLP, Pre diabetes, genital herpes and onychomycosis. He has been having heartburn and acid regurgitation for months and is taking over the counter antacids for relief. Has dysphagia occasionally to solids mostly at the sternal notch. No odynophagia, nausea or vomiting. No abdominal pains, change in bowels, or blood in stool. No nausea or vomiting. No prior surgeries or any radiation treatments to the chest. SOB or chest discomforts.  Colonoscopy in 2019 with a small tubular adenoma resected. Recently started on Pantoprazole 20 mg daily. Denies alcohol or tobacco use. Weight gain over the last year.    Past medical, surgical, social and family history reviewed in epic    Medication allergies reviewed in epic    Review of systems:    Constitutional:  No fever, no chills, no weight loss, appetite is normal  Eyes:  No visual changes or red eyes  ENT:  No odynophagia or hoarseness of voice  Cardiovascular:  No angina or palpitation  Respiratory:  No shortness breath or wheezing  Genitourinary:  No dysuria or frequency  Musculoskeletal:  No myalgias or arthralgias  Skin:  No pruritus or eczema  Neurologic:  No headache or seizures  Psychiatric:  No anxiety depression  Gastrointestinal:  See HPI    Physical exam:  Vitals see epic, awake, alert, oriented x3 in no acute distress    Neck:  Supple, no carotid bruit, no cervical adenopathy  Abdomen:  Flat, soft, nontender, nondistended, no masses palpable, no hepatosplenomegaly detected, bowel sounds are normal, no ascites clinically detectable  Eyes:  Conjunctivae anicteric, not injected  ENT:  Oral mucosa moist  Cardiovascular:  S1, S2 normal, no murmurs, no gallops, no abdominal bruits heard  Respiratory:  Bilateral air entry equal, no rhonchi, no crackles, normal effort  Skin:  No palmar erythema or spider angiomata  Neurologic:  No asterixis or tremors  Psychiatric:  Affect appropriate,  proper judgment, proper insight, oriented to place and time  Lower extremities:  No pedal edema    Recent labs reviewed.      Impression: GERD        Recommendations:   1. Schedule EGD  2. Increase Pantoprazole 40 mg daily 30 minutes before breakfast.

## 2022-01-30 ENCOUNTER — HOSPITAL ENCOUNTER (EMERGENCY)
Facility: HOSPITAL | Age: 62
Discharge: HOME | End: 2022-01-31
Attending: EMERGENCY MEDICINE
Payer: COMMERCIAL

## 2022-01-30 DIAGNOSIS — R11.2 NON-INTRACTABLE VOMITING WITH NAUSEA, UNSPECIFIED VOMITING TYPE: Primary | ICD-10-CM

## 2022-01-30 DIAGNOSIS — H81.10 BENIGN PAROXYSMAL POSITIONAL VERTIGO, UNSPECIFIED LATERALITY: ICD-10-CM

## 2022-01-30 PROCEDURE — 99284 PR EMERGENCY DEPT VISIT,LEVEL IV: ICD-10-PCS | Mod: ,,, | Performed by: EMERGENCY MEDICINE

## 2022-01-30 PROCEDURE — 99284 EMERGENCY DEPT VISIT MOD MDM: CPT | Mod: 25

## 2022-01-30 PROCEDURE — 96374 THER/PROPH/DIAG INJ IV PUSH: CPT

## 2022-01-30 PROCEDURE — 99284 EMERGENCY DEPT VISIT MOD MDM: CPT | Mod: ,,, | Performed by: EMERGENCY MEDICINE

## 2022-01-31 VITALS
TEMPERATURE: 98 F | HEART RATE: 58 BPM | RESPIRATION RATE: 19 BRPM | OXYGEN SATURATION: 96 % | DIASTOLIC BLOOD PRESSURE: 82 MMHG | SYSTOLIC BLOOD PRESSURE: 124 MMHG

## 2022-01-31 LAB
ALBUMIN SERPL BCP-MCNC: 3.6 G/DL (ref 3.5–5.2)
ALP SERPL-CCNC: 53 U/L (ref 55–135)
ALT SERPL W/O P-5'-P-CCNC: 18 U/L (ref 10–44)
ANION GAP SERPL CALC-SCNC: 9 MMOL/L (ref 8–16)
AST SERPL-CCNC: 17 U/L (ref 10–40)
BASOPHILS # BLD AUTO: 0.03 K/UL (ref 0–0.2)
BASOPHILS NFR BLD: 0.4 % (ref 0–1.9)
BILIRUB SERPL-MCNC: 0.5 MG/DL (ref 0.1–1)
BUN SERPL-MCNC: 15 MG/DL (ref 8–23)
CALCIUM SERPL-MCNC: 8.6 MG/DL (ref 8.7–10.5)
CHLORIDE SERPL-SCNC: 104 MMOL/L (ref 95–110)
CO2 SERPL-SCNC: 27 MMOL/L (ref 23–29)
CREAT SERPL-MCNC: 1 MG/DL (ref 0.5–1.4)
CTP QC/QA: YES
DIFFERENTIAL METHOD: ABNORMAL
EOSINOPHIL # BLD AUTO: 0.1 K/UL (ref 0–0.5)
EOSINOPHIL NFR BLD: 0.9 % (ref 0–8)
ERYTHROCYTE [DISTWIDTH] IN BLOOD BY AUTOMATED COUNT: 11.9 % (ref 11.5–14.5)
EST. GFR  (AFRICAN AMERICAN): >60 ML/MIN/1.73 M^2
EST. GFR  (NON AFRICAN AMERICAN): >60 ML/MIN/1.73 M^2
GLUCOSE SERPL-MCNC: 137 MG/DL (ref 70–110)
HCT VFR BLD AUTO: 40.8 % (ref 40–54)
HGB BLD-MCNC: 13.4 G/DL (ref 14–18)
IMM GRANULOCYTES # BLD AUTO: 0.02 K/UL (ref 0–0.04)
IMM GRANULOCYTES NFR BLD AUTO: 0.2 % (ref 0–0.5)
LYMPHOCYTES # BLD AUTO: 1.2 K/UL (ref 1–4.8)
LYMPHOCYTES NFR BLD: 14.4 % (ref 18–48)
MCH RBC QN AUTO: 30 PG (ref 27–31)
MCHC RBC AUTO-ENTMCNC: 32.8 G/DL (ref 32–36)
MCV RBC AUTO: 91 FL (ref 82–98)
MONOCYTES # BLD AUTO: 0.4 K/UL (ref 0.3–1)
MONOCYTES NFR BLD: 5.5 % (ref 4–15)
NEUTROPHILS # BLD AUTO: 6.3 K/UL (ref 1.8–7.7)
NEUTROPHILS NFR BLD: 78.6 % (ref 38–73)
NRBC BLD-RTO: 0 /100 WBC
PLATELET # BLD AUTO: 239 K/UL (ref 150–450)
PMV BLD AUTO: 10.7 FL (ref 9.2–12.9)
POTASSIUM SERPL-SCNC: 3.6 MMOL/L (ref 3.5–5.1)
PROT SERPL-MCNC: 6.4 G/DL (ref 6–8.4)
RBC # BLD AUTO: 4.47 M/UL (ref 4.6–6.2)
SARS-COV-2 RDRP RESP QL NAA+PROBE: NEGATIVE
SODIUM SERPL-SCNC: 140 MMOL/L (ref 136–145)
TROPONIN I SERPL DL<=0.01 NG/ML-MCNC: <0.006 NG/ML (ref 0–0.03)
WBC # BLD AUTO: 8.05 K/UL (ref 3.9–12.7)

## 2022-01-31 PROCEDURE — U0002 COVID-19 LAB TEST NON-CDC: HCPCS | Performed by: EMERGENCY MEDICINE

## 2022-01-31 PROCEDURE — 25000003 PHARM REV CODE 250

## 2022-01-31 PROCEDURE — 80053 COMPREHEN METABOLIC PANEL: CPT

## 2022-01-31 PROCEDURE — 84484 ASSAY OF TROPONIN QUANT: CPT

## 2022-01-31 PROCEDURE — 63600175 PHARM REV CODE 636 W HCPCS

## 2022-01-31 PROCEDURE — 85025 COMPLETE CBC W/AUTO DIFF WBC: CPT

## 2022-01-31 PROCEDURE — 87389 HIV-1 AG W/HIV-1&-2 AB AG IA: CPT | Performed by: EMERGENCY MEDICINE

## 2022-01-31 RX ORDER — MECLIZINE HYDROCHLORIDE 25 MG/1
25 TABLET ORAL 3 TIMES DAILY PRN
Qty: 30 TABLET | Refills: 0 | Status: SHIPPED | OUTPATIENT
Start: 2022-01-31 | End: 2022-03-03

## 2022-01-31 RX ORDER — MECLIZINE HCL 12.5 MG 12.5 MG/1
25 TABLET ORAL
Status: COMPLETED | OUTPATIENT
Start: 2022-01-31 | End: 2022-01-31

## 2022-01-31 RX ORDER — PROCHLORPERAZINE EDISYLATE 5 MG/ML
5 INJECTION INTRAMUSCULAR; INTRAVENOUS
Status: COMPLETED | OUTPATIENT
Start: 2022-01-31 | End: 2022-01-31

## 2022-01-31 RX ORDER — ONDANSETRON 4 MG/1
4 TABLET, FILM COATED ORAL EVERY 6 HOURS
Qty: 30 TABLET | Refills: 0 | Status: SHIPPED | OUTPATIENT
Start: 2022-01-31 | End: 2022-03-03

## 2022-01-31 RX ADMIN — SODIUM CHLORIDE 1000 ML: 0.9 INJECTION, SOLUTION INTRAVENOUS at 12:01

## 2022-01-31 RX ADMIN — MECLIZINE HYDROCHLORIDE 25 MG: 12.5 TABLET ORAL at 12:01

## 2022-01-31 RX ADMIN — PROCHLORPERAZINE EDISYLATE 5 MG: 5 INJECTION INTRAMUSCULAR; INTRAVENOUS at 12:01

## 2022-01-31 NOTE — ED PROVIDER NOTES
Encounter Date: 1/30/2022       History     Chief Complaint   Patient presents with    Vomiting     Arrived by EMS from fire department. Dizziness while using restroom and then multiple episodes of emesis and generalized weakness. Per fire department, pt O2 sat 91% on RA, but on EMS arrival pt 96% on RA. 500ml NS and 4mg zofran given en route.      Royer Stewart Flot 61M with HTN and GERD who presented via EMS for 2 episodes of dizziness with nausea and vomiting. The first episode happened when he got up from bed. He immediately felt dizzy and nauseous so he laid back down in bed. A couple hours later he got up from bed to use the bathroom and again felt dizzy and nauseous. He went to the toilet and had a BM but the nausea continued and he vomited twice. He has never had an episode like this before. He c/o chronic sinus congestion, mild HA. Nausea mostly resolved with Zofran from EMS. Denies any fevers/chills, CP, palpitations, SOB, cough, abd pain, urinary or bowel changes.         Review of patient's allergies indicates:  No Known Allergies  Past Medical History:   Diagnosis Date    Cellulitis     left foot    Diverticulosis     Genital herpes     GERD (gastroesophageal reflux disease)     HTN (hypertension)     Hyperlipidemia     Left atrial dilation     Onychomycosis     Pre-diabetes      Past Surgical History:   Procedure Laterality Date    ARTHROSCOPIC CHONDROPLASTY OF KNEE JOINT Right 5/27/2021    Procedure: ARTHROSCOPY, KNEE, WITH CHONDROPLASTY;  Surgeon: Gerard Tello MD;  Location: [facility] OR;  Service: Orthopedics;  Laterality: Right;    COLONOSCOPY N/A 12/11/2019    Procedure: COLONOSCOPY;  Surgeon: Devaughn Johnson MD;  Location: [address]);  Service: Endoscopy;  Laterality: N/A;    KNEE ARTHROSCOPY W/ MENISCECTOMY Right 5/27/2021    Procedure: ARTHROSCOPY, KNEE, WITH MENISCECTOMY;  Surgeon: Gerard Tello MD;  Location: [facility] OR;  Service: Orthopedics;  Laterality: Right;  JOHNATHAN 50CC     SYNOVECTOMY OF KNEE Right 5/27/2021    Procedure: SYNOVECTOMY, KNEE limited;  Surgeon: Gerard Tello MD;  Location: [facility];  Service: Orthopedics;  Laterality: Right;     Family History   Problem Relation Age of Onset    Cancer Mother     Heart disease Father 86    Heart disease Sister 64    Hyperlipidemia Sister     Hyperlipidemia Sister     Hypertension Sister     Colon cancer Neg Hx     Esophageal cancer Neg Hx      Social History     Tobacco Use    Smoking status: Never Smoker    Smokeless tobacco: Never Used   Substance Use Topics    Alcohol use: No    Drug use: No     Review of Systems   Constitutional: Negative for activity change, appetite change, chills, diaphoresis and fever.   HENT: Positive for congestion (chronic). Negative for rhinorrhea and sore throat.    Eyes: Negative for visual disturbance.   Respiratory: Negative for cough and shortness of breath.    Cardiovascular: Negative for chest pain, palpitations and leg swelling.   Gastrointestinal: Positive for nausea and vomiting. Negative for abdominal distention, abdominal pain, constipation and diarrhea.   Genitourinary: Negative for dysuria, frequency, hematuria and urgency.   Musculoskeletal: Negative for arthralgias and myalgias.   Skin: Negative for rash.   Neurological: Positive for dizziness, weakness and light-headedness. Negative for tremors, syncope and headaches.   Psychiatric/Behavioral: Negative for agitation and confusion. The patient is not nervous/anxious.        Physical Exam     Initial Vitals [01/30/22 2338]   BP Pulse Resp Temp SpO2   138/81 (!) 58 16 97.5 °F (36.4 °C) 96 %      MAP       --         Physical Exam    Nursing note and vitals reviewed.  Constitutional: He appears well-developed and well-nourished. He is not diaphoretic. No distress.   HENT:   Head: Normocephalic and atraumatic.   Right Ear: Tympanic membrane, external ear and ear canal normal.   Left Ear: External ear and ear canal normal. Tympanic  membrane is erythematous.   Eyes: Conjunctivae and EOM are normal.   Neck: Neck supple.   Normal range of motion.  Cardiovascular: Regular rhythm. Bradycardia present.    No murmur heard.  Pulmonary/Chest: Breath sounds normal. No respiratory distress. He has no wheezes.   Abdominal: Abdomen is soft. Bowel sounds are normal. He exhibits no distension. There is no abdominal tenderness.   Musculoskeletal:         General: No tenderness or edema. Normal range of motion.      Cervical back: Normal range of motion and neck supple.     Neurological: He is alert and oriented to person, place, and time. He has normal strength. No cranial nerve deficit.   When patient moved from sitting to laying position he felt dizzy and like the room was spinning with associated nausea   Skin: Skin is warm and dry. Capillary refill takes less than 2 seconds.   Psychiatric: He has a normal mood and affect. His behavior is normal. Judgment and thought content normal.         ED Course   Procedures  Labs Reviewed   CBC W/ AUTO DIFFERENTIAL - Abnormal; Notable for the following components:       Result Value    RBC 4.47 (*)     Hemoglobin 13.4 (*)     Gran % 78.6 (*)     Lymph % 14.4 (*)     All other components within normal limits   COMPREHENSIVE METABOLIC PANEL - Abnormal; Notable for the following components:    Glucose 137 (*)     Calcium 8.6 (*)     Alkaline Phosphatase 53 (*)     All other components within normal limits   TROPONIN I   HIV 1 / 2 ANTIBODY   SARS-COV-2 RDRP GENE    Narrative:     This test utilizes isothermal nucleic acid amplification   technology to detect the SARS-CoV-2 RdRp nucleic acid segment.   The analytical sensitivity (limit of detection) is 125 genome   equivalents/mL.   A POSITIVE result implies infection with the SARS-CoV-2 virus;   the patient is presumed to be contagious.     A NEGATIVE result means that SARS-CoV-2 nucleic acids are not   present above the limit of detection. A NEGATIVE result should be    treated as presumptive. It does not rule out the possibility of   COVID-19 and should not be the sole basis for treatment decisions.   If COVID-19 is strongly suspected based on clinical and exposure   history, re-testing using an alternate molecular assay should be   considered.   This test is only for use under the Food and Drug   Administration s Emergency Use Authorization (EUA).   Commercial kits are provided by [employer].   Performance characteristics of the EUA have been independently   verified by [facility] Department of   Pathology and Laboratory Medicine.   _________________________________________________________________   The authorized Fact Sheet for Healthcare Providers and the authorized Fact   Sheet for Patients of the ID NOW COVID-19 are available on the FDA   website:     https://www.fda.gov/media/285384/download  https://www.fda.gov/media/766640/download         EKG Readings: (Independently Interpreted)   Rhythm: Sinus Bradycardia. Conduction: 1st Degree AV Block.       Imaging Results    None          Medications   meclizine tablet 25 mg (25 mg Oral Given 1/31/22 0032)   prochlorperazine injection Soln 5 mg (5 mg Intravenous Given 1/31/22 0033)   sodium chloride 0.9% bolus 1,000 mL (0 mLs Intravenous Stopped 1/31/22 0136)     Medical Decision Making:   Initial Assessment:   61M with HTN and GERD who presented to the ED after two episodes where he moved from laying to standing and felt dizzy and like the room was spinning. The second episode associated with 2 episodes of emesis. He is afebrile and HDS. Nausea improved with Zofran from EMS. Patient had repeat sensation when moving from sitting to supine position during exam. Strength 5/5 bilat, sensation symmetrical and intact. L TM sl erythematous, no bulging.   Differential Diagnosis:   BPPV, Meniere's disease, electrolyte abnl, otitis media  Clinical Tests:   Lab Tests: Ordered and Reviewed  The following lab test(s) were  unremarkable: CBC, CMP and Troponin  Medical Tests: Ordered and Reviewed  ED Management:  Patient given 1L NS, meclizine, and Compazine with good relief of symptoms. Patient was able to ambulate without symptoms. Will discharge home with meclizine and Zofran rx and PCP follow-up.             ED Course as of 01/31/22 0153   Mon Jan 31, 2022   0141 ATTENDING PHYSICIAN ATTESTATION    I have personally seen and examined this patient and repeated the key portions of the resident's history and physical, reviewed and agree with the resident medical documentation, and supervised and managed the medical care of the patient.  I was present and supervising any critical portions of procedures performed    61-year-old male history of hypertension GERD prediabetes presents the ER for evaluation positional vertigo.  Onset today, multiple episodes of nausea vomiting sensation is room is spinning.  Called EMS.  Came to the ER, symptomatic.  Neurologically intact.  Denies chest pain shortness of breath abdominal pain weakness to 1 side of the body.  Given Compazine meclizine with improvement in symptoms.  Slight erythema of left ear, rest of physical exam reassuring.  Patient feels great, no longer symptomatic.  Discussed with patient diagnosis.  Will plan discharge home, meclizine, Zofran, will follow-up with PCP.  Return precautions discussed.  Patient understood agreed with plan. [SE]      ED Course User Index  [SE] Trace Mclaughlin MD             Clinical Impression:   Final diagnoses:  [R11.2] Non-intractable vomiting with nausea, unspecified vomiting type (Primary)  [H81.10] Benign paroxysmal positional vertigo, unspecified laterality          ED Disposition Condition    Discharge Stable        ED Prescriptions     Medication Sig Dispense Start Date End Date Auth. Provider    meclizine (ANTIVERT) 25 mg tablet Take 1 tablet (25 mg total) by mouth 3 (three) times daily as needed for Dizziness or Nausea (1-2 pills as needed for  dizziness, nausea vomiting). 30 tablet 1/31/2022  Trace Mclaughlin MD    ondansetron (ZOFRAN) 4 MG tablet Take 1 tablet (4 mg total) by mouth every 6 (six) hours. 30 tablet 1/31/2022  Trace Mclaughlin MD        Follow-up Information     Follow up With Specialties Details Why Contact Info    Vaishali Camacoh MD Internal Medicine Schedule an appointment as soon as possible for a visit  As needed [address] [facility] [address]  [phone]             Olman Mcdaniel MD  Resident  01/31/22 0153

## 2022-02-01 LAB — HIV 1+2 AB+HIV1 P24 AG SERPL QL IA: NEGATIVE

## 2022-02-27 ENCOUNTER — LAB VISIT (OUTPATIENT)
Dept: PRIMARY CARE CLINIC | Facility: CLINIC | Age: 62
End: 2022-02-27
Payer: COMMERCIAL

## 2022-02-27 DIAGNOSIS — Z01.818 PRE-OP TESTING: ICD-10-CM

## 2022-02-27 LAB
SARS-COV-2 RNA RESP QL NAA+PROBE: NOT DETECTED
SARS-COV-2- CYCLE NUMBER: NORMAL

## 2022-02-27 PROCEDURE — U0005 INFEC AGEN DETEC AMPLI PROBE: HCPCS | Performed by: FAMILY MEDICINE

## 2022-02-27 PROCEDURE — U0003 INFECTIOUS AGENT DETECTION BY NUCLEIC ACID (DNA OR RNA); SEVERE ACUTE RESPIRATORY SYNDROME CORONAVIRUS 2 (SARS-COV-2) (CORONAVIRUS DISEASE [COVID-19]), AMPLIFIED PROBE TECHNIQUE, MAKING USE OF HIGH THROUGHPUT TECHNOLOGIES AS DESCRIBED BY CMS-2020-01-R: HCPCS | Performed by: FAMILY MEDICINE

## 2022-03-02 ENCOUNTER — HOSPITAL ENCOUNTER (OUTPATIENT)
Facility: HOSPITAL | Age: 62
Discharge: HOME | End: 2022-03-02
Attending: INTERNAL MEDICINE | Admitting: INTERNAL MEDICINE
Payer: COMMERCIAL

## 2022-03-02 ENCOUNTER — ANESTHESIA EVENT (OUTPATIENT)
Dept: ENDOSCOPY | Facility: HOSPITAL | Age: 62
End: 2022-03-02
Payer: COMMERCIAL

## 2022-03-02 ENCOUNTER — ANESTHESIA (OUTPATIENT)
Dept: ENDOSCOPY | Facility: HOSPITAL | Age: 62
End: 2022-03-02
Payer: COMMERCIAL

## 2022-03-02 VITALS
TEMPERATURE: 98 F | DIASTOLIC BLOOD PRESSURE: 71 MMHG | SYSTOLIC BLOOD PRESSURE: 133 MMHG | HEIGHT: 69 IN | RESPIRATION RATE: 18 BRPM | WEIGHT: 185 LBS | OXYGEN SATURATION: 97 % | BODY MASS INDEX: 27.4 KG/M2 | HEART RATE: 51 BPM

## 2022-03-02 DIAGNOSIS — K21.9 GASTROESOPHAGEAL REFLUX DISEASE, UNSPECIFIED WHETHER ESOPHAGITIS PRESENT: Primary | ICD-10-CM

## 2022-03-02 DIAGNOSIS — K21.9 GERD (GASTROESOPHAGEAL REFLUX DISEASE): ICD-10-CM

## 2022-03-02 PROCEDURE — 88305 TISSUE EXAM BY PATHOLOGIST: ICD-10-PCS | Mod: 26,,, | Performed by: PATHOLOGY

## 2022-03-02 PROCEDURE — 37000008 HC ANESTHESIA 1ST 15 MINUTES: Performed by: INTERNAL MEDICINE

## 2022-03-02 PROCEDURE — 88305 TISSUE EXAM BY PATHOLOGIST: CPT | Mod: 59 | Performed by: PATHOLOGY

## 2022-03-02 PROCEDURE — 37000009 HC ANESTHESIA EA ADD 15 MINS: Performed by: INTERNAL MEDICINE

## 2022-03-02 PROCEDURE — 43239 PR EGD, FLEX, W/BIOPSY, SGL/MULTI: ICD-10-PCS | Mod: ,,, | Performed by: INTERNAL MEDICINE

## 2022-03-02 PROCEDURE — 63600175 PHARM REV CODE 636 W HCPCS: Performed by: NURSE ANESTHETIST, CERTIFIED REGISTERED

## 2022-03-02 PROCEDURE — 43239 EGD BIOPSY SINGLE/MULTIPLE: CPT | Performed by: INTERNAL MEDICINE

## 2022-03-02 PROCEDURE — E9220 PRA ENDO ANESTHESIA: HCPCS | Mod: ,,, | Performed by: NURSE ANESTHETIST, CERTIFIED REGISTERED

## 2022-03-02 PROCEDURE — E9220 PRA ENDO ANESTHESIA: ICD-10-PCS | Mod: ,,, | Performed by: NURSE ANESTHETIST, CERTIFIED REGISTERED

## 2022-03-02 PROCEDURE — 25000003 PHARM REV CODE 250: Performed by: INTERNAL MEDICINE

## 2022-03-02 PROCEDURE — 27201012 HC FORCEPS, HOT/COLD, DISP: Performed by: INTERNAL MEDICINE

## 2022-03-02 PROCEDURE — 88305 TISSUE EXAM BY PATHOLOGIST: CPT | Mod: 26,,, | Performed by: PATHOLOGY

## 2022-03-02 PROCEDURE — 43239 EGD BIOPSY SINGLE/MULTIPLE: CPT | Mod: ,,, | Performed by: INTERNAL MEDICINE

## 2022-03-02 PROCEDURE — 25000003 PHARM REV CODE 250: Performed by: NURSE ANESTHETIST, CERTIFIED REGISTERED

## 2022-03-02 RX ORDER — PROPOFOL 10 MG/ML
VIAL (ML) INTRAVENOUS
Status: DISCONTINUED | OUTPATIENT
Start: 2022-03-02 | End: 2022-03-02

## 2022-03-02 RX ORDER — SODIUM CHLORIDE 9 MG/ML
INJECTION, SOLUTION INTRAVENOUS CONTINUOUS
Status: DISCONTINUED | OUTPATIENT
Start: 2022-03-02 | End: 2022-03-02 | Stop reason: HOSPADM

## 2022-03-02 RX ORDER — LIDOCAINE HCL/PF 100 MG/5ML
SYRINGE (ML) INTRAVENOUS
Status: DISCONTINUED | OUTPATIENT
Start: 2022-03-02 | End: 2022-03-02

## 2022-03-02 RX ADMIN — PROPOFOL 100 MG: 10 INJECTION, EMULSION INTRAVENOUS at 03:03

## 2022-03-02 RX ADMIN — SODIUM CHLORIDE: 0.9 INJECTION, SOLUTION INTRAVENOUS at 03:03

## 2022-03-02 RX ADMIN — Medication 80 MG: at 03:03

## 2022-03-02 RX ADMIN — GLYCOPYRROLATE 0.1 MG: 0.2 INJECTION, SOLUTION INTRAMUSCULAR; INTRAVITREAL at 03:03

## 2022-03-02 RX ADMIN — PROPOFOL 50 MG: 10 INJECTION, EMULSION INTRAVENOUS at 03:03

## 2022-03-02 NOTE — ANESTHESIA POSTPROCEDURE EVALUATION
Anesthesia Post Evaluation    Patient: Royer Castillo    Procedure(s) Performed: Procedure(s) (LRB):  EGD (ESOPHAGOGASTRODUODENOSCOPY) (N/A)    Final Anesthesia Type: general      Patient location during evaluation: GI PACU  Patient participation: Yes- Able to Participate  Level of consciousness: awake and alert  Post-procedure vital signs: reviewed and stable  Pain management: adequate  Airway patency: patent    PONV status at discharge: No PONV  Anesthetic complications: no      Cardiovascular status: stable  Respiratory status: unassisted and room air  Hydration status: euvolemic  Follow-up not needed.          Vitals Value Taken Time   /72 03/02/22 1539   Temp 37.2 03/02/22 1543   Pulse 61 03/02/22 1539   Resp 18 03/02/22 1539   SpO2 94 % 03/02/22 1539         No case tracking events are documented in the log.      Pain/Jim Score: Jim Score: 9 (3/2/2022  3:28 PM)

## 2022-03-02 NOTE — TRANSFER OF CARE
"Anesthesia Transfer of Care Note    Patient: Royer Castillo    Procedure(s) Performed: Procedure(s) (LRB):  EGD (ESOPHAGOGASTRODUODENOSCOPY) (N/A)    Patient location: GI    Anesthesia Type: general    Transport from OR: Transported from OR on room air with adequate spontaneous ventilation    Post pain: adequate analgesia    Post assessment: no apparent anesthetic complications and tolerated procedure well    Post vital signs: stable    Level of consciousness: awake and responds to stimulation    Nausea/Vomiting: no nausea/vomiting    Complications: none    Transfer of care protocol was followed      Last vitals:   Visit Vitals  /80 (BP Location: Left arm, Patient Position: Lying)   Pulse 60   Temp 36.5 °C (97.7 °F) (Temporal)   Resp 18   Ht 5' 9" (1.753 m)   Wt 83.9 kg (185 lb)   SpO2 96%   BMI 27.32 kg/m²     "

## 2022-03-02 NOTE — ANESTHESIA PREPROCEDURE EVALUATION
03/02/2022  Royer Castillo is a 61 y.o., male.    Past Medical History:   Diagnosis Date    Cellulitis     left foot    Diverticulosis     Genital herpes     GERD (gastroesophageal reflux disease)     HTN (hypertension)     Hyperlipidemia     Left atrial dilation     Onychomycosis     Pre-diabetes      Past Surgical History:   Procedure Laterality Date    ARTHROSCOPIC CHONDROPLASTY OF KNEE JOINT Right 5/27/2021    Procedure: ARTHROSCOPY, KNEE, WITH CHONDROPLASTY;  Surgeon: Gerard Tello MD;  Location: [facility] OR;  Service: Orthopedics;  Laterality: Right;    COLONOSCOPY N/A 12/11/2019    Procedure: COLONOSCOPY;  Surgeon: Devaughn Johnson MD;  Location: [facility] ENDO (4TH FLR);  Service: Endoscopy;  Laterality: N/A;    KNEE ARTHROSCOPY W/ MENISCECTOMY Right 5/27/2021    Procedure: ARTHROSCOPY, KNEE, WITH MENISCECTOMY;  Surgeon: Gerard Tello MD;  Location: [facility];  Service: Orthopedics;  Laterality: Right;  JOHNATHAN 50CC    SYNOVECTOMY OF KNEE Right 5/27/2021    Procedure: SYNOVECTOMY, KNEE limited;  Surgeon: Gerard Tello MD;  Location: [facility];  Service: Orthopedics;  Laterality: Right;       Pre-op Assessment    I have reviewed the Patient Summary Reports.    I have reviewed the Nursing Notes.    I have reviewed the Medications.     Review of Systems  Anesthesia Hx:  No problems with previous Anesthesia   Denies Personal Hx of Anesthesia complications.   Social:  No Alcohol Use, Non-Smoker    Hematology/Oncology:  Hematology Normal   Oncology Normal     EENT/Dental:EENT/Dental Normal   Cardiovascular:  Cardiovascular Normal Exercise tolerance: good     Pulmonary:  Pulmonary Normal    Renal/:  Renal/ Normal     Hepatic/GI:   GERD    Musculoskeletal:  Musculoskeletal Normal    Neurological:  Neurology Normal    Endocrine:  Endocrine Normal    Dermatological:  Skin Normal    Psych:  Psychiatric  Normal           Physical Exam  General:  Well nourished      Airway/Jaw/Neck:  Airway Findings: Mouth Opening: Normal   Tongue: Normal   General Airway Assessment: Adult Mallampati: II  Improves to II, I with phonation.  TM Distance: Normal, at least 6 cm   Jaw/Neck Findings:  Neck ROM: Normal ROM      Eyes/Ears/Nose:  EYES/EARS/NOSE FINDINGS: Normal   Dental:  Dental Findings: In tact     Chest/Lungs:  Chest/Lungs Findings: Clear to auscultation, Normal Respiratory Rate      Heart/Vascular:  Heart Findings: Rate: Normal  Rhythm: Regular Rhythm  Sounds: Normal  Heart murmur: negative    Abdomen:  Abdomen Findings: Normal      Mental Status:  Mental Status Findings:  Cooperative, Alert and Oriented         Anesthesia Plan  Type of Anesthesia, risks & benefits discussed:  Anesthesia Type:  general    Patient's Preference: general  Plan Factors:          Intra-op Monitoring Plan: standard ASA monitors  Intra-op Monitoring Plan Comments:   Post Op Pain Control Plan: IV/PO Opioids PRN  Post Op Pain Control Plan Comments:     Induction:   IV  Beta Blocker:  Patient is not currently on a Beta-Blocker (No further documentation required).       Informed Consent: Informed consent signed with the Patient and all parties understand the risks and agree with anesthesia plan.  All questions answered.  Anesthesia consent signed with patient.  ASA Score: 2     Day of Surgery Review of History & Physical:  There are no significant changes.            Ready For Surgery From Anesthesia Perspective.           Physical Exam  General: Well nourished    Airway:  Mallampati: II / II/ I  Mouth Opening: Normal  TM Distance: Normal, at least 6 cm  Tongue: Normal  Neck ROM: Normal ROM    Dental:  In tact    Chest/Lungs:  Clear to auscultation, Normal Respiratory Rate    Heart:  Rate: Normal  Rhythm: Regular Rhythm  Sounds: Normal          Anesthesia Plan  Type of Anesthesia, risks & benefits discussed:    Anesthesia Type: general  Intra-op  Monitoring Plan: standard ASA monitors  Post Op Pain Control Plan: IV/PO Opioids PRN  Induction:  IV  Informed Consent: Informed consent signed with the Patient and all parties understand the risks and agree with anesthesia plan.  All questions answered.   ASA Score: 2    Ready For Surgery From Anesthesia Perspective.     .

## 2022-03-02 NOTE — PROVATION PATIENT INSTRUCTIONS
Discharge Summary/Instructions after an Endoscopic Procedure  Patient Name: Royer Castillo  Patient MRN: [id number]  Patient YOB: 1960 Wednesday, March 2, 2022  John Rose MD  Dear patient,  As a result of recent federal legislation (The Federal Cures Act), you may   receive lab or pathology results from your procedure in your [facility]   account before your physician is able to contact you. Your physician or   their representative will relay the results to you with their   recommendations at their soonest availability.  Thank you,  RESTRICTIONS:  During your procedure today, you received medications for sedation.  These   medications may affect your judgment, balance and coordination.  Therefore,   for 24 hours, you have the following restrictions:   - DO NOT drive a car, operate machinery, make legal/financial decisions,   sign important papers or drink alcohol.    ACTIVITY:  Today: no heavy lifting, straining or running due to procedural   sedation/anesthesia.  The following day: return to full activity including work.  DIET:  Eat and drink normally unless instructed otherwise.     TREATMENT FOR COMMON SIDE EFFECTS:  - Mild abdominal pain, nausea, belching, bloating or excessive gas:  rest,   eat lightly and use a heating pad.  - Sore Throat: treat with throat lozenges and/or gargle with warm salt   water.  - Because air was used during the procedure, expelling large amounts of air   from your rectum or belching is normal.  - If a bowel prep was taken, you may not have a bowel movement for 1-3 days.    This is normal.  SYMPTOMS TO WATCH FOR AND REPORT TO YOUR PHYSICIAN:  1. Abdominal pain or bloating, other than gas cramps.  2. Chest pain.  3. Back pain.  4. Signs of infection such as: chills or fever occurring within 24 hours   after the procedure.  5. Rectal bleeding, which would show as bright red, maroon, or black stools.   (A tablespoon of blood from the rectum is not serious, especially if    hemorrhoids are present.)  6. Vomiting.  7. Weakness or dizziness.  GO DIRECTLY TO THE NEAREST EMERGENCY ROOM IF YOU HAVE ANY OF THE FOLLOWING:      Difficulty breathing              Chills and/or fever over 101 F   Persistent vomiting and/or vomiting blood   Severe abdominal pain   Severe chest pain   Black, tarry stools   Bleeding- more than one tablespoon   Any other symptom or condition that you feel may need urgent attention  Your doctor recommends these additional instructions:  If any biopsies were taken, your doctors clinic will contact you in 1 to 2   weeks with any results.  - Patient has a contact number available for emergencies.  The signs and   symptoms of potential delayed complications were discussed with the   patient.  Return to normal activities tomorrow.  Written discharge   instructions were provided to the patient.   - Discharge patient to home.   - Resume previous diet.   - Continue present medications.   - Await pathology results.   For questions, problems or results please call your physician - John Rose MD at Work:  [phone].  [facility], EMERGENCY ROOM PHONE NUMBER: [phone]  IF A COMPLICATION OR EMERGENCY SITUATION ARISES AND YOU ARE UNABLE TO REACH   YOUR PHYSICIAN - GO DIRECTLY TO THE EMERGENCY ROOM.  John Rose MD  3/2/2022 3:25:12 PM  This report has been verified and signed electronically.  Dear patient,  As a result of recent federal legislation (The Federal Cures Act), you may   receive lab or pathology results from your procedure in your [facility]   account before your physician is able to contact you. Your physician or   their representative will relay the results to you with their   recommendations at their soonest availability.  Thank you,  PROVATION

## 2022-03-02 NOTE — H&P
Short Stay Endoscopy History and Physical    PCP - Leena Gleason MD    Procedure - EGD  Sedation: GA  ASA - per anesthesia  Mallampati - per anesthesia  History of Anesthesia problems - no  Family history Anesthesia problems -  no     HPI:  This is a 61 y.o. male here for evaluation of : GERD  Reflux - yes  Dysphagia - no  Abdominal pain - no  Diarrhea - no    ROS:  Constitutional: No fevers, chills, No weight loss  ENT: No allergies  CV: No chest pain  Pulm: No cough, No shortness of breath  Ophtho: No vision changes  GI: see HPI  Medical History:  has a past medical history of Cellulitis, Diverticulosis, Genital herpes, GERD (gastroesophageal reflux disease), HTN (hypertension), Hyperlipidemia, Left atrial dilation, Onychomycosis, and Pre-diabetes.    Surgical History:  has a past surgical history that includes Colonoscopy (N/A, 12/11/2019); Knee arthroscopy w/ meniscectomy (Right, 5/27/2021); Arthroscopic chondroplasty of knee joint (Right, 5/27/2021); and Synovectomy of knee (Right, 5/27/2021).    Family History: family history includes Cancer in his mother; Esophageal cancer in his father; Heart disease (age of onset: 64) in his sister; Heart disease (age of onset: 86) in his father; Hyperlipidemia in his sister and sister; Hypertension in his sister.. Otherwise no colon cancer, inflammatory bowel disease, or GI malignancies.    Social History:  reports that he has never smoked. He has never used smokeless tobacco. He reports that he does not drink alcohol and does not use drugs.    Review of patient's allergies indicates:  No Known Allergies    Medications:   Medications Prior to Admission   Medication Sig Dispense Refill Last Dose    acyclovir (ZOVIRAX) 200 MG capsule TAKE 1 CAPSULE(200 MG) BY MOUTH TWICE DAILY 60 capsule 11 3/2/2022 at Unknown time    amLODIPine (NORVASC) 10 MG tablet TAKE 1 TABLET(10 MG) BY MOUTH EVERY DAY 30 tablet 11 3/2/2022 at Unknown time    hydroCHLOROthiazide (HYDRODIURIL) 25  MG tablet TAKE 1 TABLET(25 MG) BY MOUTH EVERY DAY 90 tablet 3 3/2/2022 at Unknown time    meclizine (ANTIVERT) 25 mg tablet Take 1 tablet (25 mg total) by mouth 3 (three) times daily as needed for Dizziness or Nausea (1-2 pills as needed for dizziness, nausea vomiting). 30 tablet 0 Past Month at Unknown time    pantoprazole (PROTONIX) 20 MG tablet Take 1 tablet (20 mg total) by mouth once daily. 30 tablet 2 Past Month at Unknown time    pantoprazole (PROTONIX) 40 MG tablet Take 1 tablet (40 mg total) by mouth once daily. 90 tablet 3 3/1/2022 at Unknown time    ondansetron (ZOFRAN) 4 MG tablet Take 1 tablet (4 mg total) by mouth every 6 (six) hours. 30 tablet 0        Objective Findings:    Vital Signs: Per nursing notes.    Physical Exam:  General Appearance: Well appearing in no acute distress  Head:   Normocephalic, without obvious abnormality  Eyes:    No scleral icterus  Airway: Open  Neck: No restriction in mobility  Lungs: CTA bilaterally in anterior and posterior fields, no wheezes, no crackles.  Heart:  Regular rate and rhythm, S1, S2 normal, no murmurs heard  Abdomen: Soft, non tender, non distended      Labs:  Lab Results   Component Value Date    WBC 8.05 01/31/2022    HGB 13.4 (L) 01/31/2022    HCT 40.8 01/31/2022     01/31/2022    CHOL 234 (H) 12/21/2021    TRIG 198 (H) 12/21/2021    HDL 43 12/21/2021    ALT 18 01/31/2022    AST 17 01/31/2022     01/31/2022    K 3.6 01/31/2022     01/31/2022    CREATININE 1.0 01/31/2022    BUN 15 01/31/2022    CO2 27 01/31/2022    TSH 2.348 12/21/2021    PSA 0.85 12/21/2021    HGBA1C 5.9 (H) 12/21/2021         I have explained the risks and benefits of endoscopy procedures to the patient including but not limited to bleeding, perforation, infection, and death.    Thank you so much for allowing me to participate in the care of Royer Rose MD

## 2022-03-03 ENCOUNTER — OFFICE VISIT (OUTPATIENT)
Dept: INTERNAL MEDICINE | Facility: CLINIC | Age: 62
End: 2022-03-03
Payer: COMMERCIAL

## 2022-03-03 VITALS
HEIGHT: 69 IN | DIASTOLIC BLOOD PRESSURE: 68 MMHG | HEART RATE: 60 BPM | SYSTOLIC BLOOD PRESSURE: 114 MMHG | WEIGHT: 194.69 LBS | BODY MASS INDEX: 28.84 KG/M2 | OXYGEN SATURATION: 95 %

## 2022-03-03 DIAGNOSIS — E78.49 OTHER HYPERLIPIDEMIA: ICD-10-CM

## 2022-03-03 DIAGNOSIS — E78.2 MIXED HYPERLIPIDEMIA: ICD-10-CM

## 2022-03-03 DIAGNOSIS — M70.21 OLECRANON BURSITIS OF RIGHT ELBOW: ICD-10-CM

## 2022-03-03 DIAGNOSIS — R73.03 PRE-DIABETES: ICD-10-CM

## 2022-03-03 DIAGNOSIS — Z76.89 ENCOUNTER TO ESTABLISH CARE WITH NEW DOCTOR: Primary | ICD-10-CM

## 2022-03-03 DIAGNOSIS — A60.00 GENITAL HERPES SIMPLEX, UNSPECIFIED SITE: ICD-10-CM

## 2022-03-03 DIAGNOSIS — S83.206D ACUTE MENISCAL TEAR OF RIGHT KNEE, SUBSEQUENT ENCOUNTER: ICD-10-CM

## 2022-03-03 DIAGNOSIS — I10 ESSENTIAL HYPERTENSION: ICD-10-CM

## 2022-03-03 DIAGNOSIS — K21.9 GASTROESOPHAGEAL REFLUX DISEASE, UNSPECIFIED WHETHER ESOPHAGITIS PRESENT: ICD-10-CM

## 2022-03-03 PROBLEM — M23.91 ACUTE INTERNAL DERANGEMENT OF RIGHT KNEE: Status: RESOLVED | Noted: 2019-08-12 | Resolved: 2022-03-03

## 2022-03-03 PROBLEM — S86.911A STRAIN OF RIGHT KNEE: Status: RESOLVED | Noted: 2019-08-22 | Resolved: 2022-03-03

## 2022-03-03 PROBLEM — M76.31 IT BAND SYNDROME, RIGHT: Status: RESOLVED | Noted: 2019-09-26 | Resolved: 2022-03-03

## 2022-03-03 PROCEDURE — 3008F PR BODY MASS INDEX (BMI) DOCUMENTED: ICD-10-PCS | Mod: CPTII,S$GLB,, | Performed by: STUDENT IN AN ORGANIZED HEALTH CARE EDUCATION/TRAINING PROGRAM

## 2022-03-03 PROCEDURE — 3078F DIAST BP <80 MM HG: CPT | Mod: CPTII,S$GLB,, | Performed by: STUDENT IN AN ORGANIZED HEALTH CARE EDUCATION/TRAINING PROGRAM

## 2022-03-03 PROCEDURE — 1159F PR MEDICATION LIST DOCUMENTED IN MEDICAL RECORD: ICD-10-PCS | Mod: CPTII,S$GLB,, | Performed by: STUDENT IN AN ORGANIZED HEALTH CARE EDUCATION/TRAINING PROGRAM

## 2022-03-03 PROCEDURE — 99214 PR OFFICE/OUTPT VISIT, EST, LEVL IV, 30-39 MIN: ICD-10-PCS | Mod: S$GLB,,, | Performed by: STUDENT IN AN ORGANIZED HEALTH CARE EDUCATION/TRAINING PROGRAM

## 2022-03-03 PROCEDURE — 99999 PR PBB SHADOW E&M-EST. PATIENT-LVL IV: ICD-10-PCS | Mod: PBBFAC,,, | Performed by: STUDENT IN AN ORGANIZED HEALTH CARE EDUCATION/TRAINING PROGRAM

## 2022-03-03 PROCEDURE — 1160F RVW MEDS BY RX/DR IN RCRD: CPT | Mod: CPTII,S$GLB,, | Performed by: STUDENT IN AN ORGANIZED HEALTH CARE EDUCATION/TRAINING PROGRAM

## 2022-03-03 PROCEDURE — 3074F SYST BP LT 130 MM HG: CPT | Mod: CPTII,S$GLB,, | Performed by: STUDENT IN AN ORGANIZED HEALTH CARE EDUCATION/TRAINING PROGRAM

## 2022-03-03 PROCEDURE — 99999 PR PBB SHADOW E&M-EST. PATIENT-LVL IV: CPT | Mod: PBBFAC,,, | Performed by: STUDENT IN AN ORGANIZED HEALTH CARE EDUCATION/TRAINING PROGRAM

## 2022-03-03 PROCEDURE — 1160F PR REVIEW ALL MEDS BY PRESCRIBER/CLIN PHARMACIST DOCUMENTED: ICD-10-PCS | Mod: CPTII,S$GLB,, | Performed by: STUDENT IN AN ORGANIZED HEALTH CARE EDUCATION/TRAINING PROGRAM

## 2022-03-03 PROCEDURE — 1159F MED LIST DOCD IN RCRD: CPT | Mod: CPTII,S$GLB,, | Performed by: STUDENT IN AN ORGANIZED HEALTH CARE EDUCATION/TRAINING PROGRAM

## 2022-03-03 PROCEDURE — 3074F PR MOST RECENT SYSTOLIC BLOOD PRESSURE < 130 MM HG: ICD-10-PCS | Mod: CPTII,S$GLB,, | Performed by: STUDENT IN AN ORGANIZED HEALTH CARE EDUCATION/TRAINING PROGRAM

## 2022-03-03 PROCEDURE — 3008F BODY MASS INDEX DOCD: CPT | Mod: CPTII,S$GLB,, | Performed by: STUDENT IN AN ORGANIZED HEALTH CARE EDUCATION/TRAINING PROGRAM

## 2022-03-03 PROCEDURE — 99214 OFFICE O/P EST MOD 30 MIN: CPT | Mod: S$GLB,,, | Performed by: STUDENT IN AN ORGANIZED HEALTH CARE EDUCATION/TRAINING PROGRAM

## 2022-03-03 PROCEDURE — 3078F PR MOST RECENT DIASTOLIC BLOOD PRESSURE < 80 MM HG: ICD-10-PCS | Mod: CPTII,S$GLB,, | Performed by: STUDENT IN AN ORGANIZED HEALTH CARE EDUCATION/TRAINING PROGRAM

## 2022-03-03 RX ORDER — ATORVASTATIN CALCIUM 40 MG/1
40 TABLET, FILM COATED ORAL NIGHTLY
Qty: 90 TABLET | Refills: 3 | Status: SHIPPED | OUTPATIENT
Start: 2022-03-03 | End: 2023-01-03 | Stop reason: SDUPTHER

## 2022-03-03 NOTE — PROGRESS NOTES
INTERNAL MEDICINE INITIAL VISIT NOTE      CHIEF COMPLAINT     Chief Complaint   Patient presents with    Establish Care       ASSESSMENT/PLAN     Royer Castillo is a 61 y.o. male who presents with HTN, HLD, prediabetes, GERD, right knee pain, here today to establish care.    1. Encounter to establish care with new doctor  2. Gastroesophageal reflux disease, unspecified whether esophagitis present  3. Essential hypertension  4. Pre-diabetes  5. Genital herpes simplex, unspecified site  6. Other hyperlipidemia  7. Acute meniscal tear of right knee, subsequent encounter  8. Olecranon bursitis of right elbow  9. Mixed hyperlipidemia  - atorvastatin (LIPITOR) 40 MG tablet; Take 1 tablet (40 mg total) by mouth every evening.  Dispense: 90 tablet; Refill: 3      All previous labs, imaging, and notes reviewed up to the time point of this note.   Patient here to establish care. Had annual exam in December. Labs and imaging reviewed. Has some olecranon bursitis - will conservatively treat, handout provided. HLD - high risk, will start statin.     HM reviewed and discussed in detail with the patient.     RTC in 1 yr, sooner if needed and depending on labs.    HPI     oRyer Castillo is a 61 y.o. male who presents with HTN, HLD, prediabetes, GERD, right knee pain, here today to establish care.    Patient was previously seen by Dr. Vaishali Camacho. Labs reviewed from 1/31/22 and wnl.   He is still working as a  in the field.     GERD - reports severe, constant symptoms. Has improved with treatment. Props himself up with pillows, has had to change his diet throughout the past month and a half, eating mostly salads and plain chicken. Taking PPI daily and as needed pepcid. Had EGD yesterday and is awaiting biopsy results.     BP well controlled. On amlodipine 10 mg and HCTZ 25 mg. Does endorse a lot of urination.     HSV - takes acyclovir daily     Prediabetes - last A1c 5.9, working on reduced sugar intake.      Olecranon bursitis of right elbow - leaning on tables a lot. Minimal symptoms currently.     HLD - The 10-year ASCVD risk score (Bridger SHARLENE Jr., et al., 2013) is: 12.4%    Values used to calculate the score:      Age: 61 years      Sex: Male      Is Non- : Yes      Diabetic: No      Tobacco smoker: No      Systolic Blood Pressure: 114 mmHg      Is BP treated: Yes      HDL Cholesterol: 43 mg/dL      Total Cholesterol: 234 mg/dL          Had R meniscal repair - last year. Reports he is doing better, but now has some left hip pain.     Past Medical History:  Past Medical History:   Diagnosis Date    Cellulitis     left foot    Diverticulosis     Genital herpes     GERD (gastroesophageal reflux disease)     HTN (hypertension)     Hyperlipidemia     Left atrial dilation     Onychomycosis     Pre-diabetes        Past Surgical History:  Past Surgical History:   Procedure Laterality Date    ARTHROSCOPIC CHONDROPLASTY OF KNEE JOINT Right 5/27/2021    Procedure: ARTHROSCOPY, KNEE, WITH CHONDROPLASTY;  Surgeon: Gerard Tello MD;  Location: [facility] OR;  Service: Orthopedics;  Laterality: Right;    COLONOSCOPY N/A 12/11/2019    Procedure: COLONOSCOPY;  Surgeon: Devaughn Johnson MD;  [facility]  Service: Endoscopy;  Laterality: N/A;    ESOPHAGOGASTRODUODENOSCOPY N/A 3/2/2022    Procedure: EGD (ESOPHAGOGASTRODUODENOSCOPY);  Surgeon: John Rose MD;  [facility]  Service: Endoscopy;  Laterality: N/A;  1/27 covid test 2/27 @ [place]; instructions to portal-st  2/25 [facility] to confirm appt-RB    KNEE ARTHROSCOPY W/ MENISCECTOMY Right 5/27/2021    Procedure: ARTHROSCOPY, KNEE, WITH MENISCECTOMY;  Surgeon: Gerard Tello MD;  Location: [facility] OR;  Service: Orthopedics;  Laterality: Right;  JOHNATHAN 50CC    SYNOVECTOMY OF KNEE Right 5/27/2021    Procedure: SYNOVECTOMY, KNEE limited;  Surgeon: Gerard Tello MD;  Location: [facility] OR;  Service: Orthopedics;  Laterality:  Right;       Home Medications:  Prior to Admission medications    Medication Sig Start Date End Date Taking? Authorizing Provider   acyclovir (ZOVIRAX) 200 MG capsule TAKE 1 CAPSULE(200 MG) BY MOUTH TWICE DAILY 7/27/21   Vaishali Camacho MD   amLODIPine (NORVASC) 10 MG tablet TAKE 1 TABLET(10 MG) BY MOUTH EVERY DAY 9/24/21   Vaishali Camacho MD   hydroCHLOROthiazide (HYDRODIURIL) 25 MG tablet TAKE 1 TABLET(25 MG) BY MOUTH EVERY DAY 11/19/21   Vaishali Camacho MD   meclizine (ANTIVERT) 25 mg tablet Take 1 tablet (25 mg total) by mouth 3 (three) times daily as needed for Dizziness or Nausea (1-2 pills as needed for dizziness, nausea vomiting). 1/31/22   Trace Mclaughlin MD   ondansetron (ZOFRAN) 4 MG tablet Take 1 tablet (4 mg total) by mouth every 6 (six) hours. 1/31/22   Trace Mclaughlin MD   pantoprazole (PROTONIX) 40 MG tablet Take 1 tablet (40 mg total) by mouth once daily. 1/27/22 1/27/23  John Rose MD       Allergies:  Review of patient's allergies indicates:  No Known Allergies    Family History:  Family History   Problem Relation Age of Onset    Cancer Mother     Esophageal cancer Father     Heart disease Father 86    Heart disease Sister 64    Hyperlipidemia Sister     Hyperlipidemia Sister     Hypertension Sister     Colon cancer Neg Hx        Social History:  Social History     Tobacco Use    Smoking status: Never Smoker    Smokeless tobacco: Never Used   Substance Use Topics    Alcohol use: No    Drug use: No       Review of Systems:  Review of Systems   Constitutional: Negative for fever and unexpected weight change.   HENT: Negative for sinus pressure and sore throat.    Eyes: Negative for visual disturbance.   Respiratory: Negative for cough and shortness of breath.    Cardiovascular: Negative for chest pain and palpitations.   Gastrointestinal: Negative for constipation, diarrhea, nausea and vomiting.   Endocrine: Negative for polyuria.   Genitourinary: Negative for dysuria and urgency.  "  Musculoskeletal: Positive for joint swelling (right elbow). Negative for arthralgias and myalgias.   Skin: Negative for rash.   Allergic/Immunologic: Negative for environmental allergies.   Neurological: Negative for dizziness, light-headedness and headaches.   Hematological: Does not bruise/bleed easily.   Psychiatric/Behavioral: Negative for agitation and decreased concentration. The patient is not nervous/anxious.        Health Maintenance:   Up to date  Due for covid booster      PHYSICAL EXAM     /68 (BP Location: Left arm, Patient Position: Sitting, BP Method: Large (Manual))   Pulse 60   Ht 5' 9" (1.753 m)   Wt 88.3 kg (194 lb 10.7 oz)   SpO2 95%   BMI 28.75 kg/m²     GEN - A+OX4, NAD healthy and well appearing   HEENT - PERRL, EOMI, OP clear  Neck - No thyromegaly or thyroid masses felt.  No cervical lymphadenopathy appreciated.  CV - RRR, no m/r/g   Chest - CTAB, no wheezing, crackles, or rhonchi   Abd - S/NT/ND/+BS.   Ext - 2+BDP. Slight nodular swelling to R elbow   LN - No LAD appreciated.  Skin - Normal color and texture, no rash, no skin lesions.      LABS     Lab Results   Component Value Date    WBC 8.05 01/31/2022    HGB 13.4 (L) 01/31/2022    HCT 40.8 01/31/2022    MCV 91 01/31/2022     01/31/2022     Sodium   Date Value Ref Range Status   01/31/2022 140 136 - 145 mmol/L Final     Potassium   Date Value Ref Range Status   01/31/2022 3.6 3.5 - 5.1 mmol/L Final     Chloride   Date Value Ref Range Status   01/31/2022 104 95 - 110 mmol/L Final     CO2   Date Value Ref Range Status   01/31/2022 27 23 - 29 mmol/L Final     Glucose   Date Value Ref Range Status   01/31/2022 137 (H) 70 - 110 mg/dL Final     BUN   Date Value Ref Range Status   01/31/2022 15 8 - 23 mg/dL Final     Creatinine   Date Value Ref Range Status   01/31/2022 1.0 0.5 - 1.4 mg/dL Final     Calcium   Date Value Ref Range Status   01/31/2022 8.6 (L) 8.7 - 10.5 mg/dL Final     Total Protein   Date Value Ref Range " Status   01/31/2022 6.4 6.0 - 8.4 g/dL Final     Albumin   Date Value Ref Range Status   01/31/2022 3.6 3.5 - 5.2 g/dL Final     Total Bilirubin   Date Value Ref Range Status   01/31/2022 0.5 0.1 - 1.0 mg/dL Final     Comment:     For infants and newborns, interpretation of results should be based  on gestational age, weight and in agreement with clinical  observations.    Premature Infant recommended reference ranges:  Up to 24 hours.............<8.0 mg/dL  Up to 48 hours............<12.0 mg/dL  3-5 days..................<15.0 mg/dL  6-29 days.................<15.0 mg/dL       Alkaline Phosphatase   Date Value Ref Range Status   01/31/2022 53 (L) 55 - 135 U/L Final     AST   Date Value Ref Range Status   01/31/2022 17 10 - 40 U/L Final     ALT   Date Value Ref Range Status   01/31/2022 18 10 - 44 U/L Final     Anion Gap   Date Value Ref Range Status   01/31/2022 9 8 - 16 mmol/L Final     eGFR if    Date Value Ref Range Status   01/31/2022 >60.0 >60 mL/min/1.73 m^2 Final     eGFR if non    Date Value Ref Range Status   01/31/2022 >60.0 >60 mL/min/1.73 m^2 Final     Comment:     Calculation used to obtain the estimated glomerular filtration  rate (eGFR) is the CKD-EPI equation.        Lab Results   Component Value Date    HGBA1C 5.9 (H) 12/21/2021     Lab Results   Component Value Date    LDLCALC 151.4 12/21/2021     Lab Results   Component Value Date    TSH 2.348 12/21/2021           Leena Gleason MD   [facility]   Department of Internal Medicine   03/03/2022

## 2022-03-07 LAB
FINAL PATHOLOGIC DIAGNOSIS: NORMAL
GROSS: NORMAL
Lab: NORMAL

## 2022-03-08 ENCOUNTER — TELEPHONE (OUTPATIENT)
Dept: GASTROENTEROLOGY | Facility: CLINIC | Age: 62
End: 2022-03-08
Payer: COMMERCIAL

## 2022-03-08 NOTE — TELEPHONE ENCOUNTER
Spoke with patient , results given as written by Dr. Rose  Pt verbalizes understadning and appreciates the call.  Thanks MA

## 2022-03-08 NOTE — TELEPHONE ENCOUNTER
----- Message from John Rose MD sent at 3/8/2022  3:04 PM CST -----  Biopsies look fine. Follow up in 6 months.

## 2022-03-18 ENCOUNTER — PATIENT MESSAGE (OUTPATIENT)
Dept: ADMINISTRATIVE | Facility: OTHER | Age: 62
End: 2022-03-18
Payer: COMMERCIAL

## 2022-07-08 ENCOUNTER — PATIENT MESSAGE (OUTPATIENT)
Dept: ADMINISTRATIVE | Facility: OTHER | Age: 62
End: 2022-07-08
Payer: COMMERCIAL

## 2022-07-08 ENCOUNTER — PATIENT MESSAGE (OUTPATIENT)
Dept: INTERNAL MEDICINE | Facility: CLINIC | Age: 62
End: 2022-07-08
Payer: COMMERCIAL

## 2022-07-09 ENCOUNTER — NURSE TRIAGE (OUTPATIENT)
Dept: ADMINISTRATIVE | Facility: CLINIC | Age: 62
End: 2022-07-09
Payer: COMMERCIAL

## 2022-07-09 ENCOUNTER — HOSPITAL ENCOUNTER (EMERGENCY)
Facility: OTHER | Age: 62
Discharge: HOME | End: 2022-07-09
Attending: EMERGENCY MEDICINE
Payer: COMMERCIAL

## 2022-07-09 VITALS
BODY MASS INDEX: 28.14 KG/M2 | HEIGHT: 69 IN | DIASTOLIC BLOOD PRESSURE: 76 MMHG | HEART RATE: 79 BPM | SYSTOLIC BLOOD PRESSURE: 119 MMHG | OXYGEN SATURATION: 98 % | RESPIRATION RATE: 18 BRPM | WEIGHT: 190 LBS | TEMPERATURE: 98 F

## 2022-07-09 DIAGNOSIS — R07.9 CHEST PAIN: ICD-10-CM

## 2022-07-09 DIAGNOSIS — U07.1 COVID-19 VIRUS INFECTION: Primary | ICD-10-CM

## 2022-07-09 DIAGNOSIS — R05.9 COUGH: ICD-10-CM

## 2022-07-09 LAB
CTP QC/QA: YES
SARS-COV-2 RDRP RESP QL NAA+PROBE: POSITIVE

## 2022-07-09 PROCEDURE — 93010 EKG 12-LEAD: ICD-10-PCS | Mod: ,,, | Performed by: INTERNAL MEDICINE

## 2022-07-09 PROCEDURE — 93005 ELECTROCARDIOGRAM TRACING: CPT

## 2022-07-09 PROCEDURE — U0002 COVID-19 LAB TEST NON-CDC: HCPCS | Performed by: EMERGENCY MEDICINE

## 2022-07-09 PROCEDURE — 93010 ELECTROCARDIOGRAM REPORT: CPT | Mod: ,,, | Performed by: INTERNAL MEDICINE

## 2022-07-09 PROCEDURE — 99284 EMERGENCY DEPT VISIT MOD MDM: CPT | Mod: 25

## 2022-07-09 RX ORDER — BENZONATATE 100 MG/1
100 CAPSULE ORAL 3 TIMES DAILY PRN
Qty: 20 CAPSULE | Refills: 0 | Status: SHIPPED | OUTPATIENT
Start: 2022-07-09 | End: 2022-07-19

## 2022-07-09 NOTE — ED NOTES
Pt to ED with COVID concerns, +home COVID test, stated he was told by nurse on call to report to ED for eval. NAD noted.     Two patient identifiers have been checked and are correct.      Appearance: Pt awake, alert & oriented to person, place & time. Pt in no acute distress at present time. Pt is clean and well groomed with clothes appropriately fastened.   Skin: Skin warm, dry & intact. Color consistent with ethnicity. Mucous membranes moist. No breakdown or brusing noted.   Musculoskeletal: Patient moving all extremities well, no obvious swelling or deformities noted.   Respiratory: Respirations spontaneous, even, and non-labored. Visible chest rise noted. Airway is open and patent. No accessory muscle use noted.   Neurologic: Sensation is intact. Speech is clear and appropriate. Eyes open spontaneously, behavior appropriate to situation, follows commands, facial expression symmetrical, bilateral hand grasp equal and even, purposeful motor response noted.  Cardiac: All peripheral pulses present. Cap refill is <3 seconds.

## 2022-07-09 NOTE — TELEPHONE ENCOUNTER
Pt responded to [facility] text message. Reports testing positive for COVID on 7/7. Reports chest heaviness since yesterday. Reports also having body aches, sore throat, HA, and cough. Pt advised to be seen in ED now. Pt verbalized understanding    Reason for Disposition   Chest pain or pressure    Additional Information   Negative: SEVERE difficulty breathing (e.g., struggling for each breath, speaks in single words)   Negative: Difficult to awaken or acting confused (e.g., disoriented, slurred speech)   Negative: Bluish (or gray) lips or face now   Negative: Shock suspected (e.g., cold/pale/clammy skin, too weak to stand, low BP, rapid pulse)   Negative: Sounds like a life-threatening emergency to the triager   Negative: SEVERE or constant chest pain or pressure  (Exception: Mild central chest pain, present only when coughing.)   Negative: MODERATE difficulty breathing (e.g., speaks in phrases, SOB even at rest, pulse 100-120)   Negative: [1] Headache AND [2] stiff neck (can't touch chin to chest)   Negative: Oxygen level (e.g., pulse oximetry) 90 percent or lower    Protocols used: CORONAVIRUS (COVID-19) DIAGNOSED OR [id number]

## 2022-07-09 NOTE — TELEPHONE ENCOUNTER
Patient states he has covid.  Current symptoms cough, chest heaviness/pressure, headache, and body aches.  Patient denies having difficulty breathing.  Advised ER for further evaluation.  Patient verbalized understanding.     Reason for Disposition   Chest pain or pressure    Additional Information   Negative: SEVERE difficulty breathing (e.g., struggling for each breath, speaks in single words)   Negative: Difficult to awaken or acting confused (e.g., disoriented, slurred speech)   Negative: Bluish (or gray) lips or face now   Negative: Shock suspected (e.g., cold/pale/clammy skin, too weak to stand, low BP, rapid pulse)   Negative: Sounds like a life-threatening emergency to the triager   Negative: SEVERE or constant chest pain or pressure  (Exception: Mild central chest pain, present only when coughing.)   Negative: MODERATE difficulty breathing (e.g., speaks in phrases, SOB even at rest, pulse 100-120)   Negative: [1] Headache AND [2] stiff neck (can't touch chin to chest)   Negative: Oxygen level (e.g., pulse oximetry) 90 percent or lower    Protocols used: CORONAVIRUS (COVID-19) DIAGNOSED OR [id number]

## 2022-07-09 NOTE — ED PROVIDER NOTES
"     Source of History:  Patient    Chief complaint:  COVID-19 Concerns (Pt tested +covid 2 days ago, pt c/o cough, headache and body pains. Pt states "my doctor told me to come." )      HPI:  Royer Castillo is a 62 y.o. male presenting with 2-3 days of cough, congestion, headache body aches and some chest tightness.  Diagnosed with COVID 2 days ago.  I called his physician to let him know we transferred him to the nurse hotline who referred him to the emergency department.  Denies any significant pain.  Worse with coughing.  Patient has been vaccinated.    This is the extent to the patients complaints today here in the emergency department.    ROS: As per HPI and below:  Constitutional:  Chills body aches  Eyes: No visual changes.  ENT:  Congestion No ear pain    Cardiovascular: No chest pain.  Respiratory:  Cough  GI: No abdominal pain.  No nausea or vomiting.  Genitourinary: No abnormal urination.  Neurologic:  No focal weakness.  No numbness.  MSK: no back pain.  Integument: No rashes or lesions.  Hematologic: No easy bruising.  Endocrine: No excessive thirst or urination.    Review of patient's allergies indicates:  No Known Allergies    PMH:  As per HPI and below:  Past Medical History:   Diagnosis Date    Cellulitis     left foot    Diverticulosis     Genital herpes     GERD (gastroesophageal reflux disease)     HTN (hypertension)     Hyperlipidemia     Left atrial dilation     Onychomycosis     Pre-diabetes      Past Surgical History:   Procedure Laterality Date    ARTHROSCOPIC CHONDROPLASTY OF KNEE JOINT Right 5/27/2021    Procedure: ARTHROSCOPY, KNEE, WITH CHONDROPLASTY;  Surgeon: Gerard Tello MD;  Location: [facility];  Service: Orthopedics;  Laterality: Right;    COLONOSCOPY N/A 12/11/2019    Procedure: COLONOSCOPY;  Surgeon: Devaughn Johnson MD;  Location: [address]);  Service: Endoscopy;  Laterality: N/A;    ESOPHAGOGASTRODUODENOSCOPY N/A 3/2/2022    Procedure: EGD " "(ESOPHAGOGASTRODUODENOSCOPY);  Surgeon: John Rose MD;  [facility]  Service: Endoscopy;  Laterality: N/A;  1/27 covid test 2/27 @ [place]; instructions to portal-st  2/25 [facility] to confirm appt-RB    KNEE ARTHROSCOPY W/ MENISCECTOMY Right 5/27/2021    Procedure: ARTHROSCOPY, KNEE, WITH MENISCECTOMY;  Surgeon: Gerard Tello MD;  Location: [facility] OR;  Service: Orthopedics;  Laterality: Right;  JOHNATHAN 50CC    SYNOVECTOMY OF KNEE Right 5/27/2021    Procedure: SYNOVECTOMY, KNEE limited;  Surgeon: Gerard Tello MD;  Location: [facility] OR;  Service: Orthopedics;  Laterality: Right;       Social History     Tobacco Use    Smoking status: Never Smoker    Smokeless tobacco: Never Used   Substance Use Topics    Alcohol use: No    Drug use: No       Physical Exam:    /76   Pulse 79   Temp 97.7 °F (36.5 °C)   Resp 18   Ht 5' 9" (1.753 m)   Wt 86.2 kg (190 lb)   SpO2 98%   BMI 28.06 kg/m²   Nursing note and vital signs reviewed.  Appearance:  No acute distress.  Comfortable.  Head: Normocephalic.  Atraumatic.    Eyes: No conjunctival injection.  No swelling.  Chest/Respiratory: Normal work of breathing.  No retractions.  No stridor.  Cardiovascular: Regular rhythm.  No appearance of shock.  No edema.  Abdomen:  No distention.    Musculoskeletal:  No deformities.  Normal range of motion.    Skin:  No rashes.  Good turgor.  Neurologic:  Normal movement and ambulation.  Mental Status:  Alert and oriented x 3.  Appropriate, conversant.     Labs that have been ordered have been independently reviewed and interpreted by myself.    I decided to obtain the patient's medical records.  Summary of Medical Records:      MDM:    62 y.o. male with symptoms/ concern of COVID infection.  Vital signs are normal.  The patient is not hypoxic and has no respiratory distress.  Do not suspect sepsis or pneumonia.  Do not feel any further workup is indicated.   Strict return precautions and self isolation " recommendations are given to the patient.    ED Course as of 07/09/22 1638   Sat Jul 09, 2022   1413 X-Ray Chest 1 View  Chest x-ray independently interpreted by myself shows normal cardiac size, no infiltrate or focal consolidation, no pneumothorax, no bony abnormalities.  Overall impression negative chest x-ray. [SM]   1414 EKG 12-lead  EKG independently interpreted by myself shows normal sinus rhythm at a rate of 60, normal intervals, narrow QRS, no acute ST T wave abnormalities.  Overall impression no acute disease.  Compared to an EKG on 01/30/2022 shows no change. [SM]   1422 No further workup is indicated in the emergency department today.  I updated pt regarding results and I counseled pt regarding supportive care measures.  Diagnosis and treatment plan explained to patient. I have answered all questions and the patient is satisfied with the plan of care. Patient discharged home in stable condition.  [SM]      ED Course User Index  [SM] Oziel Bustamante DO                 Diagnostic Impression:    1. COVID-19 virus infection    2. Chest pain    3. Cough         ED Disposition Condition    Discharge Stable          ED Prescriptions     Medication Sig Dispense Start Date End Date Auth. Provider    benzonatate (TESSALON) 100 MG capsule Take 1 capsule (100 mg total) by mouth 3 (three) times daily as needed. 20 capsule 7/9/2022 7/19/2022 Oziel Bustamante DO    nirmatrelvir-ritonavir 150 mg x 2- 100 mg copackaged tablets (EUA) Take 3 tablets by mouth 2 (two) times daily for 5 days. Each dose contains 2 nirmatrelvir (pink tablets) and 1 ritonavir (white tablet). Take all 3 tablets together 30 tablet 7/9/2022 7/14/2022 Oziel Bustamante DO        Follow-up Information     Follow up With Specialties Details Why Contact Info    Leena Gleason MD Internal Medicine Schedule an appointment as soon as possible for a visit   [facility] [id number] [phone]               Oziel MAYBERRY  DO Dianna  07/09/22 163

## 2023-01-03 ENCOUNTER — OFFICE VISIT (OUTPATIENT)
Dept: INTERNAL MEDICINE | Facility: CLINIC | Age: 63
End: 2023-01-03
Payer: COMMERCIAL

## 2023-01-03 ENCOUNTER — LAB VISIT (OUTPATIENT)
Dept: LAB | Facility: HOSPITAL | Age: 63
End: 2023-01-03
Payer: COMMERCIAL

## 2023-01-03 DIAGNOSIS — Z00.00 ANNUAL PHYSICAL EXAM: Primary | ICD-10-CM

## 2023-01-03 DIAGNOSIS — Z00.00 ANNUAL PHYSICAL EXAM: ICD-10-CM

## 2023-01-03 DIAGNOSIS — E78.2 MIXED HYPERLIPIDEMIA: ICD-10-CM

## 2023-01-03 DIAGNOSIS — I10 BENIGN ESSENTIAL HYPERTENSION: ICD-10-CM

## 2023-01-03 DIAGNOSIS — A60.00 GENITAL HERPES SIMPLEX, UNSPECIFIED SITE: ICD-10-CM

## 2023-01-03 DIAGNOSIS — N52.9 ERECTILE DYSFUNCTION, UNSPECIFIED ERECTILE DYSFUNCTION TYPE: ICD-10-CM

## 2023-01-03 PROCEDURE — 80053 COMPREHEN METABOLIC PANEL: CPT

## 2023-01-03 PROCEDURE — 99999 PR PBB SHADOW E&M-EST. PATIENT-LVL II: CPT | Mod: PBBFAC,,,

## 2023-01-03 PROCEDURE — 80061 LIPID PANEL: CPT

## 2023-01-03 PROCEDURE — 99999 PR PBB SHADOW E&M-EST. PATIENT-LVL II: ICD-10-PCS | Mod: PBBFAC,,,

## 2023-01-03 PROCEDURE — 84153 ASSAY OF PSA TOTAL: CPT

## 2023-01-03 PROCEDURE — 99396 PREV VISIT EST AGE 40-64: CPT | Mod: S$GLB,,,

## 2023-01-03 PROCEDURE — 3044F PR MOST RECENT HEMOGLOBIN A1C LEVEL <7.0%: ICD-10-PCS | Mod: CPTII,S$GLB,,

## 2023-01-03 PROCEDURE — 36415 COLL VENOUS BLD VENIPUNCTURE: CPT

## 2023-01-03 PROCEDURE — 85025 COMPLETE CBC W/AUTO DIFF WBC: CPT

## 2023-01-03 PROCEDURE — 84443 ASSAY THYROID STIM HORMONE: CPT

## 2023-01-03 PROCEDURE — 3044F HG A1C LEVEL LT 7.0%: CPT | Mod: CPTII,S$GLB,,

## 2023-01-03 PROCEDURE — 83036 HEMOGLOBIN GLYCOSYLATED A1C: CPT

## 2023-01-03 PROCEDURE — 99396 PR PREVENTIVE VISIT,EST,40-64: ICD-10-PCS | Mod: S$GLB,,,

## 2023-01-03 RX ORDER — ATORVASTATIN CALCIUM 40 MG/1
40 TABLET, FILM COATED ORAL NIGHTLY
Qty: 90 TABLET | Refills: 3 | Status: SHIPPED | OUTPATIENT
Start: 2023-01-03 | End: 2023-03-05 | Stop reason: SDUPTHER

## 2023-01-03 RX ORDER — HYDROCHLOROTHIAZIDE 25 MG/1
25 TABLET ORAL DAILY
Qty: 90 TABLET | Refills: 0 | Status: SHIPPED | OUTPATIENT
Start: 2023-01-03 | End: 2023-03-08

## 2023-01-03 RX ORDER — PANTOPRAZOLE SODIUM 40 MG/1
40 TABLET, DELAYED RELEASE ORAL DAILY
Qty: 90 TABLET | Refills: 3 | Status: SHIPPED | OUTPATIENT
Start: 2023-01-03 | End: 2024-01-08 | Stop reason: SDUPTHER

## 2023-01-03 RX ORDER — ACYCLOVIR 200 MG/1
200 CAPSULE ORAL 2 TIMES DAILY
Qty: 60 CAPSULE | Refills: 11 | Status: SHIPPED | OUTPATIENT
Start: 2023-01-03 | End: 2024-01-19 | Stop reason: SDUPTHER

## 2023-01-03 RX ORDER — AMLODIPINE BESYLATE 10 MG/1
10 TABLET ORAL DAILY
Qty: 360 TABLET | Refills: 0 | Status: SHIPPED | OUTPATIENT
Start: 2023-01-03 | End: 2023-09-11 | Stop reason: SDUPTHER

## 2023-01-03 NOTE — PROGRESS NOTES
Clinic Note  1/3/2023      Subjective:       Patient ID:  Vasyl is a 62 y.o. male being seen for an established visit.    Chief Complaint: No chief complaint on file.    HPI  Mr. Scott is a 62-year-old gentleman here for an annual physical exam and to establish care.  State he has been feeling well, no acute complaints, affirms medication compliance, he is enrolled in [facility] digital medicine program, still works as a  (soon to be promoted) keeps himself fairly active.  Describes his diet as balanced and healthy, exercises regularly.  Reports past medical history of joint pain (left shoulder, left hip) control with NSAIDs p.r.n..  Patient also reports past medical history of hiatal hernia, currently on pantoprazole, no problems since dietary modifications.  During ROS patient affirms erectile dysfunction, denies morning erections or psychogenic stressors.  Has tried sildenafil before but reports reflux, willing to try course again.  Also requests testosterone levels.  We discussed that most likely if we were to take a sample given his age we would see a steady decline.  If patient decides to forego with measuring testosterone he will let us know so in a.m. sample can be drawn.  No other concerns.    Review of Systems   Constitutional:  Negative for fever and weight loss.   HENT:  Negative for ear discharge and sore throat.    Eyes:  Negative for discharge.   Respiratory:  Negative for cough, sputum production and shortness of breath.    Cardiovascular:  Negative for chest pain and palpitations.   Gastrointestinal:  Negative for abdominal pain, constipation and diarrhea.   Genitourinary:  Negative for dysuria, frequency and urgency.   Musculoskeletal:  Positive for joint pain.   Skin: Negative.    Neurological: Negative.    Endo/Heme/Allergies: Negative.    Psychiatric/Behavioral: Negative.       Past Medical History:   Diagnosis Date    Cellulitis     left foot    Diverticulosis     Genital herpes     GERD  (gastroesophageal reflux disease)     HTN (hypertension)     Hyperlipidemia     Left atrial dilation     Onychomycosis     Pre-diabetes        Family History   Problem Relation Age of Onset    Cancer Mother     Esophageal cancer Father     Heart disease Father 86    Heart disease Sister 64    Hyperlipidemia Sister     Hyperlipidemia Sister     Hypertension Sister     Colon cancer Neg Hx         reports that he has never smoked. He has never used smokeless tobacco. He reports that he does not drink alcohol and does not use drugs.    Medication List with Changes/Refills   Changed and/or Refilled Medications    Modified Medication Previous Medication    ACYCLOVIR (ZOVIRAX) 200 MG CAPSULE acyclovir (ZOVIRAX) 200 MG capsule       Take 1 capsule (200 mg total) by mouth 2 (two) times daily.    TAKE 1 CAPSULE(200 MG) BY MOUTH TWICE DAILY    AMLODIPINE (NORVASC) 10 MG TABLET amLODIPine (NORVASC) 10 MG tablet       Take 1 tablet (10 mg total) by mouth once daily.    TAKE 1 TABLET(10 MG) BY MOUTH EVERY DAY    ATORVASTATIN (LIPITOR) 40 MG TABLET atorvastatin (LIPITOR) 40 MG tablet       Take 1 tablet (40 mg total) by mouth every evening.    Take 1 tablet (40 mg total) by mouth every evening.    HYDROCHLOROTHIAZIDE (HYDRODIURIL) 25 MG TABLET hydroCHLOROthiazide (HYDRODIURIL) 25 MG tablet       Take 1 tablet (25 mg total) by mouth once daily.    Take 1 tablet (25 mg total) by mouth once daily.    PANTOPRAZOLE (PROTONIX) 40 MG TABLET pantoprazole (PROTONIX) 40 MG tablet       Take 1 tablet (40 mg total) by mouth once daily.    Take 1 tablet (40 mg total) by mouth once daily.     Review of patient's allergies indicates:  No Known Allergies    Patient Active Problem List   Diagnosis    Pre-diabetes    Mixed hyperlipidemia    Genital herpes    Essential hypertension    Gastroesophageal reflux disease    Pain in both knees    Tear of LCL (lateral collateral ligament) of knee, right, initial encounter    Colon polyps    Acute meniscal  "tear of right knee    Chondromalacia of right knee    Acute lateral meniscus tear of right knee           Objective:      There were no vitals taken for this visit.  Estimated body mass index is 28.06 kg/m² as calculated from the following:    Height as of 7/9/22: 5' 9" (1.753 m).    Weight as of 7/9/22: 86.2 kg (190 lb).  Physical Exam  Vitals reviewed.   Constitutional:       Appearance: Normal appearance.   HENT:      Head: Normocephalic and atraumatic.      Right Ear: External ear normal. There is no impacted cerumen.      Left Ear: External ear normal. There is no impacted cerumen.      Nose: Nose normal. No rhinorrhea.      Mouth/Throat:      Mouth: Mucous membranes are moist.   Eyes:      General: No scleral icterus.     Extraocular Movements: Extraocular movements intact.      Conjunctiva/sclera: Conjunctivae normal.      Pupils: Pupils are equal, round, and reactive to light.   Cardiovascular:      Rate and Rhythm: Normal rate and regular rhythm.      Pulses: Normal pulses.      Heart sounds: Normal heart sounds. No murmur heard.  Pulmonary:      Effort: Pulmonary effort is normal.      Breath sounds: Normal breath sounds.   Abdominal:      General: There is no distension.      Tenderness: There is no abdominal tenderness.   Musculoskeletal:         General: No deformity.      Right lower leg: No edema.      Left lower leg: No edema.   Skin:     Findings: No lesion or rash.   Neurological:      General: No focal deficit present.      Mental Status: He is alert and oriented to person, place, and time.   Psychiatric:         Mood and Affect: Mood normal.         Behavior: Behavior normal.         Thought Content: Thought content normal.       Assessment and Plan:         Diagnoses and all orders for this visit:    Annual physical exam  -     HEMOGLOBIN A1C; Future  -     LIPID PANEL; Future  -     PSA, SCREENING; Future  -     TSH; Future  -     CBC W/ AUTO DIFFERENTIAL; Future  -     COMPREHENSIVE METABOLIC " PANEL; Future    Genital herpes simplex, unspecified site  -     acyclovir (ZOVIRAX) 200 MG capsule; Take 1 capsule (200 mg total) by mouth 2 (two) times daily.    Benign essential hypertension   Patient is currently enrolled with [facility] digital health program.  Affirms medication compliance states ranges of systolic blood pressure in the 120s and diastolic in the 170s.  Will continue with same pharmacotherapy, exercise regimen and diet.                                                                                                                 -     amLODIPine (NORVASC) 10 MG tablet; Take 1 tablet (10 mg total) by mouth once daily.  -     hydroCHLOROthiazide (HYDRODIURIL) 25 MG tablet; Take 1 tablet (25 mg total) by mouth once daily.    Mixed hyperlipidemia  -patient was started on statins  January 2022, that time leaflets were slightly elevated and he was started on atorvastatin, given his overall lifestyle and adherence to medication I would anticipate change in this ears lipid panel.  -  -     atorvastatin (LIPITOR) 40 MG tablet; Take 1 tablet (40 mg total) by mouth every evening.    Erectile dysfunction, unspecified erectile dysfunction type  Patient reports having done a trial of sildenafil but discontinued secondary to gastroesophageal reflux, willing to continue.  Insurance appears not to cover, will discuss with him when I reached out regarding lab results.    Other orders  Patient with a past medical history of hiatal hernia and gastroesophageal reflux fairly currently taking pantoprazole.  Denies any symptoms after dietary modifications will continue on Protonix.  May consider changing Protonix to PRN as patient describes reflex was having heavy meals  -     pantoprazole (PROTONIX) 40 MG tablet; Take 1 tablet (40 mg total) by mouth once daily.        1 YEAR IF ALL LABS OK    Other Orders Placed This Visit:  Orders Placed This Encounter   Procedures    HEMOGLOBIN A1C    LIPID PANEL    PSA, SCREENING     TSH    CBC W/ AUTO DIFFERENTIAL    COMPREHENSIVE METABOLIC PANEL             LOC Ruth MD  PGY-1 Internal Medicine  [facility]

## 2023-01-04 LAB
ALBUMIN SERPL BCP-MCNC: 4.4 G/DL (ref 3.5–5.2)
ALP SERPL-CCNC: 86 U/L (ref 55–135)
ALT SERPL W/O P-5'-P-CCNC: 24 U/L (ref 10–44)
ANION GAP SERPL CALC-SCNC: 8 MMOL/L (ref 8–16)
AST SERPL-CCNC: 21 U/L (ref 10–40)
BASOPHILS # BLD AUTO: 0.06 K/UL (ref 0–0.2)
BASOPHILS NFR BLD: 0.7 % (ref 0–1.9)
BILIRUB SERPL-MCNC: 1.1 MG/DL (ref 0.1–1)
BUN SERPL-MCNC: 12 MG/DL (ref 8–23)
CALCIUM SERPL-MCNC: 9.7 MG/DL (ref 8.7–10.5)
CHLORIDE SERPL-SCNC: 99 MMOL/L (ref 95–110)
CHOLEST SERPL-MCNC: 157 MG/DL (ref 120–199)
CHOLEST/HDLC SERPL: 3.7 {RATIO} (ref 2–5)
CO2 SERPL-SCNC: 30 MMOL/L (ref 23–29)
COMPLEXED PSA SERPL-MCNC: 0.9 NG/ML (ref 0–4)
CREAT SERPL-MCNC: 1.1 MG/DL (ref 0.5–1.4)
DIFFERENTIAL METHOD: NORMAL
EOSINOPHIL # BLD AUTO: 0.1 K/UL (ref 0–0.5)
EOSINOPHIL NFR BLD: 1.1 % (ref 0–8)
ERYTHROCYTE [DISTWIDTH] IN BLOOD BY AUTOMATED COUNT: 12.2 % (ref 11.5–14.5)
EST. GFR  (NO RACE VARIABLE): >60 ML/MIN/1.73 M^2
ESTIMATED AVG GLUCOSE: 120 MG/DL (ref 68–131)
GLUCOSE SERPL-MCNC: 97 MG/DL (ref 70–110)
HBA1C MFR BLD: 5.8 % (ref 4–5.6)
HCT VFR BLD AUTO: 47.6 % (ref 40–54)
HDLC SERPL-MCNC: 42 MG/DL (ref 40–75)
HDLC SERPL: 26.8 % (ref 20–50)
HGB BLD-MCNC: 15.7 G/DL (ref 14–18)
IMM GRANULOCYTES # BLD AUTO: 0.04 K/UL (ref 0–0.04)
IMM GRANULOCYTES NFR BLD AUTO: 0.5 % (ref 0–0.5)
LDLC SERPL CALC-MCNC: 93.2 MG/DL (ref 63–159)
LYMPHOCYTES # BLD AUTO: 2.2 K/UL (ref 1–4.8)
LYMPHOCYTES NFR BLD: 24.8 % (ref 18–48)
MCH RBC QN AUTO: 30.4 PG (ref 27–31)
MCHC RBC AUTO-ENTMCNC: 33 G/DL (ref 32–36)
MCV RBC AUTO: 92 FL (ref 82–98)
MONOCYTES # BLD AUTO: 0.7 K/UL (ref 0.3–1)
MONOCYTES NFR BLD: 7.7 % (ref 4–15)
NEUTROPHILS # BLD AUTO: 5.8 K/UL (ref 1.8–7.7)
NEUTROPHILS NFR BLD: 65.2 % (ref 38–73)
NONHDLC SERPL-MCNC: 115 MG/DL
NRBC BLD-RTO: 0 /100 WBC
PLATELET # BLD AUTO: 273 K/UL (ref 150–450)
PMV BLD AUTO: 11.3 FL (ref 9.2–12.9)
POTASSIUM SERPL-SCNC: 4.2 MMOL/L (ref 3.5–5.1)
PROT SERPL-MCNC: 7.7 G/DL (ref 6–8.4)
RBC # BLD AUTO: 5.16 M/UL (ref 4.6–6.2)
SODIUM SERPL-SCNC: 137 MMOL/L (ref 136–145)
TRIGL SERPL-MCNC: 109 MG/DL (ref 30–150)
TSH SERPL DL<=0.005 MIU/L-ACNC: 1.23 UIU/ML (ref 0.4–4)
WBC # BLD AUTO: 8.86 K/UL (ref 3.9–12.7)

## 2023-01-05 ENCOUNTER — TELEPHONE (OUTPATIENT)
Dept: INTERNAL MEDICINE | Facility: CLINIC | Age: 63
End: 2023-01-05
Payer: COMMERCIAL

## 2023-01-05 DIAGNOSIS — N52.9 ERECTILE DYSFUNCTION, UNSPECIFIED ERECTILE DYSFUNCTION TYPE: Primary | ICD-10-CM

## 2023-01-05 NOTE — TELEPHONE ENCOUNTER
Spoke with Mr. Scott over the phone discuss results, explained that sildenafil is not covered by insurance, patient states that he will not use it any way as he has experienced adverse effects.  Discussed slightly elevated bilirubin, will recheck in 6 months.  I will order total testosterone to be drawn in a.m..

## 2023-01-06 ENCOUNTER — PATIENT MESSAGE (OUTPATIENT)
Dept: INTERNAL MEDICINE | Facility: CLINIC | Age: 63
End: 2023-01-06
Payer: COMMERCIAL

## 2023-01-06 ENCOUNTER — LAB VISIT (OUTPATIENT)
Dept: LAB | Facility: HOSPITAL | Age: 63
End: 2023-01-06
Payer: COMMERCIAL

## 2023-01-06 DIAGNOSIS — N52.9 ERECTILE DYSFUNCTION, UNSPECIFIED ERECTILE DYSFUNCTION TYPE: ICD-10-CM

## 2023-01-06 LAB — TESTOST SERPL-MCNC: 381 NG/DL (ref 304–1227)

## 2023-01-06 PROCEDURE — 84403 ASSAY OF TOTAL TESTOSTERONE: CPT

## 2023-01-06 PROCEDURE — 36415 COLL VENOUS BLD VENIPUNCTURE: CPT

## 2023-03-08 DIAGNOSIS — I10 BENIGN ESSENTIAL HYPERTENSION: ICD-10-CM

## 2023-03-08 RX ORDER — HYDROCHLOROTHIAZIDE 25 MG/1
25 TABLET ORAL DAILY
Qty: 90 TABLET | Refills: 0 | Status: CANCELLED | OUTPATIENT
Start: 2023-03-08

## 2023-04-14 ENCOUNTER — PATIENT MESSAGE (OUTPATIENT)
Dept: RESEARCH | Facility: HOSPITAL | Age: 63
End: 2023-04-14
Payer: COMMERCIAL

## 2023-05-09 ENCOUNTER — OFFICE VISIT (OUTPATIENT)
Dept: URGENT CARE | Facility: CLINIC | Age: 63
End: 2023-05-09
Payer: COMMERCIAL

## 2023-05-09 VITALS
DIASTOLIC BLOOD PRESSURE: 84 MMHG | HEIGHT: 69 IN | BODY MASS INDEX: 28.15 KG/M2 | RESPIRATION RATE: 16 BRPM | TEMPERATURE: 98 F | WEIGHT: 190.06 LBS | SYSTOLIC BLOOD PRESSURE: 131 MMHG | HEART RATE: 80 BPM | OXYGEN SATURATION: 97 %

## 2023-05-09 DIAGNOSIS — M70.52 INFRAPATELLAR BURSITIS OF LEFT KNEE: Primary | ICD-10-CM

## 2023-05-09 PROCEDURE — 99213 PR OFFICE/OUTPT VISIT, EST, LEVL III, 20-29 MIN: ICD-10-PCS | Mod: S$GLB,,, | Performed by: NURSE PRACTITIONER

## 2023-05-09 PROCEDURE — 99213 OFFICE O/P EST LOW 20 MIN: CPT | Mod: S$GLB,,, | Performed by: NURSE PRACTITIONER

## 2023-05-09 PROCEDURE — 73562 X-RAY EXAM OF KNEE 3: CPT | Mod: LT,S$GLB,, | Performed by: RADIOLOGY

## 2023-05-09 PROCEDURE — 73562 XR KNEE 3 VIEW LEFT: ICD-10-PCS | Mod: LT,S$GLB,, | Performed by: RADIOLOGY

## 2023-05-09 RX ORDER — CARBINOXAMINE MALEATE 6 MG/1
6 TABLET ORAL
COMMUNITY
Start: 2023-03-22 | End: 2023-05-24

## 2023-05-09 NOTE — PROGRESS NOTES
"Subjective:      Patient ID: Royer Castillo is a 62 y.o. male.    Vitals:  height is 5' 9" (1.753 m) and weight is 86.2 kg (190 lb 0.6 oz). His tympanic temperature is 97.9 °F (36.6 °C). His blood pressure is 131/84 and his pulse is 80. His respiration is 16 and oxygen saturation is 97%.     Chief Complaint: Joint Swelling    63 yo male presents with left knee swelling for 2-3 weeks. Noticing increased swelling over past week with stiffness and intermittent mild pain. Denies pain at rest, pain with increased ambulation. Taking ibuprofen when needed with good relief of discomfort.     Knee Pain   The incident occurred more than 1 week ago. There was no injury mechanism. The pain is present in the left knee. The quality of the pain is described as aching. The pain is at a severity of 5/10. The pain is moderate. Pertinent negatives include no inability to bear weight, loss of motion, loss of sensation, muscle weakness, numbness or tingling. He reports no foreign bodies present. The symptoms are aggravated by movement. He has tried NSAIDs for the symptoms. The treatment provided mild relief.     Neurological:  Negative for numbness.    Objective:     Physical Exam   Constitutional: He does not appear ill. No distress.   Cardiovascular: Normal rate, regular rhythm, normal heart sounds and normal pulses.   Pulmonary/Chest: Effort normal and breath sounds normal. No respiratory distress.   Abdominal: Normal appearance.   Musculoskeletal:         General: Swelling (infrapatellar swelling w/o erythema or warmth, no ttp) present.   Neurological: He is alert.   Nursing note and vitals reviewed.    XR KNEE 3 VIEW LEFT    Result Date: 5/9/2023  EXAMINATION: XR KNEE 3 VIEW LEFT CLINICAL HISTORY: Other bursitis of knee, left knee TECHNIQUE: AP, lateral, and Merchant views of the left knee were performed. COMPARISON: None FINDINGS: The bones are intact.  There is no evidence for acute fracture or bone destruction.  There are " degenerative changes with spurring of the tibial spines with osteophytes at the femorotibial and patellofemoral joints.  There is mild to moderate narrowing of the medial compartment of the left femorotibial joint.  There is soft tissue swelling anterior to the tibial tubercle suggestive of infrapatellar bursitis.  No radiopaque soft tissue foreign bodies are identified.     No evidence for acute fracture, bone destruction, or dislocation. Degenerative changes with mild to moderate narrowing of the medial left femorotibial joint. Soft tissue swelling anterior to the tibial tubercle suggestive of infrapatellar bursitis. Electronically signed by: Georges Matt MD Date:    05/09/2023 Time:    12:38     Assessment:     1. Infrapatellar bursitis of left knee        Plan:     Infrapatellar bursitis of left knee  -     XR KNEE 3 VIEW LEFT; Future; Expected date: 05/09/2023  -     Ambulatory referral/consult to Orthopedics  -     leg brace Misc; 1 Units by Misc.(Non-Drug; Combo Route) route daily as needed (pain).  Dispense: 1 each; Refill: 0    Ice area. Continue ibuprofen as directed. Follow up with orthopedics.

## 2023-05-09 NOTE — PATIENT INSTRUCTIONS
Continue ibuprofen as directed  Follow up with orthopedics    - Follow up with your PCP or specialty clinic as directed in the next 1-2 weeks if not improved or as needed.  You can call [phone] to schedule an appointment with the appropriate provider.    - Go to the ER or seek medical attention immediately if you develop new or worsening symptoms.     - You must understand that you have received an Urgent Care treatment only and that you may be released before all of your medical problems are known or treated.   - You, the patient, will arrange for follow up care as instructed.   - If your condition worsens or fails to improve we recommend that you receive another evaluation at the ER immediately or contact your PCP to discuss your concerns or return here.

## 2023-05-15 ENCOUNTER — OFFICE VISIT (OUTPATIENT)
Dept: SPORTS MEDICINE | Facility: CLINIC | Age: 63
End: 2023-05-15
Payer: COMMERCIAL

## 2023-05-15 ENCOUNTER — TELEPHONE (OUTPATIENT)
Dept: SPORTS MEDICINE | Facility: CLINIC | Age: 63
End: 2023-05-15
Payer: COMMERCIAL

## 2023-05-15 VITALS
WEIGHT: 194.88 LBS | HEART RATE: 70 BPM | BODY MASS INDEX: 28.78 KG/M2 | SYSTOLIC BLOOD PRESSURE: 126 MMHG | DIASTOLIC BLOOD PRESSURE: 85 MMHG

## 2023-05-15 DIAGNOSIS — M25.562 ACUTE PAIN OF LEFT KNEE: Primary | ICD-10-CM

## 2023-05-15 DIAGNOSIS — M70.52 INFRAPATELLAR BURSITIS OF LEFT KNEE: ICD-10-CM

## 2023-05-15 PROCEDURE — 99999 PR PBB SHADOW E&M-EST. PATIENT-LVL III: CPT | Mod: PBBFAC,,, | Performed by: PHYSICIAN ASSISTANT

## 2023-05-15 PROCEDURE — 1159F MED LIST DOCD IN RCRD: CPT | Mod: CPTII,S$GLB,, | Performed by: PHYSICIAN ASSISTANT

## 2023-05-15 PROCEDURE — 99213 PR OFFICE/OUTPT VISIT, EST, LEVL III, 20-29 MIN: ICD-10-PCS | Mod: 25,S$GLB,, | Performed by: PHYSICIAN ASSISTANT

## 2023-05-15 PROCEDURE — 20610 DRAIN/INJ JOINT/BURSA W/O US: CPT | Mod: LT,S$GLB,, | Performed by: PHYSICIAN ASSISTANT

## 2023-05-15 PROCEDURE — 3079F PR MOST RECENT DIASTOLIC BLOOD PRESSURE 80-89 MM HG: ICD-10-PCS | Mod: CPTII,S$GLB,, | Performed by: PHYSICIAN ASSISTANT

## 2023-05-15 PROCEDURE — 1160F RVW MEDS BY RX/DR IN RCRD: CPT | Mod: CPTII,S$GLB,, | Performed by: PHYSICIAN ASSISTANT

## 2023-05-15 PROCEDURE — 3008F PR BODY MASS INDEX (BMI) DOCUMENTED: ICD-10-PCS | Mod: CPTII,S$GLB,, | Performed by: PHYSICIAN ASSISTANT

## 2023-05-15 PROCEDURE — 3074F SYST BP LT 130 MM HG: CPT | Mod: CPTII,S$GLB,, | Performed by: PHYSICIAN ASSISTANT

## 2023-05-15 PROCEDURE — 1159F PR MEDICATION LIST DOCUMENTED IN MEDICAL RECORD: ICD-10-PCS | Mod: CPTII,S$GLB,, | Performed by: PHYSICIAN ASSISTANT

## 2023-05-15 PROCEDURE — 3044F PR MOST RECENT HEMOGLOBIN A1C LEVEL <7.0%: ICD-10-PCS | Mod: CPTII,S$GLB,, | Performed by: PHYSICIAN ASSISTANT

## 2023-05-15 PROCEDURE — 1160F PR REVIEW ALL MEDS BY PRESCRIBER/CLIN PHARMACIST DOCUMENTED: ICD-10-PCS | Mod: CPTII,S$GLB,, | Performed by: PHYSICIAN ASSISTANT

## 2023-05-15 PROCEDURE — 99213 OFFICE O/P EST LOW 20 MIN: CPT | Mod: 25,S$GLB,, | Performed by: PHYSICIAN ASSISTANT

## 2023-05-15 PROCEDURE — 3079F DIAST BP 80-89 MM HG: CPT | Mod: CPTII,S$GLB,, | Performed by: PHYSICIAN ASSISTANT

## 2023-05-15 PROCEDURE — 99999 PR PBB SHADOW E&M-EST. PATIENT-LVL III: ICD-10-PCS | Mod: PBBFAC,,, | Performed by: PHYSICIAN ASSISTANT

## 2023-05-15 PROCEDURE — 3074F PR MOST RECENT SYSTOLIC BLOOD PRESSURE < 130 MM HG: ICD-10-PCS | Mod: CPTII,S$GLB,, | Performed by: PHYSICIAN ASSISTANT

## 2023-05-15 PROCEDURE — 3008F BODY MASS INDEX DOCD: CPT | Mod: CPTII,S$GLB,, | Performed by: PHYSICIAN ASSISTANT

## 2023-05-15 PROCEDURE — 20610 LARGE JOINT ASPIRATION/INJECTION: L PATELLAR BURSA: ICD-10-PCS | Mod: LT,S$GLB,, | Performed by: PHYSICIAN ASSISTANT

## 2023-05-15 PROCEDURE — 3044F HG A1C LEVEL LT 7.0%: CPT | Mod: CPTII,S$GLB,, | Performed by: PHYSICIAN ASSISTANT

## 2023-05-15 NOTE — TELEPHONE ENCOUNTER
Called and spoke to patient.  He is scheduled for left hip injection on 5/24/23 with Dr. Rodriguez

## 2023-05-15 NOTE — TELEPHONE ENCOUNTER
Attempted to contact pt. Left voicemail. Confirmed appt 05/24/23 c Dr. Rodriguez for L hip eval. Asked pt to return call to clinic at [phone] c additional questions/concerns.

## 2023-05-15 NOTE — PROGRESS NOTES
CC: Left knee pain    Patient is a 62-year-old male who presents today for initial evaluation of left knee pain.  He has a history of right knee arthroscopy with Dr. Gerard Tello in 2021. He was referred to me after being seen in urgent care.  Patient reports that he initially began experiencing left anterior knee pain/swelling approximately 6-8 weeks ago.  He is unaware of any specific mechanism of injury or trauma to attribute to this, however he does work as a  and may have bumped or hit his knee on something while at work or at home.  Swelling has been progressively worsening.  Pain is typically worse with knee flexion or when kneeling.  He denies any associated instability or mechanical popping/catching in the left knee.  Thus far treatment has included over-the-counter anti-inflammatory medications, compression sleeve wear, and ice compresses with limited relief.  No prior injuries or surgeries on his left knee.    - mechanical symptoms, - instability    Is affecting ADLs.  Pain is 5/10 at it's worst.    REVIEW OF SYSTEMS:  Constitution: Negative. Negative for chills, fever and night sweats.   HENT: Negative for congestion and headaches.    Eyes: Negative for blurred vision, left vision loss and right vision loss.   Cardiovascular: Negative for chest pain and syncope.   Respiratory: Negative for cough and shortness of breath.    Endocrine: Negative for polydipsia, polyphagia and polyuria.   Hematologic/Lymphatic: Negative for bleeding problem. Does not bruise/bleed easily.   Skin: Negative for dry skin, itching and rash.   Musculoskeletal: Negative for falls. Positive for left knee pain and  muscle weakness.   Gastrointestinal: Negative for abdominal pain and bowel incontinence.   Genitourinary: Negative for bladder incontinence and nocturia.   Neurological: Negative for disturbances in coordination, loss of balance and seizures.   Psychiatric/Behavioral: Negative for depression. The patient does not  have insomnia.    Allergic/Immunologic: Negative for hives and persistent infections.     PAST MEDICAL HISTORY:    Past Medical History:   Diagnosis Date    Cellulitis     left foot    Diverticulosis     Genital herpes     GERD (gastroesophageal reflux disease)     HTN (hypertension)     Hyperlipidemia     Left atrial dilation     Onychomycosis     Pre-diabetes        PAST SURGICAL HISTORY:   Past Surgical History:   Procedure Laterality Date    ARTHROSCOPIC CHONDROPLASTY OF KNEE JOINT Right 5/27/2021    Procedure: ARTHROSCOPY, KNEE, WITH CHONDROPLASTY;  Surgeon: Gerard Tello MD;  Location: [facility] OR;  Service: Orthopedics;  Laterality: Right;    COLONOSCOPY N/A 12/11/2019    Procedure: COLONOSCOPY;  Surgeon: Devaughn Johnson MD;  Location: [facility] (4TH FLR);  Service: Endoscopy;  Laterality: N/A;    ESOPHAGOGASTRODUODENOSCOPY N/A 3/2/2022    Procedure: EGD (ESOPHAGOGASTRODUODENOSCOPY);  Surgeon: John Rose MD;  [facility]  Service: Endoscopy;  Laterality: N/A;  1/27 covid test 2/27 @ [facility]; instructions to portal-st  2/25 lv to confirm appt-RB    KNEE ARTHROSCOPY W/ MENISCECTOMY Right 5/27/2021    Procedure: ARTHROSCOPY, KNEE, WITH MENISCECTOMY;  Surgeon: Gerard Tello MD;  Location: [facility] OR;  Service: Orthopedics;  Laterality: Right;  JOHNATHAN 50CC    SYNOVECTOMY OF KNEE Right 5/27/2021    Procedure: SYNOVECTOMY, KNEE limited;  Surgeon: Gerard Tello MD;  Location: [facility];  Service: Orthopedics;  Laterality: Right;       FAMILY HISTORY:   Family History   Problem Relation Age of Onset    Cancer Mother     Esophageal cancer Father     Heart disease Father 86    Heart disease Sister 64    Hyperlipidemia Sister     Hyperlipidemia Sister     Hypertension Sister     Colon cancer Neg Hx        SOCIAL HISTORY:   Social History     Socioeconomic History    Marital status:    Occupational History    Occupation:      Employer: [employer]   Tobacco Use    Smoking status:  Never    Smokeless tobacco: Never   Substance and Sexual Activity    Alcohol use: No    Drug use: No    Sexual activity: Yes     Partners: Female   Social History Narrative    Lives w/ wife and daughter - 24 y/o.      Social Determinants of Health     Financial Resource Strain: Low Risk     Difficulty of Paying Living Expenses: Not hard at all   Food Insecurity: No Food Insecurity    Worried About Running Out of Food in the Last Year: Never true    Ran Out of Food in the Last Year: Never true   Transportation Needs: No Transportation Needs    Lack of Transportation (Medical): No    Lack of Transportation (Non-Medical): No   Physical Activity: Unknown    Days of Exercise per Week: 6 days   Stress: No Stress Concern Present    Feeling of Stress : Not at all   Social Connections: Unknown    Frequency of Communication with Friends and Family: More than three times a week    Frequency of Social Gatherings with Friends and Family: Three times a week    Active Member of Clubs or Organizations: No    Attends Club or Organization Meetings: Never    Marital Status:    Housing Stability: Low Risk     Unable to Pay for Housing in the Last Year: No    Number of Places Lived in the Last Year: 1    Unstable Housing in the Last Year: No       MEDICATIONS:     Current Outpatient Medications:     acyclovir (ZOVIRAX) 200 MG capsule, Take 1 capsule (200 mg total) by mouth 2 (two) times daily., Disp: 60 capsule, Rfl: 11    amLODIPine (NORVASC) 10 MG tablet, Take 1 tablet (10 mg total) by mouth once daily., Disp: 360 tablet, Rfl: 0    atorvastatin (LIPITOR) 40 MG tablet, Take 1 tablet (40 mg total) by mouth every evening., Disp: 90 tablet, Rfl: 3    hydroCHLOROthiazide (HYDRODIURIL) 25 MG tablet, TAKE 1 TABLET(25 MG) BY MOUTH EVERY DAY, Disp: 90 tablet, Rfl: 0    leg brace Misc, 1 Units by Misc.(Non-Drug; Combo Route) route daily as needed (pain)., Disp: 1 each, Rfl: 0    pantoprazole (PROTONIX) 40 MG tablet, Take 1 tablet (40 mg  total) by mouth once daily., Disp: 90 tablet, Rfl: 3    carbinoxamine maleate 6 mg Tab, Take 6 mg by mouth., Disp: , Rfl:     ALLERGIES:   Review of patient's allergies indicates:  No Known Allergies    VITAL SIGNS:   /85   Pulse 70   Wt 88.4 kg (194 lb 14.2 oz)   BMI 28.78 kg/m²      PHYSICAL EXAMINATION  General:  The patient is alert and oriented x 3.  Mood is pleasant.  Observation of ears, eyes and nose reveal no gross abnormalities.  No labored breathing observed.    LEFT KNEE EXAMINATION     OBSERVATION / INSPECTION   Gait:   Nonantalgic   Alignment:  Neutral   Scars:   None   Muscle atrophy: Mild  Effusion:  Significant swelling overlying infrapatellar bursa/tibial tubercle without any signs of warmth, erythema, or visible wound  Warmth:  None   Discoloration:   none     TENDERNESS / CREPITUS (T / C):          T / C      T / C   Patella   - / -   Lateral joint line   - / -   Peripatellar medial  -  Medial joint line    - / -   Peripatellar lateral -  Medial plica   - / -   Patellar tendon +   Popliteal fossa   - / -   Quad tendon   -   Gastrocnemius   -   Prepatellar Bursa - / -   Quadricep   -   Tibial tubercle  +  Thigh/hamstring  -   Pes anserine/HS -  Fibula    -   ITB   - / -  Tibia     -   Tib/fib joint  - / -  LCL    -     MFC   - / -   MCL: Proximal  -    LFC   - / -    Distal   -          ROM: (* = pain)  PASSIVE   ACTIVE    Left :   5 / 0 / 100*   5 / 0 / 100*     Right :    5 / 0 / 145   5 / 0 / 145    Patellofemoral examination:  See above noted areas of tenderness.   Patella position    Subluxation / dislocation: Centered           Sup. / Inf;   Normal   Crepitus (PF):    Absent   Patellar Mobility:       Medial-lateral:   Normal    Superior-inferior:  Normal    Inferior tilt   Normal    Patellar tendon:  Normal   Lateral tilt:    Normal   J-sign:     None   Patellofemoral grind:   No pain       MENISCAL SIGNS:     Pain on terminal extension:  -  Pain on terminal  flexion:  +  Denas maneuver:  - (for pain)  Squat     deferred    LIGAMENT EXAMINATION:  ACL / Lachman:  normal (-1 to 2mm)    PCL-Post.  drawer: normal 0 to 2mm  MCL- Valgus:  normal 0 to 2mm  LCL- Varus:  normal 0 to 2mm  Pivot shift: normal (Equal)   Dial Test: difference c/w other side   At 30° flexion: normal (< 5°)    At 90° flexion: normal (< 5°)   Reverse Pivot Shift:   normal (Equal)     STRENGTH: (* = with pain) PAINFUL SIDE   Quadricep   5/5   Hamstrin/5    EXTREMITY NEURO-VASCULAR EXAMINATION:   Sensation:  Grossly intact to light touch all dermatomal regions.   Motor Function:  Fully intact motor function at hip, knee, foot and ankle    DTRs;  quadriceps and  achilles 2+.  No clonus and downgoing Babinski.    Vascular status:  DP and PT pulses 2+, brisk capillary refill, symmetric.     OTHER FINDINGS:  + pain with SLR    X-rays (2023):      Impression:     No evidence for acute fracture, bone destruction, or dislocation.     Degenerative changes with mild to moderate narrowing of the medial left femorotibial joint.     Soft tissue swelling anterior to the tibial tubercle suggestive of infrapatellar bursitis.       ASSESSMENT:    Left knee pain  Infrapatellar bursitis, left knee    PLAN:     Prior imaging reviewed and discussed with patient in detail.    We discussed at length different treatment options including conservative vs surgical management. These include anti-inflammatories, acetaminophen, rest, ice, compression, heat, formal physical therapy including strengthening and stretching exercises, home exercise programs, dry needling, and finally surgical intervention.      32 cc of normal joint fluid aspirated from the left infra patellar bursa.  See procedure note for details.    Continue to rest, ice, and elevate the left knee and take over-the-counter anti-inflammatories/acetaminophen as needed for pain.  Continue compression wear for the next few days.    Patient also has pain  of the left hip.  On x-ray review, he has moderate to severe left hip osteoarthritis.  Patient is interested in proceeding with an intra-articular corticosteroid injection.  Message sent to Dr. Rodriguez's staff to schedule this.    Patient provided with a note for work restricting him to light/desk duty for the next 3 days.    Follow-up with me as needed.      All questions were answered, pt will contact us for questions or concerns in the interim.      Medical Dictation software was used during the dictation of portions or the entirety of this medical record.  Phonetic or grammatic errors may exist due to the use of this software. For clarification, refer to the author of the document.

## 2023-05-15 NOTE — TELEPHONE ENCOUNTER
----- Message from Brenda Gentile sent at 5/15/2023  9:16 AM CDT -----  Regarding: RE: Left hip intra-articular injection  Sure no problem! We'll give him a call and get him scheduled ASAP.     MARTI Earnestine Annalee has started and will now be working with Dr. Rodriguez as his SMA.     Thanks,  Brenda     ----- Message -----  From: Fernandez Dougherty PA-C  Sent: 5/15/2023   9:13 AM CDT  To: Brenda Gentile  Subject: Left hip intra-articular injection               Alton Gutierrez,    I saw this patient today in clinic for his left knee. He is also having some left hip pain and looking at his x-rays from 2021 he has pretty severe OA. He is a  and interested in having an ultrasound guided intra-articular hip injection with Dr. ZAZUETA. Would you please set him up for this?    Thanks,    Fernandez

## 2023-05-15 NOTE — LETTER
Patient: Royer Castillo   YOB: 1960   Clinic Number: [id number]   Today's Date: May 15, 2023        Certificate to Return to Work     Royer Castillo was seen by Fernandez Dougherty PA-C on 5/15/2023.    To Whom It May Concern:    Royer Castillo can return to work on 5/15/23 with light duty until 5/18/23.    Specific restrictions: no climbing, lifting, pulling or pushing    If you have any questions or concerns, please feel free to contact the office at [phone].    Thank you.    Fernandez Dougherty PA-C                       Signature: __________________________________________________

## 2023-05-15 NOTE — PROCEDURES
Large Joint Aspiration/Injection: L patellar bursa    Date/Time: 5/15/2023 9:00 AM  Performed by: Fernandez Dougherty PA-C  Authorized by: Fernandez Dougherty PA-C     Consent Done?:  Yes (Verbal)  Indications:  Joint swelling  Site marked: the procedure site was marked    Timeout: prior to procedure the correct patient, procedure, and site was verified    Prep: patient was prepped and draped in usual sterile fashion    Local anesthesia used?: No      Details:  Needle Size:  22 G  Ultrasonic Guidance for needle placement?: No    Approach:  Anterolateral  Location:  Knee  Site:  L patellar bursa  Aspirate amount (mL):  32  Aspirate:  Clear and yellow  Patient tolerance:  Patient tolerated the procedure well with no immediate complications    Aspiration Procedure  A time out was performed, including verification of patient ID, procedure, site and side, availability of information and equipment, review of safety issues, and agreement with consent, the procedure site was marked.    After time out was performed, the patient was prepped aseptically with povidone-iodine swabsticks. 35cc's of normal joint fluid were aspirated from the Anterior  aspect of the left infrapatellar bursa using an 22g x 1.5 needle in sterile fashion without complication. Bandage was applied.     Royer Castillo was reminded to rest with RICE for 48 hours post injection and to call the clinic immediately for any adverse side effects as explained in clinic today.

## 2023-05-22 NOTE — PROGRESS NOTES
HISTORY OF PRESENT ILLNESS   Royer Castillo, a 62 y.o. male, presents today for evaluation of his left HIP.    Patient reports onset of chronic pain beginning several years ago.. Patient reports no known injury or trauma however he is a . Pain is located along anterior, posterior, and lateral aspect of HIP. Pain is mild at rest & up to 7/10 with provacative activity including all activity including ADLs. Pain is described as throbbing and pounding  & occurs  Patient states pain does not radiate.     Associated symptoms include stiffness, decreased ROM, instability, weakness, locking, and crepitus . Pain is aggravated by activities above & occur constantly. Symptoms does interfere with sleep. Patient reports pain & symptoms are getting worse . Patient reports no prior surgery to HIP.     Prior treatment Royer Castillo has tried   OTC Acetaminophen - Yes  OTC NSAID - Yes - aleve   Rx NSAID - No   Rx Narcotic/Other - No   Brace - No   Injection - Cortisone - No   Injection - Biologics - No   Activity Modification - Yes  Physical Therapy - No   Home Exercise Program - Yes  Assistive Device - No  Other - topical creams    Review of systems (ROS):  A 10+ review of systems was performed with pertinent positives and negatives noted above in the history of present illness. Other systems were negative unless otherwise specified.    PHYSICAL EXAMINATION  General:  The patient is alert and oriented x 3.  Mood is pleasant.  Observation of ears, eyes and nose reveal no gross abnormalities.  HEENT: NCAT, sclera nonicteric  Lungs: Respirations are equal and unlabored.   Gait is coordinated. Patient can toe walk and heel walk without difficulty.    HIP/PELVIS EXAMINATION    Observation/Inspection  Gait:   Antalgic   Alignment:  Neutral   Scars:   None   Muscle atrophy: None   Effusion:  None   Warmth:  None   Discoloration:   None   Leg lengths:   Equal   Pelvis:    Level     Tenderness/Crepitus (T/C):      T  / C  Trochanteric bursa   - / -  Piriformis    - / -  SI joint    - / -  Psoas tendon   - / -  Rectus insertion  - / -  Adductor insertion  - / -  Pubic symphysis  - / -    ROM: (* = pain)    Flexion:      110 degrees*  External rotation:   40 degrees*  Internal rotation with axial load:  30 degrees*  Internal rotation without axial load:  40 degrees*  Abduction:    45 degrees  Adduction:     20 degrees    Special Tests:  Pain w/ forced internal rotation (FADIR):  +  Pain w/ forced external rotation (CAN):  +  Circumduction test:     -  Stinchfield test:     -   Log roll:       +  Snapping hip (internal):    -   Sit-up pain:      -   Resisted sit-up pain:     -   Resisted sit-up with adductor contraction pain:  -   Step-down test:     +  Trendelenburg test:     -  Bridge test      +     Extremity Neuro-vascular Examination:   Sensation:  Grossly intact to light touch all dermatomal regions.   Motor Function:  Fully intact motor function at hip, knee, foot and ankle    DTRs;  quadriceps and  achilles 2+.  No clonus and downgoing Babinski.    Vascular status:  DP and PT pulses 2+, brisk capillary refill, symmetric.    Skin:  intact, compartments soft.    Other Findings:    ASSESSMENT & PLAN  Assessment  #1 Tonnis Grade IV osteoarthritis of hip, left    No evidence of neurologic pathology  No evidence of vascular pathology    Imaging studies reviewed:  X-ray pelvis and hip, left 02/22/21    Plan  We discussed the importance of appropriate diet, weight, and regular exercise    We discussed options including    Watchful waiting / relative rest    Physical therapy    Injection therapy CSI iaHip   Consultation    The patient chooses As above   x = prescribed  CSI = corticosteroid injection  VSI = viscosupplement injection  PRPI = platelet rich plasma injection  ia = intra articular  R = right  L = left  B = bilateral   nfSx = surgical consultation was recommended, but patient is not interested in consultation at this  time    Physical Therapy        Formal (fPT), @ [facility] facility    Formal (fPT), @ [facility] facility        Homegoing (hgPT), per concurrent fPT recommendations    Homegoing (hgPT), per prior fPT recommendations    Homegoing (hgPT), handout provided        w/  (atPT)    [blank] = not prescribed  x = prescribed  b = prescribed, and begin as indicated  t = continue as indicated  r = prescribed, and restart as indicated  p = completed prior as indicated  hs = prescribed, and with high school   col = prescribed, and with [facility] or university   nfPT = physical therapy was recommended, but patient is not interested in PT at this time    Activity (e.g. sports, work) restrictions    [blank] = as tolerated  pt = per physical therapist  at = per   NWB = non weight bearing on affected lower extremity, with crutches assistance for ambulation    Bracing    [blank] = not prescribed  r = recommended, but not fit with at todays visit  f = prescribed and fit with at todays visit  t = continue as indicated  d = d/c  p = as needed  rare = use on rare, as-needed basis; advised against chronic use    Pain management    [blank] = No prescription necessary. A handout detailing dosing of appropriate   over-the-counter musculoskeletal analgesics was made available to the patient.   m = meloxicam x 14 days  mp = 14 day course of meloxicam prescribed prior    Follow up 12w   [blank] = as needed  [number] = in [number] weeks  CSI = for corticosteroid injection  VSI = for viscosupplement injection or injection series  PRP = for platelet rich plasma injection or injection series  MRI = after MRI imaging  ns = should surgical options be deferred (no surgery)  o = appointment offered, deferred by patient    Should symptoms worsen or fail to resolve, consider    Revisiting the above options and / or CSI lat glut tendon     Vocation:

## 2023-05-23 ENCOUNTER — PATIENT MESSAGE (OUTPATIENT)
Dept: SPORTS MEDICINE | Facility: CLINIC | Age: 63
End: 2023-05-23
Payer: COMMERCIAL

## 2023-05-24 ENCOUNTER — OFFICE VISIT (OUTPATIENT)
Dept: SPORTS MEDICINE | Facility: CLINIC | Age: 63
End: 2023-05-24
Payer: COMMERCIAL

## 2023-05-24 VITALS — BODY MASS INDEX: 28.78 KG/M2 | TEMPERATURE: 98 F | HEIGHT: 69 IN

## 2023-05-24 DIAGNOSIS — M16.12 PRIMARY OSTEOARTHRITIS OF LEFT HIP: Primary | ICD-10-CM

## 2023-05-24 PROCEDURE — 99214 PR OFFICE/OUTPT VISIT, EST, LEVL IV, 30-39 MIN: ICD-10-PCS | Mod: 25,S$GLB,, | Performed by: FAMILY MEDICINE

## 2023-05-24 PROCEDURE — 3044F PR MOST RECENT HEMOGLOBIN A1C LEVEL <7.0%: ICD-10-PCS | Mod: CPTII,S$GLB,, | Performed by: FAMILY MEDICINE

## 2023-05-24 PROCEDURE — 1160F RVW MEDS BY RX/DR IN RCRD: CPT | Mod: CPTII,S$GLB,, | Performed by: FAMILY MEDICINE

## 2023-05-24 PROCEDURE — 1159F MED LIST DOCD IN RCRD: CPT | Mod: CPTII,S$GLB,, | Performed by: FAMILY MEDICINE

## 2023-05-24 PROCEDURE — 20611 LARGE JOINT ASPIRATION/INJECTION: L HIP JOINT: ICD-10-PCS | Mod: LT,S$GLB,, | Performed by: FAMILY MEDICINE

## 2023-05-24 PROCEDURE — 99999 PR PBB SHADOW E&M-EST. PATIENT-LVL III: CPT | Mod: PBBFAC,,, | Performed by: FAMILY MEDICINE

## 2023-05-24 PROCEDURE — 1160F PR REVIEW ALL MEDS BY PRESCRIBER/CLIN PHARMACIST DOCUMENTED: ICD-10-PCS | Mod: CPTII,S$GLB,, | Performed by: FAMILY MEDICINE

## 2023-05-24 PROCEDURE — 1159F PR MEDICATION LIST DOCUMENTED IN MEDICAL RECORD: ICD-10-PCS | Mod: CPTII,S$GLB,, | Performed by: FAMILY MEDICINE

## 2023-05-24 PROCEDURE — 99999 PR PBB SHADOW E&M-EST. PATIENT-LVL III: ICD-10-PCS | Mod: PBBFAC,,, | Performed by: FAMILY MEDICINE

## 2023-05-24 PROCEDURE — 3008F PR BODY MASS INDEX (BMI) DOCUMENTED: ICD-10-PCS | Mod: CPTII,S$GLB,, | Performed by: FAMILY MEDICINE

## 2023-05-24 PROCEDURE — 99214 OFFICE O/P EST MOD 30 MIN: CPT | Mod: 25,S$GLB,, | Performed by: FAMILY MEDICINE

## 2023-05-24 PROCEDURE — 3008F BODY MASS INDEX DOCD: CPT | Mod: CPTII,S$GLB,, | Performed by: FAMILY MEDICINE

## 2023-05-24 PROCEDURE — 3044F HG A1C LEVEL LT 7.0%: CPT | Mod: CPTII,S$GLB,, | Performed by: FAMILY MEDICINE

## 2023-05-24 PROCEDURE — 20611 DRAIN/INJ JOINT/BURSA W/US: CPT | Mod: LT,S$GLB,, | Performed by: FAMILY MEDICINE

## 2023-05-24 RX ORDER — TRIAMCINOLONE ACETONIDE 40 MG/ML
40 INJECTION, SUSPENSION INTRA-ARTICULAR; INTRAMUSCULAR
Status: DISCONTINUED | OUTPATIENT
Start: 2023-05-24 | End: 2023-05-24 | Stop reason: HOSPADM

## 2023-05-24 RX ADMIN — TRIAMCINOLONE ACETONIDE 40 MG: 40 INJECTION, SUSPENSION INTRA-ARTICULAR; INTRAMUSCULAR at 10:05

## 2023-05-24 NOTE — PROCEDURES
"Large Joint Aspiration/Injection: L hip joint    Date/Time: 5/24/2023 10:15 AM  Performed by: Harjeet Rodriguez MD  Authorized by: Harjeet Rodriguez MD     Consent Done?:  Yes (Verbal)  Indications:  Pain  Site marked: the procedure site was marked    Timeout: prior to procedure the correct patient, procedure, and site was verified    Prep: patient was prepped and draped in usual sterile fashion    Local anesthesia used?: No      Details:  Needle Size:  20 G  Ultrasonic Guidance for needle placement?: Yes    Images are saved and documented.  Approach:  Anterior  Location:  Hip  Site:  L hip joint  Medications:  40 mg triamcinolone acetonide 40 mg/mL  Patient tolerance:  Patient tolerated the procedure well with no immediate complications     Description of ultrasound utilization for needle guidance:   Ultrasound guidance used for needle localization. Images saved and stored for documentation. The hip joint was visualized. Dynamic visualization of the 20g x 6.0" needle was continuous throughout the procedure.    Precautionary:  The patient's femoral artery, vein, and nerve were identified, marked with a skin marker, and visualized throughout the procedure, so as to avoid needle contact with those structures.   "

## 2023-06-08 ENCOUNTER — PATIENT MESSAGE (OUTPATIENT)
Dept: INTERNAL MEDICINE | Facility: CLINIC | Age: 63
End: 2023-06-08
Payer: COMMERCIAL

## 2023-06-08 DIAGNOSIS — I10 BENIGN ESSENTIAL HYPERTENSION: ICD-10-CM

## 2023-06-10 RX ORDER — HYDROCHLOROTHIAZIDE 25 MG/1
25 TABLET ORAL DAILY
Qty: 90 TABLET | Refills: 0 | Status: SHIPPED | OUTPATIENT
Start: 2023-06-10 | End: 2023-09-14 | Stop reason: SDUPTHER

## 2023-09-14 ENCOUNTER — OFFICE VISIT (OUTPATIENT)
Dept: INTERNAL MEDICINE | Facility: CLINIC | Age: 63
End: 2023-09-14
Payer: COMMERCIAL

## 2023-09-14 ENCOUNTER — IMMUNIZATION (OUTPATIENT)
Dept: INTERNAL MEDICINE | Facility: CLINIC | Age: 63
End: 2023-09-14
Payer: COMMERCIAL

## 2023-09-14 VITALS
DIASTOLIC BLOOD PRESSURE: 86 MMHG | SYSTOLIC BLOOD PRESSURE: 142 MMHG | BODY MASS INDEX: 29.12 KG/M2 | HEIGHT: 69 IN | OXYGEN SATURATION: 97 % | HEART RATE: 80 BPM | WEIGHT: 196.63 LBS

## 2023-09-14 DIAGNOSIS — E78.2 MIXED HYPERLIPIDEMIA: ICD-10-CM

## 2023-09-14 DIAGNOSIS — R73.03 PRE-DIABETES: ICD-10-CM

## 2023-09-14 DIAGNOSIS — I10 BENIGN ESSENTIAL HYPERTENSION: Primary | ICD-10-CM

## 2023-09-14 PROCEDURE — 99214 PR OFFICE/OUTPT VISIT, EST, LEVL IV, 30-39 MIN: ICD-10-PCS | Mod: S$GLB,,, | Performed by: PHYSICIAN ASSISTANT

## 2023-09-14 PROCEDURE — 3077F SYST BP >= 140 MM HG: CPT | Mod: CPTII,S$GLB,, | Performed by: PHYSICIAN ASSISTANT

## 2023-09-14 PROCEDURE — 99999 PR PBB SHADOW E&M-EST. PATIENT-LVL IV: CPT | Mod: PBBFAC,,, | Performed by: PHYSICIAN ASSISTANT

## 2023-09-14 PROCEDURE — 3079F PR MOST RECENT DIASTOLIC BLOOD PRESSURE 80-89 MM HG: ICD-10-PCS | Mod: CPTII,S$GLB,, | Performed by: PHYSICIAN ASSISTANT

## 2023-09-14 PROCEDURE — 3079F DIAST BP 80-89 MM HG: CPT | Mod: CPTII,S$GLB,, | Performed by: PHYSICIAN ASSISTANT

## 2023-09-14 PROCEDURE — 3077F PR MOST RECENT SYSTOLIC BLOOD PRESSURE >= 140 MM HG: ICD-10-PCS | Mod: CPTII,S$GLB,, | Performed by: PHYSICIAN ASSISTANT

## 2023-09-14 PROCEDURE — G0008 FLU VACCINE (QUAD) GREATER THAN OR EQUAL TO 3YO PRESERVATIVE FREE IM: ICD-10-PCS | Mod: S$GLB,,, | Performed by: INTERNAL MEDICINE

## 2023-09-14 PROCEDURE — 3008F PR BODY MASS INDEX (BMI) DOCUMENTED: ICD-10-PCS | Mod: CPTII,S$GLB,, | Performed by: PHYSICIAN ASSISTANT

## 2023-09-14 PROCEDURE — 90686 FLU VACCINE (QUAD) GREATER THAN OR EQUAL TO 3YO PRESERVATIVE FREE IM: ICD-10-PCS | Mod: S$GLB,,, | Performed by: INTERNAL MEDICINE

## 2023-09-14 PROCEDURE — 90686 IIV4 VACC NO PRSV 0.5 ML IM: CPT | Mod: S$GLB,,, | Performed by: INTERNAL MEDICINE

## 2023-09-14 PROCEDURE — 3044F HG A1C LEVEL LT 7.0%: CPT | Mod: CPTII,S$GLB,, | Performed by: PHYSICIAN ASSISTANT

## 2023-09-14 PROCEDURE — 1160F PR REVIEW ALL MEDS BY PRESCRIBER/CLIN PHARMACIST DOCUMENTED: ICD-10-PCS | Mod: CPTII,S$GLB,, | Performed by: PHYSICIAN ASSISTANT

## 2023-09-14 PROCEDURE — 99214 OFFICE O/P EST MOD 30 MIN: CPT | Mod: S$GLB,,, | Performed by: PHYSICIAN ASSISTANT

## 2023-09-14 PROCEDURE — 1159F MED LIST DOCD IN RCRD: CPT | Mod: CPTII,S$GLB,, | Performed by: PHYSICIAN ASSISTANT

## 2023-09-14 PROCEDURE — 99999 PR PBB SHADOW E&M-EST. PATIENT-LVL IV: ICD-10-PCS | Mod: PBBFAC,,, | Performed by: PHYSICIAN ASSISTANT

## 2023-09-14 PROCEDURE — 3008F BODY MASS INDEX DOCD: CPT | Mod: CPTII,S$GLB,, | Performed by: PHYSICIAN ASSISTANT

## 2023-09-14 PROCEDURE — G0008 ADMIN INFLUENZA VIRUS VAC: HCPCS | Mod: S$GLB,,, | Performed by: INTERNAL MEDICINE

## 2023-09-14 PROCEDURE — 1160F RVW MEDS BY RX/DR IN RCRD: CPT | Mod: CPTII,S$GLB,, | Performed by: PHYSICIAN ASSISTANT

## 2023-09-14 PROCEDURE — 3044F PR MOST RECENT HEMOGLOBIN A1C LEVEL <7.0%: ICD-10-PCS | Mod: CPTII,S$GLB,, | Performed by: PHYSICIAN ASSISTANT

## 2023-09-14 PROCEDURE — 1159F PR MEDICATION LIST DOCUMENTED IN MEDICAL RECORD: ICD-10-PCS | Mod: CPTII,S$GLB,, | Performed by: PHYSICIAN ASSISTANT

## 2023-09-14 RX ORDER — HYDROCHLOROTHIAZIDE 25 MG/1
25 TABLET ORAL DAILY
Qty: 90 TABLET | Refills: 3 | Status: SHIPPED | OUTPATIENT
Start: 2023-09-14

## 2023-09-14 RX ORDER — AMLODIPINE BESYLATE 10 MG/1
10 TABLET ORAL DAILY
Qty: 90 TABLET | Refills: 3 | Status: SHIPPED | OUTPATIENT
Start: 2023-09-14 | End: 2024-01-19 | Stop reason: SDUPTHER

## 2023-09-14 NOTE — PATIENT INSTRUCTIONS
I will have staff schedule appt with one of the physicians to be your new lead PCP. I work closely with Dr. Cl Wyatt and Dr. Andrew Francisco.

## 2024-01-08 ENCOUNTER — PATIENT MESSAGE (OUTPATIENT)
Dept: INTERNAL MEDICINE | Facility: CLINIC | Age: 64
End: 2024-01-08
Payer: COMMERCIAL

## 2024-01-08 RX ORDER — PANTOPRAZOLE SODIUM 40 MG/1
40 TABLET, DELAYED RELEASE ORAL DAILY
Qty: 90 TABLET | Refills: 1 | Status: SHIPPED | OUTPATIENT
Start: 2024-01-08 | End: 2025-01-07

## 2024-01-19 ENCOUNTER — OFFICE VISIT (OUTPATIENT)
Dept: INTERNAL MEDICINE | Facility: CLINIC | Age: 64
End: 2024-01-19
Payer: COMMERCIAL

## 2024-01-19 ENCOUNTER — HOSPITAL ENCOUNTER (OUTPATIENT)
Dept: RADIOLOGY | Facility: HOSPITAL | Age: 64
Discharge: HOME | End: 2024-01-19
Attending: PHYSICIAN ASSISTANT
Payer: COMMERCIAL

## 2024-01-19 VITALS
WEIGHT: 207.69 LBS | OXYGEN SATURATION: 95 % | DIASTOLIC BLOOD PRESSURE: 86 MMHG | HEIGHT: 69 IN | BODY MASS INDEX: 30.76 KG/M2 | HEART RATE: 68 BPM | SYSTOLIC BLOOD PRESSURE: 136 MMHG

## 2024-01-19 DIAGNOSIS — E78.2 MIXED HYPERLIPIDEMIA: ICD-10-CM

## 2024-01-19 DIAGNOSIS — I10 BENIGN ESSENTIAL HYPERTENSION: ICD-10-CM

## 2024-01-19 DIAGNOSIS — Z12.5 PROSTATE CANCER SCREENING: ICD-10-CM

## 2024-01-19 DIAGNOSIS — Z23 NEED FOR TDAP VACCINATION: ICD-10-CM

## 2024-01-19 DIAGNOSIS — M54.2 CHRONIC NECK PAIN: ICD-10-CM

## 2024-01-19 DIAGNOSIS — A60.00 GENITAL HERPES SIMPLEX, UNSPECIFIED SITE: ICD-10-CM

## 2024-01-19 DIAGNOSIS — Z00.00 ANNUAL PHYSICAL EXAM: Primary | ICD-10-CM

## 2024-01-19 DIAGNOSIS — G89.29 CHRONIC NECK PAIN: ICD-10-CM

## 2024-01-19 DIAGNOSIS — R73.03 PRE-DIABETES: ICD-10-CM

## 2024-01-19 PROCEDURE — 72040 X-RAY EXAM NECK SPINE 2-3 VW: CPT | Mod: 26,,, | Performed by: INTERNAL MEDICINE

## 2024-01-19 PROCEDURE — 1159F MED LIST DOCD IN RCRD: CPT | Mod: CPTII,S$GLB,, | Performed by: PHYSICIAN ASSISTANT

## 2024-01-19 PROCEDURE — 90471 IMMUNIZATION ADMIN: CPT | Mod: S$GLB,,, | Performed by: PHYSICIAN ASSISTANT

## 2024-01-19 PROCEDURE — 3075F SYST BP GE 130 - 139MM HG: CPT | Mod: CPTII,S$GLB,, | Performed by: PHYSICIAN ASSISTANT

## 2024-01-19 PROCEDURE — 3079F DIAST BP 80-89 MM HG: CPT | Mod: CPTII,S$GLB,, | Performed by: PHYSICIAN ASSISTANT

## 2024-01-19 PROCEDURE — 90715 TDAP VACCINE 7 YRS/> IM: CPT | Mod: S$GLB,,, | Performed by: PHYSICIAN ASSISTANT

## 2024-01-19 PROCEDURE — 99999 PR PBB SHADOW E&M-EST. PATIENT-LVL IV: CPT | Mod: PBBFAC,,, | Performed by: PHYSICIAN ASSISTANT

## 2024-01-19 PROCEDURE — 99396 PREV VISIT EST AGE 40-64: CPT | Mod: 25,S$GLB,, | Performed by: PHYSICIAN ASSISTANT

## 2024-01-19 PROCEDURE — 3008F BODY MASS INDEX DOCD: CPT | Mod: CPTII,S$GLB,, | Performed by: PHYSICIAN ASSISTANT

## 2024-01-19 PROCEDURE — 1160F RVW MEDS BY RX/DR IN RCRD: CPT | Mod: CPTII,S$GLB,, | Performed by: PHYSICIAN ASSISTANT

## 2024-01-19 PROCEDURE — 72040 X-RAY EXAM NECK SPINE 2-3 VW: CPT | Mod: TC

## 2024-01-19 RX ORDER — ACYCLOVIR 200 MG/1
200 CAPSULE ORAL 2 TIMES DAILY
Qty: 60 CAPSULE | Refills: 11 | Status: SHIPPED | OUTPATIENT
Start: 2024-01-19

## 2024-01-19 RX ORDER — ATORVASTATIN CALCIUM 40 MG/1
40 TABLET, FILM COATED ORAL NIGHTLY
Qty: 90 TABLET | Refills: 3 | Status: SHIPPED | OUTPATIENT
Start: 2024-01-19 | End: 2024-04-09 | Stop reason: SDUPTHER

## 2024-01-19 RX ORDER — AMLODIPINE BESYLATE 10 MG/1
10 TABLET ORAL DAILY
Qty: 90 TABLET | Refills: 3 | Status: SHIPPED | OUTPATIENT
Start: 2024-01-19 | End: 2025-01-18

## 2024-01-19 NOTE — PROGRESS NOTES
Subjective     Patient ID: Royer Castillo is a 63 y.o. male.    Chief Complaint: Annual Exam and Neck Pain    HPI    Established pt of Jose F Sanchez MD     Here for physical exam    Overall doing well but has concerns of chronic neck pain for over 1yr, notes crepitus and soreness. No injury. No n/t of extremities. She has tried heating pad, TENS unit, and massage with mild relief.           Past Medical History:   Diagnosis Date    Cellulitis     left foot    Diverticulosis     Genital herpes     GERD (gastroesophageal reflux disease)     HTN (hypertension)     Hyperlipidemia     Left atrial dilation     Onychomycosis     Pre-diabetes      Social History     Tobacco Use    Smoking status: Never    Smokeless tobacco: Never   Substance Use Topics    Alcohol use: No    Drug use: No     Review of patient's allergies indicates:  No Known Allergies      Review of Systems   Constitutional:  Negative for activity change and unexpected weight change.   HENT:  Negative for hearing loss, rhinorrhea and trouble swallowing.    Eyes:  Negative for discharge and visual disturbance.   Respiratory:  Negative for chest tightness and wheezing.    Cardiovascular:  Negative for chest pain and palpitations.   Gastrointestinal:  Negative for blood in stool, constipation, diarrhea and vomiting.   Endocrine: Negative for polydipsia and polyuria.   Genitourinary:  Positive for urgency. Negative for difficulty urinating and hematuria.   Musculoskeletal:  Positive for arthralgias and neck pain. Negative for joint swelling.   Neurological:  Negative for weakness and headaches.   Psychiatric/Behavioral:  Negative for confusion and dysphoric mood.      Answers submitted by the patient for this visit:  Review of Systems Questionnaire (Submitted on 1/16/2024)  activity change: No  unexpected weight change: No  neck pain: Yes  hearing loss: No  rhinorrhea: No  trouble swallowing: No  eye discharge: No  visual disturbance: No  chest  "tightness: No  wheezing: No  chest pain: No  palpitations: No  blood in stool: No  constipation: No  vomiting: No  diarrhea: No  polydipsia: No  polyuria: No  difficulty urinating: No  urgency: Yes  hematuria: No  joint swelling: No  arthralgias: Yes  headaches: No  weakness: No  confusion: No  dysphoric mood: No     Objective  /86 (BP Location: Left arm, Patient Position: Sitting, BP Method: Medium (Manual))   Pulse 68   Ht 5' 9" (1.753 m)   Wt 94.2 kg (207 lb 10.8 oz)   SpO2 95%   BMI 30.67 kg/m²       Physical Exam  Vitals reviewed.   Constitutional:       General: He is not in acute distress.     Appearance: He is well-developed.   HENT:      Head: Normocephalic and atraumatic.      Right Ear: Tympanic membrane, ear canal and external ear normal.      Left Ear: Tympanic membrane, ear canal and external ear normal.   Cardiovascular:      Rate and Rhythm: Normal rate and regular rhythm.      Heart sounds: No murmur heard.  Pulmonary:      Effort: Pulmonary effort is normal.      Breath sounds: Normal breath sounds. No wheezing or rales.   Abdominal:      General: Bowel sounds are normal.      Palpations: Abdomen is soft.      Tenderness: There is no abdominal tenderness.   Musculoskeletal:      Right lower leg: No edema.      Left lower leg: No edema.   Skin:     General: Skin is warm and dry.      Findings: No rash.   Neurological:      Mental Status: He is alert.   Psychiatric:         Mood and Affect: Mood normal.            Assessment and Plan     1. Annual physical exam  HM reviewed and updated  -     CBC Auto Differential; Future; Expected date: 01/19/2024  -     Comprehensive Metabolic Panel; Future; Expected date: 01/19/2024  -     TSH; Future; Expected date: 01/19/2024  -     Lipid Panel; Future; Expected date: 01/19/2024  -     Hemoglobin A1C; Future; Expected date: 01/19/2024  -     PSA, Screening; Future; Expected date: 01/19/2024    2. Benign essential hypertension  BP at goal  Continue " your current meds  -     amLODIPine (NORVASC) 10 MG tablet; Take 1 tablet (10 mg total) by mouth once daily.  Dispense: 90 tablet; Refill: 3    3. Pre-diabetes  -     Hemoglobin A1C; Future; Expected date: 01/19/2024    4. Mixed hyperlipidemia  -     Lipid Panel; Future; Expected date: 01/19/2024  -     atorvastatin (LIPITOR) 40 MG tablet; Take 1 tablet (40 mg total) by mouth every evening.  Dispense: 90 tablet; Refill: 3    5. Chronic neck pain  Discussed meds/PT, pt would like to await imaging results  -     X-Ray Cervical Spine AP And Lateral; Future; Expected date: 01/19/2024    6. Genital herpes simplex, unspecified site  No outbreaks on suppression therapy, continue  -     acyclovir (ZOVIRAX) 200 MG capsule; Take 1 capsule (200 mg total) by mouth 2 (two) times daily.  Dispense: 60 capsule; Refill: 11    7. Need for Tdap vaccination  -     (In Office Administered) Tdap Vaccine    8. Prostate cancer screening  -     PSA, Screening; Future; Expected date: 01/19/2024      RTC in 1 yr for next annual or sooner if needed.     Christine Peterson PA-C

## 2024-01-22 ENCOUNTER — PATIENT MESSAGE (OUTPATIENT)
Dept: INTERNAL MEDICINE | Facility: CLINIC | Age: 64
End: 2024-01-22
Payer: COMMERCIAL

## 2024-01-22 DIAGNOSIS — G89.29 CHRONIC NECK PAIN: Primary | ICD-10-CM

## 2024-01-22 DIAGNOSIS — M54.2 CHRONIC NECK PAIN: Primary | ICD-10-CM

## 2024-01-23 RX ORDER — MELOXICAM 15 MG/1
15 TABLET ORAL DAILY PRN
Qty: 30 TABLET | Refills: 1 | Status: SHIPPED | OUTPATIENT
Start: 2024-01-23 | End: 2024-03-28

## 2024-01-31 ENCOUNTER — CLINICAL SUPPORT (OUTPATIENT)
Dept: REHABILITATION | Facility: HOSPITAL | Age: 64
End: 2024-01-31
Payer: COMMERCIAL

## 2024-01-31 DIAGNOSIS — M54.2 CHRONIC NECK PAIN: ICD-10-CM

## 2024-01-31 DIAGNOSIS — M25.612 DECREASED RANGE OF MOTION OF LEFT SHOULDER: ICD-10-CM

## 2024-01-31 DIAGNOSIS — R29.898 DECREASED RANGE OF MOTION OF NECK: Primary | ICD-10-CM

## 2024-01-31 DIAGNOSIS — G89.29 CHRONIC NECK PAIN: ICD-10-CM

## 2024-01-31 DIAGNOSIS — R29.898 IMPAIRED STRENGTH OF NECK MUSCLES: ICD-10-CM

## 2024-01-31 PROCEDURE — 97161 PT EVAL LOW COMPLEX 20 MIN: CPT

## 2024-01-31 PROCEDURE — 97110 THERAPEUTIC EXERCISES: CPT

## 2024-02-01 PROBLEM — R29.898 DECREASED RANGE OF MOTION OF NECK: Status: ACTIVE | Noted: 2024-02-01

## 2024-02-01 PROBLEM — M25.612 DECREASED RANGE OF MOTION OF LEFT SHOULDER: Status: ACTIVE | Noted: 2024-02-01

## 2024-02-01 PROBLEM — R29.898 IMPAIRED STRENGTH OF NECK MUSCLES: Status: ACTIVE | Noted: 2024-02-01

## 2024-02-01 NOTE — PLAN OF CARE
[facility]   Physical Therapy Initial Evaluation      Name: Royer Castillo  Clinic Number: [id number]    Therapy Diagnosis:   Encounter Diagnoses   Name Primary?    Chronic neck pain     Decreased range of motion of neck Yes    Decreased range of motion of left shoulder     Impaired strength of neck muscles         Physician: Christine Peterson PA-C    Physician Orders: PT Eval and Treat   Medical Diagnosis from Referral: M54.2,G89.29 (ICD-10-CM) - Chronic neck pain   Evaluation Date: 1/31/2024  Authorization Period Expiration: 1/22/2025  Plan of Care Expiration: 5/31/2024  Progress Note Due: 3/1/2024  Visit # / Visits authorized: 1/ 1   FOTO: 1/1    Precautions: Standard     Time In: 9:50 AM  Time Out: 10:55 AM  Total Appointment Time (timed & untimed codes): 65 minutes    Subjective     Date of onset: 1year     History of current condition - Vasyl reports: Pt is a 62 yo male who presents to clinic w/ c/o chronic neck. Reports tenderness over L side middle cervical and R upper trapezius. Denies any numbness or tingling. Notes constant pressure, soreness and crepitus when moving the neck. Pain disturbed him during sleep. Pain aggravates with neck flexion and rotation. Pain improves with massage and rest. Pt also noticed he has L shoulder pain and he could's lift up the arm above the shoulder. Occupation is a . Pt would like to reduce the neck pain with daily activities.    Falls: NA    Imaging: bone scan films: There is straightening of the normal cervical lordosis.  C4-5 through C6-7 demonstrate disc space narrowing and moderate osteophyte formation.  The odontoid is intact.       Prior Therapy: NA  Social History:  lives with their family  Occupation:   Prior Level of Function: Independent  Current Level of Function: Independent    Pain:  Current 5/10, worst 8/10, best 5/10   Location: bilateral neck   Description: Aching, Dull, and Tight  Aggravating Factors:  flexion and b/l rotation   Easing Factors: massage and rest    Patients goals: reduce the neck pain with daily activities.     Medical History:   Past Medical History:   Diagnosis Date    Cellulitis     left foot    Diverticulosis     Genital herpes     GERD (gastroesophageal reflux disease)     HTN (hypertension)     Hyperlipidemia     Left atrial dilation     Onychomycosis     Pre-diabetes        Surgical History:   Royer Castillo  has a past surgical history that includes Colonoscopy (N/A, 12/11/2019); Knee arthroscopy w/ meniscectomy (Right, 5/27/2021); Arthroscopic chondroplasty of knee joint (Right, 5/27/2021); Synovectomy of knee (Right, 5/27/2021); and Esophagogastroduodenoscopy (N/A, 3/2/2022).    Medications:   Royer has a current medication list which includes the following prescription(s): acyclovir, amlodipine, atorvastatin, hydrochlorothiazide, meloxicam, and pantoprazole.    Allergies:   Review of patient's allergies indicates:  No Known Allergies     Objective      Posture: decreased cervical lordosis, and depressed shoulder blade on the L      Cervical Range of Motion:    degree Observation Pain   Flexion 40  Y     Extension 50       Right Rotation 45  Pain     Left Rotation 40  Pain     Right Sidebend 30  Pain      Left Sidebend 35  Pain with crepitus           Thoracic Range of Motion:   50% limited in extension and bilateral rotation   25% limited in flexion      Shoulder Active Range of Motion:   Shoulder Right Left   Flexion   155 90   ER at 0   45 25   Behind the back Reach   T8 L3        Strength:   Right Left   Flexion  4+/5 4-/5*   Abduction 4+/5 4-/5*   Shoulder ER at side 4+/5 3/5*   Shoulder IR at side  4+/5 4/5   Middle trap Will assess next visit    Lower trap Will assess next visit        Special Tests:    Ligamentous Stability    Sharp-Murtaza N   Lateral Flexion Alar Ligament N     Distraction P   Compression N   Spurlings P on L   Upper Cervical Flexion Test P         Reflexes:   Biceps:2+   Brachioradialis: 2+   Triceps: 2     Neural Tension Test:    Median: N   Ulnar: N   Radial: P on L      Cervical Joint Mobility:   Decreased C0-C1 flexion, C1-C2 b/l rotation, and C5-C7 segmental mobility     Palpation: Tenderness over R upper trapezius, C5-C7 SP          Limitation/Restriction for FOTO  Survey    Therapist reviewed FOTO scores for Royer Castillo on 1/31/2024.   FOTO documents entered into [employer] - see Media section.    Limitation Score: 80%         Treatment     Total Treatment time (time-based codes) separate from Evaluation: 20 minutes     Vasyl received the treatments listed below:      therapeutic exercises to develop strength, endurance, ROM, and flexibility for 10 minutes including:  Chin tuck supine   SNAG C1-C2 rotation  Scapula retraction     manual therapy techniques: Joint mobilizations, Manual traction, and Myofacial release were applied to the: neck for 10 minutes, including:  Suboccipital release  C0-C1 flexion mob with MET  C5-C7 upglide mob grade II-III  Manual cervical traction        Patient Education and Home Exercises     Education provided:   - HEP, role of PT, prognosis    Written Home Exercises Provided: yes. Exercises were reviewed and Vasyl was able to demonstrate them prior to the end of the session.  Vasyl demonstrated good  understanding of the education provided. See EMR under Patient Instructions for exercises provided during therapy sessions.    Assessment     Royer is a 63 y.o. male referred to outpatient Physical Therapy with a medical diagnosis of M54.2,G89.29 (ICD-10-CM) - Chronic neck pain . Patient presents with decreased neck mobility, strength and shoulder ROM , increased muscle tone and tenderness. Possible causative factors are muscle tightness, joint stiffness and adverse neural tension.    Patient prognosis is Good.   Patient will benefit from skilled outpatient Physical Therapy to address the deficits stated above and in  the chart below, provide patient /family education, and to maximize patientt's level of independence.     Plan of care discussed with patient: Yes  Patient's spiritual, cultural and educational needs considered and patient is agreeable to the plan of care and goals as stated below:     Anticipated Barriers for therapy: NA    Medical Necessity is demonstrated by the following  History  Co-morbidities and personal factors that may impact the plan of care [x] LOW: no personal factors / co-morbidities  [] MODERATE: 1-2 personal factors / co-morbidities  [] HIGH: 3+ personal factors / co-morbidities    Moderate / High Support Documentation:   Co-morbidities affecting plan of care:     Personal Factors:   no deficits     Examination  Body Structures and Functions, activity limitations and participation restrictions that may impact the plan of care [x] LOW: addressing 1-2 elements  [] MODERATE: 3+ elements  [] HIGH: 4+ elements (please support below)    Moderate / High Support Documentation:      Clinical Presentation [x] LOW: stable  [] MODERATE: Evolving  [] HIGH: Unstable     Decision Making/ Complexity Score: low       Goals:  Short Term Goals: 6 weeks  1.Report decreased NECK pain  <   / =  4 /10  to increase tolerance for adls, work  2. Increase cervical ROM by 5-10 degrees in order to perform ADLs with decreased difficulty.  3. Pt to tolerate HEP to improve ROM and independence with ADL's     Long Term Goals: 12 weeks  1.Report decreased neck pain  <   / =  2  /10  to increase tolerance for adls, work  2. Increase UE/neck flexibility in order to improve posture.    3.Increased strength L shoulder stabilizers to >/= 4/5 MMT grade to increase tolerance for ADL and work activities.  4.  Pt will report at thoracic (30% impaired) on FOTO neck score for neck pain disability to demonstrate decrease in disability and improvement in neck pain.   Plan     Plan of care Certification: 1/31/2024 to 5/31/2024.    Outpatient  Physical Therapy 1-2 times weekly for 12 weeks to include the following interventions: Cervical/Lumbar Traction, Manual Therapy, Neuromuscular Re-ed, Patient Education, Therapeutic Activities, and Therapeutic Exercise.     Medardo Powell, PT

## 2024-02-04 ENCOUNTER — OFFICE VISIT (OUTPATIENT)
Dept: URGENT CARE | Facility: CLINIC | Age: 64
End: 2024-02-04
Payer: COMMERCIAL

## 2024-02-04 VITALS
HEIGHT: 69 IN | SYSTOLIC BLOOD PRESSURE: 126 MMHG | TEMPERATURE: 98 F | HEART RATE: 85 BPM | BODY MASS INDEX: 30.76 KG/M2 | RESPIRATION RATE: 16 BRPM | OXYGEN SATURATION: 98 % | WEIGHT: 207.69 LBS | DIASTOLIC BLOOD PRESSURE: 80 MMHG

## 2024-02-04 DIAGNOSIS — Z02.6 ENCOUNTER RELATED TO WORKER'S COMPENSATION CLAIM: Primary | ICD-10-CM

## 2024-02-04 DIAGNOSIS — M25.552 PAIN IN LEFT HIP: ICD-10-CM

## 2024-02-04 PROCEDURE — 73502 X-RAY EXAM HIP UNI 2-3 VIEWS: CPT | Mod: LT,S$GLB,, | Performed by: RADIOLOGY

## 2024-02-04 PROCEDURE — 99214 OFFICE O/P EST MOD 30 MIN: CPT | Mod: S$GLB,,, | Performed by: FAMILY MEDICINE

## 2024-02-04 NOTE — PROGRESS NOTES
Subjective:      Patient ID: Royer Castillo is a 63 y.o. male.    Chief Complaint: Injury (Located to the left hip. 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   What they did immediately after - got up and went back to work.   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 The current episode started in the past 7 days. The problem occurs constantly. The problem has been gradually worsening. Associated symptoms include joint swelling and weakness. Associated symptoms comments: Inability to bear weight. A grinding sensation in between the joints when walking. . The symptoms are aggravated by bending, standing, walking and twisting. Treatments tried: aleve. The treatment provided no relief.     Musculoskeletal:  Positive for joint swelling.     Objective:     Physical Exam  Vitals and nursing note reviewed.   Constitutional:       General: He is not in acute distress.     Appearance: Normal appearance. He is well-developed. He is not diaphoretic.   HENT:      Head: Normocephalic and atraumatic.      Right Ear: Tympanic membrane normal.      Nose: Nose normal.      Mouth/Throat:      Mouth: Mucous membranes are moist.      Pharynx: Oropharynx is clear.   Eyes:      General: Lids are normal.      Conjunctiva/sclera: Conjunctivae normal.   Neck:      Trachea: Trachea and phonation normal.   Cardiovascular:      Rate and Rhythm: Normal rate and regular rhythm.      Pulses: Normal pulses.      Heart sounds: Normal heart sounds.   Pulmonary:      Effort: Pulmonary effort is  normal.      Breath sounds: Normal breath sounds.   Abdominal:      General: Bowel sounds are normal. There is no abdominal bruit.      Palpations: Abdomen is soft. There is no mass or pulsatile mass.   Musculoskeletal:         General: No swelling, tenderness or signs of injury.      Cervical back: Full passive range of motion without pain, normal range of motion and neck supple.      Right lower leg: No edema.      Left lower leg: No edema.      Comments: Left hip no swelling  Able to bear partial weight on,  Limping on walking     Skin:     General: Skin is warm and dry.   Neurological:      Mental Status: He is alert and oriented to person, place, and time.      Sensory: No sensory deficit.      Deep Tendon Reflexes: Reflexes are normal and symmetric.   Psychiatric:         Speech: Speech normal.         Behavior: Behavior normal. Behavior is cooperative.         Thought Content: Thought content normal.         Judgment: Judgment normal.        Assessment:      1. Encounter related to worker's compensation claim    2. Pain in left hip      Plan:        X-ray reviewed, no obvious Fx    Probably chronic Djd,, symptoms worsened with this injury        Pt advised to take meloxicam  which he has it at home for neck pain       No follow-ups on file.

## 2024-02-04 NOTE — LETTER
Urgent Care - [address]  [facility] [address]  Phone: [phone]  Fax: [phone]  [facility]: [id number]    Pt Name: Royer Castillo  Injury Date: 02/02/2024   Employee ID:  Date of First Treatment: 02/04/2024   Company: [employer]      Appointment Time: 10:15 AM Arrived:    Provider: Naeem Hartley MD Time Out:     Office Treatment:   1. Encounter related to worker's compensation claim    2. Pain in left hip                     Return Appointment: Visit date not found at  2/5/24

## 2024-02-05 ENCOUNTER — CLINICAL SUPPORT (OUTPATIENT)
Dept: REHABILITATION | Facility: HOSPITAL | Age: 64
End: 2024-02-05
Payer: COMMERCIAL

## 2024-02-05 ENCOUNTER — OFFICE VISIT (OUTPATIENT)
Dept: URGENT CARE | Facility: CLINIC | Age: 64
End: 2024-02-05
Payer: COMMERCIAL

## 2024-02-05 VITALS
WEIGHT: 207.44 LBS | HEIGHT: 69 IN | DIASTOLIC BLOOD PRESSURE: 89 MMHG | TEMPERATURE: 98 F | BODY MASS INDEX: 30.72 KG/M2 | HEART RATE: 71 BPM | RESPIRATION RATE: 18 BRPM | SYSTOLIC BLOOD PRESSURE: 137 MMHG | OXYGEN SATURATION: 98 %

## 2024-02-05 DIAGNOSIS — R29.898 IMPAIRED STRENGTH OF NECK MUSCLES: ICD-10-CM

## 2024-02-05 DIAGNOSIS — M25.612 DECREASED RANGE OF MOTION OF LEFT SHOULDER: ICD-10-CM

## 2024-02-05 DIAGNOSIS — S79.912A HIP INJURY, LEFT, INITIAL ENCOUNTER: Primary | ICD-10-CM

## 2024-02-05 DIAGNOSIS — R29.898 DECREASED RANGE OF MOTION OF NECK: Primary | ICD-10-CM
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
## 2024-02-05 DIAGNOSIS — Z02.6 ENCOUNTER RELATED TO WORKER'S COMPENSATION CLAIM: ICD-10-CM

## 2024-02-05 PROCEDURE — 97140 MANUAL THERAPY 1/> REGIONS: CPT

## 2024-02-05 PROCEDURE — 97112 NEUROMUSCULAR REEDUCATION: CPT

## 2024-02-05 PROCEDURE — 96372 THER/PROPH/DIAG INJ SC/IM: CPT | Mod: S$GLB,,, | Performed by: PHYSICIAN ASSISTANT

## 2024-02-05 PROCEDURE — 97110 THERAPEUTIC EXERCISES: CPT

## 2024-02-05 PROCEDURE — 99203 OFFICE O/P NEW LOW 30 MIN: CPT | Mod: 25,S$GLB,, | Performed by: PHYSICIAN ASSISTANT

## 2024-02-05 RX ORDER — KETOROLAC TROMETHAMINE 30 MG/ML
30 INJECTION, SOLUTION INTRAMUSCULAR; INTRAVENOUS
Status: COMPLETED | OUTPATIENT
Start: 2024-02-05 | End: 2024-02-05

## 2024-02-05 RX ADMIN — KETOROLAC TROMETHAMINE 30 MG: 30 INJECTION, SOLUTION INTRAMUSCULAR; INTRAVENOUS at 12:02

## 2024-02-05 NOTE — LETTER
Urgent Care - [address] [address]  Phone: [phone]  Fax: [phone]  [facility]: [id number]    Pt Name: Royer Castillo  Injury Date: 02/19/2021   Employee ID: [id number] Date of First Treatment: 02/05/2024   Company: [employer]      Appointment Time: 10:30 AM Arrived: 10:48 AM   Provider: Royer Monson PA-C Time Out: 12:08 PM     Office Treatment:   1. Hip injury, left, initial encounter    2. Encounter related to worker's compensation claim    3. Work related injury      Medications Ordered This Encounter   Medications    ketorolac injection 30 mg      Patient Instructions: Attention not to aggravate affected area, Daily home exercises/warm soaks      Restrictions: Sit down work only, Sedentary work only, Avoid climbing/kneeling/squatting, Avoid frequent bending/lifting/twisting     Return Appointment: 2/14/2024 at 10:30 AM    MARIEL

## 2024-02-05 NOTE — LETTER
Urgent Care - [address] [address]  Phone: [phone]  Fax: [phone]  [facility]: [id number]    Pt Name: Royer Castillo  Injury Date: 02/19/2021   Employee ID: [id number] Date of First Treatment: 02/05/2024   Company: [employer]      Appointment Time: 10:30 AM Arrived: 10:48 am   Provider: Royer Monson PA-C Time Out: 12:08 pm     Office Treatment:   1. Hip injury, left, initial encounter    2. Encounter related to worker's compensation claim    3. Work related injury      Medications Ordered This Encounter   Medications    ketorolac injection 30 mg      Patient Instructions: Attention not to aggravate affected area, Daily home exercises/warm soaks      Restrictions: Sit down work only, Sedentary work only, Avoid climbing/kneeling/squatting, Avoid frequent bending/lifting/twisting (Avoid climbing stairs or ladders.)     Return Appointment: 2/14/2024 at 10:30 am    beulah

## 2024-02-05 NOTE — PROGRESS NOTES
Subjective:      Patient ID: Royer Castillo is a 63 y.o. male.    Chief Complaint: Hip Injury (L HIP )    Patient's place of employment - [employer]   Patient's job title -    Date of injury - 2/2/2024  Body part injured including left or right - L HIP  Injury Mechanism - FELL IN A HOLE   What they were doing when they got hurt - WORKING   What they did immediately after - SEEN AT [facility] [place] ON 2/4/2024  Pain scale right now - 7-8/10    KSD    63-year-old Male  presents with acute left hip pain that occurred 3 days ago while on duty.  Patient reports accidentally stepping in a hole and twisting the left lower extremity causing immediate pain to the left hip.  Patient states he grabbed onto the mirror of the truck to prevent from falling.  He denies previous left hip injury.  He denies back, left knee or left ankle pain.  Patient was prescribed meloxicam after being seen at urgent care yesterday.  Patient had x-rays of the left hip that did not indicate any fracture.  I reviewed the urgent care notes.  He has not taken meloxicam yet. MEB    Hip Injury  Associated symptoms include arthralgias. Pertinent negatives include no neck pain or numbness.     Constitution: Positive for activity change.   HENT:  Negative for facial trauma.    Neck: Negative for neck pain.   Cardiovascular:  Negative for chest trauma.   Eyes:  Negative for eye trauma.   Respiratory:  Negative for shortness of breath.    Gastrointestinal:  Negative for abdominal trauma.   Musculoskeletal:  Positive for pain, joint pain and pain with walking. Negative for back pain.   Skin:  Negative for wound and bruising.   Neurological:  Negative for numbness and tingling.     Objective:     Physical Exam  Vitals and nursing note reviewed.   Constitutional:       General: He is not in acute distress.     Appearance: He is well-developed. He is not diaphoretic.   HENT:      Head: Normocephalic and atraumatic.      Right Ear:  Hearing and external ear normal.      Left Ear: Hearing and external ear normal.      Nose: Nose normal. No nasal deformity.   Eyes:      General: Lids are normal. No scleral icterus.     Conjunctiva/sclera: Conjunctivae normal.   Neck:      Trachea: Trachea normal.   Cardiovascular:      Pulses: Normal pulses.   Pulmonary:      Effort: Pulmonary effort is normal. No respiratory distress.      Breath sounds: No stridor.   Musculoskeletal:      Cervical back: Normal range of motion.      Left hip: No deformity or tenderness. Decreased range of motion.      Comments: Left hip exam:  Limited active and passive range of motion due to pain and guarding.  Increased pain with external and internal rotation.  There is no E/E/D.  Left lower extremity neurovascularly intact.   Skin:     General: Skin is warm and dry.      Capillary Refill: Capillary refill takes less than 2 seconds.   Neurological:      Mental Status: He is alert. He is not disoriented.      GCS: GCS eye subscore is 4. GCS verbal subscore is 5. GCS motor subscore is 6.      Sensory: No sensory deficit.   Psychiatric:         Attention and Perception: He is attentive.         Speech: Speech normal.         Behavior: Behavior normal.        X-Ray Hip 2 or 3 views Left (with Pelvis when performed)    Result Date: 2/4/2024  EXAMINATION: XR HIP WITH PELVIS WHEN PERFORMED, 2 OR 3 VIEWS LEFT CLINICAL HISTORY: Encounter for examination for insurance purposes TECHNIQUE: AP view of the pelvis and frog leg lateral view of the left hip were performed. COMPARISON: 02/22/2021. FINDINGS: There is severe joint space narrowing of the left femoroacetabular joint with adjacent subchondral sclerosis and subchondral cystic changes and marginal osteophytes.  Left proximal femur appears identical to prior.  No acute fracture or dislocation.  Remaining osseous structures are intact     No acute fracture or dislocation. Advanced degenerative changes of the left femoroacetabular  joint. Electronically signed by: Sagar Mantilla Date:    02/04/2024 Time:    11:44      Assessment:      1. Hip injury, left, initial encounter    2. Encounter related to worker's compensation claim    3. Work related injury      Plan:     Patient with severe pain of the left hip after stepping in a hole.  Concerning for possible labral tear versus occult fracture.  Will treat conservatively for now with ice, heat, range-of-motion exercises and anti-inflammatories.  Return to clinic next week for re-evaluation.  Consider MRI if no significant improvement at next office visit.  Patient will be placed on light duty.  Patient verbalized understanding and agreement.        Medications Ordered This Encounter   Medications    ketorolac injection 30 mg     Patient Instructions: Attention not to aggravate affected area, Daily home exercises/warm soaks   Restrictions: Sit down work only, Sedentary work only, Avoid climbing/kneeling/squatting, Avoid frequent bending/lifting/twisting  Follow up in about 9 days (around 2/14/2024) for Reassessment.

## 2024-02-06 ENCOUNTER — PATIENT MESSAGE (OUTPATIENT)
Dept: URGENT CARE | Facility: CLINIC | Age: 64
End: 2024-02-06
Payer: COMMERCIAL

## 2024-02-08 ENCOUNTER — CLINICAL SUPPORT (OUTPATIENT)
Dept: REHABILITATION | Facility: HOSPITAL | Age: 64
End: 2024-02-08
Payer: COMMERCIAL

## 2024-02-08 DIAGNOSIS — R29.898 IMPAIRED STRENGTH OF NECK MUSCLES: ICD-10-CM

## 2024-02-08 DIAGNOSIS — M25.612 DECREASED RANGE OF MOTION OF LEFT SHOULDER: ICD-10-CM

## 2024-02-08 DIAGNOSIS — R29.898 DECREASED RANGE OF MOTION OF NECK: Primary | ICD-10-CM

## 2024-02-08 PROCEDURE — 97112 NEUROMUSCULAR REEDUCATION: CPT

## 2024-02-08 PROCEDURE — 97110 THERAPEUTIC EXERCISES: CPT

## 2024-02-08 PROCEDURE — 97140 MANUAL THERAPY 1/> REGIONS: CPT

## 2024-02-11 NOTE — PROGRESS NOTES
[facility]   Physical Therapy Treatment Note      Name: Royer Castillo  [facility] Number: [id number]    Therapy Diagnosis:   Encounter Diagnoses   Name Primary?    Decreased range of motion of neck Yes    Decreased range of motion of left shoulder     Impaired strength of neck muscles      Physician: Christine Peterson PA-C    Visit Date: 2/8/2024    Physician Orders: PT Eval and Treat   Medical Diagnosis from Referral: M54.2,G89.29 (ICD-10-CM) - Chronic neck pain   Evaluation Date: 1/31/2024  Authorization Period Expiration: 1/22/2025  Plan of Care Expiration: 5/31/2024  Progress Note Due: 3/1/2024  Visit # / Visits authorized: 1/ 30  FOTO: 1/3    Time In: 9:00 AM  Time Out: 10:00 AM  Total Billable Time: 60 minutes    Subjective     Pt reports: noticed some neck/shoulder pain improvement from last session.   He was compliant with home exercise program.  Response to previous treatment: NA  Functional change: not yet    Pain: 3/10  Location: bilateral neck and shoulder      Objective      Objective Measures updated at progress report unless specified.     Treatment     Vasyl received the treatments listed below:      therapeutic exercises to develop strength, endurance, ROM, and flexibility for 14 minutes including:  SNAG C1-C2 X 30  Cervical AROM X 30  T-spine extension on the chair x 30    manual therapy techniques: Joint mobilizations, Manual traction, and Myofacial release were applied to the: neck for 16 minutes, including:  Suboccipital release   Upper cervical C1-C2 rotation MET  Upper cervical C0-C1 flexion MET   First rib mob Grade III    neuromuscular re-education activities to improve: Balance, Coordination, Kinesthetic, Sense, and Proprioception for 30 minutes. The following activities were included:  Chin tuck with head lift 3 x 10 x 5 sec hold  Prone modified T 3 x 10 x 5 sec hold  SA slide with towel 3 x 10  Shoulder IR/ER walk out 3 x 10  Shoulder scaption with chin tuck  2# 3 x 10  Prone shoulder extension with 2# DB 3 x 10        therapeutic activities to improve functional performance for   minutes, including:        Patient Education and Home Exercises       Education provided:   - Reviewed and updated HEP.    Written Home Exercises Provided: yes. Exercises were reviewed and Vasyl was able to demonstrate them prior to the end of the session.  Vasyl demonstrated good  understanding of the education provided. See EMR under Patient Instructions for exercises provided during therapy sessions    Assessment     Pt completed session as noted above. Today session continued to focus on neck mobility and shoulder RC/prescapular NM control. Added first rib mobilization to improve rib mobility and neck symptoms. Noticed improved cervical rotation/side bending with manual intervention. Will continue to focus on neck AROM and progress on RC/periscapular training as tolerated .     Vasyl Is progressing well towards his goals.   Pt prognosis is Good.     Pt will continue to benefit from skilled outpatient physical therapy to address the deficits listed in the problem list box on initial evaluation, provide pt/family education and to maximize pt's level of independence in the home and community environment.     Pt's spiritual, cultural and educational needs considered and pt agreeable to plan of care and goals.     Anticipated barriers to physical therapy: NA    Goals: Short Term Goals: 6 weeks  1.Report decreased NECK pain  <   / =  4 /10  to increase tolerance for adls, work  2. Increase cervical ROM by 5-10 degrees in order to perform ADLs with decreased difficulty.  3. Pt to tolerate HEP to improve ROM and independence with ADL's     Long Term Goals: 12 weeks  1.Report decreased neck pain  <   / =  2  /10  to increase tolerance for adls, work  2. Increase UE/neck flexibility in order to improve posture.    3.Increased strength L shoulder stabilizers to >/= 4/5 MMT grade to increase tolerance for  ADL and work activities.  4.  Pt will report at thoracic (30% impaired) on FOTO neck score for neck pain disability to demonstrate decrease in disability and improvement in neck pain.       Plan     Will continue to focus on neck ROM and RC/periscapular NM control in the next visit.     Medardo Powell, PT

## 2024-02-14 ENCOUNTER — OFFICE VISIT (OUTPATIENT)
Dept: URGENT CARE | Facility: CLINIC | Age: 64
End: 2024-02-14
Payer: COMMERCIAL

## 2024-02-14 ENCOUNTER — CLINICAL SUPPORT (OUTPATIENT)
Dept: REHABILITATION | Facility: HOSPITAL | Age: 64
End: 2024-02-14
Payer: COMMERCIAL

## 2024-02-14 VITALS
RESPIRATION RATE: 19 BRPM | HEART RATE: 69 BPM | HEIGHT: 69 IN | SYSTOLIC BLOOD PRESSURE: 158 MMHG | TEMPERATURE: 98 F | BODY MASS INDEX: 30.7 KG/M2 | DIASTOLIC BLOOD PRESSURE: 92 MMHG | OXYGEN SATURATION: 98 % | WEIGHT: 207.25 LBS

## 2024-02-14 DIAGNOSIS — R29.898 DECREASED RANGE OF MOTION OF NECK: Primary | ICD-10-CM

## 2024-02-14 DIAGNOSIS — M25.612 DECREASED RANGE OF MOTION OF LEFT SHOULDER: ICD-10-CM
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
## 2024-02-14 DIAGNOSIS — R29.898 IMPAIRED STRENGTH OF NECK MUSCLES: ICD-10-CM

## 2024-02-14 DIAGNOSIS — S73.192D OTHER SPRAIN OF LEFT HIP, SUBSEQUENT ENCOUNTER: Primary | ICD-10-CM

## 2024-02-14 DIAGNOSIS — M25.552 PAIN IN LEFT HIP: ICD-10-CM

## 2024-02-14 DIAGNOSIS — Z02.6 ENCOUNTER RELATED TO WORKER'S COMPENSATION CLAIM: ICD-10-CM

## 2024-02-14 PROCEDURE — 97140 MANUAL THERAPY 1/> REGIONS: CPT

## 2024-02-14 PROCEDURE — 97112 NEUROMUSCULAR REEDUCATION: CPT

## 2024-02-14 PROCEDURE — 97110 THERAPEUTIC EXERCISES: CPT

## 2024-02-14 PROCEDURE — 99213 OFFICE O/P EST LOW 20 MIN: CPT | Mod: S$GLB,,, | Performed by: PHYSICIAN ASSISTANT

## 2024-02-14 NOTE — PROGRESS NOTES
Subjective:      Patient ID: Royer Castillo is a 63 y.o. male.    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  He reports no significant improvement since last office visit.  Says he feels a clicking and pain when attempting to get up from a seated position.  Also has significant pain with ambulation.  Says his hip feels like it dislocates at times.  Says the Toradol shot helped initially for a few hours however pain returned. MEB    Hip Pain   Pertinent negatives include no numbness.     Constitution: Positive for activity change.   Musculoskeletal:  Positive for pain, joint pain, abnormal ROM of joint and pain with walking.   Neurological:  Negative for numbness and tingling.     Objective:     Physical Exam  Vitals and nursing note reviewed.   Constitutional:       General: He is not in acute distress.     Appearance: He is well-developed. He is not diaphoretic.   HENT:      Head: Normocephalic and atraumatic.      Right Ear: Hearing and external ear normal.      Left Ear: Hearing and external ear normal.      Nose: Nose normal. No nasal deformity.   Eyes:      General: Lids are normal. No scleral icterus.     Conjunctiva/sclera: Conjunctivae normal.   Neck:      Trachea: Trachea normal.   Cardiovascular:      Pulses: Normal pulses.   Pulmonary:      Effort: Pulmonary effort is normal. No respiratory distress.      Breath sounds: No stridor.   Musculoskeletal:      Cervical back: Normal range of motion.      Left hip: No deformity or tenderness. Decreased range of motion.      Comments: Left hip exam:  Limited active and passive range of motion due to pain and guarding.  Increased pain with external and  internal rotation.  There is no E/E/D.  Left lower extremity neurovascularly intact.   Skin:     General: Skin is warm and dry.      Capillary Refill: Capillary refill takes less than 2 seconds.   Neurological:      Mental Status: He is alert. He is not disoriented.      GCS: GCS eye subscore is 4. GCS verbal subscore is 5. GCS motor subscore is 6.      Sensory: No sensory deficit.   Psychiatric:         Attention and Perception: He is attentive.         Speech: Speech normal.         Behavior: Behavior normal.        Assessment:      1. Other sprain of left hip, subsequent encounter    2. Encounter related to worker's compensation claim    3. Pain in left hip    4. Work related injury      Plan:     It is with a high degree of medical certainty that this patient's current signs and symptoms were caused or exacerbated by the work related injury mentioned in the note above    Patient with severe pain of the left hip after stepping in a hole. Concerning for possible labral tear versus occult fracture.  I will order MRI to further characterize.  Patient encouraged to apply ice, heat and conduct range of motion exercises as tolerated.  Patient to continue light duty and return to clinic in 2 weeks or sooner as needed.  I will notify patient of MRI results once available.       Patient Instructions: Attention not to aggravate affected area, Daily home exercises/warm soaks, MRI to be scheduled once authorized   Restrictions: Sedentary work only, Sit down work only  Follow up in about 2 weeks (around 2/28/2024) for Reassessment.

## 2024-02-14 NOTE — LETTER
Urgent Care - [address] [address]  Phone: [phone]  Fax: [phone]  [facility]: [id number]    Pt Name: Royer Castillo  Injury Date: 02/02/2024   Employee ID: [id number] Date of Treatment: 02/14/2024   Company: [employer]      Appointment Time: 10:15 AM Arrived: 10:12 AM   Provider: Royer Monson PA-C Time Out: 11:38 AM     Office Treatment:   1. Other sprain of left hip, subsequent encounter    2. Encounter related to worker's compensation claim    3. Pain in left hip    4. Work related injury          Patient Instructions: Attention not to aggravate affected area, Daily home exercises/warm soaks, MRI to be scheduled once authorized      Restrictions: Sedentary work only, Sit down work only     Return Appointment: 2/28/2024 at 11:00 AM    MARIEL

## 2024-02-15 NOTE — PROGRESS NOTES
[facility]   Physical Therapy Treatment Note      [facility]  [facility] Number: [id number]    Therapy Diagnosis:   Encounter Diagnoses   Name Primary?    Decreased range of motion of neck Yes    Decreased range of motion of left shoulder     Impaired strength of neck muscles      Physician: Christine Peterson PA-C    Visit Date: 2/14/2024    Physician Orders: PT Eval and Treat   Medical Diagnosis from Referral: M54.2,G89.29 (ICD-10-CM) - Chronic neck pain   Evaluation Date: 1/31/2024  Authorization Period Expiration: 1/22/2025  Plan of Care Expiration: 5/31/2024  Progress Note Due: 3/1/2024  Visit # / Visits authorized: 3/ 30  FOTO: 1/3    Time In: 9:00 AM  Time Out: 9:47 AM  Total Billable Time: 47 minutes    Subjective     Pt reports: overall doing well, needs to leave early today.   He was compliant with home exercise program.  Response to previous treatment: NA  Functional change: not yet    Pain: 3/10  Location: bilateral neck and shoulder      Objective      Objective Measures updated at progress report unless specified.  50% Limited neck SB/rotation to the R   50% Limited neck extension     Treatment     Vasyl received the treatments listed below:      therapeutic exercises to develop strength, endurance, ROM, and flexibility for 12 minutes including:  SNAG C1-C2 X 30  Cervical AROM X 30  T-spine extension on the chair x 30    manual therapy techniques: Joint mobilizations, Manual traction, and Myofacial release were applied to the: neck for 19 minutes, including:  Suboccipital release   Upper cervical C1-C2 rotation MET  Upper cervical C0-C1 flexion MET   First rib mob Grade III  Cervical C5-C7 Down glide on th R grade III  GHJ posterior/inferior glide grade III    neuromuscular re-education activities to improve: Balance, Coordination, Kinesthetic, Sense, and Proprioception for 16 minutes. The following activities were included:  Chin tuck with head lift 3 x 10 x 5 sec  hold  Scapula retraction with Y band 3x 10  SA slide with towel 3 x 10  NP  Shoulder IR/ER walk out 3 x 10  Shoulder scaption with chin tuck 2# 3 x 10  Prone shoulder extension with 2# DB 3 x 10  Banded shoulder scaption 3 x 10          therapeutic activities to improve functional performance for   minutes, including:        Patient Education and Home Exercises       Education provided:   - Reviewed and updated HEP.    Written Home Exercises Provided: yes. Exercises were reviewed and Vasyl was able to demonstrate them prior to the end of the session.  Vasyl demonstrated good  understanding of the education provided. See EMR under Patient Instructions for exercises provided during therapy sessions    Assessment     Pt completed session as noted above. Added cervical R down glide mob to improve facet joint stiffness and neck rotation. Noticed improved neck mobility with manual intervention and self mobilization.   Will continue to focus on neck AROM and progress on RC/periscapular training as tolerated .     Vasyl Is progressing well towards his goals.   Pt prognosis is Good.     Pt will continue to benefit from skilled outpatient physical therapy to address the deficits listed in the problem list box on initial evaluation, provide pt/family education and to maximize pt's level of independence in the home and community environment.     Pt's spiritual, cultural and educational needs considered and pt agreeable to plan of care and goals.     Anticipated barriers to physical therapy: NA    Goals: Short Term Goals: 6 weeks  1.Report decreased NECK pain  <   / =  4 /10  to increase tolerance for adls, work  2. Increase cervical ROM by 5-10 degrees in order to perform ADLs with decreased difficulty.  3. Pt to tolerate HEP to improve ROM and independence with ADL's     Long Term Goals: 12 weeks  1.Report decreased neck pain  <   / =  2  /10  to increase tolerance for adls, work  2. Increase UE/neck flexibility in order to  improve posture.    3.Increased strength L shoulder stabilizers to >/= 4/5 MMT grade to increase tolerance for ADL and work activities.  4.  Pt will report at thoracic (30% impaired) on FOTO neck score for neck pain disability to demonstrate decrease in disability and improvement in neck pain.       Plan     Will continue to focus on neck ROM and RC/periscapular NM control in the next visit.     Medardo Powell, PT

## 2024-02-20 ENCOUNTER — CLINICAL SUPPORT (OUTPATIENT)
Dept: REHABILITATION | Facility: HOSPITAL | Age: 64
End: 2024-02-20
Payer: COMMERCIAL

## 2024-02-20 DIAGNOSIS — R29.898 DECREASED RANGE OF MOTION OF NECK: Primary | ICD-10-CM

## 2024-02-20 DIAGNOSIS — R29.898 IMPAIRED STRENGTH OF NECK MUSCLES: ICD-10-CM

## 2024-02-20 DIAGNOSIS — M25.612 DECREASED RANGE OF MOTION OF LEFT SHOULDER: ICD-10-CM

## 2024-02-20 PROCEDURE — 97110 THERAPEUTIC EXERCISES: CPT

## 2024-02-20 PROCEDURE — 97112 NEUROMUSCULAR REEDUCATION: CPT

## 2024-02-20 PROCEDURE — 97140 MANUAL THERAPY 1/> REGIONS: CPT

## 2024-02-21 ENCOUNTER — TELEPHONE (OUTPATIENT)
Dept: URGENT CARE | Facility: CLINIC | Age: 64
End: 2024-02-21
Payer: COMMERCIAL

## 2024-02-21 ENCOUNTER — HOSPITAL ENCOUNTER (OUTPATIENT)
Dept: RADIOLOGY | Facility: HOSPITAL | Age: 64
Discharge: HOME | End: 2024-02-21
Attending: PHYSICIAN ASSISTANT
Payer: COMMERCIAL

## 2024-02-21 DIAGNOSIS — M25.552 PAIN IN LEFT HIP: ICD-10-CM
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
## 2024-02-21 PROCEDURE — 73721 MRI JNT OF LWR EXTRE W/O DYE: CPT | Mod: TC,LT

## 2024-02-21 PROCEDURE — 73721 MRI JNT OF LWR EXTRE W/O DYE: CPT | Mod: 26,LT,, | Performed by: RADIOLOGY

## 2024-02-21 NOTE — TELEPHONE ENCOUNTER
Called patient back to speak with him about an mri referral.    Patient stated he was contacted and scheduled his mri.    beulah   continue with  plan of care

## 2024-02-22 NOTE — PROGRESS NOTES
[facility]   Physical Therapy Treatment Note      Name: Royer Castillo  Clinic Number: [id number]    Therapy Diagnosis:   Encounter Diagnoses   Name Primary?    Decreased range of motion of neck Yes    Decreased range of motion of left shoulder     Impaired strength of neck muscles      Physician: Christine Peterson PA-C    Visit Date: 2/20/2024    Physician Orders: PT Eval and Treat   Medical Diagnosis from Referral: M54.2,G89.29 (ICD-10-CM) - Chronic neck pain   Evaluation Date: 1/31/2024  Authorization Period Expiration: 1/22/2025  Plan of Care Expiration: 5/31/2024  Progress Note Due: 3/1/2024  Visit # / Visits authorized: 4/ 30  FOTO: 1/3    Time In: 9:00 AM  Time Out: 9:57 AM  Total Billable Time: 57 minutes    Subjective     Pt reports: overall doing well, noticed improved neck mobility from last visit.   He was compliant with home exercise program.  Response to previous treatment: NA  Functional change: not yet    Pain: 3/10  Location: bilateral neck and shoulder      Objective      Objective Measures updated at progress report unless specified.  50% Limited neck SB/rotation to the R   50% Limited neck extension     Treatment     Vasyl received the treatments listed below:      therapeutic exercises to develop strength, endurance, ROM, and flexibility for 12 minutes including:  SNAG C1-C2 X 30  Cervical AROM X 30  T-spine extension on the chair x 30  SL open book X 30    manual therapy techniques: Joint mobilizations, Manual traction, and Myofacial release were applied to the: neck for 18 minutes, including:  Suboccipital release   Upper cervical C1-C2 rotation MET  Upper cervical C0-C1 flexion MET   First rib mob Grade III  Cervical C5-C7 Down glide on th R grade III  GHJ posterior/inferior glide grade III    neuromuscular re-education activities to improve: Balance, Coordination, Kinesthetic, Sense, and Proprioception for 27 minutes. The following activities were  included:  Chin tuck with head lift 3 x 10 x 5 sec hold  Scapula retraction with Y band 3x 10  SA slide with towel 3 x 10  Shoulder IR/ER walk out at 90 degrees 3 x 10  Shoulder scaption with 1# DB chin tuck 2# 3 x 10      therapeutic activities to improve functional performance for   minutes, including:        Patient Education and Home Exercises       Education provided:   - Reviewed and updated HEP.    Written Home Exercises Provided: yes. Exercises were reviewed and Vasyl was able to demonstrate them prior to the end of the session.  Vasyl demonstrated good  understanding of the education provided. See EMR under Patient Instructions for exercises provided during therapy sessions    Assessment     Pt completed session as noted above. Noticed improved neck mobility and symptoms with manual intervention and self mobilization. Pt still has difficulties with overhead movements due to RC muscle insufficiency. Will continue to focus on neck AROM and progress on RC/periscapular training as tolerated .     Vasyl Is progressing well towards his goals.   Pt prognosis is Good.     Pt will continue to benefit from skilled outpatient physical therapy to address the deficits listed in the problem list box on initial evaluation, provide pt/family education and to maximize pt's level of independence in the home and community environment.     Pt's spiritual, cultural and educational needs considered and pt agreeable to plan of care and goals.     Anticipated barriers to physical therapy: NA    Goals: Short Term Goals: 6 weeks  1.Report decreased NECK pain  <   / =  4 /10  to increase tolerance for adls, work  2. Increase cervical ROM by 5-10 degrees in order to perform ADLs with decreased difficulty.  3. Pt to tolerate HEP to improve ROM and independence with ADL's     Long Term Goals: 12 weeks  1.Report decreased neck pain  <   / =  2  /10  to increase tolerance for adls, work  2. Increase UE/neck flexibility in order to improve  posture.    3.Increased strength L shoulder stabilizers to >/= 4/5 MMT grade to increase tolerance for ADL and work activities.  4.  Pt will report at thoracic (30% impaired) on FOTO neck score for neck pain disability to demonstrate decrease in disability and improvement in neck pain.       Plan     Will continue to focus on neck ROM and RC/periscapular NM control in the next visit.     Medardo Powell, PT

## 2024-02-26 ENCOUNTER — CLINICAL SUPPORT (OUTPATIENT)
Dept: REHABILITATION | Facility: HOSPITAL | Age: 64
End: 2024-02-26
Payer: COMMERCIAL

## 2024-02-26 DIAGNOSIS — R29.898 IMPAIRED STRENGTH OF NECK MUSCLES: ICD-10-CM

## 2024-02-26 DIAGNOSIS — R29.898 DECREASED RANGE OF MOTION OF NECK: Primary | ICD-10-CM

## 2024-02-26 DIAGNOSIS — M25.612 DECREASED RANGE OF MOTION OF LEFT SHOULDER: ICD-10-CM

## 2024-02-26 PROCEDURE — 97530 THERAPEUTIC ACTIVITIES: CPT

## 2024-02-26 PROCEDURE — 97110 THERAPEUTIC EXERCISES: CPT

## 2024-02-26 PROCEDURE — 97112 NEUROMUSCULAR REEDUCATION: CPT

## 2024-02-26 NOTE — PROGRESS NOTES
[facility]   Physical Therapy Treatment Note      Name: Royer Castillo  Clinic Number: [id number]    Therapy Diagnosis:   Encounter Diagnoses   Name Primary?    Decreased range of motion of neck Yes    Decreased range of motion of left shoulder     Impaired strength of neck muscles      Physician: Christine Peterson PA-C    Visit Date: 2/26/2024    Physician Orders: PT Eval and Treat   Medical Diagnosis from Referral: M54.2,G89.29 (ICD-10-CM) - Chronic neck pain   Evaluation Date: 1/31/2024  Authorization Period Expiration: 1/22/2025  Plan of Care Expiration: 5/31/2024  Progress Note Due: 3/1/2024  Visit # / Visits authorized: 5/ 30  FOTO: 1/3    Time In: 1005 AM  Time Out: 1103 AM  Total Billable Time: 58 minutes    Subjective     Pt reports: overall doing well, noticed his hip pain bothered him a lot.  He was compliant with home exercise program.  Response to previous treatment: NA  Functional change: not yet    Pain: 3/10  Location: bilateral neck and shoulder      Objective      Objective Measures updated at progress report unless specified.  50% Limited neck SB/rotation to the R   50% Limited neck extension     Treatment     Vasyl received the treatments listed below:      therapeutic exercises to develop strength, endurance, ROM, and flexibility for 10 minutes including:  SNAG C1-C2 X 30  T-spine extension on the chair x 30  SL open book X 30    manual therapy techniques: Joint mobilizations, Manual traction, and Myofacial release were applied to the: neck for 8 minutes, including:  ROM check  GHJ posterior/inferior glide grade III  NP- Suboccipital release   Upper cervical C1-C2 rotation MET  Upper cervical C0-C1 flexion MET   First rib mob Grade III  Cervical C5-C7 Down glide on th R grade III      neuromuscular re-education activities to improve: Balance, Coordination, Kinesthetic, Sense, and Proprioception for 32 minutes. The following activities were included:  Chin tuck  with head lift 3 x 10 x 5 sec hold  Scapula retraction with Y band 3x 10  SA slide with towel 3 x 10  Shoulder IR/ER walk out at 90 degrees 3 x 10  Shoulder scaption with 1# DB chin tuck 2# 3 x 1  Seated Banded ER 3 x 10  Scaption 1# 3 x 10    therapeutic activities to improve functional performance for  8 minutes, including:  Landmine press with 3# weights 3 x 12        Patient Education and Home Exercises       Education provided:   - Reviewed and updated HEP.    Written Home Exercises Provided: yes. Exercises were reviewed and Vasyl was able to demonstrate them prior to the end of the session.  Vasyl demonstrated good  understanding of the education provided. See EMR under Patient Instructions for exercises provided during therapy sessions    Assessment     Pt completed session as noted above. Today's session continue to focus on the RC and periscapular training to improve functional mobility with overhead movements. Pt aldo session well w/o increased shoulder symptoms. Will continue to focus on neck AROM and progress on RC/periscapular training as tolerated .     Vasyl Is progressing well towards his goals.   Pt prognosis is Good.     Pt will continue to benefit from skilled outpatient physical therapy to address the deficits listed in the problem list box on initial evaluation, provide pt/family education and to maximize pt's level of independence in the home and community environment.     Pt's spiritual, cultural and educational needs considered and pt agreeable to plan of care and goals.     Anticipated barriers to physical therapy: NA    Goals: Short Term Goals: 6 weeks  1.Report decreased NECK pain  <   / =  4 /10  to increase tolerance for adls, work  2. Increase cervical ROM by 5-10 degrees in order to perform ADLs with decreased difficulty.  3. Pt to tolerate HEP to improve ROM and independence with ADL's     Long Term Goals: 12 weeks  1.Report decreased neck pain  <   / =  2  /10  to increase tolerance for  adls, work  2. Increase UE/neck flexibility in order to improve posture.    3.Increased strength L shoulder stabilizers to >/= 4/5 MMT grade to increase tolerance for ADL and work activities.  4.  Pt will report at thoracic (30% impaired) on FOTO neck score for neck pain disability to demonstrate decrease in disability and improvement in neck pain.       Plan     Will continue to focus on neck ROM and RC/periscapular NM control in the next visit.     Medardo Powell, PT

## 2024-02-28 ENCOUNTER — OFFICE VISIT (OUTPATIENT)
Dept: URGENT CARE | Facility: CLINIC | Age: 64
End: 2024-02-28
Payer: COMMERCIAL

## 2024-02-28 ENCOUNTER — CLINICAL SUPPORT (OUTPATIENT)
Dept: REHABILITATION | Facility: HOSPITAL | Age: 64
End: 2024-02-28
Payer: COMMERCIAL

## 2024-02-28 VITALS
SYSTOLIC BLOOD PRESSURE: 136 MMHG | OXYGEN SATURATION: 95 % | WEIGHT: 201 LBS | HEIGHT: 69 IN | DIASTOLIC BLOOD PRESSURE: 75 MMHG | HEART RATE: 73 BPM | BODY MASS INDEX: 29.77 KG/M2 | RESPIRATION RATE: 18 BRPM | TEMPERATURE: 98 F

## 2024-02-28 DIAGNOSIS — R29.898 DECREASED RANGE OF MOTION OF NECK: Primary | ICD-10-CM

## 2024-02-28 DIAGNOSIS — M25.552 PAIN IN LEFT HIP: ICD-10-CM

## 2024-02-28 DIAGNOSIS — M25.612 DECREASED RANGE OF MOTION OF LEFT SHOULDER: ICD-10-CM
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
## 2024-02-28 DIAGNOSIS — R29.898 IMPAIRED STRENGTH OF NECK MUSCLES: ICD-10-CM

## 2024-02-28 DIAGNOSIS — M16.12 OSTEOARTHRITIS OF LEFT HIP, UNSPECIFIED OSTEOARTHRITIS TYPE: Primary | ICD-10-CM

## 2024-02-28 PROCEDURE — 97530 THERAPEUTIC ACTIVITIES: CPT

## 2024-02-28 PROCEDURE — 97112 NEUROMUSCULAR REEDUCATION: CPT

## 2024-02-28 PROCEDURE — 99213 OFFICE O/P EST LOW 20 MIN: CPT | Mod: S$GLB,,, | Performed by: PHYSICIAN ASSISTANT

## 2024-02-28 PROCEDURE — 97110 THERAPEUTIC EXERCISES: CPT

## 2024-02-28 PROCEDURE — 97140 MANUAL THERAPY 1/> REGIONS: CPT

## 2024-02-28 NOTE — PROGRESS NOTES
Subjective:      Patient ID: Royer Castillo is a 63 y.o. male.    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  He reports no significant improvement since last office visit.  Patient had an MRI in the interim and is here for review.  He is currently taking meloxicam daily with no relief.  MEB    Hip Injury  This is a new problem. The current episode started 1 to 4 weeks ago. The problem occurs intermittently. The problem has been unchanged. Associated symptoms include arthralgias. Pertinent negatives include no numbness. The symptoms are aggravated by bending, walking, twisting and standing. Treatments tried: mobic.       Constitution: Positive for activity change.   Musculoskeletal:  Positive for pain, joint pain and pain with walking.   Neurological:  Negative for numbness and tingling.     Objective:     Physical Exam  Vitals and nursing note reviewed.   Constitutional:       General: He is not in acute distress.     Appearance: He is well-developed. He is not diaphoretic.   HENT:      Head: Normocephalic and atraumatic.      Right Ear: Hearing and external ear normal.      Left Ear: Hearing and external ear normal.      Nose: Nose normal. No nasal deformity.   Eyes:      General: Lids are normal. No scleral icterus.     Conjunctiva/sclera: Conjunctivae normal.   Neck:      Trachea: Trachea normal.   Cardiovascular:      Pulses: Normal pulses.   Pulmonary:      Effort: Pulmonary effort is normal. No respiratory distress.      Breath sounds: No stridor.   Musculoskeletal:      Cervical back: Normal range of motion.      Left hip: No deformity or tenderness. Decreased range of motion.      Comments: Left hip exam:   Limited active and passive range of motion due to pain and guarding.  Increased pain with external and internal rotation.  There is no E/E/D.  Left lower extremity neurovascularly intact.   Skin:     General: Skin is warm and dry.      Capillary Refill: Capillary refill takes less than 2 seconds.   Neurological:      Mental Status: He is alert. He is not disoriented.      GCS: GCS eye subscore is 4. GCS verbal subscore is 5. GCS motor subscore is 6.      Sensory: No sensory deficit.   Psychiatric:         Attention and Perception: He is attentive.         Speech: Speech normal.         Behavior: Behavior normal.        MRI Hip Without Contrast Left    Result Date: 2/21/2024  EXAMINATION: MRI HIP WITHOUT CONTRAST LEFT CLINICAL HISTORY: Trauma, Labral tear suspected;  Pain in left hip TECHNIQUE: Axial and coronal T2 FS performed of the pelvis; coronal T1 pelvis; coronal PD FS, sagittal T2 FS and axial PD oblique of the hip performed. COMPARISON: Radiograph 02/04/2024. FINDINGS: End-stage degenerative change of the left hip joint.  There is complete left hip joint space loss.  Small subchondral cystic geodes at the superolateral femoral head and lateral acetabular margin.  Complete cartilage loss at the superior femoral head.  There is also a collar of osteophytes at the femoral head.  No acute fracture, no osseous lesions, no joint effusion. The right hip joint space is maintained, the right femoral head cartilage is maintained.  I suspect minimal cartilage fissures at the acetabular region.  No subchondral cystic geodes or edema.  No joint effusion. The remainder of the pelvis including the bilateral sacroiliac joints, sacrum, pubic symphysis, superior and inferior pubic rami and bilateral ischial tuberosity appear normal.  Degenerative disc disease and disc space narrowing L5-S1. The visualized intrapelvic content demonstrate mild bladder wall thickening which could be due to nondistention, cystitis could have this  appearance.  The prostate appears normal in size.  The inguinal regions appear normal.  The gluteal and proximal thigh musculature appear normal.     End-stage degenerative change of the left hip joint. Electronically signed by: Chandni Samaniego MD Date:    02/21/2024 Time:    08:02    X-Ray Hip 2 or 3 views Left (with Pelvis when performed)    Result Date: 2/4/2024  EXAMINATION: XR HIP WITH PELVIS WHEN PERFORMED, 2 OR 3 VIEWS LEFT CLINICAL HISTORY: Encounter for examination for insurance purposes TECHNIQUE: AP view of the pelvis and frog leg lateral view of the left hip were performed. COMPARISON: 02/22/2021. FINDINGS: There is severe joint space narrowing of the left femoroacetabular joint with adjacent subchondral sclerosis and subchondral cystic changes and marginal osteophytes.  Left proximal femur appears identical to prior.  No acute fracture or dislocation.  Remaining osseous structures are intact     No acute fracture or dislocation. Advanced degenerative changes of the left femoroacetabular joint. Electronically signed by: Sagar Mantilla Date:    02/04/2024 Time:    11:44     Assessment:      1. Osteoarthritis of left hip, unspecified osteoarthritis type    2. Pain in left hip    3. Work related injury      Plan:     Patient with severe degenerative changes of the left hip as seen on imaging.  Patient denies having any pain in the hip prior to his work-related injury earlier this month.  Although there is no obvious traumatic injury to the hip I am positive work-related activity caused or exacerbated left hip pathology.  Therefore I will refer to orthopedics for further management.    Patient will be referred to Orthopedics as he may benefit from intra-articular steroid injection or surgical procedure.  I will order physical therapy.  Continue meloxicam daily.     Patient Instructions: Attention not to aggravate affected area, Referral to specialist to be scheduled, once authorized, PT to be scheduled  once authorized   Restrictions: Sedentary work only, Sit down work only, Discharged to Ortho/Neuro/Opthomologist/Surgeon (Avoid climbing ladders or stairs.)  No follow-ups on file.

## 2024-02-28 NOTE — LETTER
Urgent Care - [address] [address]  Phone: [phone]  Fax: [phone]  [facility] [facility]: [email or web address]    Pt Name: Royer Castillo  Injury Date: 02/02/2024   Employee ID:  Date of First Treatment: 02/28/2024   Company: [employer]      Appointment Time: 10:45 AM Arrived: ***   Provider: Royer Monson PA-C Time Out:***     Office Treatment:   1. Osteoarthritis of left hip, unspecified osteoarthritis type    2. Pain in left hip    3. Work related injury          Patient Instructions: Attention not to aggravate affected area, Referral to specialist to be scheduled, once authorized, PT to be scheduled once authorized    Restrictions: Sedentary work only, Sit down work only, Discharged to Ortho/Neuro/Opthomologist/Surgeon (Avoid climbing ladders or stairs.)     Return Appointment: Visit date not found at ***

## 2024-02-28 NOTE — LETTER
Urgent Care - [address] [address]  Phone: [phone]  Fax: [phone]  [place] [employer] [facility]: [email or web address]    Pt Name: Royer Castillo  Injury Date: 02/02/2024   Employee ID: [id number] Date of  Treatment: 02/28/2024   Company: [employer]      Appointment Time: 11:00  AM Arrived: 10:40 AM   Provider: Royer Monson PA-C Time Out:11:52 AM     Office Treatment:   1. Osteoarthritis of left hip, unspecified osteoarthritis type    2. Pain in left hip    3. Work related injury          Patient Instructions: Attention not to aggravate affected area, Referral to specialist to be scheduled, once authorized, PT to be scheduled once authorized    Restrictions: Sedentary work only, Sit down work only, Discharged to Ortho/Neuro/Opthomologist/Surgeon (Avoid climbing ladders or stairs.)     Return Appointment: none Discharged to Ortho  SB

## 2024-03-02 NOTE — PROGRESS NOTES
[facility]   Physical Therapy Treatment Note      Name: Royer Castillo  Clinic Number: [id number]    Therapy Diagnosis:   Encounter Diagnoses   Name Primary?    Decreased range of motion of neck Yes    Decreased range of motion of left shoulder     Impaired strength of neck muscles      Physician: Christine Peterson PA-C    Visit Date: 2/28/2024    Physician Orders: PT Eval and Treat   Medical Diagnosis from Referral: M54.2,G89.29 (ICD-10-CM) - Chronic neck pain   Evaluation Date: 1/31/2024  Authorization Period Expiration: 1/22/2025  Plan of Care Expiration: 5/31/2024  Progress Note Due: 3/1/2024  Visit # / Visits authorized: 6/ 30  FOTO: 1/3    Time In: 1005 AM  Time Out: 1103 AM  Total Billable Time: 58 minutes    Subjective     Pt reports: overall doing well, would like to hold off on therapy before getting hip looked at.   He was compliant with home exercise program.  Response to previous treatment: NA  Functional change: not yet    Pain: 3/10  Location: bilateral neck and shoulder      Objective      Objective Measures updated at progress report unless specified.  50% Limited neck SB/rotation to the R   50% Limited neck extension     Treatment     Vasyl received the treatments listed below:      therapeutic exercises to develop strength, endurance, ROM, and flexibility for 8 minutes including:  NP- SNAG C1-C2 X 30  T-spine extension on the chair x 30  SL open book X 30    manual therapy techniques: Joint mobilizations, Manual traction, and Myofacial release were applied to the: neck for 8 minutes, including:  ROM check  GHJ posterior/inferior glide grade III  NP- Suboccipital release   Upper cervical C1-C2 rotation MET  Upper cervical C0-C1 flexion MET   First rib mob Grade III  Cervical C5-C7 Down glide on th R grade III      neuromuscular re-education activities to improve: Balance, Coordination, Kinesthetic, Sense, and Proprioception for 32 minutes. The following activities  were included:  Chin tuck with head lift 3 x 10 x 5 sec hold  Scapula retraction with Y band 3x 10  SA slide with towel 3 x 10  Shoulder IR/ER at 90 degrees 3 x 10  Shoulder scaption with 1# DB chin tuck 2# 3 x 1  Seated Banded ER 3 x 10  Scaption 1# 3 x 10    therapeutic activities to improve functional performance for  8 minutes, including:  Landmine press with 3# weights 3 x 12        Patient Education and Home Exercises       Education provided:   - Reviewed and updated HEP.    Written Home Exercises Provided: yes. Exercises were reviewed and Vasyl was able to demonstrate them prior to the end of the session.  Vasyl demonstrated good  understanding of the education provided. See EMR under Patient Instructions for exercises provided during therapy sessions    Assessment     Pt completed session as noted above. Today's session continue to focus on the RC strength and periscapular training to improve posture and shoulder mobility with overhead movements. Pt aldo session well w/o increased shoulder symptoms. Advised pt to continue HEP at home and contact me as needed.   Vasyl Is progressing well towards his goals.   Pt prognosis is Good.     Pt will continue to benefit from skilled outpatient physical therapy to address the deficits listed in the problem list box on initial evaluation, provide pt/family education and to maximize pt's level of independence in the home and community environment.     Pt's spiritual, cultural and educational needs considered and pt agreeable to plan of care and goals.     Anticipated barriers to physical therapy: NA    Goals: Short Term Goals: 6 weeks  1.Report decreased NECK pain  <   / =  4 /10  to increase tolerance for adls, work  2. Increase cervical ROM by 5-10 degrees in order to perform ADLs with decreased difficulty.  3. Pt to tolerate HEP to improve ROM and independence with ADL's     Long Term Goals: 12 weeks  1.Report decreased neck pain  <   / =  2  /10  to increase tolerance  for adls, work  2. Increase UE/neck flexibility in order to improve posture.    3.Increased strength L shoulder stabilizers to >/= 4/5 MMT grade to increase tolerance for ADL and work activities.  4.  Pt will report at thoracic (30% impaired) on FOTO neck score for neck pain disability to demonstrate decrease in disability and improvement in neck pain.       Plan     Will continue to focus on neck ROM and RC/periscapular NM control in the next visit.     Medardo Powell, PT

## 2024-03-12 NOTE — PROGRESS NOTES
Subjective:          Chief Complaint: Royer Castillo is a 63 y.o. male who had concerns including Pain of the Left Hip.    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 The pain started 6 weeks ago after twisted while working as a  and is becoming progressively worse. Pain is located over (points to) groin. He reports that the pain is a 10 /10 sharp, constant, and pain with movement pain today. He reports none previous treatments. Pain is affecting ADLs and limiting desired level of activity. Denies numbness, tingling, radiation, and inability to bear weight.  Pain is 10 /10 at its worst    Mechanical symptoms: painful click and crepitus  Subjective instability: (+)   Worse with activity, climbing stairs, descending stairs, standing, walking, putting on shoes and socks, and getting in and out of cars or chairs  Better with nothing  Nocturnal symptoms: (+)    No previous surgeries or trauma on February 2, 2024 and reported on 2/4/24.           Review of Systems   Constitutional: Negative for fever and night sweats.   HENT:  Negative for hearing loss.    Eyes:  Negative for blurred vision and visual disturbance.   Cardiovascular:  Negative for chest pain and leg swelling.   Respiratory:  Negative for shortness of breath.    Endocrine: Negative for polyuria.   Hematologic/Lymphatic: Negative for bleeding problem.   Skin:  Negative for rash.   Musculoskeletal:  Negative for back pain, joint pain, joint swelling, muscle cramps and muscle weakness.   Gastrointestinal:  Negative for melena.   Genitourinary:  Negative for hematuria.   Neurological:  Negative for loss of balance, numbness and paresthesias.   Psychiatric/Behavioral:  Negative for altered mental status.                    Objective:        General: Royer is well-developed, well-nourished, appears stated age, in no acute distress, alert and oriented to time, place and person.      General    Vitals reviewed.  Constitutional: He is oriented to person, place, and time. He appears well-developed and well-nourished. No distress.   HENT:   Mouth/Throat: No oropharyngeal exudate.   Eyes: Right eye exhibits no discharge. Left eye exhibits no discharge.   Cardiovascular:  Normal rate.            Pulmonary/Chest: Effort normal and breath sounds normal. No respiratory distress.   Neurological: He is alert and oriented to person, place, and time. He has normal reflexes. No cranial nerve deficit. Coordination normal.   Psychiatric: He has a normal mood and affect. His behavior is normal. Judgment and thought content normal.     General Musculoskeletal Exam   Gait: normal   Pelvic Obliquity: none      Right Knee Exam     Inspection   Alignment:  normal  Effusion: absent    Left Knee Exam     Inspection   Alignment:  normal  Effusion: absent    Right Hip Exam     Inspection   Scars: absent  Swelling: absent  Bruising: absent  No deformity of hip.  Quadriceps Atrophy:  Negative  Erythema: absent    Range of Motion   Abduction:  40 normal   Adduction:  20 normal   Extension:  0 normal   Flexion:  110 normal   External rotation:  60 normal   Internal rotation:  15 normal     Tests   Pain w/ forced internal rotation (CAN): absent  Pain w/ forced external rotation (FADIR): absent  Owen: negative  Trendelenburg Test: negative  Circumduction test: negative  Stinchfield test: negative  Log Roll: negative  Snapping Hip (internal): negative  Step-down test: negative  Resisted sit-up pain: negative  Unresisted sit-up pain: negative  Resisted sit-up pain with adductor contraction pain: negative    Other   Sensation: normal  Left Hip Exam     Inspection   Scars: absent  Swelling: absent  No deformity of hip.  Quadriceps Atrophy:  negative  Erythema: absent  Bruising: absent    Range of Motion   Abduction:  10 abnormal   Adduction:  20 abnormal   Extension:  0 normal   Flexion:  80 abnormal   External rotation:  0  abnormal   Internal rotation: 5 abnormal     Tests   Pain w/ forced internal rotation (CAN): present  Pain w/ forced external rotation (FADIR): present  Owen: negative  Trendelenburg Test: negative  Charnley Class: A  Circumduction test: positive  Stinchfield test: positive  Log Roll: positive  Snapping Hip (internal): negative  Step-down test: negative  Resisted sit-up pain: negative  Resisted sit-up pain with adductor contraction pain: negative  Unresisted sit-up pain: negative    Other   Sensation: normal      Back (L-Spine & T-Spine) / Neck (C-Spine) Exam   Back exam is normal.      Muscle Strength   Right Lower Extremity   Hip Abduction: 5/5   Hip Adduction: 5/5   Hip Flexion: 5/5   Ankle Dorsiflexion:  5/5   Left Lower Extremity   Hip Abduction: 5/5   Hip Adduction: 5/5   Hip Flexion: 4/5   Ankle Dorsiflexion:  5/5     Reflexes     Left Side  Achilles:  2+  Quadriceps:  2+    Right Side   Achilles:  2+  Quadriceps:  2+    Vascular Exam     Right Pulses  Dorsalis Pedis:      2+  Posterior Tibial:      2+        Left Pulses  Dorsalis Pedis:      2+  Posterior Tibial:      2+        Capillary Refill  Right Hand: normal capillary refill  Left Hand: normal capillary refill        Edema  Right Upper Leg: absent  Left Upper Leg: absent        X-Ray Hips Bilateral 2 View Incl AP Pelvis  Narrative: EXAMINATION:  XR HIPS BILATERAL 2 VIEW INCL AP PELVIS    TECHNIQUE:  Two views of the hips were obtained, with an AP pelvis and a frogleg lateral of both hips submitted.    COMPARISON:  Comparison is made to 02/04/2024.    FINDINGS:  Severe degenerative arthritic change involving the left hip is again observed, and includes marked hip joint space narrowing which is particularly severe involving the superolateral aspect of the hip, spurring, sclerosis, and subchondral cystic change within the left femoral head and acetabulum.  Hip joint space on the right is fairly well maintained without significant narrowing.  No evidence  of recent or healing fracture or lytic destructive process.  SI joints appear unremarkable.  Impression: Advanced degenerative arthritic changes involving the left hip.  Findings are quite similar to the examination of 02/04/2024.    Electronically signed by: Marvin Deluca MD  Date:    03/13/2024  Time:    10:20    Narrative & Impression  EXAMINATION:  MRI HIP WITHOUT CONTRAST LEFT     CLINICAL HISTORY:  Trauma, Labral tear suspected;  Pain in left hip     TECHNIQUE:  Axial and coronal T2 FS performed of the pelvis; coronal T1 pelvis; coronal PD FS, sagittal T2 FS and axial PD oblique of the hip performed.     COMPARISON:  Radiograph 02/04/2024.     FINDINGS:  End-stage degenerative change of the left hip joint.  There is complete left hip joint space loss.  Small subchondral cystic geodes at the superolateral femoral head and lateral acetabular margin.  Complete cartilage loss at the superior femoral head.  There is also a collar of osteophytes at the femoral head.  No acute fracture, no osseous lesions, no joint effusion.     The right hip joint space is maintained, the right femoral head cartilage is maintained.  I suspect minimal cartilage fissures at the acetabular region.  No subchondral cystic geodes or edema.  No joint effusion.     The remainder of the pelvis including the bilateral sacroiliac joints, sacrum, pubic symphysis, superior and inferior pubic rami and bilateral ischial tuberosity appear normal.  Degenerative disc disease and disc space narrowing L5-S1.     The visualized intrapelvic content demonstrate mild bladder wall thickening which could be due to nondistention, cystitis could have this appearance.  The prostate appears normal in size.  The inguinal regions appear normal.  The gluteal and proximal thigh musculature appear normal.     Impression:     End-stage degenerative change of the left hip joint.         Assessment:       Encounter Diagnoses   Name Primary?    Left hip pain Yes     Traumatic arthritis of left hip           Plan:       1. RTC in 3-4 weeks with Advanced Practice Provider H&P; Price Hip Score was filled out today in clinic. Patient will not fill out Price Hip Score on return.     2. Medications: Refills of the following Rx were sent to patients preferred Pharmacy:  celecoxib (CELEBREX) 200 MG capsule    3. Physical Therapy: Continue/Begin: Pre-Rehabilitation at @[facility]       4. HEP: N/A    5. Procedures/Procedural Planning:   We reviewed with Royer today, the pathology and natural history of his diagnosis. We have discussed a variety of treatment options including medications, physical therapy and other alternative treatments. I also explained the indications, risks and benefits of surgery. After discussion, Royer decided to proceed with surgery. The decision was made to go forward with:     left Hip:    81385 Replacement, hip, acetabular and proximal femoral prosthesis (Total Hip Arthroplasty), with or without autograft or allograft      Clearance Medically Necessary: X Yes   - PCP    Pre-Op Center Clearance Medically Necessary: X Yes        The details of the surgical procedure were explained, including the location of probable incisions and a description of likely hardware and/or grafts to be used.  The patient understands the likely convalescence after surgery.  Also, we have thoroughly discussed the risks, benefits and alternatives to surgery, including, but not limited to, the risk of infection, joint stiffness, blood clot (including DVT and/or pulmonary embolus), neurologic and vascular injury.  It was explained that, if tissue has been repaired or reconstructed, there is a chance of failure, which may require further management.      All of the patient's questions were answered and informed consent was obtained. The patient will contact us if they have any questions or concerns in the interim.    6. DME: N/A    7. Work/Sport Status: Light duty continued at  work at this time with no stairclimbing at this time; he will need to take off 6 weeks prior to return to light duty at work with full release to medium demand level at 3-6 months after surgery based on his response to the planned GISSELLE procedure.    8. Visit Summary: Clearance needed and approval from work-related injury                          [facility] patient questionnaires have been collected today.

## 2024-03-13 ENCOUNTER — HOSPITAL ENCOUNTER (OUTPATIENT)
Dept: RADIOLOGY | Facility: HOSPITAL | Age: 64
Discharge: HOME | End: 2024-03-13
Attending: ORTHOPAEDIC SURGERY
Payer: COMMERCIAL

## 2024-03-13 ENCOUNTER — OFFICE VISIT (OUTPATIENT)
Dept: SPORTS MEDICINE | Facility: CLINIC | Age: 64
End: 2024-03-13
Payer: COMMERCIAL

## 2024-03-13 VITALS
WEIGHT: 201 LBS | DIASTOLIC BLOOD PRESSURE: 85 MMHG | BODY MASS INDEX: 29.77 KG/M2 | HEIGHT: 69 IN | HEART RATE: 79 BPM | SYSTOLIC BLOOD PRESSURE: 129 MMHG

## 2024-03-13 DIAGNOSIS — M25.552 LEFT HIP PAIN: Primary | ICD-10-CM

## 2024-03-13 DIAGNOSIS — M25.552 LEFT HIP PAIN: ICD-10-CM

## 2024-03-13 DIAGNOSIS — M12.552 TRAUMATIC ARTHRITIS OF LEFT HIP: ICD-10-CM

## 2024-03-13 PROBLEM — M16.12 PRIMARY OSTEOARTHRITIS OF LEFT HIP: Status: ACTIVE | Noted: 2024-03-13

## 2024-03-13 PROCEDURE — 73521 X-RAY EXAM HIPS BI 2 VIEWS: CPT | Mod: 26,,, | Performed by: RADIOLOGY

## 2024-03-13 PROCEDURE — 99214 OFFICE O/P EST MOD 30 MIN: CPT | Mod: S$GLB,,, | Performed by: ORTHOPAEDIC SURGERY

## 2024-03-13 PROCEDURE — 73521 X-RAY EXAM HIPS BI 2 VIEWS: CPT | Mod: TC

## 2024-03-13 PROCEDURE — 99999 PR PBB SHADOW E&M-EST. PATIENT-LVL III: CPT | Mod: PBBFAC,,, | Performed by: ORTHOPAEDIC SURGERY

## 2024-03-13 RX ORDER — CELECOXIB 200 MG/1
200 CAPSULE ORAL 2 TIMES DAILY
Qty: 60 CAPSULE | Refills: 2 | Status: ON HOLD | OUTPATIENT
Start: 2024-03-13 | End: 2024-04-16 | Stop reason: HOSPADM

## 2024-03-13 NOTE — LETTER
March 13, 2024        Christine Peterson PA-C  [address]  [facility] [address]             [facility] - Sports [facility] 1st Fl  [address]  [facility] [address]  Phone: [phone]  Fax: [phone]   Patient: Royer Castillo   MR Number: [id number]   YOB: 1960   Date of Visit: 3/13/2024       Dear Dr. Peterson:    Thank you for referring Royer Castillo to me for evaluation. Below are the relevant portions of my assessment and plan of care.    Encounter Diagnoses   Name Primary?    Left hip pain Yes    Traumatic arthritis of left hip         1. RTC in 3-4 weeks with Advanced Practice Provider H&P; Price Hip Score was filled out today in clinic. Patient will not fill out Price Hip Score on return.     2. Medications: Refills of the following Rx were sent to patients preferred Pharmacy:  celecoxib (CELEBREX) 200 MG capsule    3. Physical Therapy: Continue/Begin: Pre-Rehabilitation at @[facility]       4. HEP: N/A    5. Procedures/Procedural Planning:   We reviewed with Royer today, the pathology and natural history of his diagnosis. We have discussed a variety of treatment options including medications, physical therapy and other alternative treatments. I also explained the indications, risks and benefits of surgery. After discussion, Royer decided to proceed with surgery. The decision was made to go forward with:     left Hip:    [id number] Replacement, hip, acetabular and proximal femoral prosthesis (Total Hip Arthroplasty), with or without autograft or allograft      Clearance Medically Necessary: X Yes   - PCP    Pre-Op Center Clearance Medically Necessary: X Yes        The details of the surgical procedure were explained, including the location of probable incisions and a description of likely hardware and/or grafts to be used.  The patient understands the likely convalescence after surgery.  Also, we have thoroughly discussed the risks, benefits and  alternatives to surgery, including, but not limited to, the risk of infection, joint stiffness, blood clot (including DVT and/or pulmonary embolus), neurologic and vascular injury.  It was explained that, if tissue has been repaired or reconstructed, there is a chance of failure, which may require further management.      All of the patient's questions were answered and informed consent was obtained. The patient will contact us if they have any questions or concerns in the interim.    6. DME: N/A    7. Work/Sport Status: Light duty continued at work at this time with no stairclimbing at this time; he will need to take off 6 weeks prior to return to light duty at work with full release to medium demand level at 3-6 months after surgery based on his response to the planned GISSELLE procedure.    8. Visit Summary: Clearance needed and approval from work-related injury                  If you have questions, please do not hesitate to call me. I look forward to following Royer along with you.    Sincerely,        Gerard Tello MD           CC    No Recipients

## 2024-03-13 NOTE — LETTER
Patient: Royer Castillo   YOB: 1960   Clinic Number: [id number]   Today's Date: March 13, 2024     To:    Fax Number:         Work Status Summary     Date of Injury: 2/2/2024  Diagnosis/ICD-10: M12.552     Work Status: ABLE to work --- transitional duty (as follows): LIGHT WORK: Lifting 20 lbs maximum with frequent lifting and/or carrying of objects weighing up to 10 lbs. Even though the weight lifted may be only a negligible amount, a job is in this category when it requires walking or standing to a significant degree, or when it involves sitting most of the time with a degree of pushing and pulling of arm and/or leg controls.    Therapy Recomendations: Physical Therapy 1-2 times a week for 2-4 weeks.    Medications Ordered This Encounter   Medications    celecoxib (CELEBREX) 200 MG capsule       Next Appointment: Follow-up for preoperative H&P; Royer will be seen by [facility]..    Comments: Work/Sport Status: Light duty continued at work at this time with no stairclimbing at this time; he will need to take off 6 weeks prior to return to light duty at work with full release to medium demand level at 3-6 months after surgery based on his response to the planned GISSELLE procedure.         ____________________________         March 13, 2024    Gerard Tello MD  Signed (Provider)

## 2024-03-14 DIAGNOSIS — M16.12 PRIMARY OSTEOARTHRITIS OF LEFT HIP: Primary | ICD-10-CM

## 2024-03-15 ENCOUNTER — PATIENT MESSAGE (OUTPATIENT)
Dept: ADMINISTRATIVE | Facility: OTHER | Age: 64
End: 2024-03-15
Payer: COMMERCIAL

## 2024-03-15 ENCOUNTER — TELEPHONE (OUTPATIENT)
Dept: PREADMISSION TESTING | Facility: HOSPITAL | Age: 64
End: 2024-03-15
Payer: COMMERCIAL

## 2024-03-15 NOTE — TELEPHONE ENCOUNTER
----- Message from June Boothe sent at 3/13/2024 11:08 AM CDT -----  Regarding: PCP clearance for [facility] surgical patient-Dr. Gerard Tello  Hi,        Would you please reach out and schedule PCP clearance appointment with Dr. Uribe or either his NPs (Anabel Day & Gopi Dickerson) for  surgical patient dos 4/16/2024.       Thank you so much,    June Boothe   Clinical Assistant to Dr. Gerard Tello

## 2024-03-18 ENCOUNTER — TELEPHONE (OUTPATIENT)
Dept: PREADMISSION TESTING | Facility: HOSPITAL | Age: 64
End: 2024-03-18
Payer: COMMERCIAL

## 2024-03-18 DIAGNOSIS — M79.609 PAIN IN EXTREMITY, UNSPECIFIED EXTREMITY: ICD-10-CM

## 2024-03-18 DIAGNOSIS — Z01.818 PRE-OP TESTING: Primary | ICD-10-CM

## 2024-03-18 NOTE — ANESTHESIA PAT ROS NOTE
03/18/2024  Royer Castillo is a 63 y.o., male with Primary Osteoarthritis left hip, presents to periop center for Optimization with Dr. Uribe, Anesthesia assessment, and preop instruction.      Pre-op Assessment    I have reviewed the Patient Summary Reports.     I have reviewed the Nursing Notes. I have reviewed the NPO Status.   I have reviewed the Medications.     Review of Systems  Anesthesia Hx:  No problems with previous Anesthesia   History of prior surgery of interest to airway management or planning:  Previous anesthesia: General 3/2/22 EGD with general anesthesia.       Airway issues documented on chart review include GETA     Denies Family Hx of Anesthesia complications.    Denies Personal Hx of Anesthesia complications.                    Social:  Non-Smoker, No Alcohol Use       Hematology/Oncology:  Hematology Normal                  Hematology Comments: Genital herpes                      EENT/Dental:  denies chronic allergic rhinitis           Cardiovascular:  Exercise tolerance: poor   Denies Pacemaker. Hypertension, well controlled       Denies Angina.     hyperlipidemia                             Pulmonary:    Denies COPD.  Denies Asthma.   Denies Shortness of breath.  Denies Recent URI.                 Renal/:   Denies Chronic Renal Disease. no renal calculi    Genital herpes                 Hepatic/GI:     GERD, well controlled     12/11/2019  Colon polyp          Musculoskeletal:  Arthritis    Musculoskeletal General/Symptoms: joint pain, joint stiffness, muscle weakness, generalized, muscle pain, generalzed, joint immobility.  Decreased range of motion of neck, Decreased range of motion of left shoulder,   Impaired strength of neck muscles    Left hip pain,   Traumatic arthritis of left hip    Pain in both knees,   Tear of LCL (lateral collateral ligament) of knee,  right,  initial encounter,   Acute meniscal tear of right knee, Chondromalacia of right knee,   Acute lateral meniscus tear of right knee,     Joint Disease:  Arthritis, Osteoarthritis, hip, knee, shoulder Primary osteoarthritis left hip    5/27/21 ARTHROSCOPY, KNEE, WITH MENISCECTOMY (Right)   ARTHROSCOPY, KNEE,         Neurological:  Denies TIA.  Denies CVA.    Denies Seizures.          Chronic Pain Syndrome Arthritis, Osteoarthritis, hip, knee, shoulder   Left hip pain                        Endocrine:  Denies Diabetes. Denies Hypothyroidism.  Denies Hyperthyroidism. Pre-diabetes        Dermatological:  Skin Normal    Psych:  Psychiatric Normal                    Physical Exam  General: Well nourished, Cooperative, Alert and Oriented    Airway:  Mouth Opening: Normal  Tongue: Normal  Neck ROM: Normal ROM    Dental:  Intact    Chest/Lungs:  Clear to auscultation, Normal Respiratory Rate    Heart:  Rate: Normal  Rhythm: Regular Rhythm  Sounds: Normal          Anesthesia Assessment: Preoperative EQUATION    Planned Procedure: Procedure(s) (LRB):  ARTHROPLASTY, HIP (Left)  Requested Anesthesia Type:General  Surgeon: Gerard Tello MD  Service: Orthopedics  Known or anticipated Date of Surgery:4/16/2024    Surgeon notes: reviewed    Electronic QUestionnaire Assessment completed via nurse interview with patient.        Triage considerations:     The patient has no apparent active cardiac condition (No unstable coronary Syndrome such as severe unstable angina or recent [<1 month] myocardial infarction, decompensated CHF, severe valvular   disease or significant arrhythmia)    Previous anesthesia records:GETA and No problems  3/2/22 EGD  Airway/Jaw/Neck:  Airway Findings: Mouth Opening: Normal   Tongue: Normal   General Airway Assessment: Adult Mallampati: II  Improves to II, I with phonation.  TM Distance: Normal, at least 6 cm   Jaw/Neck Findings:  Neck ROM: Normal ROM     5/27/21 ARTHROSCOPY, KNEE, WITH MENISCECTOMY (Right)    ARTHROSCOPY, KNEE, WITH CHONDROPLASTY (Right: Knee)   SYNOVECTOMY, KNEE limited (Right: Knee)    Mask Ventilation: Easy Intubated: Postinduction Airway Device Size: 3.5 Cuff Inflation: Minimal occlusive pressure Placement Verified By: Capnometry Complicating Factors: None Findings Post-Intubation: Bilateral breath sounds;Atraumatic/Condition of teeth unchanged Secured at: Lips Complications: None     Last PCP note: 6-12 months ago , within [facility]     Subspecialty notes: Ortho    Other important co-morbidities: GERD, HLD, HTN, and Pre DM       Tests already available:  Available tests,  within [facility] .   1/9/24 A1C, CBC, CMP, TSH   7/9/22 EKG             Instructions given. (See in Nurse's note)    Optimization:  Anesthesia Preop Clinic Assessment  Indicated POC    Medical Opinion Indicated       Sub-specialist consult indicated:   [facility] Pre OP Center Dr. Uribe       Plan:    Testing:  EKG and PT/INR   Pre-anesthesia  visit       Visit focus: possible regional anesthesia and/or nerve block      Consultation:IM Perioperative Hospitalist     Patient  has previously scheduled Medical Appointment: 3/28    Navigation: Tests Scheduled.              Consults scheduled.             Results will be tracked by Preop Clinic.

## 2024-03-18 NOTE — TELEPHONE ENCOUNTER
----- Message from Lesly Thompson RN sent at 3/18/2024  1:25 PM CDT -----  (4/16) Please schedule EKG and PT/INR.  Thanks,  Lesly

## 2024-03-19 ENCOUNTER — PATIENT MESSAGE (OUTPATIENT)
Dept: ADMINISTRATIVE | Facility: OTHER | Age: 64
End: 2024-03-19
Payer: COMMERCIAL

## 2024-03-21 ENCOUNTER — PATIENT MESSAGE (OUTPATIENT)
Dept: ADMINISTRATIVE | Facility: OTHER | Age: 64
End: 2024-03-21
Payer: COMMERCIAL

## 2024-03-25 ENCOUNTER — PATIENT MESSAGE (OUTPATIENT)
Dept: ADMINISTRATIVE | Facility: OTHER | Age: 64
End: 2024-03-25
Payer: COMMERCIAL

## 2024-03-26 ENCOUNTER — TELEPHONE (OUTPATIENT)
Dept: INTERNAL MEDICINE | Facility: CLINIC | Age: 64
End: 2024-03-26
Payer: COMMERCIAL

## 2024-03-26 ENCOUNTER — PATIENT MESSAGE (OUTPATIENT)
Dept: SPORTS MEDICINE | Facility: CLINIC | Age: 64
End: 2024-03-26
Payer: COMMERCIAL

## 2024-03-26 NOTE — TELEPHONE ENCOUNTER
----- Message from Katelyn Lazo sent at 3/26/2024  9:56 AM CDT -----  Regarding: Start date for PT  Contact: Dennise @ [phone]  Caller wanted to now when does pt start physical therapy after surgery. Please c/b to advise.. Thanks

## 2024-03-27 ENCOUNTER — TELEPHONE (OUTPATIENT)
Dept: PREADMISSION TESTING | Facility: HOSPITAL | Age: 64
End: 2024-03-27
Payer: COMMERCIAL

## 2024-03-28 ENCOUNTER — OFFICE VISIT (OUTPATIENT)
Dept: INTERNAL MEDICINE | Facility: CLINIC | Age: 64
End: 2024-03-28
Payer: COMMERCIAL

## 2024-03-28 ENCOUNTER — HOSPITAL ENCOUNTER (OUTPATIENT)
Dept: CARDIOLOGY | Facility: CLINIC | Age: 64
Discharge: HOME | End: 2024-03-28
Payer: COMMERCIAL

## 2024-03-28 ENCOUNTER — HOSPITAL ENCOUNTER (OUTPATIENT)
Dept: PREADMISSION TESTING | Facility: HOSPITAL | Age: 64
Discharge: HOME | End: 2024-03-28
Attending: ANESTHESIOLOGY
Payer: COMMERCIAL

## 2024-03-28 VITALS
SYSTOLIC BLOOD PRESSURE: 133 MMHG | WEIGHT: 204 LBS | OXYGEN SATURATION: 96 % | HEIGHT: 69 IN | HEART RATE: 69 BPM | TEMPERATURE: 98 F | BODY MASS INDEX: 30.21 KG/M2 | DIASTOLIC BLOOD PRESSURE: 85 MMHG | RESPIRATION RATE: 16 BRPM

## 2024-03-28 DIAGNOSIS — M16.12 PRIMARY OSTEOARTHRITIS OF LEFT HIP: ICD-10-CM

## 2024-03-28 DIAGNOSIS — Z01.818 PRE-OP TESTING: ICD-10-CM

## 2024-03-28 DIAGNOSIS — K21.9 GASTROESOPHAGEAL REFLUX DISEASE, UNSPECIFIED WHETHER ESOPHAGITIS PRESENT: ICD-10-CM

## 2024-03-28 DIAGNOSIS — Z01.818 PREOP EXAMINATION: Primary | ICD-10-CM

## 2024-03-28 DIAGNOSIS — I10 ESSENTIAL HYPERTENSION: ICD-10-CM

## 2024-03-28 DIAGNOSIS — E78.2 MIXED HYPERLIPIDEMIA: ICD-10-CM

## 2024-03-28 DIAGNOSIS — A60.00 GENITAL HERPES SIMPLEX, UNSPECIFIED SITE: ICD-10-CM

## 2024-03-28 DIAGNOSIS — M25.552 LEFT HIP PAIN: ICD-10-CM

## 2024-03-28 DIAGNOSIS — Z86.16 HISTORY OF COVID-19: ICD-10-CM

## 2024-03-28 DIAGNOSIS — R73.03 PRE-DIABETES: ICD-10-CM

## 2024-03-28 DIAGNOSIS — K63.5 POLYP OF COLON, UNSPECIFIED PART OF COLON, UNSPECIFIED TYPE: ICD-10-CM

## 2024-03-28 PROBLEM — M25.562 PAIN IN BOTH KNEES: Status: RESOLVED | Noted: 2019-08-12 | Resolved: 2024-03-28

## 2024-03-28 PROBLEM — M25.561 PAIN IN BOTH KNEES: Status: RESOLVED | Noted: 2019-08-12 | Resolved: 2024-03-28

## 2024-03-28 LAB
OHS QRS DURATION: 82 MS
OHS QTC CALCULATION: 397 MS

## 2024-03-28 PROCEDURE — 1159F MED LIST DOCD IN RCRD: CPT | Mod: CPTII,S$GLB,, | Performed by: HOSPITALIST

## 2024-03-28 PROCEDURE — 93005 ELECTROCARDIOGRAM TRACING: CPT | Mod: S$GLB,,, | Performed by: ANESTHESIOLOGY

## 2024-03-28 PROCEDURE — 93010 ELECTROCARDIOGRAM REPORT: CPT | Mod: S$GLB,,, | Performed by: INTERNAL MEDICINE

## 2024-03-28 PROCEDURE — 3079F DIAST BP 80-89 MM HG: CPT | Mod: CPTII,S$GLB,, | Performed by: HOSPITALIST

## 2024-03-28 PROCEDURE — 3075F SYST BP GE 130 - 139MM HG: CPT | Mod: CPTII,S$GLB,, | Performed by: HOSPITALIST

## 2024-03-28 PROCEDURE — 99215 OFFICE O/P EST HI 40 MIN: CPT | Mod: S$GLB,,, | Performed by: HOSPITALIST

## 2024-03-28 PROCEDURE — 99417 PROLNG OP E/M EACH 15 MIN: CPT | Mod: S$GLB,,, | Performed by: HOSPITALIST

## 2024-03-28 PROCEDURE — 3044F HG A1C LEVEL LT 7.0%: CPT | Mod: CPTII,S$GLB,, | Performed by: HOSPITALIST

## 2024-03-28 PROCEDURE — 3008F BODY MASS INDEX DOCD: CPT | Mod: CPTII,S$GLB,, | Performed by: HOSPITALIST

## 2024-03-28 PROCEDURE — 99999 PR PBB SHADOW E&M-EST. PATIENT-LVL IV: CPT | Mod: PBBFAC,,, | Performed by: HOSPITALIST

## 2024-03-28 RX ORDER — MULTIVITAMIN
TABLET ORAL DAILY
COMMUNITY

## 2024-03-28 RX ORDER — EPINEPHRINE 0.22MG
100 AEROSOL WITH ADAPTER (ML) INHALATION DAILY
COMMUNITY

## 2024-03-28 NOTE — ASSESSMENT & PLAN NOTE
Doing good   Does not sound Cardiac   Tips to control reflux discussed     GERD-  I suggest continuation of the Proton pump inhibitor in the perioperative period . I suggest aspiration precautions
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
## 2024-03-28 NOTE — ASSESSMENT & PLAN NOTE
He is taking amlodipine, hydrochlorothiazide in the morning time  I suggested not to take hydrochlorothiazide on the morning of surgery      Hypertension-  Blood pressure is acceptable . I suggest continuation of --Amlodipine------ during the entire perioperative period. I suggest holding --HCTZ------ on the morning of the surgery and can continue that  post operatively under blood pressure, electrolyte and renal function monitoring as long as they are acceptable.I suggest addressing pain control as uncontrolled pain can increased blood pressure    I have encouraged him that with increased mobility after joint replacement surgery his blood pressure might get better and his blood pressure medication requirement might come down  I suggested to watch his blood pressure

## 2024-03-28 NOTE — ASSESSMENT & PLAN NOTE
A few years     He did not require intubation, supplemental oxygen use, hospitalization   He has been vaccinated against COVID    COVID screening     No fever   No cough   No SOB  No sore throat   No loss of taste or smell   No muscle aches   No nausea, vomiting , diarrhea

## 2024-03-28 NOTE — DISCHARGE INSTRUCTIONS
Your surgery has been scheduled for:__________4/16/24________________________________    You should report to:  ____[facility], located on the [place] side of the first floor of the           [facility] ([phone])  ____[facility], located on the [facility] side of the            Second floor of the [facility] [phone])  ____3rd Floor [facility] located on the [facility] side of the [facility] [phone]  _X___[facility]: located at [address] [place] [address]. [address].     Please Note   Tell your doctor if you take Aspirin, products containing Aspirin, herbal medications  or blood thinners, such as Coumadin, Ticlid, or Plavix.  (Consult your provider regarding holding or stopping before surgery).  Arrange for someone to drive you home following surgery.  You will not be allowed to leave the surgical facility alone or drive yourself home following sedation and anesthesia.        Before Surgery  Stop taking all herbal medications, vitamins, and supplements 7 days prior to surgery  No Motrin/Advil (Ibuprofen) 7 days before surgery  No Aleve (Naproxen) 7 days before surgery  Stop Taking Asprin, products containing Asprin __7___days before surgery  Stop taking blood thinners_______days before surgery  No Goody's/BC  Powder 7 days before surgery  Refrain from drinking alcoholic beverages for 24hours before and after surgery  Stop or limit smoking ______7___days before surgery  You may take Tylenol for pain    Night before Surgery  Do not eat or drink after midnight  Take a shower or bath (shower is recommended).  Bathe with Hibiclens soap or an antibacterial soap from the neck down.  If not supplied by your surgeon, hibiclens soap will need to be purchased over the counter in pharmacy.  Rinse soap off thoroughly.  Shampoo your hair with your regular shampoo    The Day of  Surgery  Take another bath or shower with hibiclens or any antibacterial soap, to reduce the chance of infection.  Take heart and blood pressure medications with a small sip of water, as advised by the perioperative team.  Do not take fluid pills  You may brush your teeth and rinse your mouth, but do not swall any additional water.   Do not apply perfumes, powder, body lotions or deodorant on the day of surgery.  Nail polish should be removed.  Do not wear makeup or moisturizer  Wear comfortable clothes, such as a button front shirt and loose fitting pants.  Leave all jewelry, including body piercings, and valuables at home.    Bring any devices you will neeed after surgery such as crutches or canes.  If you have sleep apnea, please bring your CPAP machine  In the event that your physical condition changes including the onset of a cold or respiratory illness, or if you have to delay or cancel your surgery, please notify your surgeon.     Anesthesia: Regional Anesthesia    Youre scheduled for surgery. During surgery, youll receive medicine called anesthesia to keep you comfortable and pain-free. Your surgeon has decided that youll receive regional anesthesia. This sheet tells you what to expect with this type of anesthesia.  What is regional anesthesia?  Regional anesthesia numbs one region of your body. The anesthesia may be given around nerves or into veins in your arms, neck, or legs (nerve block or [place] block). Or it may be sent into the spinal fluid (spinal anesthesia) or into the space just outside the spinal fluid (epidural anesthesia). You may also be given sedatives to help you relax.  Nerve block or [place] block  A small area of the body, such as an arm or leg, can be numbed using a nerve block or [place] block.  Nerve block. During a nerve block, your skin is numbed. A needle is then inserted near nerves that serve the area to be numbed. Anesthetic is sent through the needle.  IV regional or [place] block. For  this type of block, an IV line is put into a vein. The blood flow to the area to be numbed is blocked for a short time. Anesthetic is sent through the IV.  Spinal anesthesia  Spinal anesthesia numbs your body from about the waist down.  Anesthetic is injected into the spinal fluid. This is a substance that surrounds the spinal cord in your spinal column. The anesthetic blocks pain traveling from the body to the brain.  To receive the anesthetic, your skin is numbed at the injection site on your back.  A needle is then inserted into the spinal space. Anesthetic is sent into the spinal fluid through the needle.  Epidural anesthesia  Epidural anesthesia is most commonly used during childbirth and may also be used after surgical procedures of the chest, belly, and legs.  Anesthetic is injected into the epidural space. This is just outside the dural sac which contains the spinal fluid.  To receive the anesthetic, your skin is numbed at the injection site on your back.  A needle is then inserted into the epidural space. Anesthetic is sent into the epidural space through the needle.  A small flexible catheter may be attached to the needle and left in place. This allows for continuous injections or infusions of anesthetic.  Anesthesia tools and medicines that might be near you during your procedure  Local anesthetic. This medicine is given through a needle numbs one region of your body.  Electrocardiography leads (electrodes). These are used to record your heart rate and rhythm.  Blood pressure cuff. A cuff is placed on your arm to keep track of your blood pressure.  Pulse oximeter. This small clip is placed on the end of the finger. It measures your blood oxygen level.  Sedatives. These medicines may be given through an IV. They help to relax you and keep you comfortable. You may stay awake or sleep lightly.  Oxygen. You may be given oxygen through a facemask.  Risks and possible complications  Regional anesthesia carries  some risks. These include:  Nausea and vomiting  Headache  Backache  Decreased blood pressure  Allergic reaction to the anesthetic  Ongoing numbness (rare)  Irregular heartbeat (rare)  Cardiac arrest (rare)   Date Last Reviewed: 12/1/2016  © 9668-4237 [employer]. [address] [place] [id number]. All rights reserved. This information is not intended as a substitute for professional medical care. Always follow your healthcare professional's instructions.

## 2024-03-28 NOTE — OUTPATIENT SUBJECTIVE & OBJECTIVE
"Outpatient Subjective & Objective     Chief complaint-Preoperative evaluation, Perioperative Medical management, complication reduction plan     Active cardiac conditions- none    Revised cardiac risk index predictors- none    Functional capacity -Examples of physical activity  , active person,can take 1 flight of stairs----- He can undertake all the above activities without  chest pain,chest tightness, Shortness of breath ,dizziness,lightheadedness making his exercise tolerance more,   than 4 Mets.       Review of Systems   Constitutional:  Negative for chills and fever.        No unusual weight changes  Weight loss--- over ---  Weight gain from reduced activity   HENT:          STOPBANG score  / 8      Elevated BP  Age over 50 years  Neck size over 40 CM  Male gender   Eyes:         No unusual vision changes   Respiratory:          No cough , phlegm    No Hemoptysis   Cardiovascular:         As noted   Gastrointestinal:         Bowels- Regular   No overt GI/ blood losses   Endocrine:        Prednisone use > 20 mg daily for 3 weeks None   Genitourinary:  Negative for dysuria.        No urinary hesitancy    Musculoskeletal:         As above      Skin:  Negative for rash.   Neurological:  Negative for syncope.        No unilateral weakness   Hematological:         Current use of Anticoagulants  None   Psychiatric/Behavioral:          No Depression,Anxiety     No vascular stenting     No past medical history pertinent negatives.        No anesthesia, bleeding, cardiac problems, PONV with previous surgeries/procedures.  Medications and Allergies reviewed in epic.   FH- No anesthesia,bleeding / venous thrombosis , in family      Physical Exam  Blood pressure 133/85, pulse 69, temperature 97.8 °F (36.6 °C), temperature source Oral, resp. rate 16, height 5' 9" (1.753 m), weight 92.5 kg (204 lb), SpO2 96 %.      Physical Exam  Constitutional- Vitals - Body mass index is 30.13 kg/m².,   Vitals:    03/28/24 0920   BP: " 133/85   Pulse: 69   Resp: 16   Temp: 97.8 °F (36.6 °C)     General appearance-Conscious,Coherent  Eyes- No conjunctival icterus,pupils  round  and reactive to light   ENT-Oral cavity- moist    , Hearing grossly normal   Neck- No thyromegaly ,Trachea -central, No jugular venous distension,   No Carotid Bruit   Cardiovascular -Heart Sounds- Normal  and  no murmur   , No gallop rhythm   Respiratory - Normal Respiratory Effort, Normal breath sounds,  no wheeze , and  no forced expiratory wheeze    Peripheral pitting pedal edema-- none , no calf pain   Gastrointestinal -Soft abdomen, No palpable masses, Non Tender,Liver,Spleen not palpable. No-- free fluid and shifting dullness  Musculoskeletal- No finger Clubbing. Strength grossly normal   Lymphatic-No Palpable cervical, axillary,Inguinal lymphadenopathy   Psychiatric - normal effect,Orientation  Rt Dorsalis pedis pulses-palpable    Lt Dorsalis pedis pulses- palpable   Rt Posterior tibial pulses -palpable   Left posterior tibial pulses -palpable   Miscellaneous -  no renal bruit       Investigations  Lab and Imaging have been reviewed in epic.    May 2021    The left ventricle is normal in size with normal systolic function.  Moderate left atrial enlargement.  The estimated ejection fraction is 60%.  Indeterminate left ventricular diastolic function.  Normal right ventricular size with normal right ventricular systolic function.*    Discussed blood pressure control      Review of old records- Was done and information gathered regards to events leading to surgery and health conditions of significance in the perioperative period.    Outpatient Subjective & Objective

## 2024-03-28 NOTE — ASSESSMENT & PLAN NOTE
He has gained weight from reduced physical activity    Weight related conditions     Known to have     HTN  Pre  Diabetes   Hyperlipidemia   Acid reflux   Osteoarthritis    Not troubled with / Not known to have       Type 2 Diabetes   Fatty liver       Encouraged weight loss

## 2024-03-28 NOTE — ASSESSMENT & PLAN NOTE
A1c 5.9   .Prediabetes- I suggest monitoring the glucose in the perioperative period as  glucose may be high from stress hyperglycemia in which case Insulin may be required     Hemoglobin A1c in the pre diabetic range   Weight loss with diet and exercise , if able ,helps lower A1c  Increased risk of becoming a diabetic   Suggested  follow up      He was going to the gym and stayed active but with the hip he is less active causing his A1c to go up  I have encouraged him that with increased mobility after joint replacement surgery his A1c might improve   He is planning on retiring soon so I suggested to stay active
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
## 2024-04-08 ENCOUNTER — PATIENT MESSAGE (OUTPATIENT)
Dept: ADMINISTRATIVE | Facility: OTHER | Age: 64
End: 2024-04-08
Payer: COMMERCIAL

## 2024-04-09 DIAGNOSIS — E78.2 MIXED HYPERLIPIDEMIA: ICD-10-CM

## 2024-04-10 ENCOUNTER — OFFICE VISIT (OUTPATIENT)
Dept: SPORTS MEDICINE | Facility: CLINIC | Age: 64
End: 2024-04-10
Payer: COMMERCIAL

## 2024-04-10 VITALS
SYSTOLIC BLOOD PRESSURE: 129 MMHG | HEIGHT: 69 IN | WEIGHT: 203.69 LBS | HEART RATE: 72 BPM | BODY MASS INDEX: 30.17 KG/M2 | DIASTOLIC BLOOD PRESSURE: 84 MMHG

## 2024-04-10 DIAGNOSIS — M16.12 PRIMARY OSTEOARTHRITIS OF LEFT HIP: Primary | ICD-10-CM

## 2024-04-10 PROCEDURE — 99999 PR PBB SHADOW E&M-EST. PATIENT-LVL IV: CPT | Mod: PBBFAC,,, | Performed by: PHYSICIAN ASSISTANT

## 2024-04-10 PROCEDURE — 99215 OFFICE O/P EST HI 40 MIN: CPT | Mod: S$GLB,,, | Performed by: PHYSICIAN ASSISTANT

## 2024-04-10 RX ORDER — ACETAMINOPHEN 500 MG
1000 TABLET ORAL EVERY 6 HOURS
Status: CANCELLED | OUTPATIENT
Start: 2024-04-10

## 2024-04-10 RX ORDER — CEFADROXIL 500 MG/1
500 CAPSULE ORAL EVERY 12 HOURS
Qty: 14 CAPSULE | Refills: 0 | Status: SHIPPED | OUTPATIENT
Start: 2024-04-10

## 2024-04-10 RX ORDER — CEFAZOLIN SODIUM 2 G/50ML
2 SOLUTION INTRAVENOUS
Status: CANCELLED | OUTPATIENT
Start: 2024-04-10

## 2024-04-10 RX ORDER — CELECOXIB 200 MG/1
200 CAPSULE ORAL 2 TIMES DAILY
Qty: 60 CAPSULE | Refills: 2 | Status: SHIPPED | OUTPATIENT
Start: 2024-04-10

## 2024-04-10 RX ORDER — MIDAZOLAM HYDROCHLORIDE 1 MG/ML
2 INJECTION, SOLUTION INTRAMUSCULAR; INTRAVENOUS
Status: CANCELLED | OUTPATIENT
Start: 2024-04-10 | End: 2024-04-11

## 2024-04-10 RX ORDER — POLYETHYLENE GLYCOL 3350 17 G/17G
17 POWDER, FOR SOLUTION ORAL DAILY
Status: CANCELLED | OUTPATIENT
Start: 2024-04-10

## 2024-04-10 RX ORDER — METHOCARBAMOL 500 MG/1
500 TABLET, FILM COATED ORAL 3 TIMES DAILY
Qty: 30 TABLET | Refills: 0 | Status: SHIPPED | OUTPATIENT
Start: 2024-04-10 | End: 2024-04-27

## 2024-04-10 RX ORDER — SODIUM CHLORIDE 9 MG/ML
INJECTION, SOLUTION INTRAVENOUS
Status: CANCELLED | OUTPATIENT
Start: 2024-04-10

## 2024-04-10 RX ORDER — MUPIROCIN 20 MG/G
1 OINTMENT TOPICAL 2 TIMES DAILY
Status: CANCELLED | OUTPATIENT
Start: 2024-04-10 | End: 2024-04-15

## 2024-04-10 RX ORDER — ACETAMINOPHEN 500 MG
1000 TABLET ORAL
Status: CANCELLED | OUTPATIENT
Start: 2024-04-10

## 2024-04-10 RX ORDER — ASPIRIN 81 MG/1
81 TABLET ORAL 2 TIMES DAILY
Status: CANCELLED | OUTPATIENT
Start: 2024-04-10

## 2024-04-10 RX ORDER — CEFAZOLIN SODIUM 2 G/50ML
2 SOLUTION INTRAVENOUS
Status: CANCELLED | OUTPATIENT
Start: 2024-04-10 | End: 2024-04-11

## 2024-04-10 RX ORDER — MORPHINE SULFATE 2 MG/ML
2 INJECTION, SOLUTION INTRAMUSCULAR; INTRAVENOUS
Status: CANCELLED | OUTPATIENT
Start: 2024-04-10

## 2024-04-10 RX ORDER — PREGABALIN 75 MG/1
75 CAPSULE ORAL 2 TIMES DAILY
Qty: 60 CAPSULE | Refills: 1 | Status: SHIPPED | OUTPATIENT
Start: 2024-04-10 | End: 2024-06-09

## 2024-04-10 RX ORDER — MUPIROCIN 20 MG/G
1 OINTMENT TOPICAL
Status: CANCELLED | OUTPATIENT
Start: 2024-04-10

## 2024-04-10 RX ORDER — AMOXICILLIN 250 MG
1 CAPSULE ORAL 2 TIMES DAILY
Status: CANCELLED | OUTPATIENT
Start: 2024-04-10

## 2024-04-10 RX ORDER — ASPIRIN 325 MG
325 TABLET, DELAYED RELEASE (ENTERIC COATED) ORAL DAILY
Qty: 42 TABLET | Refills: 0 | Status: SHIPPED | OUTPATIENT
Start: 2024-04-10 | End: 2024-05-29

## 2024-04-10 RX ORDER — PREGABALIN 75 MG/1
75 CAPSULE ORAL NIGHTLY
Status: CANCELLED | OUTPATIENT
Start: 2024-04-10

## 2024-04-10 RX ORDER — OXYCODONE HYDROCHLORIDE 5 MG/1
10 TABLET ORAL
Status: CANCELLED | OUTPATIENT
Start: 2024-04-10

## 2024-04-10 RX ORDER — CELECOXIB 200 MG/1
400 CAPSULE ORAL ONCE
Status: CANCELLED | OUTPATIENT
Start: 2024-04-10 | End: 2024-04-10

## 2024-04-10 RX ORDER — PROMETHAZINE HYDROCHLORIDE 25 MG/1
25 TABLET ORAL EVERY 6 HOURS PRN
Qty: 30 TABLET | Refills: 0 | Status: SHIPPED | OUTPATIENT
Start: 2024-04-10 | End: 2024-05-10

## 2024-04-10 RX ORDER — SODIUM CHLORIDE 9 MG/ML
INJECTION, SOLUTION INTRAVENOUS CONTINUOUS
Status: CANCELLED | OUTPATIENT
Start: 2024-04-10 | End: 2024-04-11

## 2024-04-10 RX ORDER — LIDOCAINE HYDROCHLORIDE 10 MG/ML
1 INJECTION, SOLUTION EPIDURAL; INFILTRATION; INTRACAUDAL; PERINEURAL
Status: CANCELLED | OUTPATIENT
Start: 2024-04-10

## 2024-04-10 RX ORDER — OXYCODONE HYDROCHLORIDE 5 MG/1
5-10 TABLET ORAL EVERY 6 HOURS PRN
Qty: 28 TABLET | Refills: 0 | Status: SHIPPED | OUTPATIENT
Start: 2024-04-10

## 2024-04-10 RX ORDER — FAMOTIDINE 20 MG/1
20 TABLET, FILM COATED ORAL 2 TIMES DAILY
Status: CANCELLED | OUTPATIENT
Start: 2024-04-10

## 2024-04-10 RX ORDER — ROPIVACAINE/EPI/CLONIDINE/KET 2.46-0.005
SYRINGE (ML) INJECTION ONCE
Status: CANCELLED | OUTPATIENT
Start: 2024-04-10 | End: 2024-04-10

## 2024-04-10 RX ORDER — FENTANYL CITRATE 50 UG/ML
25 INJECTION, SOLUTION INTRAMUSCULAR; INTRAVENOUS EVERY 5 MIN PRN
Status: CANCELLED | OUTPATIENT
Start: 2024-04-10

## 2024-04-10 RX ORDER — ONDANSETRON HYDROCHLORIDE 2 MG/ML
4 INJECTION, SOLUTION INTRAVENOUS EVERY 8 HOURS PRN
Status: CANCELLED | OUTPATIENT
Start: 2024-04-10

## 2024-04-10 RX ORDER — NALOXONE HCL 0.4 MG/ML
0.02 VIAL (ML) INJECTION
Status: CANCELLED | OUTPATIENT
Start: 2024-04-10

## 2024-04-10 RX ORDER — POLYETHYLENE GLYCOL 3350 17 G/17G
17 POWDER, FOR SOLUTION ORAL DAILY PRN
Status: CANCELLED | OUTPATIENT
Start: 2024-04-10

## 2024-04-10 RX ORDER — CELECOXIB 200 MG/1
200 CAPSULE ORAL DAILY
Status: CANCELLED | OUTPATIENT
Start: 2024-04-10

## 2024-04-10 RX ORDER — METHOCARBAMOL 750 MG/1
750 TABLET, FILM COATED ORAL 3 TIMES DAILY
Status: CANCELLED | OUTPATIENT
Start: 2024-04-10

## 2024-04-10 RX ORDER — PROCHLORPERAZINE EDISYLATE 5 MG/ML
5 INJECTION INTRAMUSCULAR; INTRAVENOUS EVERY 6 HOURS PRN
Status: CANCELLED | OUTPATIENT
Start: 2024-04-10

## 2024-04-10 RX ORDER — ATORVASTATIN CALCIUM 40 MG/1
40 TABLET, FILM COATED ORAL NIGHTLY
Qty: 90 TABLET | Refills: 3 | Status: SHIPPED | OUTPATIENT
Start: 2024-04-10 | End: 2024-05-21 | Stop reason: SDUPTHER

## 2024-04-10 RX ORDER — FENTANYL CITRATE 50 UG/ML
50 INJECTION, SOLUTION INTRAMUSCULAR; INTRAVENOUS
Status: CANCELLED | OUTPATIENT
Start: 2024-04-10 | End: 2024-04-11

## 2024-04-10 RX ORDER — PREGABALIN 75 MG/1
75 CAPSULE ORAL
Status: CANCELLED | OUTPATIENT
Start: 2024-04-10

## 2024-04-10 RX ORDER — OXYCODONE HYDROCHLORIDE 5 MG/1
5 TABLET ORAL
Status: CANCELLED | OUTPATIENT
Start: 2024-04-10

## 2024-04-10 NOTE — H&P (VIEW-ONLY)
Royer Castillo  is here for a completion of his perioperative paperwork. he  Is scheduled to undergo left Hip:     38038 Replacement, hip, acetabular and proximal femoral prosthesis (Total Hip Arthroplasty), with or without autograft or allograft on 4/16/24.      Same day surgery, patient plans to go home today.    He  does need clearance for this procedure.   Patient was optimized by Dr. Uribe on 3/28/24.    Risks, indications and benefits of the surgical procedure were discussed with the patient. All questions with regard to surgery, rehab, expected return to functional activities, activities of daily living and recreational endeavors were answered to his satisfaction.    Discussed COVID-19 with the patient, they are aware of our current policies and procedures, were given the option of delaying surgery, and they elect to proceed.    Patient was informed and understands the risks of surgery are greater for patients with a current condition or history of heart disease, obesity, clotting disorders, recurrent infections, steroid use, current or past smoking, and factors such as sedentary lifestyle and noncompliance with medications, therapy or follow-up. The degree of the increased risk is hard to estimate with any degree of precision.    Once no other questions were asked, a brief history and physical exam was then performed.    PAST MEDICAL HISTORY:   Past Medical History:   Diagnosis Date    Arthritis     Cellulitis     left foot    Diverticulosis     Genital herpes     GERD (gastroesophageal reflux disease)     HTN (hypertension)     Hyperlipidemia     Left atrial dilation     Onychomycosis     Pre-diabetes      PAST SURGICAL HISTORY:   Past Surgical History:   Procedure Laterality Date    ARTHROSCOPIC CHONDROPLASTY OF KNEE JOINT Right 5/27/2021    Procedure: ARTHROSCOPY, KNEE, WITH CHONDROPLASTY;  Surgeon: Gerard Tello MD;  Location: [facility];  Service: Orthopedics;  Laterality: Right;    COLONOSCOPY  The patient was called and notified her urine culture shows <100,000 gram negative quynh. She states she is feeling better and denies any questions, problems, or concerns.   Patient verbalizes understanding of the information provided and is in agreement w N/A 12/11/2019    Procedure: COLONOSCOPY;  Surgeon: Devaughn Johnson MD;  Location: [facility] (4TH FLR);  Service: Endoscopy;  Laterality: N/A;    ESOPHAGOGASTRODUODENOSCOPY N/A 3/2/2022    Procedure: EGD (ESOPHAGOGASTRODUODENOSCOPY);  Surgeon: John Rose MD;  Location: [facility] (4TH FLR);  Service: Endoscopy;  Laterality: N/A;  1/27 covid test 2/27 @ [place]; instructions to portal-st  2/25 lvm to confirm appt-RB    KNEE ARTHROSCOPY W/ MENISCECTOMY Right 5/27/2021    Procedure: ARTHROSCOPY, KNEE, WITH MENISCECTOMY;  Surgeon: Gerard Tello MD;  Location: [facility] OR;  Service: Orthopedics;  Laterality: Right;  JOHNATHAN 50CC    SYNOVECTOMY OF KNEE Right 5/27/2021    Procedure: SYNOVECTOMY, KNEE limited;  Surgeon: Gerard Tello MD;  Location: [facility] OR;  Service: Orthopedics;  Laterality: Right;     FAMILY HISTORY:   Family History   Problem Relation Age of Onset    Cancer Mother     Esophageal cancer Father     Heart disease Father 86    Heart disease Sister 64    Hyperlipidemia Sister     Kidney disease Sister     Hyperlipidemia Sister     Hypertension Sister     Colon cancer Neg Hx      SOCIAL HISTORY:   Social History     Socioeconomic History    Marital status:    Occupational History    Occupation:      Employer: [employer]   Tobacco Use    Smoking status: Never     Passive exposure: Never    Smokeless tobacco: Never   Substance and Sexual Activity    Alcohol use: No    Drug use: No    Sexual activity: Yes     Partners: Female   Social History Narrative    Lives w/ wife      Social Determinants of Health     Financial Resource Strain: Low Risk  (1/16/2024)    Overall Financial Resource Strain (CARDIA)     Difficulty of Paying Living Expenses: Not hard at all   Food Insecurity: No Food Insecurity (1/16/2024)    Hunger Vital Sign     Worried About Running Out of Food in the Last Year: Never true     Ran Out of Food in the Last Year: Never true   Transportation Needs: No Transportation Needs  (1/16/2024)    PRAPARE - Transportation     Lack of Transportation (Medical): No     Lack of Transportation (Non-Medical): No   Physical Activity: Sufficiently Active (1/16/2024)    Exercise Vital Sign     Days of Exercise per Week: 6 days     Minutes of Exercise per Session: 60 min   Stress: No Stress Concern Present (1/16/2024)    [employer] - Occupational Stress Questionnaire     Feeling of Stress : Not at all   Social Connections: Unknown (1/16/2024)    Social Connection and Isolation Panel [NHANES]     Frequency of Communication with Friends and Family: Three times a week     Frequency of Social Gatherings with Friends and Family: Twice a week     Active Member of Clubs or Organizations: No     Attends Club or Organization Meetings: Never     Marital Status:    Housing Stability: Low Risk  (1/16/2024)    Housing Stability Vital Sign     Unable to Pay for Housing in the Last Year: No     Number of Places Lived in the Last Year: 1     Unstable Housing in the Last Year: No       MEDICATIONS:   Current Outpatient Medications:     acyclovir (ZOVIRAX) 200 MG capsule, Take 1 capsule (200 mg total) by mouth 2 (two) times daily., Disp: 60 capsule, Rfl: 11    amLODIPine (NORVASC) 10 MG tablet, Take 1 tablet (10 mg total) by mouth once daily., Disp: 90 tablet, Rfl: 3    atorvastatin (LIPITOR) 40 MG tablet, Take 1 tablet (40 mg total) by mouth every evening., Disp: 90 tablet, Rfl: 3    coenzyme Q10 (CO Q-10) 100 mg capsule, Take 100 mg by mouth once daily., Disp: , Rfl:     hydroCHLOROthiazide (HYDRODIURIL) 25 MG tablet, Take 1 tablet (25 mg total) by mouth once daily., Disp: 90 tablet, Rfl: 3    multivitamin (THERAGRAN) per tablet, Take by mouth once daily. 2 Gummies a day, Disp: , Rfl:     omega-3 fatty acids/fish oil (OMEGA 3 FISH OIL ORAL), Take 1 capsule by mouth once daily., Disp: , Rfl:     pantoprazole (PROTONIX) 40 MG tablet, Take 1 tablet (40 mg total) by mouth once daily.,  Disp: 90 tablet, Rfl: 1    celecoxib (CELEBREX) 200 MG capsule, Take 1 capsule (200 mg total) by mouth 2 (two) times daily. (Patient not taking: Reported on 3/28/2024), Disp: 60 capsule, Rfl: 2  ALLERGIES: Review of patient's allergies indicates:  No Known Allergies    Review of Systems   Constitution: Negative. Negative for chills, fever and night sweats.   HENT: Negative for congestion and headaches.    Eyes: Negative for blurred vision, left vision loss and right vision loss.   Cardiovascular: Negative for chest pain and syncope.   Respiratory: Negative for cough and shortness of breath.    Endocrine: Negative for polydipsia, polyphagia and polyuria.   Hematologic/Lymphatic: Negative for bleeding problem. Does not bruise/bleed easily.   Skin: Negative for dry skin, itching and rash.   Musculoskeletal: Negative for falls and muscle weakness.   Gastrointestinal: Negative for abdominal pain and bowel incontinence.   Genitourinary: Negative for bladder incontinence and nocturia.   Neurological: Negative for disturbances in coordination, loss of balance and seizures.   Psychiatric/Behavioral: Negative for depression. The patient does not have insomnia.    Allergic/Immunologic: Negative for hives and persistent infections.     PHYSICAL EXAM:  GEN: A&Ox3, WD WN NAD  HEENT: WNL  CHEST: CTAB, no W/R/R  HEART: RRR, no M/R/G   ABD: Soft, NT ND, BS x4 QUADS  MS: Refer to previous note for detailed MS exam  NEURO: CN II-XII intact       The surgical consent was then reviewed with the patient, who agreed with all the contents of the consent form and it was signed. he was instructed to wait for a phone call from the anesthesia department prior to surgery to discuss past medical history, medications, and clearance. Also, informed he may be required to get additional testing per the anesthesia department prior to having surgery.     he was also instructed to present to Joint Boot Camp Class prior to surgery or his surgery may be  cancelled or postponed.     Due to the serious nature of total joint infection and the high prevalence of community acquired MRSA, vancomycin will be used perioperatively.     PHYSICAL THERAPY:  He was also instructed regarding physical therapy and will begin POD # 1-3. He was given a copy of the original prescription to schedule. Another copy of this prescription was also faxed to [facility]. Order faxed.    POST OP CARE:instructions were reviewed including care of the wound and dressing after surgery and when he can shower.     PAIN MANAGEMENT: Royer Castillo was also given a pain management regime, which includes the TENS unit given to him by our DME team, along with the education required for its use. He was also instructed regarding the ice wrap that will be in place after surgery and his postoperative pain medications.     PAIN MEDICATION:  Roxicodone 5mg 1-2 po q 4-6 hours prn pain  Phenergan 25 mg one p.o. q.4-6 hours p.r.n. nausea and vomiting.  Celebrex 200 mg BID  Robaxin 500mg TID PRN  Lyrica 75mg BID  Aspirin 325mg daily x 6 weeks for DVT prophylaxis starting on the evening after surgery  Cefadroxil 500mg BID x 7 days    Post op meds to be delivered bedside prior to discharge. Deliver to family if patient is in surgery at 5pm.     Patient was instructed not to take benzodiazepines with opioid and Lyrica during the perioperative period. Patient denies history of seizures.     Patient will also use bilateral TEDs on lower extremities, SCDs during surgery, and early ambulation post-op. If the patient was previously taking 81mg baby aspirin, they may have been told to continue taking it during the perioperative period.     Patient was also told to buy over the counter Prilosec medication and take it once daily for GI protection as long as they are taking NSAIDs or Aspirin.    The mobility limitation cannot be sufficiently resolved by the use of a cane. Patient's functional mobility deficit can be  sufficiently resolved with the use of a (Rolling Walker or Walker). Patient's mobility limitation significantly impairs their ability to participate in one of more activities of daily living. The use of a (Rolling Walker or Walker) will significantly improve the patient's ability to participate in MRADLS and the patient will use it on regular basis in the home.     DVT prophylaxis was discussed with the patient today including risk factors for developing DVTs and history of DVTs. The patient was asked if any specific recommendations were given from the doctor/s that did pre-operative surgical clearance. I may have prescribed a mechanical DVT prophylaxis device, , for the above listed patient, to reduce the risk for clot formation and complications that can arise from a DVT. In my professional medical opinion, I consider this medically necessary and have prescribed the device for the purpose of postoperative treatment and rehabilitation. This can treat both the acute and chronic aspects of the inflammatory reaction that accompanies trauma and surgery. Additionally, I am prescribing mechanical DVT prophylaxis because the patient will have restricted mobility post-operatively and is at high risk for DVT. Failure to approve this device will compromise the outcome of this patient's healing process.     The patient was told that narcotic pain medications may make them drowsy and instructions were given to not sign legal documents, drive or operate heavy machinery, cars, or equipment while under the influence of narcotic medications.     Dr. Tello was present in clinic during this pre-op evaluation. The patient was offered the opportunity to ask Dr. Tello any further questions regarding the procedure which may not have been addressed during their previous informed consent discussion. The patient has declined to see Dr. Tello today.    As there were no other questions to be asked, he was given my business card along  with Dr. Tello business card if he has any questions or concerns prior to surgery or in the postop period.

## 2024-04-10 NOTE — H&P
Royer Castillo  is here for a completion of his perioperative paperwork. he  Is scheduled to undergo left Hip:     87964 Replacement, hip, acetabular and proximal femoral prosthesis (Total Hip Arthroplasty), with or without autograft or allograft on 4/16/24.      Same day surgery, patient plans to go home today.    He  does need clearance for this procedure.   Patient was optimized by Dr. Uribe on 3/28/24.    Risks, indications and benefits of the surgical procedure were discussed with the patient. All questions with regard to surgery, rehab, expected return to functional activities, activities of daily living and recreational endeavors were answered to his satisfaction.    Discussed COVID-19 with the patient, they are aware of our current policies and procedures, were given the option of delaying surgery, and they elect to proceed.    Patient was informed and understands the risks of surgery are greater for patients with a current condition or history of heart disease, obesity, clotting disorders, recurrent infections, steroid use, current or past smoking, and factors such as sedentary lifestyle and noncompliance with medications, therapy or follow-up. The degree of the increased risk is hard to estimate with any degree of precision.    Once no other questions were asked, a brief history and physical exam was then performed.    PAST MEDICAL HISTORY:   Past Medical History:   Diagnosis Date    Arthritis     Cellulitis     left foot    Diverticulosis     Genital herpes     GERD (gastroesophageal reflux disease)     HTN (hypertension)     Hyperlipidemia     Left atrial dilation     Onychomycosis     Pre-diabetes      PAST SURGICAL HISTORY:   Past Surgical History:   Procedure Laterality Date    ARTHROSCOPIC CHONDROPLASTY OF KNEE JOINT Right 5/27/2021    Procedure: ARTHROSCOPY, KNEE, WITH CHONDROPLASTY;  Surgeon: Gerard Tello MD;  Location: [facility];  Service: Orthopedics;  Laterality: Right;    COLONOSCOPY  N/A 12/11/2019    Procedure: COLONOSCOPY;  Surgeon: Devaughn Johnson MD;  Location: [facility] (4TH FLR);  Service: Endoscopy;  Laterality: N/A;    ESOPHAGOGASTRODUODENOSCOPY N/A 3/2/2022    Procedure: EGD (ESOPHAGOGASTRODUODENOSCOPY);  Surgeon: John Rose MD;  Location: [facility] (4TH FLR);  Service: Endoscopy;  Laterality: N/A;  1/27 covid test 2/27 @ [place]; instructions to portal-st  2/25 lvm to confirm appt-RB    KNEE ARTHROSCOPY W/ MENISCECTOMY Right 5/27/2021    Procedure: ARTHROSCOPY, KNEE, WITH MENISCECTOMY;  Surgeon: Gerard Tello MD;  Location: [facility] OR;  Service: Orthopedics;  Laterality: Right;  JOHNATHAN 50CC    SYNOVECTOMY OF KNEE Right 5/27/2021    Procedure: SYNOVECTOMY, KNEE limited;  Surgeon: Gerard Tello MD;  Location: [facility] OR;  Service: Orthopedics;  Laterality: Right;     FAMILY HISTORY:   Family History   Problem Relation Age of Onset    Cancer Mother     Esophageal cancer Father     Heart disease Father 86    Heart disease Sister 64    Hyperlipidemia Sister     Kidney disease Sister     Hyperlipidemia Sister     Hypertension Sister     Colon cancer Neg Hx      SOCIAL HISTORY:   Social History     Socioeconomic History    Marital status:    Occupational History    Occupation:      Employer: [employer]   Tobacco Use    Smoking status: Never     Passive exposure: Never    Smokeless tobacco: Never   Substance and Sexual Activity    Alcohol use: No    Drug use: No    Sexual activity: Yes     Partners: Female   Social History Narrative    Lives w/ wife      Social Determinants of Health     Financial Resource Strain: Low Risk  (1/16/2024)    Overall Financial Resource Strain (CARDIA)     Difficulty of Paying Living Expenses: Not hard at all   Food Insecurity: No Food Insecurity (1/16/2024)    Hunger Vital Sign     Worried About Running Out of Food in the Last Year: Never true     Ran Out of Food in the Last Year: Never true   Transportation Needs: No Transportation Needs  (1/16/2024)    PRAPARE - Transportation     Lack of Transportation (Medical): No     Lack of Transportation (Non-Medical): No   Physical Activity: Sufficiently Active (1/16/2024)    Exercise Vital Sign     Days of Exercise per Week: 6 days     Minutes of Exercise per Session: 60 min   Stress: No Stress Concern Present (1/16/2024)    [employer] - Occupational Stress Questionnaire     Feeling of Stress : Not at all   Social Connections: Unknown (1/16/2024)    Social Connection and Isolation Panel [NHANES]     Frequency of Communication with Friends and Family: Three times a week     Frequency of Social Gatherings with Friends and Family: Twice a week     Active Member of Clubs or Organizations: No     Attends Club or Organization Meetings: Never     Marital Status:    Housing Stability: Low Risk  (1/16/2024)    Housing Stability Vital Sign     Unable to Pay for Housing in the Last Year: No     Number of Places Lived in the Last Year: 1     Unstable Housing in the Last Year: No       MEDICATIONS:   Current Outpatient Medications:     acyclovir (ZOVIRAX) 200 MG capsule, Take 1 capsule (200 mg total) by mouth 2 (two) times daily., Disp: 60 capsule, Rfl: 11    amLODIPine (NORVASC) 10 MG tablet, Take 1 tablet (10 mg total) by mouth once daily., Disp: 90 tablet, Rfl: 3    atorvastatin (LIPITOR) 40 MG tablet, Take 1 tablet (40 mg total) by mouth every evening., Disp: 90 tablet, Rfl: 3    coenzyme Q10 (CO Q-10) 100 mg capsule, Take 100 mg by mouth once daily., Disp: , Rfl:     hydroCHLOROthiazide (HYDRODIURIL) 25 MG tablet, Take 1 tablet (25 mg total) by mouth once daily., Disp: 90 tablet, Rfl: 3    multivitamin (THERAGRAN) per tablet, Take by mouth once daily. 2 Gummies a day, Disp: , Rfl:     omega-3 fatty acids/fish oil (OMEGA 3 FISH OIL ORAL), Take 1 capsule by mouth once daily., Disp: , Rfl:     pantoprazole (PROTONIX) 40 MG tablet, Take 1 tablet (40 mg total) by mouth once daily.,  Disp: 90 tablet, Rfl: 1    celecoxib (CELEBREX) 200 MG capsule, Take 1 capsule (200 mg total) by mouth 2 (two) times daily. (Patient not taking: Reported on 3/28/2024), Disp: 60 capsule, Rfl: 2  ALLERGIES: Review of patient's allergies indicates:  No Known Allergies    Review of Systems   Constitution: Negative. Negative for chills, fever and night sweats.   HENT: Negative for congestion and headaches.    Eyes: Negative for blurred vision, left vision loss and right vision loss.   Cardiovascular: Negative for chest pain and syncope.   Respiratory: Negative for cough and shortness of breath.    Endocrine: Negative for polydipsia, polyphagia and polyuria.   Hematologic/Lymphatic: Negative for bleeding problem. Does not bruise/bleed easily.   Skin: Negative for dry skin, itching and rash.   Musculoskeletal: Negative for falls and muscle weakness.   Gastrointestinal: Negative for abdominal pain and bowel incontinence.   Genitourinary: Negative for bladder incontinence and nocturia.   Neurological: Negative for disturbances in coordination, loss of balance and seizures.   Psychiatric/Behavioral: Negative for depression. The patient does not have insomnia.    Allergic/Immunologic: Negative for hives and persistent infections.     PHYSICAL EXAM:  GEN: A&Ox3, WD WN NAD  HEENT: WNL  CHEST: CTAB, no W/R/R  HEART: RRR, no M/R/G   ABD: Soft, NT ND, BS x4 QUADS  MS: Refer to previous note for detailed MS exam  NEURO: CN II-XII intact       The surgical consent was then reviewed with the patient, who agreed with all the contents of the consent form and it was signed. he was instructed to wait for a phone call from the anesthesia department prior to surgery to discuss past medical history, medications, and clearance. Also, informed he may be required to get additional testing per the anesthesia department prior to having surgery.     he was also instructed to present to Joint Boot Camp Class prior to surgery or his surgery may be  cancelled or postponed.     Due to the serious nature of total joint infection and the high prevalence of community acquired MRSA, vancomycin will be used perioperatively.     PHYSICAL THERAPY:  He was also instructed regarding physical therapy and will begin POD # 1-3. He was given a copy of the original prescription to schedule. Another copy of this prescription was also faxed to [facility]. Order faxed.    POST OP CARE:instructions were reviewed including care of the wound and dressing after surgery and when he can shower.     PAIN MANAGEMENT: Royer Castillo was also given a pain management regime, which includes the TENS unit given to him by our DME team, along with the education required for its use. He was also instructed regarding the ice wrap that will be in place after surgery and his postoperative pain medications.     PAIN MEDICATION:  Roxicodone 5mg 1-2 po q 4-6 hours prn pain  Phenergan 25 mg one p.o. q.4-6 hours p.r.n. nausea and vomiting.  Celebrex 200 mg BID  Robaxin 500mg TID PRN  Lyrica 75mg BID  Aspirin 325mg daily x 6 weeks for DVT prophylaxis starting on the evening after surgery  Cefadroxil 500mg BID x 7 days    Post op meds to be delivered bedside prior to discharge. Deliver to family if patient is in surgery at 5pm.     Patient was instructed not to take benzodiazepines with opioid and Lyrica during the perioperative period. Patient denies history of seizures.     Patient will also use bilateral TEDs on lower extremities, SCDs during surgery, and early ambulation post-op. If the patient was previously taking 81mg baby aspirin, they may have been told to continue taking it during the perioperative period.     Patient was also told to buy over the counter Prilosec medication and take it once daily for GI protection as long as they are taking NSAIDs or Aspirin.    The mobility limitation cannot be sufficiently resolved by the use of a cane. Patient's functional mobility deficit can be  sufficiently resolved with the use of a (Rolling Walker or Walker). Patient's mobility limitation significantly impairs their ability to participate in one of more activities of daily living. The use of a (Rolling Walker or Walker) will significantly improve the patient's ability to participate in MRADLS and the patient will use it on regular basis in the home.     DVT prophylaxis was discussed with the patient today including risk factors for developing DVTs and history of DVTs. The patient was asked if any specific recommendations were given from the doctor/s that did pre-operative surgical clearance. I may have prescribed a mechanical DVT prophylaxis device, , for the above listed patient, to reduce the risk for clot formation and complications that can arise from a DVT. In my professional medical opinion, I consider this medically necessary and have prescribed the device for the purpose of postoperative treatment and rehabilitation. This can treat both the acute and chronic aspects of the inflammatory reaction that accompanies trauma and surgery. Additionally, I am prescribing mechanical DVT prophylaxis because the patient will have restricted mobility post-operatively and is at high risk for DVT. Failure to approve this device will compromise the outcome of this patient's healing process.     The patient was told that narcotic pain medications may make them drowsy and instructions were given to not sign legal documents, drive or operate heavy machinery, cars, or equipment while under the influence of narcotic medications.     Dr. Tello was present in clinic during this pre-op evaluation. The patient was offered the opportunity to ask Dr. Tello any further questions regarding the procedure which may not have been addressed during their previous informed consent discussion. The patient has declined to see Dr. Tello today.    As there were no other questions to be asked, he was given my business card along  with Dr. Tello business card if he has any questions or concerns prior to surgery or in the postop period.

## 2024-04-10 NOTE — PROGRESS NOTES
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 year old here today for pre surgery optimization visit  in preparation for a Left total hip arthroplasty to be performed by Dr. Tello on 4/16/24.  he was last seen and treated in the clinic on 3/13/2024. he will be medically optimized by the pre op center. There has been no significant change in medical status since last visit. No fever, chills, malaise, or unexplained weight change.      Allergies, Medications, past medical and surgical history reviewed.    Focused examination performed.    Patient declined to see surgeon today. All questions answered. Patient encouraged to call with questions. Contact information given.     Pre, jero, and post operative procedures and expectations discussed. Goals of successful surgery reviewed and include manageable pain levels, surgical site free of infection, medication management, and ambulation with PT/OT assistance. Healthy weight management discussed with patient and caregiver who were receptive to eduction of healthy diet and activity. No other necessary lifestyle changes identified. Educated patient about signs and symptoms of infection, medication management, anticoagulation therapy, risk of tobacco and alcohol use, and self-care to promote healing. Surgical guide given for future reference. Hibiclens given to patient with instructions. All questions were answered.     Royer Castillo verbalized an understanding to the education and goals. Patient has displayed readiness to engage in care and is ready to proceed with surgery.  Patient reports his wife, Shauna, is able and ready to provide assistance at home after discharge.    Surgical and blood consents signed.    Royer Castillo will contact us if there are any questions, concerns, or changes in medical status prior to surgery.       Joint class: scheduled for 4/15/24    Patient has discussed discharge planning with surgeon. Patient will be discharged to home following  surgery.   patient will be scheduled with non-[facility] PT.  [facility].    50 minutes of time was spent on patient education, review of records, templating, H&P, , appointment scheduling and optimizing patient for surgery.

## 2024-04-15 ENCOUNTER — PATIENT MESSAGE (OUTPATIENT)
Dept: ADMINISTRATIVE | Facility: OTHER | Age: 64
End: 2024-04-15
Payer: COMMERCIAL

## 2024-04-15 ENCOUNTER — TELEPHONE (OUTPATIENT)
Dept: SPORTS MEDICINE | Facility: CLINIC | Age: 64
End: 2024-04-15
Payer: COMMERCIAL

## 2024-04-15 ENCOUNTER — ANESTHESIA EVENT (OUTPATIENT)
Dept: SURGERY | Facility: HOSPITAL | Age: 64
End: 2024-04-15
Payer: COMMERCIAL

## 2024-04-15 NOTE — TELEPHONE ENCOUNTER
Spoke with patient and let him know to arrive to surgery tomorrow on 4/16/24 at 5:00 am in [facility].

## 2024-04-16 ENCOUNTER — HOSPITAL ENCOUNTER (OUTPATIENT)
Facility: HOSPITAL | Age: 64
Discharge: HOME | End: 2024-04-17
Attending: ORTHOPAEDIC SURGERY | Admitting: ORTHOPAEDIC SURGERY
Payer: COMMERCIAL

## 2024-04-16 ENCOUNTER — ANESTHESIA (OUTPATIENT)
Dept: SURGERY | Facility: HOSPITAL | Age: 64
End: 2024-04-16
Payer: COMMERCIAL

## 2024-04-16 ENCOUNTER — TELEPHONE (OUTPATIENT)
Dept: SPORTS MEDICINE | Facility: CLINIC | Age: 64
End: 2024-04-16
Payer: COMMERCIAL

## 2024-04-16 DIAGNOSIS — M16.12 PRIMARY OSTEOARTHRITIS OF LEFT HIP: ICD-10-CM

## 2024-04-16 DIAGNOSIS — M12.552 TRAUMATIC ARTHRITIS OF LEFT HIP: Primary | ICD-10-CM

## 2024-04-16 DIAGNOSIS — M16.12 PRIMARY OSTEOARTHRITIS OF LEFT HIP: Primary | ICD-10-CM

## 2024-04-16 PROCEDURE — 37000009 HC ANESTHESIA EA ADD 15 MINS: Performed by: ORTHOPAEDIC SURGERY

## 2024-04-16 PROCEDURE — 27100025 HC TUBING, SET FLUID WARMER: Performed by: NURSE ANESTHETIST, CERTIFIED REGISTERED

## 2024-04-16 PROCEDURE — 63600175 PHARM REV CODE 636 W HCPCS: Mod: JZ,JG | Performed by: ANESTHESIOLOGY

## 2024-04-16 PROCEDURE — 25000003 PHARM REV CODE 250: Performed by: ORTHOPAEDIC SURGERY

## 2024-04-16 PROCEDURE — 63600175 PHARM REV CODE 636 W HCPCS: Performed by: NURSE PRACTITIONER

## 2024-04-16 PROCEDURE — 71000033 HC RECOVERY, INTIAL HOUR: Performed by: ORTHOPAEDIC SURGERY

## 2024-04-16 PROCEDURE — 71000039 HC RECOVERY, EACH ADD'L HOUR: Performed by: ORTHOPAEDIC SURGERY

## 2024-04-16 PROCEDURE — 27201423 OPTIME MED/SURG SUP & DEVICES STERILE SUPPLY: Performed by: ORTHOPAEDIC SURGERY

## 2024-04-16 PROCEDURE — 36000710: Performed by: ORTHOPAEDIC SURGERY

## 2024-04-16 PROCEDURE — 94799 UNLISTED PULMONARY SVC/PX: CPT

## 2024-04-16 PROCEDURE — 27130 TOTAL HIP ARTHROPLASTY: CPT | Mod: AS,LT,, | Performed by: PHYSICIAN ASSISTANT

## 2024-04-16 PROCEDURE — 94761 N-INVAS EAR/PLS OXIMETRY MLT: CPT

## 2024-04-16 PROCEDURE — 25000003 PHARM REV CODE 250: Performed by: NURSE PRACTITIONER

## 2024-04-16 PROCEDURE — 25000003 PHARM REV CODE 250: Performed by: PHYSICIAN ASSISTANT

## 2024-04-16 PROCEDURE — 63600175 PHARM REV CODE 636 W HCPCS: Performed by: NURSE ANESTHETIST, CERTIFIED REGISTERED

## 2024-04-16 PROCEDURE — C1776 JOINT DEVICE (IMPLANTABLE): HCPCS | Performed by: ORTHOPAEDIC SURGERY

## 2024-04-16 PROCEDURE — 63600175 PHARM REV CODE 636 W HCPCS: Performed by: ORTHOPAEDIC SURGERY

## 2024-04-16 PROCEDURE — 63600175 PHARM REV CODE 636 W HCPCS: Performed by: PHYSICIAN ASSISTANT

## 2024-04-16 PROCEDURE — 25000003 PHARM REV CODE 250: Performed by: ANESTHESIOLOGY

## 2024-04-16 PROCEDURE — 25000003 PHARM REV CODE 250: Performed by: NURSE ANESTHETIST, CERTIFIED REGISTERED

## 2024-04-16 PROCEDURE — 76942 ECHO GUIDE FOR BIOPSY: CPT | Performed by: ANESTHESIOLOGY

## 2024-04-16 PROCEDURE — 97162 PT EVAL MOD COMPLEX 30 MIN: CPT

## 2024-04-16 PROCEDURE — 64450 NJX AA&/STRD OTHER PN/BRANCH: CPT | Mod: 59,LT,, | Performed by: ANESTHESIOLOGY

## 2024-04-16 PROCEDURE — 37000008 HC ANESTHESIA 1ST 15 MINUTES: Performed by: ORTHOPAEDIC SURGERY

## 2024-04-16 PROCEDURE — 97165 OT EVAL LOW COMPLEX 30 MIN: CPT

## 2024-04-16 PROCEDURE — 36000711: Performed by: ORTHOPAEDIC SURGERY

## 2024-04-16 PROCEDURE — 27130 TOTAL HIP ARTHROPLASTY: CPT | Mod: LT,,, | Performed by: ORTHOPAEDIC SURGERY

## 2024-04-16 PROCEDURE — 99900035 HC TECH TIME PER 15 MIN (STAT)

## 2024-04-16 PROCEDURE — D9220A PRA ANESTHESIA: Mod: ANES,,, | Performed by: ANESTHESIOLOGY

## 2024-04-16 PROCEDURE — 97116 GAIT TRAINING THERAPY: CPT

## 2024-04-16 PROCEDURE — 97535 SELF CARE MNGMENT TRAINING: CPT

## 2024-04-16 PROCEDURE — D9220A PRA ANESTHESIA: Mod: CRNA,,, | Performed by: NURSE ANESTHETIST, CERTIFIED REGISTERED

## 2024-04-16 DEVICE — BIOLOX DELTA TS CERAMIC FEMORAL HEAD 12/14 TAPER REVISION DIAMETER 36MM +1.5
Type: IMPLANTABLE DEVICE | Site: HIP | Status: FUNCTIONAL
Brand: BIOLOX DELTA

## 2024-04-16 DEVICE — ACTIS DUOFIX HIP PROSTHESIS (FEMORAL STEM 12/14 TAPER CEMENTLESS SIZE 7 HIGH COLLAR)  CE
Type: IMPLANTABLE DEVICE | Site: HIP | Status: FUNCTIONAL
Brand: ACTIS

## 2024-04-16 DEVICE — PINNACLE HIP SOLUTIONS ALTRX POLYETHYLENE ACETABULAR LINER NEUTRAL 36MM ID 54MM OD
Type: IMPLANTABLE DEVICE | Site: HIP | Status: FUNCTIONAL
Brand: PINNACLE ALTRX

## 2024-04-16 DEVICE — PINNACLE GRIPTION ACETABULAR SHELL SECTOR 54MM OD
Type: IMPLANTABLE DEVICE | Site: HIP | Status: FUNCTIONAL
Brand: PINNACLE GRIPTION

## 2024-04-16 RX ORDER — ONDANSETRON HYDROCHLORIDE 2 MG/ML
4 INJECTION, SOLUTION INTRAVENOUS EVERY 8 HOURS PRN
Status: DISCONTINUED | OUTPATIENT
Start: 2024-04-16 | End: 2024-04-17 | Stop reason: HOSPADM

## 2024-04-16 RX ORDER — LIDOCAINE HYDROCHLORIDE AND EPINEPHRINE 15; 5 MG/ML; UG/ML
INJECTION, SOLUTION EPIDURAL
Status: DISCONTINUED | OUTPATIENT
Start: 2024-04-16 | End: 2024-04-16

## 2024-04-16 RX ORDER — PROPOFOL 10 MG/ML
VIAL (ML) INTRAVENOUS
Status: DISCONTINUED | OUTPATIENT
Start: 2024-04-16 | End: 2024-04-16

## 2024-04-16 RX ORDER — BUPIVACAINE HYDROCHLORIDE 2.5 MG/ML
INJECTION, SOLUTION EPIDURAL; INFILTRATION; INTRACAUDAL
Status: COMPLETED | OUTPATIENT
Start: 2024-04-16 | End: 2024-04-16

## 2024-04-16 RX ORDER — PHENYLEPHRINE HYDROCHLORIDE 10 MG/ML
INJECTION INTRAVENOUS
Status: DISCONTINUED | OUTPATIENT
Start: 2024-04-16 | End: 2024-04-16

## 2024-04-16 RX ORDER — POLYETHYLENE GLYCOL 3350 17 G/17G
17 POWDER, FOR SOLUTION ORAL DAILY
Status: DISCONTINUED | OUTPATIENT
Start: 2024-04-16 | End: 2024-04-17 | Stop reason: HOSPADM

## 2024-04-16 RX ORDER — ONDANSETRON HYDROCHLORIDE 2 MG/ML
INJECTION, SOLUTION INTRAVENOUS
Status: DISCONTINUED | OUTPATIENT
Start: 2024-04-16 | End: 2024-04-16

## 2024-04-16 RX ORDER — PROPOFOL 10 MG/ML
VIAL (ML) INTRAVENOUS CONTINUOUS PRN
Status: DISCONTINUED | OUTPATIENT
Start: 2024-04-16 | End: 2024-04-16

## 2024-04-16 RX ORDER — ATORVASTATIN CALCIUM 20 MG/1
40 TABLET, FILM COATED ORAL NIGHTLY
Status: DISCONTINUED | OUTPATIENT
Start: 2024-04-16 | End: 2024-04-17 | Stop reason: HOSPADM

## 2024-04-16 RX ORDER — PREGABALIN 75 MG/1
75 CAPSULE ORAL
Status: COMPLETED | OUTPATIENT
Start: 2024-04-16 | End: 2024-04-16

## 2024-04-16 RX ORDER — OXYCODONE HYDROCHLORIDE 5 MG/1
5 TABLET ORAL
Status: DISCONTINUED | OUTPATIENT
Start: 2024-04-16 | End: 2024-04-17 | Stop reason: HOSPADM

## 2024-04-16 RX ORDER — HYDROCHLOROTHIAZIDE 25 MG/1
25 TABLET ORAL DAILY
Status: DISCONTINUED | OUTPATIENT
Start: 2024-04-17 | End: 2024-04-17 | Stop reason: HOSPADM

## 2024-04-16 RX ORDER — METHOCARBAMOL 750 MG/1
750 TABLET, FILM COATED ORAL 3 TIMES DAILY
Status: DISCONTINUED | OUTPATIENT
Start: 2024-04-16 | End: 2024-04-17 | Stop reason: HOSPADM

## 2024-04-16 RX ORDER — FENTANYL CITRATE 50 UG/ML
25 INJECTION, SOLUTION INTRAMUSCULAR; INTRAVENOUS EVERY 5 MIN PRN
Status: DISCONTINUED | OUTPATIENT
Start: 2024-04-16 | End: 2024-04-16 | Stop reason: HOSPADM

## 2024-04-16 RX ORDER — MORPHINE SULFATE 2 MG/ML
2 INJECTION, SOLUTION INTRAMUSCULAR; INTRAVENOUS
Status: DISCONTINUED | OUTPATIENT
Start: 2024-04-16 | End: 2024-04-17 | Stop reason: HOSPADM

## 2024-04-16 RX ORDER — SODIUM CHLORIDE 0.9 % (FLUSH) 0.9 %
3 SYRINGE (ML) INJECTION
Status: DISCONTINUED | OUTPATIENT
Start: 2024-04-16 | End: 2024-04-16 | Stop reason: HOSPADM

## 2024-04-16 RX ORDER — AMOXICILLIN 250 MG
1 CAPSULE ORAL 2 TIMES DAILY
Status: DISCONTINUED | OUTPATIENT
Start: 2024-04-16 | End: 2024-04-17 | Stop reason: HOSPADM

## 2024-04-16 RX ORDER — METHOCARBAMOL 500 MG/1
1000 TABLET, FILM COATED ORAL ONCE
Status: COMPLETED | OUTPATIENT
Start: 2024-04-16 | End: 2024-04-16

## 2024-04-16 RX ORDER — HYDROCHLOROTHIAZIDE 12.5 MG/1
25 TABLET ORAL DAILY
Status: DISCONTINUED | OUTPATIENT
Start: 2024-04-16 | End: 2024-04-16

## 2024-04-16 RX ORDER — VANCOMYCIN HYDROCHLORIDE 1 G/20ML
INJECTION, POWDER, LYOPHILIZED, FOR SOLUTION INTRAVENOUS
Status: DISCONTINUED | OUTPATIENT
Start: 2024-04-16 | End: 2024-04-16 | Stop reason: HOSPADM

## 2024-04-16 RX ORDER — PREGABALIN 75 MG/1
75 CAPSULE ORAL NIGHTLY
Status: DISCONTINUED | OUTPATIENT
Start: 2024-04-16 | End: 2024-04-16

## 2024-04-16 RX ORDER — MIDAZOLAM HYDROCHLORIDE 1 MG/ML
1 INJECTION, SOLUTION INTRAMUSCULAR; INTRAVENOUS
Status: DISCONTINUED | OUTPATIENT
Start: 2024-04-16 | End: 2024-04-16 | Stop reason: HOSPADM

## 2024-04-16 RX ORDER — ACETAMINOPHEN 500 MG
1000 TABLET ORAL
Status: DISCONTINUED | OUTPATIENT
Start: 2024-04-16 | End: 2024-04-16 | Stop reason: HOSPADM

## 2024-04-16 RX ORDER — ACYCLOVIR 200 MG/1
200 CAPSULE ORAL 2 TIMES DAILY
Status: DISCONTINUED | OUTPATIENT
Start: 2024-04-16 | End: 2024-04-17 | Stop reason: HOSPADM

## 2024-04-16 RX ORDER — ROPIVACAINE/EPI/CLONIDINE/KET 2.46-0.005
SYRINGE (ML) INJECTION
Status: DISCONTINUED | OUTPATIENT
Start: 2024-04-16 | End: 2024-04-16 | Stop reason: HOSPADM

## 2024-04-16 RX ORDER — FENTANYL CITRATE 50 UG/ML
50 INJECTION, SOLUTION INTRAMUSCULAR; INTRAVENOUS
Status: DISCONTINUED | OUTPATIENT
Start: 2024-04-16 | End: 2024-04-16 | Stop reason: HOSPADM

## 2024-04-16 RX ORDER — CELECOXIB 200 MG/1
400 CAPSULE ORAL
Status: COMPLETED | OUTPATIENT
Start: 2024-04-16 | End: 2024-04-16

## 2024-04-16 RX ORDER — LIDOCAINE HYDROCHLORIDE 10 MG/ML
1 INJECTION, SOLUTION EPIDURAL; INFILTRATION; INTRACAUDAL; PERINEURAL
Status: DISCONTINUED | OUTPATIENT
Start: 2024-04-16 | End: 2024-04-16 | Stop reason: HOSPADM

## 2024-04-16 RX ORDER — HALOPERIDOL 5 MG/ML
0.5 INJECTION INTRAMUSCULAR EVERY 10 MIN PRN
Status: DISCONTINUED | OUTPATIENT
Start: 2024-04-16 | End: 2024-04-16 | Stop reason: HOSPADM

## 2024-04-16 RX ORDER — LIDOCAINE HYDROCHLORIDE 20 MG/ML
INJECTION INTRAVENOUS
Status: DISCONTINUED | OUTPATIENT
Start: 2024-04-16 | End: 2024-04-16

## 2024-04-16 RX ORDER — ACETAMINOPHEN 500 MG
1000 TABLET ORAL EVERY 6 HOURS
Status: DISCONTINUED | OUTPATIENT
Start: 2024-04-16 | End: 2024-04-17 | Stop reason: HOSPADM

## 2024-04-16 RX ORDER — SODIUM CHLORIDE 9 MG/ML
INJECTION, SOLUTION INTRAVENOUS
Status: DISCONTINUED | OUTPATIENT
Start: 2024-04-16 | End: 2024-04-16 | Stop reason: HOSPADM

## 2024-04-16 RX ORDER — PREGABALIN 75 MG/1
75 CAPSULE ORAL 2 TIMES DAILY
Status: DISCONTINUED | OUTPATIENT
Start: 2024-04-16 | End: 2024-04-17 | Stop reason: HOSPADM

## 2024-04-16 RX ORDER — DEXAMETHASONE SODIUM PHOSPHATE 4 MG/ML
INJECTION, SOLUTION INTRA-ARTICULAR; INTRALESIONAL; INTRAMUSCULAR; INTRAVENOUS; SOFT TISSUE
Status: DISCONTINUED | OUTPATIENT
Start: 2024-04-16 | End: 2024-04-16

## 2024-04-16 RX ORDER — ROPIVACAINE/EPI/CLONIDINE/KET 2.46-0.005
SYRINGE (ML) INJECTION ONCE
Status: DISCONTINUED | OUTPATIENT
Start: 2024-04-16 | End: 2024-04-16 | Stop reason: HOSPADM

## 2024-04-16 RX ORDER — AMLODIPINE BESYLATE 10 MG/1
10 TABLET ORAL DAILY
Status: DISCONTINUED | OUTPATIENT
Start: 2024-04-17 | End: 2024-04-17 | Stop reason: HOSPADM

## 2024-04-16 RX ORDER — NALOXONE HCL 0.4 MG/ML
0.02 VIAL (ML) INJECTION
Status: DISCONTINUED | OUTPATIENT
Start: 2024-04-16 | End: 2024-04-17 | Stop reason: HOSPADM

## 2024-04-16 RX ORDER — POLYETHYLENE GLYCOL 3350 17 G/17G
17 POWDER, FOR SOLUTION ORAL DAILY PRN
Status: DISCONTINUED | OUTPATIENT
Start: 2024-04-16 | End: 2024-04-17 | Stop reason: HOSPADM

## 2024-04-16 RX ORDER — OXYCODONE HYDROCHLORIDE 10 MG/1
10 TABLET ORAL
Status: DISCONTINUED | OUTPATIENT
Start: 2024-04-16 | End: 2024-04-17 | Stop reason: HOSPADM

## 2024-04-16 RX ORDER — SODIUM CHLORIDE 9 MG/ML
INJECTION, SOLUTION INTRAVENOUS CONTINUOUS
Status: ACTIVE | OUTPATIENT
Start: 2024-04-16 | End: 2024-04-17

## 2024-04-16 RX ORDER — CELECOXIB 200 MG/1
400 CAPSULE ORAL ONCE
Status: DISCONTINUED | OUTPATIENT
Start: 2024-04-16 | End: 2024-04-16 | Stop reason: HOSPADM

## 2024-04-16 RX ORDER — KETAMINE HCL IN 0.9 % NACL 50 MG/5 ML
SYRINGE (ML) INTRAVENOUS
Status: DISCONTINUED | OUTPATIENT
Start: 2024-04-16 | End: 2024-04-16

## 2024-04-16 RX ORDER — FAMOTIDINE 20 MG/1
20 TABLET, FILM COATED ORAL 2 TIMES DAILY
Status: DISCONTINUED | OUTPATIENT
Start: 2024-04-16 | End: 2024-04-17 | Stop reason: HOSPADM

## 2024-04-16 RX ORDER — FENTANYL CITRATE 50 UG/ML
INJECTION, SOLUTION INTRAMUSCULAR; INTRAVENOUS
Status: DISCONTINUED | OUTPATIENT
Start: 2024-04-16 | End: 2024-04-16

## 2024-04-16 RX ORDER — AMLODIPINE BESYLATE 5 MG/1
10 TABLET ORAL DAILY
Status: DISCONTINUED | OUTPATIENT
Start: 2024-04-16 | End: 2024-04-16

## 2024-04-16 RX ORDER — ASPIRIN 81 MG/1
81 TABLET ORAL 2 TIMES DAILY
Status: DISCONTINUED | OUTPATIENT
Start: 2024-04-16 | End: 2024-04-17 | Stop reason: HOSPADM

## 2024-04-16 RX ORDER — ACETAMINOPHEN 500 MG
1000 TABLET ORAL
Status: COMPLETED | OUTPATIENT
Start: 2024-04-16 | End: 2024-04-16

## 2024-04-16 RX ORDER — MIDAZOLAM HYDROCHLORIDE 1 MG/ML
2 INJECTION, SOLUTION INTRAMUSCULAR; INTRAVENOUS
Status: DISCONTINUED | OUTPATIENT
Start: 2024-04-16 | End: 2024-04-16 | Stop reason: HOSPADM

## 2024-04-16 RX ORDER — MUPIROCIN 20 MG/G
1 OINTMENT TOPICAL
Status: COMPLETED | OUTPATIENT
Start: 2024-04-16 | End: 2024-04-16

## 2024-04-16 RX ORDER — PROCHLORPERAZINE EDISYLATE 5 MG/ML
5 INJECTION INTRAMUSCULAR; INTRAVENOUS EVERY 6 HOURS PRN
Status: DISCONTINUED | OUTPATIENT
Start: 2024-04-16 | End: 2024-04-17 | Stop reason: HOSPADM

## 2024-04-16 RX ORDER — MUPIROCIN 20 MG/G
1 OINTMENT TOPICAL 2 TIMES DAILY
Status: DISCONTINUED | OUTPATIENT
Start: 2024-04-16 | End: 2024-04-17 | Stop reason: HOSPADM

## 2024-04-16 RX ORDER — OXYCODONE HYDROCHLORIDE 5 MG/1
5 TABLET ORAL
Status: DISCONTINUED | OUTPATIENT
Start: 2024-04-16 | End: 2024-04-16 | Stop reason: HOSPADM

## 2024-04-16 RX ADMIN — PHENYLEPHRINE HYDROCHLORIDE 100 MCG: 10 INJECTION INTRAVENOUS at 09:04

## 2024-04-16 RX ADMIN — FENTANYL CITRATE 50 MCG: 50 INJECTION, SOLUTION INTRAMUSCULAR; INTRAVENOUS at 06:04

## 2024-04-16 RX ADMIN — ACETAMINOPHEN 1000 MG: 500 TABLET ORAL at 06:04

## 2024-04-16 RX ADMIN — MUPIROCIN 1 G: 20 OINTMENT TOPICAL at 08:04

## 2024-04-16 RX ADMIN — SODIUM CHLORIDE: 9 INJECTION, SOLUTION INTRAVENOUS at 10:04

## 2024-04-16 RX ADMIN — TRANEXAMIC ACID 1000 MG: 100 INJECTION INTRAVENOUS at 07:04

## 2024-04-16 RX ADMIN — FAMOTIDINE 20 MG: 20 TABLET ORAL at 08:04

## 2024-04-16 RX ADMIN — ACETAMINOPHEN 1000 MG: 500 TABLET ORAL at 11:04

## 2024-04-16 RX ADMIN — SODIUM CHLORIDE, SODIUM GLUCONATE, SODIUM ACETATE, POTASSIUM CHLORIDE, MAGNESIUM CHLORIDE, SODIUM PHOSPHATE, DIBASIC, AND POTASSIUM PHOSPHATE: .53; .5; .37; .037; .03; .012; .00082 INJECTION, SOLUTION INTRAVENOUS at 08:04

## 2024-04-16 RX ADMIN — ASPIRIN 81 MG: 81 TABLET, COATED ORAL at 08:04

## 2024-04-16 RX ADMIN — PROPOFOL 20 MG: 10 INJECTION, EMULSION INTRAVENOUS at 07:04

## 2024-04-16 RX ADMIN — PREGABALIN 75 MG: 75 CAPSULE ORAL at 08:04

## 2024-04-16 RX ADMIN — METHOCARBAMOL 750 MG: 750 TABLET ORAL at 03:04

## 2024-04-16 RX ADMIN — MIDAZOLAM 1 MG: 1 INJECTION INTRAMUSCULAR; INTRAVENOUS at 06:04

## 2024-04-16 RX ADMIN — Medication 30 MG: at 07:04

## 2024-04-16 RX ADMIN — LIDOCAINE HYDROCHLORIDE 100 MG: 20 INJECTION INTRAVENOUS at 07:04

## 2024-04-16 RX ADMIN — VANCOMYCIN HYDROCHLORIDE 1500 MG: 1.5 INJECTION, POWDER, LYOPHILIZED, FOR SOLUTION INTRAVENOUS at 05:04

## 2024-04-16 RX ADMIN — PREGABALIN 75 MG: 75 CAPSULE ORAL at 05:04

## 2024-04-16 RX ADMIN — CELECOXIB 400 MG: 200 CAPSULE ORAL at 05:04

## 2024-04-16 RX ADMIN — FAMOTIDINE 20 MG: 20 TABLET ORAL at 10:04

## 2024-04-16 RX ADMIN — PHENYLEPHRINE HYDROCHLORIDE 40 MCG: 10 INJECTION INTRAVENOUS at 08:04

## 2024-04-16 RX ADMIN — METHOCARBAMOL 750 MG: 750 TABLET ORAL at 08:04

## 2024-04-16 RX ADMIN — LIDOCAINE HYDROCHLORIDE,EPINEPHRINE BITARTRATE 5 ML: 15; .005 INJECTION, SOLUTION EPIDURAL; INFILTRATION; INTRACAUDAL; PERINEURAL at 09:04

## 2024-04-16 RX ADMIN — BUPIVACAINE HYDROCHLORIDE 20 ML: 2.5 INJECTION, SOLUTION EPIDURAL; INFILTRATION; INTRACAUDAL; PERINEURAL at 06:04

## 2024-04-16 RX ADMIN — ATORVASTATIN CALCIUM 40 MG: 20 TABLET, FILM COATED ORAL at 08:04

## 2024-04-16 RX ADMIN — ACYCLOVIR 200 MG: 200 CAPSULE ORAL at 08:04

## 2024-04-16 RX ADMIN — FENTANYL CITRATE 25 MCG: 50 INJECTION, SOLUTION INTRAMUSCULAR; INTRAVENOUS at 09:04

## 2024-04-16 RX ADMIN — ACETAMINOPHEN 1000 MG: 500 TABLET ORAL at 05:04

## 2024-04-16 RX ADMIN — SODIUM CHLORIDE, SODIUM GLUCONATE, SODIUM ACETATE, POTASSIUM CHLORIDE, MAGNESIUM CHLORIDE, SODIUM PHOSPHATE, DIBASIC, AND POTASSIUM PHOSPHATE: .53; .5; .37; .037; .03; .012; .00082 INJECTION, SOLUTION INTRAVENOUS at 09:04

## 2024-04-16 RX ADMIN — ONDANSETRON 4 MG: 2 INJECTION INTRAMUSCULAR; INTRAVENOUS at 09:04

## 2024-04-16 RX ADMIN — OXYCODONE 5 MG: 5 TABLET ORAL at 10:04

## 2024-04-16 RX ADMIN — ACETAMINOPHEN 1000 MG: 500 TABLET ORAL at 12:04

## 2024-04-16 RX ADMIN — TRANEXAMIC ACID 1000 MG: 100 INJECTION INTRAVENOUS at 09:04

## 2024-04-16 RX ADMIN — METHOCARBAMOL 1000 MG: 500 TABLET ORAL at 10:04

## 2024-04-16 RX ADMIN — SODIUM CHLORIDE: 9 INJECTION, SOLUTION INTRAVENOUS at 03:04

## 2024-04-16 RX ADMIN — CEFAZOLIN 2 G: 2 INJECTION, POWDER, FOR SOLUTION INTRAMUSCULAR; INTRAVENOUS at 07:04

## 2024-04-16 RX ADMIN — CEFAZOLIN 2 G: 2 INJECTION, POWDER, FOR SOLUTION INTRAMUSCULAR; INTRAVENOUS at 11:04

## 2024-04-16 RX ADMIN — PROPOFOL 100 MCG/KG/MIN: 10 INJECTION, EMULSION INTRAVENOUS at 07:04

## 2024-04-16 RX ADMIN — SODIUM CHLORIDE: 9 INJECTION, SOLUTION INTRAVENOUS at 11:04

## 2024-04-16 RX ADMIN — MUPIROCIN 1 G: 20 OINTMENT TOPICAL at 05:04

## 2024-04-16 RX ADMIN — DOCUSATE SODIUM AND SENNOSIDES 1 TABLET: 8.6; 5 TABLET, FILM COATED ORAL at 08:04

## 2024-04-16 RX ADMIN — CEFAZOLIN 2 G: 2 INJECTION, POWDER, FOR SOLUTION INTRAMUSCULAR; INTRAVENOUS at 03:04

## 2024-04-16 RX ADMIN — DEXAMETHASONE SODIUM PHOSPHATE 8 MG: 4 INJECTION, SOLUTION INTRAMUSCULAR; INTRAVENOUS at 07:04

## 2024-04-16 RX ADMIN — SODIUM CHLORIDE: 0.9 INJECTION, SOLUTION INTRAVENOUS at 07:04

## 2024-04-16 NOTE — PLAN OF CARE
Poc reviewed with pt. Verbalized understanding, questions answered, reassurance provided.     Needs walker    Belongings in locker

## 2024-04-16 NOTE — TRANSFER OF CARE
"Anesthesia Transfer of Care Note    Patient: Royer Castillo    Procedure(s) Performed: Procedure(s) (LRB):  ARTHROPLASTY, HIP (Left)    Patient location: PACU    Anesthesia Type: CSE    Transport from OR: Transported from OR on 6-10 L/min O2 by face mask with adequate spontaneous ventilation    Post pain: adequate analgesia    Post assessment: no apparent anesthetic complications and tolerated procedure well    Post vital signs: stable    Level of consciousness: responds to stimulation and sedated    Nausea/Vomiting: no nausea/vomiting    Complications: none    Transfer of care protocol was followed      Last vitals: Visit Vitals  /71   Pulse (!) 57   Temp 36.1 °C (97 °F) (Temporal)   Resp 14   Ht 5' 9" (1.753 m)   Wt 92.1 kg (203 lb)   SpO2 95%   BMI 29.98 kg/m²     "

## 2024-04-16 NOTE — PLAN OF CARE
Pt staying overnight due to left quad weakness. Pt able to plantar and dorisflex left and right feet. Pt unable to pass physical therapy after 2 failed attempts. MD and PA notified. Orders placed for transfer. Pain control appropriate. Dressing is clean dry and intact. VSS and afebrile. Pt has bedside commode and walker at bedside for home use. Report to given to More. Pt going to room 302 in Recovery suites.

## 2024-04-16 NOTE — OPERATIVE NOTE ADDENDUM
Certification of Assistant at Surgery       Surgery Date: 4/16/2024     Participating Surgeons:  Surgeons and Role:     * Gerard Tello MD - Primary    Procedures:  Procedure(s) (LRB):  ARTHROPLASTY, HIP (Left)    Assistant Surgeon's Certification of Necessity:  I understand that section 1842 (b) (6) (d) of the Social Security Act generally prohibits Medicare Part B reasonable charge payment for the services of assistants at surgery in teaching hospitals when qualified residents are available to furnish such services. I certify that the services for which payment is claimed were medically necessary, and that no qualified resident was available to perform the services. I further understand that these services are subject to post-payment review by the Medicare carrier.      Joan Ley PA-C    04/16/2024  10:21 AM

## 2024-04-16 NOTE — PT/OT/SLP EVAL
"Occupational Therapy Evaluation and Treatment    Name: Royer Castillo  MRN: [id number]  Admitting Diagnosis: Primary osteoarthritis of left hip Day of Surgery  Recent Surgery: Procedure(s) (LRB):  ARTHROPLASTY, HIP (Left) Day of Surgery    Recommendations:     Discharge Recommendations: Low Intensity Therapy  Level of Assistance Recommended: 24 hours supervision  Discharge Equipment Recommendations: bedside commode, walker, rolling  Barriers to discharge: None    Assessment:     Royer Castillo is a 63 y.o. male with a medical diagnosis of Primary osteoarthritis of left hip. He presents with performance deficits affecting function including impaired self care skills, impaired functional mobility, orthopedic precautions. Pt was able to perform supine/sit T/F c min A.  After sitting EOB pt had c/o numbness in foot but was able to extend knee c 2/5 strength.  T/F from sit/stand c min A and pt's L LE began to buckle. Sat pt back down into bed and then returned pt to supine c min A.  RN notified.    Rehab Prognosis: Good; patient would benefit from acute OT services to address these deficits and reach maximum level of function.    Plan:     Patient to be seen daily to address the above listed problems via self-care/home management, therapeutic activities, therapeutic exercises  Plan of Care Expires: 04/16/24  Plan of Care Reviewed with: patient, spouse    Subjective     Chief Complaint: L anterior GISSELLE  Patient Comments/Goals: "I can't feel my foot on the floor."  Pain/Comfort:  Pain Rating 1: 2/10    Patients cultural, spiritual, Sikh conflicts given the current situation: no    Social History:  Living Environment: Patient lives with their spouse in a single story home with number of outside stair(s): 8 and tub-shower combo  Prior Level of Function: Prior to admission, patient was independent.  Roles and Routines: Patient was driving prior to admission.  Equipment Used at Home: none  DME owned (not " currently used): none  Assistance Upon Discharge: significant other    Objective:     Communicated with RN prior to session. Patient found supine with cryotherapy, hip abduction pillow, SCD, telemetry upon OT entry to room.    General Precautions: Standard, fall   Orthopedic Precautions: LLE weight bearing as tolerated, LLE anterior precautions   Braces: N/A    Respiratory Status: Room air    Occupational Performance    Gait belt applied - Yes    Bed Mobility:   Supine to sit from left side of bed with minimum assistance  Sit to Supine with minimum assistance on L side of bed    Functional Mobility/Transfers:  Sit <> Stand Transfer with minimum assistance with rolling walker  Functional Mobility: Pt was unable to take steps d/t L LE buckling.    Activities of Daily Living:  Upper Body Dressing: minimum assistance to don hospital gown.      Physical Exam:  Upper Extremity Range of Motion:     -       Right Upper Extremity: WFL  -       Left Upper Extremity: WFL  Upper Extremity Strength:    -       Right Upper Extremity: WFL  -       Left Upper Extremity: WFL    AMPAC 6 Click ADL:  AMPAC Total Score: 13    Treatment & Education:  Educated on the importance of mobility to maximize recovery  Educated on the importance of OOB mobility within safe range in order to decrease adverse effects of prolonged bedrest  Educated on Anterior hip precautions - patient recalled 3 hip precautions  Educated on use of hip kit during LB dressing  Educated on safety with functional mobility; hand placement to ensure safe transfers to various surfaces in prep for ADLs  Educated on performing functional mobility and ADLs in adherence to orthopedic precautions  Educated on weight bearing status  Will continue to educate as needed      Patient not clear to transfer with RN/PCT.    Patient left supine with all lines intact, call button in reach, and RN notified.    GOALS:   Multidisciplinary Problems       Occupational Therapy Goals           Problem: Occupational Therapy    Goal Priority Disciplines Outcome Interventions   Occupational Therapy Goal     OT, PT/OT Ongoing, Progressing    Description: Goals to be met by: 4/17/24     Patient will increase functional independence with ADLs by performing:    UE Dressing with Modified [place].  LE Dressing with Modified [place] and Assistive Devices as needed.  Grooming while standing at sink with Modified [place].  Toileting from bedside commode with Modified [place] for hygiene and clothing management.   Bathing from  shower chair/bench with Modified [place].  Toilet transfer to bedside commode with Modified [place].                         History:     Past Medical History:   Diagnosis Date    Arthritis     Cellulitis     left foot    Diverticulosis     Genital herpes     GERD (gastroesophageal reflux disease)     HTN (hypertension)     Hyperlipidemia     Left atrial dilation     Onychomycosis     Pre-diabetes          Past Surgical History:   Procedure Laterality Date    ARTHROSCOPIC CHONDROPLASTY OF KNEE JOINT Right 5/27/2021    Procedure: ARTHROSCOPY, KNEE, WITH CHONDROPLASTY;  Surgeon: Gerard Tello MD;  Location: [facility];  Service: Orthopedics;  Laterality: Right;    COLONOSCOPY N/A 12/11/2019    Procedure: COLONOSCOPY;  Surgeon: Devaughn Johnson MD;  [facility]  Service: Endoscopy;  Laterality: N/A;    ESOPHAGOGASTRODUODENOSCOPY N/A 3/2/2022    Procedure: EGD (ESOPHAGOGASTRODUODENOSCOPY);  Surgeon: John Rose MD;  [facility]  Service: Endoscopy;  Laterality: N/A;  1/27 covid test 2/27 @ [place]; instructions to portal-st  2/25 lv to confirm appt-RB    KNEE ARTHROSCOPY W/ MENISCECTOMY Right 5/27/2021    Procedure: ARTHROSCOPY, KNEE, WITH MENISCECTOMY;  Surgeon: Gerard Tello MD;  Location: [facility];  Service: Orthopedics;  Laterality: Right;  JOHNATHAN 50CC    SYNOVECTOMY OF KNEE Right 5/27/2021    Procedure: SYNOVECTOMY, KNEE  limited;  Surgeon: Gerard Tello MD;  Location: [facility];  Service: Orthopedics;  Laterality: Right;       Time Tracking:     OT Date of Treatment: 04/16/24  OT Start Time: 1222  OT Stop Time: 1248  OT Total Time (min): 26 min    Billable Minutes: Evaluation 13 and Self Care/Home Management 13    4/16/2024

## 2024-04-16 NOTE — OP NOTE
[facility] ([facility])  Operative Note      Date of Procedure: 4/16/2024     Procedure:   1. Left  Replacement, hip, acetabular and proximal femoral prosthesis (Total Hip arthroplasty), with or without autograft or allograft [id number]    Surgeons and Role:     * Gerard Tello MD - Primary    Assisting Surgeon:  Joan Ley PA-C    No resident or fellow was available throughout the entire procedure as a result it was medically necessary for Joan Ley PA-C to perform first assistant duties.      Pre-Operative Diagnosis: Primary osteoarthritis of left hip [M16.12]    Post-Operative Diagnosis: Post-Op Diagnosis Codes:     * Primary osteoarthritis of left hip [M16.12]    Anesthesia: General    Operative Findings (including complications, if any): left hip DJD with large CAM lesion    Description of Technical Procedures:   DATE OF PROCEDURE: 4/16/2024    ATTENDING SURGEON: Surgeons and Role:     * Gerard Tello MD - Primary    Assistants:     PREOPERATIVE DIAGNOSIS:  Left  DJD, Hip M16.9 and Pain, Hip M25.559    POSTOPERATIVE DIAGNOSIS:   Left  DJD, Hip M16.9 and Pain, Hip M25.559    PROCEDURES(S) PERFORMED:   1. Left  Replacement, hip, acetabular and proximal femoral prosthesis (Total Hip arthroplasty), with or without autograft or allograft [id number]    ANESTHESIA: Local, Regional w/ catheter , and Regional w/o Catheter  Epidural and Spinal, PENG block and 50cc JOHNATHAN    FLUIDS IN THE CASE: 2200 ml    ESTIMATED BLOOD LOSS: 200 mL    URINE OUTPUT: 0 ml    COMPLICATIONS: none    INDICATIONS FOR OPERATIVE PROCEDURE:   Royer Castillo is a 63 y.o. male with history of left hip pain and pathology. The patient's history and physical examination findings consistent with the procedure performed. The patient noted significant problems in the area of concern with problems on activities of daily living and aggressive use of the left leg. As a result of these problems and problems with overall activity level, the  patient was deemed to be an appropriate candidate for operative intervention. Nonoperative versus operative options were discussed. The risks and benefits were discussed with the patient. The patient acknowledged understanding and wished to proceed with operative intervention. Informed consent was obtained prior to the procedure. Details of the surgical procedure were explained, including incisions and probable rehabilitation course. The patient understands the likely length of convalescence after surgery; and we have explained the risks, benefits, and alternatives of surgery. Reasonable expectations and potential complications were discussed and acknowledged, including but not limited to infection, bleeding, blood clots, (DVT and/or PE), nerve injury, retear, instability, continued pain and stiffness. It was also explained that there was a chance of failure which may require further management. The patient agreed and understood and wished to proceed.       IMPLANTS UTILIZED: HANA Table, Depuy Hip Set, and Oscillating Saw  DePuy [place] Gription acetabular shell sector 54 mm outer diameter component  DePuy [place] AltrX polyethylene acetabular liner neutral, 36 mm inner diameter, 54 mm outer diameter  DePuy Actis Duofix hip system cementless femoral stem High offset Collar size 7  DePuy Biolox Delta Ceramic femoral head 36 mm, +1.5 with 12-14 taper.    DESCRIPTION OF PROCEDURE: The patient was brought into the Operating Room,   placed in supine position. Upon application of general anesthetic as well as   placement of endotracheal tube to stabilize the area, the patient was given the   appropriate dose of antibiotics based on body weight. Time-out was utilized to   verify  left side as the operative site.    The patient was then placed onto the [place] table. The pudendal post was applied.   Both feet were carefully padded and placed into the [place] table   foot attachments. Appropriate rotation was verified and C-arm  was used to   obtain fluoroscopic views and maintain appropriate leg lengths intraoperatively.  Right leg was then prepped and draped in a sterile fashion with ChloraPrep   material with the right arm carefully draped across the chest. Left arm was   placed well arm schaefer. Anterior based incision was carried down through the   skin down the fat and fascia. The tensor fascia carl and sartorius interval was  then created and the tensor fascia carl region retracted laterally. The   lateral edge of the rectus femoris region was then released. The crossing   vessels were carefully ligated and cauterized using Aquamantys probe. The   anterior capsular fat was then removed. Capsule was then incised in a T-shaped   incision region. Releases were performed medially as well as anteriorly and   posteriorly. We then exposed the neck region with care to protect the femoral nerves   and vessels throughout the remaining portion of the procedure. An oscillating saw was   used to create the proximal osteotomy of the femoral neck. We then removed additional   osteophytic bases along the periphery of the acetabulum with a rongeur and   osteotomes. The head was then removed. We then placed retractors   anteroinferiorly and posteroinferiorly along the acetabular region.    Sequential reaming was performed up to a size 53 mm reamer and then trialed with  the 54 mm acetabular component. We felt that a 54 mm component would most   normalize the patient's anatomy. As a result, Gription 54 mm cup was then   applied, was tapped in position under fluoroscopic guidance, found to be   excellent position. An anterior lip liner was then applied to the area of   concern to avoid any postoperative instability. Irrigation performed along the   area of concern. We then released further along the posterior capsular region   as well as along the anterior and posterior aspect of the femoral neck. The   hook attachment from the [place] table was then  "applied. [place] table was then used   to retract the femur anteriorly with external rotation and adduction. We   exposed the area of concern with medial retractors placed medially and   posteriorly as well as proximally. We used the cookie cutting device to   remove bone from the piriformis fossa region. Using the "chili" pepper and [place] device as well as the canal finder we verified the appropriate location within the femoral canal. We then sequentially broached, starting with the chili pepper device and extending up to 7 broach. We then calcar reamer along the area of concern, placed trials and felt that with sequential placement of trial neck lengths and leg lengths were created with the +1.5 neck. As a result, we removed all trial components, placed the   [place] stem into the area of concern. Fit in excellent fashion. Using modular   technique to place the +1.5, 36-mm Biolox Delta Ceramic head. We then irrigated   along the area of concern, removing the bony fragments from the acetabular region and reduced the hip.     Final pictures demonstrating excellent recreation of normal anatomy   leg lengths. 1:1 mixture iodine and NS was placed followed by use of the Pulsavac  To remove the remnants of the iodine. As a result, we then irrigated copiously along the area of concern.  We then closed the fascia and deep soft tissue layers with a series of #1 Vicryl   placed in figure-of-eight fashion, and the same interval was closed with a running # 1 Stratafix suture followed by closure of the subcutaneous tissues and fascial   region with a series of # 1 and 3-0 Vicryl sutures placed in inverted fashion. Skin was  closed with running 3-0 Monocryl sutures placed in subcuticular fashion along   with application of Dermabond ointment and aquacel. We injected intracapsular and   intramuscular JOHNATHAN mixture into the hip joint with a 20-gauge needle prior to  closure of the area of concern. We did place Xeroform gauze, ABD " pads and   Medipore tape. The patient's hip was then placed into an abduction pillow with   cooling unit applied on the operative hip. The patient was allowed to recover from   the anesthetic, was extubated and was taken to Recovery Room in stable   condition. At the completion case, all instrument and sponge counts were   correct.    NOTE: Dr. Gerard Tello was present for the key portions of the procedure and did  perform the key parts of the procedure.    PHYSICAL THERAPY:   The patient should begin outpatient physical therapy on postop day # 1-3     While in the hospital the patient should be OOB to chair QID with Full on the operative leg with walker or crutches and nursing or PT assistance; Ambulation should be with above weightbearing status  Full range of motion can be undertaken with full flexion as well as full internal and external  rotation as tolerated with anterior hip precautions.   The patient should use walker or   crutches and advancing off as tolerated.     Significant Surgical Tasks Conducted by the Assistant(s), if Applicable: preparation and closure    Estimated Blood Loss (EBL): 200 mL           Implants:   Implant Name Type Inv. Item Serial No.  Lot No. LRB No. Used Action   LINE ACET PINN 36X54 ALTRX - [id number]  LINE ACET PINN 36X54 ALTRX  [employer] [id number] Left 1 Implanted   CUP 54MM - [id number]  CUP 54MM  [employer] [id number] Left 1 Implanted   STEM ACTIS FEM COLLARED HIGH 7 - [id number]  STEM ACTIS FEM COLLARED HIGH 7  [employer] [id number] Left 1 Implanted   HEAD FEM CRMIC BIOLX 36MM +1.5 - [id number]  HEAD FEM CRMIC BIOLX 36MM +1.5  [employer] [id number] Left 1 Implanted       Specimens:   Specimen (24h ago, onward)      None                    Condition: Good    Disposition: PACU - hemodynamically stable.    Attestation: I was present and scrubbed for the key portions of the procedure.    Discharge Note    OUTCOME: Patient tolerated treatment/procedure well without complication and  "is now ready for discharge.    DISPOSITION: Home or Self Care    FINAL DIAGNOSIS:  Primary osteoarthritis of left hip    FOLLOWUP: In clinic    DISCHARGE INSTRUCTIONS:    Discharge Procedure Orders   WALKER FOR HOME USE   Order Comments: Rolling walker, 2 wheels.     Order Specific Question Answer Comments   Type of Walker: Adult (5'4"-6'6")    With wheels? Yes    Height: 5' 9" (1.753 m)    Weight: 92.4 kg (203 lb 11.3 oz)    Length of need (1-99 months): 99    Please check all that apply: Walker will be used for gait training.      Activity as tolerated     Lifting restrictions   Order Comments: No strenuous exercise or lifting of > 10 lbs     Weight bearing as tolerated     No driving, operating heavy equipment or signing legal documents while taking pain medication     Leave dressing on - Keep it clean, dry, and intact until clinic visit   Order Comments: Do not remove surgical dressing for 2 weeks post-op. This will be done only by MD at initial post-op visit. If dressing is completely saturated, replace with identical dressing - silver-impregnated hydrocolloid dressing.    Do not get dressings wet. Do not shower.    If dressing continues to be saturated or there are signs of infection, please call [facility] [phone] for further instructions and to make appt to be seen.     Call MD for: fluid/liquid/drainage on dressing     Call MD for:  temperature >100.4     Call MD for:  persistent nausea and vomiting     Call MD for:  severe uncontrolled pain     Call MD for:  difficulty breathing, headache or visual disturbances     Call MD for:  redness, tenderness, or signs of infection (pain, swelling, redness, odor or green/yellow discharge around incision site)     Call MD for:  hives     Call MD for:  persistent dizziness or light-headedness     Call MD for:  extreme fatigue     Ok to shower at home, running water only, towel dry, use shower chair for safety, no soaking in a tub or pool for one month.       "

## 2024-04-16 NOTE — ANESTHESIA PROCEDURE NOTES
PENG single shot    Patient location during procedure: pre-op   Block not for primary anesthetic.  Reason for block: at surgeon's request and post-op pain management   Post-op Pain Location: left hip pain   Start time: 4/16/2024 6:49 AM  Timeout: 4/16/2024 6:48 AM   End time: 4/16/2024 6:50 AM    Staffing  Authorizing Provider: Sharon Yang MD  Performing Provider: Sharon Yang MD    Staffing  Performed by: Sharon Yang MD  Authorized by: Sharon Yang MD    Preanesthetic Checklist  Completed: patient identified, IV checked, site marked, risks and benefits discussed, surgical consent, monitors and equipment checked, pre-op evaluation and timeout performed  Peripheral Block  Patient position: supine  Prep: ChloraPrep  Patient monitoring: heart rate, continuous pulse ox and cardiac monitor  Block type: PENG  Laterality: left  Injection technique: single shot  Needle  Needle type: Stimuplex   Needle gauge: 21 G  Needle length: 4 in  Needle localization: ultrasound guidance   -ultrasound image captured on disc.  Assessment  Injection assessment: negative aspiration and negative parasthesia  Paresthesia pain: none  Heart rate change: no  Slow fractionated injection: yes  Pain Tolerance: comfortable throughout block and no complaints  Medications:    Medications: bupivacaine (pf) (MARCAINE) injection 0.25% - Perineural   20 mL - 4/16/2024 6:50:00 AM

## 2024-04-16 NOTE — ANESTHESIA PROCEDURE NOTES
CSE    Patient location during procedure: OR  Start time: 4/16/2024 7:23 AM  Timeout: 4/16/2024 7:20 AM  End time: 4/16/2024 7:26 AM      Staffing  Authorizing Provider: Sharon Yang MD  Performing Provider: Sharon Yang MD    Staffing  Performed by: Sharon Yang MD  Authorized by: Sharon Yang MD    Preanesthetic Checklist  Completed: patient identified, IV checked, site marked, risks and benefits discussed, surgical consent, monitors and equipment checked, pre-op evaluation and timeout performed  CSE  Patient position: sitting  Prep: ChloraPrep  Patient monitoring: heart rate and frequent blood pressure checks  Approach: midline  Spinal Needle  Needle type: Molly   Needle gauge: 25 G  Needle length: 5 in  Epidural Needle  Injection technique: MICHAEL air  Needle type: Tuohy   Needle gauge: 17 G  Needle length: 3.5 in  Needle insertion depth: 5 cm  Location: L3-4  Needle localization: anatomical landmarks   Catheter  Catheter type: springwound  Catheter size: 19 G  Catheter at skin depth: 10 cm  Assessment  Sensory level: T8   Dermatomal levels determined by pinch or prick  Intrathecal Medications:   administered: primary anesthetic mcg of    Medications:    Medications: Intrathecal - mepivacaine 20 mg/mL (2 %) injection - Intrathecal   3 mL - 4/16/2024 7:26:00 AM

## 2024-04-16 NOTE — PLAN OF CARE
Problem: Occupational Therapy  Goal: Occupational Therapy Goal  Description: Goals to be met by: 4/17/24     Patient will increase functional independence with ADLs by performing:    UE Dressing with Modified [place].  LE Dressing with Modified [place] and Assistive Devices as needed.  Grooming while standing at sink with Modified [place].  Toileting from bedside commode with Modified [place] for hygiene and clothing management.   Bathing from  shower chair/bench with Modified [place].  Toilet transfer to bedside commode with Modified [place].    Outcome: Ongoing, Progressing

## 2024-04-16 NOTE — ANESTHESIA PREPROCEDURE EVALUATION
04/16/2024  Royer Castillo is a 63 y.o., male with Primary Osteoarthritis left hip,     Pre-operative evaluation for Procedure(s) (LRB):  ARTHROPLASTY, HIP (Left)    Royer Castillo is a 63 y.o. male     Patient Active Problem List   Diagnosis    Pre-diabetes    Mixed hyperlipidemia    Genital herpes    Essential hypertension    Gastroesophageal reflux disease    Tear of LCL (lateral collateral ligament) of knee, right, initial encounter    Colon polyps    Acute meniscal tear of right knee    Chondromalacia of right knee    Acute lateral meniscus tear of right knee    Decreased range of motion of neck    Decreased range of motion of left shoulder    Impaired strength of neck muscles    Primary osteoarthritis of left hip    Left hip pain    Traumatic arthritis of left hip    History of COVID-19    BMI 30.0-30.9,adult       Review of patient's allergies indicates:  No Known Allergies    No current facility-administered medications on file prior to encounter.     Current Outpatient Medications on File Prior to Encounter   Medication Sig Dispense Refill    acyclovir (ZOVIRAX) 200 MG capsule Take 1 capsule (200 mg total) by mouth 2 (two) times daily. 60 capsule 11    amLODIPine (NORVASC) 10 MG tablet Take 1 tablet (10 mg total) by mouth once daily. 90 tablet 3    hydroCHLOROthiazide (HYDRODIURIL) 25 MG tablet Take 1 tablet (25 mg total) by mouth once daily. 90 tablet 3    pantoprazole (PROTONIX) 40 MG tablet Take 1 tablet (40 mg total) by mouth once daily. 90 tablet 1    celecoxib (CELEBREX) 200 MG capsule Take 1 capsule (200 mg total) by mouth 2 (two) times daily. (Patient not taking: Reported on 3/28/2024) 60 capsule 2       Past Surgical History:   Procedure Laterality Date    ARTHROSCOPIC CHONDROPLASTY OF KNEE JOINT Right 5/27/2021    Procedure: ARTHROSCOPY, KNEE, WITH CHONDROPLASTY;  Surgeon: Gerard  RADHA Tello MD;  Location: [facility];  Service: Orthopedics;  Laterality: Right;    COLONOSCOPY N/A 12/11/2019    Procedure: COLONOSCOPY;  Surgeon: Devaughn Johnson MD;  Location: [facility] (4TH FLR);  Service: Endoscopy;  Laterality: N/A;    ESOPHAGOGASTRODUODENOSCOPY N/A 3/2/2022    Procedure: EGD (ESOPHAGOGASTRODUODENOSCOPY);  Surgeon: John Rose MD;  [facility]  Service: Endoscopy;  Laterality: N/A;  1/27 covid test 2/27 @ [place]; instructions to portal-st  2/25 lvm to confirm appt-RB    KNEE ARTHROSCOPY W/ MENISCECTOMY Right 5/27/2021    Procedure: ARTHROSCOPY, KNEE, WITH MENISCECTOMY;  Surgeon: Gerard Tello MD;  Location: [facility];  Service: Orthopedics;  Laterality: Right;  JOHNATHAN 50CC    SYNOVECTOMY OF KNEE Right 5/27/2021    Procedure: SYNOVECTOMY, KNEE limited;  Surgeon: Gerard Tello MD;  Location: [facility];  Service: Orthopedics;  Laterality: Right;         CBC:  Lab Results   Component Value Date    WBC 5.77 01/19/2024    RBC 5.03 01/19/2024    HGB 15.4 01/19/2024    HCT 49.2 01/19/2024     01/19/2024    MCV 98 01/19/2024    MCH 30.6 01/19/2024    MCHC 31.3 (L) 01/19/2024       CMP:   Lab Results   Component Value Date     01/19/2024    K 3.9 01/19/2024     01/19/2024    CO2 27 01/19/2024    BUN 11 01/19/2024    CREATININE 1.0 01/19/2024     01/19/2024    CALCIUM 9.2 01/19/2024    ALBUMIN 4.1 01/19/2024    PROT 7.3 01/19/2024    ALKPHOS 74 01/19/2024    ALT 27 01/19/2024    AST 20 01/19/2024    BILITOT 0.9 01/19/2024       INR:  Lab Results   Component Value Date    INR 1.0 03/28/2024         Diagnostic Studies:      EKG:   Results for orders placed or performed during the hospital encounter of 03/28/24   EKG 12-lead    Collection Time: 03/28/24 11:58 AM   Result Value Ref Range    QRS Duration 82 ms    OHS QTC Calculation 397 ms    Narrative    Test Reason : Z01.818,    Vent. Rate : 057 BPM     Atrial Rate : 057 BPM     P-R Int : 236 ms          QRS Dur :  "082 ms      QT Int : 408 ms       P-R-T Axes : 052 009 034 degrees     QTc Int : 397 ms    Sinus bradycardia with 1st degree A-V block with Premature atrial complexes  Possible Inferior infarct ,age undetermined  Abnormal ECG  When compared with ECG of 09-JUL-2022 13:24,  Borderline criteria for Inferior infarct are now Present  Confirmed by APOLINAR AYOUB MD ([id number]) on 3/28/2024 12:38:24 PM    Referred By: HEMA MERRILL           Confirmed By:APOLINAR AYOUB MD        2D Echo:  No results found for this or any previous visit.    Stress Test:   No results found for this or any previous visit.      Pre-op Vitals   BP Pulse Resp Temp SpO2   04/16/24 0534 04/16/24 0534 04/16/24 0534 04/16/24 0534 04/16/24 0534   (!) 142/85 (!) 58 16 36.1 °C (97 °F) 96 %      Height Weight BMI (Calculated)     04/16/24 0534 04/16/24 0526 04/16/24 0534     5' 9" 203 lb 11.3 oz 30                Pre-op Assessment    I have reviewed the Patient Summary Reports.     I have reviewed the Nursing Notes. I have reviewed the NPO Status.   I have reviewed the Medications.     Review of Systems  Anesthesia Hx:  No problems with previous Anesthesia   History of prior surgery of interest to airway management or planning:  Previous anesthesia: General 3/2/22 EGD with general anesthesia.       Airway issues documented on chart review include GETA     Denies Family Hx of Anesthesia complications.    Denies Personal Hx of Anesthesia complications.                    Social:  Non-Smoker, No Alcohol Use       Hematology/Oncology:  Hematology Normal                  Hematology Comments: Genital herpes                      EENT/Dental:  denies chronic allergic rhinitis           Cardiovascular:  Exercise tolerance: poor   Denies Pacemaker. Hypertension, well controlled       Denies Angina.     hyperlipidemia                             Pulmonary:    Denies COPD.  Denies Asthma.   Denies Shortness of breath.  Denies Recent URI.                 Renal/:   " Denies Chronic Renal Disease. no renal calculi    Genital herpes                 Hepatic/GI:     GERD, well controlled     12/11/2019  Colon polyp          Musculoskeletal:  Arthritis    Musculoskeletal General/Symptoms: joint pain, joint stiffness, muscle weakness, generalized, muscle pain, generalzed, joint immobility.  Decreased range of motion of neck, Decreased range of motion of left shoulder,   Impaired strength of neck muscles    Left hip pain,   Traumatic arthritis of left hip    Pain in both knees,   Tear of LCL (lateral collateral ligament) of knee,  right, initial encounter,   Acute meniscal tear of right knee, Chondromalacia of right knee,   Acute lateral meniscus tear of right knee,     Joint Disease:  Arthritis, Osteoarthritis, hip, knee, shoulder Primary osteoarthritis left hip    5/27/21 ARTHROSCOPY, KNEE, WITH MENISCECTOMY (Right)   ARTHROSCOPY, KNEE,         Neurological:  Denies TIA.  Denies CVA.    Denies Seizures.          Chronic Pain Syndrome Arthritis, Osteoarthritis, hip, knee, shoulder   Left hip pain                        Endocrine:  Denies Diabetes. Denies Hypothyroidism.  Denies Hyperthyroidism. Pre-diabetes        Dermatological:  Skin Normal    Psych:  Psychiatric Normal                    Physical Exam  General: Well nourished, Cooperative, Alert and Oriented    Airway:  Mouth Opening: Normal  Tongue: Normal  Neck ROM: Normal ROM    Dental:  Intact    Chest/Lungs:  Clear to auscultation, Normal Respiratory Rate    Heart:  Rate: Normal  Rhythm: Regular Rhythm  Sounds: Normal          Anesthesia Assessment: Preoperative EQUATION    Planned Procedure: Procedure(s) (LRB):  ARTHROPLASTY, HIP (Left)  Requested Anesthesia Type:General  Surgeon: Gerard Tello MD  Service: Orthopedics  Known or anticipated Date of Surgery:4/16/2024    Surgeon notes: reviewed    Electronic QUestionnaire Assessment completed via nurse interview with patient.        Triage considerations:     The patient has  no apparent active cardiac condition (No unstable coronary Syndrome such as severe unstable angina or recent [<1 month] myocardial infarction, decompensated CHF, severe valvular   disease or significant arrhythmia)    Previous anesthesia records:GETA and No problems  3/2/22 EGD  Airway/Jaw/Neck:  Airway Findings: Mouth Opening: Normal   Tongue: Normal   General Airway Assessment: Adult Mallampati: II  Improves to II, I with phonation.  TM Distance: Normal, at least 6 cm   Jaw/Neck Findings:  Neck ROM: Normal ROM     5/27/21 ARTHROSCOPY, KNEE, WITH MENISCECTOMY (Right)   ARTHROSCOPY, KNEE, WITH CHONDROPLASTY (Right: Knee)   SYNOVECTOMY, KNEE limited (Right: Knee)    Mask Ventilation: Easy Intubated: Postinduction Airway Device Size: 3.5 Cuff Inflation: Minimal occlusive pressure Placement Verified By: Capnometry Complicating Factors: None Findings Post-Intubation: Bilateral breath sounds;Atraumatic/Condition of teeth unchanged Secured at: Lips Complications: None     Last PCP note: 6-12 months ago , within [facility]     Subspecialty notes: Ortho    Other important co-morbidities: GERD, HLD, HTN, and Pre DM       Tests already available:  Available tests,  within [facility] .   1/9/24 A1C, CBC, CMP, TSH   7/9/22 EKG             Instructions given. (See in Nurse's note)    Optimization:  Anesthesia Preop Clinic Assessment  Indicated POC    Medical Opinion Indicated       Sub-specialist consult indicated:   [facility] Pre [facility]       Plan:    Testing:  EKG and PT/INR   Pre-anesthesia  visit       Visit focus: possible regional anesthesia and/or nerve block      Consultation:IM Perioperative Hospitalist     Patient  has previously scheduled Medical Appointment: 3/28    Navigation: Tests Scheduled.              Consults scheduled.             Results will be tracked by Preop Clinic.                 Anesthesia Plan  Type of Anesthesia, risks & benefits discussed:    Anesthesia Type: Gen ETT, Regional, CSE  Intra-op  Monitoring Plan: Standard ASA Monitors  Post Op Pain Control Plan: multimodal analgesia, peripheral nerve block and IV/PO Opioids PRN  Induction:  IV  Informed Consent: Informed consent signed with the Patient and all parties understand the risks and agree with anesthesia plan.  All questions answered.   ASA Score: 2    Ready For Surgery From Anesthesia Perspective.     .

## 2024-04-16 NOTE — PT/OT/SLP EVAL
Physical Therapy Evaluation and Treatment    Patient Name: Royer Castillo   MRN: [id number]  Recent Surgery: Procedure(s) (LRB):  ARTHROPLASTY, HIP (Left) Day of Surgery    Recommendations:     Discharge Recommendations: Low Intensity Therapy   Discharge Equipment Recommendations: bedside commode, walker, rolling   Barriers to discharge:  left quad weakness    Assessment:     Royer Castillo is a 63 y.o. male admitted with a medical diagnosis of Primary osteoarthritis of left hip. He presents with the following impairments/functional limitations: weakness, impaired functional mobility, gait instability, impaired balance, decreased lower extremity function, pain, decreased ROM, orthopedic precautions, impaired sensation. Patient tolerated PT session fair due to left quad weakness. Patient ambulated 8 side steps with RW and minimal assistance due to left knee buckling. Patient educated in anterior hip precautions.     Rehab Prognosis: Good; patient would benefit from acute PT services to address these deficits and reach maximum level of function.    Plan:     During this hospitalization, patient to be seen daily to address the above listed problems via gait training, therapeutic activities, therapeutic exercises    Plan of Care Expires: 04/19/24    Subjective     Chief Complaint: Unable to kick left leg still.   Patient Comments/Goals: To get back to work.   Pain/Comfort:  Pain Rating 1:  (did not rate)  Location - Side 1: Left  Location 1: hip  Pain Addressed 1: Reposition, Distraction    Social History:  Living Environment: Patient lives with his wife in a [facility] with 8 steps and bilateral handrails to enter.   Prior Level of Function: Prior to admission, patient was independent for functional mobility. He works as a .   Equipment Used at Home: none  Assistance Upon Discharge:  wife    Objective:     Communicated with RN prior to session. Patient found HOB elevated with telemetry, cryotherapy, hip  abduction pillow, SCD upon PT entry to room. Wife present in room.     General Precautions: Standard, fall   Orthopedic Precautions: LLE weight bearing as tolerated, LLE anterior precautions   Braces: N/A    Respiratory Status: Room air    Exams:  RLE ROM: WFL  RLE Strength: WFL  LLE ROM: AROM and hip and ankle. AAROM at knee.   LLE Strength: WFL at hip and ankle. Minimal long arc quad on left leg with shaking noting when attempting to use quad. Per patient, he feels like his leg will not work fully.   Cognitive: Patient is oriented to Person, Place, Time, Situation  Sensation: decreased light touch to L LE when compared to R LE     Functional Mobility:  Gait belt applied - Yes  Bed Mobility  Scooting: stand by assistance  Supine to Sit: stand by assistance  Sit to Supine: minimum assistance for L LE management  Transfers  Sit to Stand: contact guard assistance with rolling walker x1 from bed   Gait  Patient ambulated 8 side steps with rolling walker and minimum assistance. Patient required cues for position in walker, sequencing, and weight bearing status to increase independence and safety.       Therapeutic Activities and Exercises:  Patient sat on edge of bed to use the urinal with supervision.     Patient and wife educated in:  -PT role and POC  -anterior hip precautions   -safety with transfers including hand placement  -gait sequencing and RW management      AM-PAC 6 CLICK MOBILITY  Total Score:16    Patient left HOB elevated with all lines intact, call button in reach, RN notified, and discussed case with PA, anesthesia MD, and RN .    GOALS:   Multidisciplinary Problems       Physical Therapy Goals          Problem: Physical Therapy    Goal Priority Disciplines Outcome Goal Variances Interventions   Physical Therapy Goal     PT, PT/OT Ongoing, Progressing     Description: Goals to be met by: 2024     Patient will increase functional independence with mobility by performin. Supine to sit with  supervision  2. Sit to stand transfer with Supervision  3. Gait x300 feet with Supervision using Rolling Walker  4. Ascend/Descend 8 steps with bilateral handrails and stand by assistance  5. Lower extremity exercise program x30 reps per handout, with supervision                          History:     Past Medical History:   Diagnosis Date    Arthritis     Cellulitis     left foot    Diverticulosis     Genital herpes     GERD (gastroesophageal reflux disease)     HTN (hypertension)     Hyperlipidemia     Left atrial dilation     Onychomycosis     Pre-diabetes        Past Surgical History:   Procedure Laterality Date    ARTHROSCOPIC CHONDROPLASTY OF KNEE JOINT Right 5/27/2021    Procedure: ARTHROSCOPY, KNEE, WITH CHONDROPLASTY;  Surgeon: Gerard Tello MD;  Location: [facility];  Service: Orthopedics;  Laterality: Right;    COLONOSCOPY N/A 12/11/2019    Procedure: COLONOSCOPY;  Surgeon: Devaughn Johnson MD;  Location: [facility] (4TH FLR);  Service: Endoscopy;  Laterality: N/A;    ESOPHAGOGASTRODUODENOSCOPY N/A 3/2/2022    Procedure: EGD (ESOPHAGOGASTRODUODENOSCOPY);  Surgeon: John Rose MD;  Location: [facility] (4TH FLR);  Service: Endoscopy;  Laterality: N/A;  1/27 covid test 2/27 @ [place]; instructions to portal-st  2/25 lv to confirm appt-RB    KNEE ARTHROSCOPY W/ MENISCECTOMY Right 5/27/2021    Procedure: ARTHROSCOPY, KNEE, WITH MENISCECTOMY;  Surgeon: Gerard Tello MD;  Location: [facility];  Service: Orthopedics;  Laterality: Right;  JOHNATHAN 50CC    SYNOVECTOMY OF KNEE Right 5/27/2021    Procedure: SYNOVECTOMY, KNEE limited;  Surgeon: Gerard Tello MD;  Location: [facility];  Service: Orthopedics;  Laterality: Right;       Time Tracking:     PT Received On: 04/16/24  PT Start Time: 1348  PT Stop Time: 1404  PT Total Time (min): 16 min     Billable Minutes: Evaluation 8 and Gait Training 8    4/16/2024

## 2024-04-16 NOTE — NURSING
Report received. Care assumed. Patient arrived to unit AAOx4 in hospital bed from PACU. VSS, IVF infusing. Dressing to left hip, CDI.  Pt lying supine in bed. Pt denies pain or any other concerns at this time. See assessment. Patient oriented to room. Bed in lowest position, side rails up x2, bed wheels locked and call light within reach.  Pt instructed to call for assistance, verbalized understanding. NADN. Will continue to monitor.

## 2024-04-16 NOTE — PLAN OF CARE
Problem: Adult Inpatient Plan of Care  Goal: Plan of Care Review  4/16/2024 1551 by Gilda Villalta RN  Flowsheets (Taken 4/16/2024 1551)  Plan of Care Reviewed With:   patient   spouse  4/16/2024 1550 by Gilda Villalta RN  Outcome: Ongoing, Progressing  Goal: Patient-Specific Goal (Individualized)  4/16/2024 1551 by Gilda Villalta RN  Flowsheets (Taken 4/16/2024 1551)  Individualized Care Needs: manage pain  4/16/2024 1550 by Gilda Villalta RN  Outcome: Ongoing, Progressing  Goal: Absence of Hospital-Acquired Illness or Injury  Outcome: Ongoing, Progressing  Goal: Optimal Comfort and Wellbeing  Outcome: Ongoing, Progressing  Intervention: Provide Person-Centered Care  Flowsheets (Taken 4/16/2024 1551)  Trust Relationship/Rapport:   care explained   reassurance provided   thoughts/feelings acknowledged  Goal: Readiness for Transition of Care  Outcome: Ongoing, Progressing     Problem: Pain Acute  Goal: Acceptable Pain Control and Functional Ability  Outcome: Ongoing, Progressing  Intervention: Develop Pain Management Plan  Flowsheets (Taken 4/16/2024 1551)  Pain Management Interventions:   breathing exercises utilized   care clustered   cold applied   medication offered   pain management plan reviewed with patient/caregiver   pillow support provided   premedicated for activity   quiet environment facilitated   warm blanket provided     Problem: Adjustment to Surgery (Hip Arthroplasty)  Goal: Optimal Coping  Outcome: Ongoing, Progressing     Problem: Bleeding (Hip Arthroplasty)  Goal: Absence of Bleeding  Outcome: Ongoing, Progressing  Intervention: Monitor and Manage Bleeding  Flowsheets (Taken 4/16/2024 1551)  Bleeding Management: dressing monitored     Problem: Bowel Motility Impaired (Hip Arthroplasty)  Goal: Effective Bowel Elimination  Outcome: Ongoing, Progressing     Problem: Fluid and Electrolyte Imbalance (Hip Arthroplasty)  Goal: Fluid and Electrolyte Balance  Outcome: Ongoing,  Progressing     Problem: Functional Ability Impaired (Hip Arthroplasty)  Goal: Optimal Functional Ability  Outcome: Ongoing, Progressing     Problem: Infection (Hip Arthroplasty)  Goal: Absence of Infection Signs and Symptoms  Outcome: Ongoing, Progressing     Problem: Neurovascular Compromise (Hip Arthroplasty)  Goal: Intact Neurovascular Status  Outcome: Ongoing, Progressing     Problem: Ongoing Anesthesia Effects (Hip Arthroplasty)  Goal: Anesthesia/Sedation Recovery  Outcome: Ongoing, Progressing     Problem: Pain (Hip Arthroplasty)  Goal: Acceptable Pain Control  Outcome: Ongoing, Progressing     Problem: Postoperative Nausea and Vomiting (Hip Arthroplasty)  Goal: Nausea and Vomiting Relief  Outcome: Ongoing, Progressing     Problem: Postoperative Urinary Retention (Hip Arthroplasty)  Goal: Effective Urinary Elimination  Outcome: Ongoing, Progressing     Problem: Respiratory Compromise (Hip Arthroplasty)  Goal: Effective Oxygenation and Ventilation  Outcome: Ongoing, Progressing     Problem: Fall Injury Risk  Goal: Absence of Fall and Fall-Related Injury  Outcome: Ongoing, Progressing  Intervention: Promote Injury-Free Environment  Flowsheets (Taken 4/16/2024 2643)  Safety Promotion/Fall Prevention:   assistive device/personal item within reach   nonskid shoes/socks when out of bed   instructed to call staff for mobility

## 2024-04-16 NOTE — ANESTHESIA POSTPROCEDURE EVALUATION
Anesthesia Post Evaluation    Patient: Royer Castillo    Procedure(s) Performed: Procedure(s) (LRB):  ARTHROPLASTY, HIP (Left)    Final Anesthesia Type: CSE      Patient location during evaluation: floor  Patient participation: Yes- Able to Participate  Level of consciousness: awake and alert  Post-procedure vital signs: reviewed and stable  Pain management: adequate  Airway patency: patent  CHANDANA mitigation strategies: Multimodal analgesia  PONV status at discharge: No PONV  Anesthetic complications: no      Cardiovascular status: blood pressure returned to baseline  Respiratory status: unassisted  Hydration status: euvolemic  Follow-up not needed.              Vitals Value Taken Time   /75 04/16/24 1617   Temp 37.1 °C (98.7 °F) 04/16/24 1617   Pulse 62 04/16/24 1617   Resp 18 04/16/24 1617   SpO2 95 % 04/16/24 1617         Event Time   Out of Recovery 14:57:03         Pain/Jim Score: Pain Rating Prior to Med Admin: 2 (4/16/2024  6:21 PM)  Jim Score: 10 (4/16/2024  2:10 PM)

## 2024-04-16 NOTE — NURSING
Upon re-assessment, pt continues with limited quad strength on left leg. Pt can sit up on side of bed to use urinal with 1 person assist, unable to lift his left leg (surgical leg off of the bed) leg does bend at the knee. ANH aware.

## 2024-04-16 NOTE — PLAN OF CARE
Patient tolerated PT session fair due to left quad weakness. Patient ambulated 8 side steps with RW and minimal assistance due to left knee buckling. Patient educated in anterior hip precautions.     Problem: Physical Therapy  Goal: Physical Therapy Goal  Description: Goals to be met by: 2024     Patient will increase functional independence with mobility by performin. Supine to sit with supervision  2. Sit to stand transfer with Supervision  3. Gait x300 feet with Supervision using Rolling Walker  4. Ascend/Descend 8 steps with bilateral handrails and stand by assistance  5. Lower extremity exercise program x30 reps per handout, with supervision     Outcome: Ongoing, Progressing

## 2024-04-16 NOTE — DISCHARGE INSTRUCTIONS
[address], [place]                                                                          [phone]                   Postoperative Instructions for Hip Surgery                 Your Surgery Included:   Open               Arthroscopic   [] Fracture Fixation       []  Diagnostic   [] Tendon Repair      [] Synovectomy [x] Joint Replacement      [] Debridement / Chondroplasty [] Articular Cartilage Repair      [] Articular Cartilage Repair           [x] Anterior Approach             []   Microfracture          [] Anterolateral Approach       [] Labral Repair         [] Posterior Approach      [] Osteoplasty               []   Femoral  []  Acetabular        [] Psoas Tendon Release          [] Tendon Repair  [] Amniox Arthrocentesis                  Call our office ([phone]) immediately or message us through [facility] if you experience any of the following:       Excessive bleeding or pus like drainage at the incision site       Uncontrollable pain not relieved by pain medication       Excessive swelling or redness at the incision site       Fever above 101.5 degrees not controlled with Tylenol or Motrin       Shortness of Breath or severe calf pain       Any foul odor or blistering from the surgery site    FOR EMERGENCIES: If any unusual problems or difficulties occur, call our office at [phone], or page the  at [phone] who will direct your call appropriately. If your procedure is a total joint replacement, please call the number on your wristband.     1.   Multimodal Pain Management: A cold therapy cuff, pain medications, anti-inflammatories, local injections, TENs unit, and in some cases, regional anesthesia injections are used to manage your post-operative pain. The decision to use each of these options is based on their risks and benefits.     Medications: You were given one or more of the following medication prescriptions during your preoperative  appointment. Follow the instruction on the bottles.     Narcotic Medication (usually Percocet, Roxicodone, or Norco): Begin taking the medication before your hip starts to hurt. Some patients do not like to take any medication, but if you wait until your pain is severe before taking, you will be very uncomfortable for several hours waiting for the narcotic to work. Always take with food.     Nausea / Vomiting: For this issue, we prescribe Phenergan or Zofran, use this medication as directed.     Cold Therapy: You may have been sent home with an ice wrap. The cold can provide short-term pain relief, and limits swelling by reducing blood flow to the injured area. Apply the cold gel pack for 20 minutes and then take it off for 20 minutes. Use throughout the day, as needed to help relieve pain and control swelling.      Regional Anesthesia Injections (Blocks): You may have been given a regional nerve block either before or after surgery. This may make your entire leg numb for 24-36 hours.                            * Proceed with caution when bearing weight on your leg.     2.   Diet: Eat a bland diet for the first day after surgery. Progress your diet as tolerated. Constipation may occur with Narcotic usage. We recommend Colace 100 mg twice a day while taking narcotics.    3.   Activity: Limit your activity during the first 48 hours, keep your leg elevated with pillows under your heel. After the first 48 hours at home, increase your activity level based on your symptoms.    4.   Dressing Change: (c) The soft, bulky dressing will be removed on the 3rd day after surgery. The Aquacel dressing (tan, long adhesive bandage) will remain on until the 1st post operative appointment. Do not remove. It is normal for some blood to be seen on the dressings. It is also normal for you to see apparent bruising on the skin around your incisions. If you are concerned by the drainage or the appearance of your wound site, you can send a  "picture via [facility].    5.   Showering: (c) You may shower on the 3rd day after surgery. The Aquacel bandage is to be covered with saran wrap before showering. Do not submerge limb in any water for 4 weeks or until incisions completely healed.  Do not get bandage wet.    6.   Your procedure did not require a post-operative brace.    7.   Your procedure did not require a Continuous Passive Motion (CPM) device.    8.   Weight Bearing: You may have been sent home with crutches. If instructed (see below), use these crutches at all times unless at complete rest.                  [x] Full weight bearing            [x]  NOW        9.  Home Exercises: Begin these exercises the first day after surgery in order to help you regain your motion and strength. You may do the following marked exercises:       [x] Straight Leg Raise (SLR) - While samuel your quadriceps muscle, lift     your fully extended leg to the level of your non-operative knee (as shown)         [x] Heel Slides - With the knee straight, slide your heel slowly toward your   buttocks, hold at the endpoint for 10-15 seconds, then slowly straighten       [x] Ankle pumps - With your knee straight, move your ankle in a "pumping"    fashion to activate your calf and leg muscles      10.  Physical Therapy: Physical therapy is an essential component to your recovery from surgery. Your physical therapy will start in 1 days.    11.  Dislocation Precautions: Dislocation of an artificial hip is uncommon but may occur within the first three months after surgery. The problem usually starts with a popping or slipping sensation. If the ball dislocates, you will be unable to put weight on the affected limb and will most likely experience discomfort in your hip. You should contact your orthopedic surgeon immediately and probably have someone take you to the emergency room.    Anterior Approach:  Avoid extending the hip, turning the operative leg outward, take shorter steps " with your operative leg and longer steps with your nonoperative leg          FIRST POSTOPERATIVE VISIT: As scheduled.

## 2024-04-16 NOTE — BRIEF OP NOTE
"[facility] - Surgery ([facility])  Brief Operative Note    Surgery Date: 4/16/2024     Surgeons and Role:     * Gerard Tello MD - Primary    Assisting Surgeon: None    Pre-op Diagnosis:  Primary osteoarthritis of left hip [M16.12]    Post-op Diagnosis:  Post-Op Diagnosis Codes:     * Primary osteoarthritis of left hip [M16.12]    Procedure(s) (LRB):  ARTHROPLASTY, HIP (Left)    Anesthesia: General    Operative Findings:    * Primary osteoarthritis of left hip [M16.12]    Estimated Blood Loss: 200 mL         Specimens:   Specimen (24h ago, onward)      None              Discharge Note    OUTCOME: Patient tolerated treatment/procedure well without complication and is now ready for discharge.    DISPOSITION: Home or Self Care    FINAL DIAGNOSIS:  <principal problem not specified>    FOLLOWUP: In clinic    DISCHARGE INSTRUCTIONS:    Discharge Procedure Orders   WALKER FOR HOME USE   Order Comments: Rolling walker, 2 wheels.     Order Specific Question Answer Comments   Type of Walker: Adult (5'4"-6'6")    With wheels? Yes    Height: 5' 9" (1.753 m)    Weight: 92.4 kg (203 lb 11.3 oz)    Length of need (1-99 months): 99    Please check all that apply: Walker will be used for gait training.      Activity as tolerated     Lifting restrictions   Order Comments: No strenuous exercise or lifting of > 10 lbs     Weight bearing as tolerated     No driving, operating heavy equipment or signing legal documents while taking pain medication     Leave dressing on - Keep it clean, dry, and intact until clinic visit   Order Comments: Do not remove surgical dressing for 2 weeks post-op. This will be done only by MD at initial post-op visit. If dressing is completely saturated, replace with identical dressing - silver-impregnated hydrocolloid dressing.    Do not get dressings wet. Do not shower.    If dressing continues to be saturated or there are signs of infection, please call [facility] [phone] for further instructions and to " make appt to be seen.     Call MD for: fluid/liquid/drainage on dressing     Call MD for:  temperature >100.4     Call MD for:  persistent nausea and vomiting     Call MD for:  severe uncontrolled pain     Call MD for:  difficulty breathing, headache or visual disturbances     Call MD for:  redness, tenderness, or signs of infection (pain, swelling, redness, odor or green/yellow discharge around incision site)     Call MD for:  hives     Call MD for:  persistent dizziness or light-headedness     Call MD for:  extreme fatigue     Ok to shower at home, running water only, towel dry, use shower chair for safety, no soaking in a tub or pool for one month.

## 2024-04-17 ENCOUNTER — PATIENT MESSAGE (OUTPATIENT)
Dept: ADMINISTRATIVE | Facility: OTHER | Age: 64
End: 2024-04-17
Payer: COMMERCIAL

## 2024-04-17 VITALS
DIASTOLIC BLOOD PRESSURE: 80 MMHG | HEIGHT: 69 IN | WEIGHT: 203 LBS | RESPIRATION RATE: 16 BRPM | TEMPERATURE: 98 F | BODY MASS INDEX: 30.07 KG/M2 | HEART RATE: 60 BPM | OXYGEN SATURATION: 96 % | SYSTOLIC BLOOD PRESSURE: 138 MMHG

## 2024-04-17 LAB
BUN SERPL-MCNC: 10 MG/DL (ref 6–30)
CHLORIDE SERPL-SCNC: 106 MMOL/L (ref 95–110)
CREAT SERPL-MCNC: 0.8 MG/DL (ref 0.5–1.4)
GLUCOSE SERPL-MCNC: 147 MG/DL (ref 70–110)
HCT VFR BLD CALC: 34 %PCV (ref 36–54)
POC IONIZED CALCIUM: 1.12 MMOL/L (ref 1.06–1.42)
POC TCO2 (MEASURED): 24 MMOL/L (ref 23–29)
POTASSIUM BLD-SCNC: 3.8 MMOL/L (ref 3.5–5.1)
SAMPLE: ABNORMAL
SODIUM BLD-SCNC: 140 MMOL/L (ref 136–145)

## 2024-04-17 PROCEDURE — 97116 GAIT TRAINING THERAPY: CPT

## 2024-04-17 PROCEDURE — 99212 OFFICE O/P EST SF 10 MIN: CPT | Mod: FS,,, | Performed by: ANESTHESIOLOGY

## 2024-04-17 PROCEDURE — 94761 N-INVAS EAR/PLS OXIMETRY MLT: CPT

## 2024-04-17 PROCEDURE — 82330 ASSAY OF CALCIUM: CPT

## 2024-04-17 PROCEDURE — 99900035 HC TECH TIME PER 15 MIN (STAT)

## 2024-04-17 PROCEDURE — 82565 ASSAY OF CREATININE: CPT

## 2024-04-17 PROCEDURE — 97535 SELF CARE MNGMENT TRAINING: CPT

## 2024-04-17 PROCEDURE — 84295 ASSAY OF SERUM SODIUM: CPT

## 2024-04-17 PROCEDURE — 84132 ASSAY OF SERUM POTASSIUM: CPT

## 2024-04-17 PROCEDURE — 97110 THERAPEUTIC EXERCISES: CPT

## 2024-04-17 PROCEDURE — 25000003 PHARM REV CODE 250: Performed by: ANESTHESIOLOGY

## 2024-04-17 PROCEDURE — 85014 HEMATOCRIT: CPT

## 2024-04-17 PROCEDURE — 97530 THERAPEUTIC ACTIVITIES: CPT

## 2024-04-17 PROCEDURE — 25000003 PHARM REV CODE 250: Performed by: PHYSICIAN ASSISTANT

## 2024-04-17 RX ORDER — CELECOXIB 200 MG/1
200 CAPSULE ORAL DAILY
Status: DISCONTINUED | OUTPATIENT
Start: 2024-04-17 | End: 2024-04-17 | Stop reason: HOSPADM

## 2024-04-17 RX ADMIN — AMLODIPINE BESYLATE 10 MG: 10 TABLET ORAL at 09:04

## 2024-04-17 RX ADMIN — PREGABALIN 75 MG: 75 CAPSULE ORAL at 09:04

## 2024-04-17 RX ADMIN — HYDROCHLOROTHIAZIDE 25 MG: 25 TABLET ORAL at 09:04

## 2024-04-17 RX ADMIN — METHOCARBAMOL 750 MG: 750 TABLET ORAL at 09:04

## 2024-04-17 RX ADMIN — ACETAMINOPHEN 1000 MG: 500 TABLET ORAL at 05:04

## 2024-04-17 RX ADMIN — ASPIRIN 81 MG: 81 TABLET, COATED ORAL at 09:04

## 2024-04-17 RX ADMIN — CELECOXIB 200 MG: 200 CAPSULE ORAL at 09:04

## 2024-04-17 RX ADMIN — ACYCLOVIR 200 MG: 200 CAPSULE ORAL at 09:04

## 2024-04-17 RX ADMIN — DOCUSATE SODIUM AND SENNOSIDES 1 TABLET: 8.6; 5 TABLET, FILM COATED ORAL at 09:04

## 2024-04-17 RX ADMIN — POLYETHYLENE GLYCOL 3350 17 G: 17 POWDER, FOR SOLUTION ORAL at 09:04

## 2024-04-17 RX ADMIN — MUPIROCIN 1 G: 20 OINTMENT TOPICAL at 09:04

## 2024-04-17 RX ADMIN — FAMOTIDINE 20 MG: 20 TABLET ORAL at 09:04

## 2024-04-17 NOTE — ASSESSMENT & PLAN NOTE
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 male s/p L GISSELLE 4/16/24. Admitted for quad weakness due to delayed spinal anesthesia recovery. Quad function returned this morning. Neurovascularly intact otherwise. Doing well.    Plan:  Pain control: multimodal  PT/OT: WBAT LLE  DVT PPx: ASA 81 mg BID, FCDs at all times when not ambulating    Dispo: anticipate home pending PT/OT

## 2024-04-17 NOTE — PT/OT/SLP PROGRESS
Physical Therapy Treatment and Discharge    Patient Name:  Royer Castillo   MRN:  [id number]    Recommendations:     Discharge Recommendations: Low Intensity Therapy  Discharge Equipment Recommendations: walker, rolling, bedside commode  Barriers to discharge: None    Assessment:     Royer Castillo is a 63 y.o. male admitted with a medical diagnosis of Primary osteoarthritis of left hip.  He presents with the following impairments/functional limitations: weakness, impaired functional mobility, gait instability, impaired balance, decreased lower extremity function, pain, decreased ROM, orthopedic precautions, impaired sensation. Patient tolerated PT session well. Patient ambulated 150ft x2 trials with RW and supervision. No LOB or SOB noted. Patient ascended/descended 4 steps with bilateral handrails and stand by assistance. Patient performed L LE therex x30 reps. Patient educated in anterior hip precautions. Patient ready to discharge home from PT standpoint.      Rehab Prognosis: Good; patient would benefit from acute skilled PT services to address these deficits and reach maximum level of function.    Recent Surgery: Procedure(s) (LRB):  ARTHROPLASTY, HIP (Left) 1 Day Post-Op    Plan:     During this hospitalization, patient to be seen daily to address the identified rehab impairments via gait training, therapeutic activities, therapeutic exercises and progress toward the following goals:    Plan of Care Expires:  04/19/24    Subjective     Chief Complaint: Left hip pain.   Patient/Family Comments/goals: To get back to work.   Pain/Comfort:  Pain Rating 1: 3/10  Location - Side 1: Left  Location 1: hip  Pain Addressed 1: Reposition, Distraction      Objective:     Communicated with RN prior to session. Patient found up in chair with cryotherapy, telemetry upon PT entry to room. Wife present in room.     General Precautions: Standard, fall  Orthopedic Precautions: LLE weight bearing as tolerated, LLE  anterior precautions  Braces: N/A  Respiratory Status: Room air     Functional Mobility:  Mat Mobility:     Supine to Sit: supervision  Sit to Supine: supervision with sheet as leg lift for left leg  Transfers:     Sit to Stand:  supervision with rolling walker x1 from bedside chair and x1 from mat   Gait: Patient ambulated 150ft x2 trials with Rolling Walker and supervision using reciprocal gait. Patient demonstrated decreased abdias and decreased step length during gait due to pain, decreased ROM, and decreased strength.  Stairs:  Patient ascended/descended 4 steps with bilateral handrails and stand by assistance.       AM-PAC 6 CLICK MOBILITY  Turning over in bed (including adjusting bedclothes, sheets and blankets)?: 4  Sitting down on and standing up from a chair with arms (e.g., wheelchair, bedside commode, etc.): 4  Moving from lying on back to sitting on the side of the bed?: 4  Moving to and from a bed to a chair (including a wheelchair)?: 4  Need to walk in hospital room?: 4  Climbing 3-5 steps with a railing?: 3  Basic Mobility Total Score: 23       Treatment & Education:  Patient performed L LE therex x30 reps for heel slides, SAQ over bolster, A/P, quad set, and glute set all within anterior hip precautions.      Patient educated in:  -PT role and POC  -anterior hip precautions  -safety with transfers including hand placement  -gait sequencing and RW management  -OOB activity to maximize recovery including ambulating at home to prevent DVT   -SUV transfer  -stair training  -HEP for therex at home with handout provided       Patient left up in chair with all lines intact, call button in reach, RN notified, and wife present.    GOALS:   Multidisciplinary Problems       Physical Therapy Goals          Problem: Physical Therapy    Goal Priority Disciplines Outcome Goal Variances Interventions   Physical Therapy Goal     PT, PT/OT Ongoing, Progressing     Description: Goals to be met by: 4/19/2024      Patient will increase functional independence with mobility by performin. Supine to sit with supervision  2. Sit to stand transfer with Supervision  3. Gait x300 feet with Supervision using Rolling Walker  4. Ascend/Descend 8 steps with bilateral handrails and stand by assistance  5. Lower extremity exercise program x30 reps per handout, with supervision                          Time Tracking:     PT Received On: 24  PT Start Time: 921     PT Stop Time: 948  PT Total Time (min): 27 min     Billable Minutes: Gait Training 15 and Therapeutic Exercise 12    Treatment Type: Treatment  PT/PTA: PT     Number of PTA visits since last PT visit: 0     2024

## 2024-04-17 NOTE — SUBJECTIVE & OBJECTIVE
Principal Problem:Primary osteoarthritis of left hip    Principal Orthopedic Problem: same, s/p L GISSELLE 4/16/24    Interval History: No acute events overnight.  Vitals within normal limits.  Pain controlled.  Quad weakness from yesterday necessitating admission has resolved.  Patient had adequate urine output overnight.  Anticipate PT/OT this morning.    Review of patient's allergies indicates:  No Known Allergies    Current Facility-Administered Medications   Medication Dose Route Frequency Provider Last Rate Last Admin    0.9%  NaCl infusion   Intravenous Continuous Joan Ley PA-C 150 mL/hr at 04/16/24 2332 New Bag at 04/16/24 2332    acetaminophen tablet 1,000 mg  1,000 mg Oral Q6H Joan Ley PA-C   1,000 mg at 04/17/24 0511    acyclovir capsule 200 mg  200 mg Oral BID Sharon Yang MD   200 mg at 04/16/24 2035    amLODIPine tablet 10 mg  10 mg Oral Daily Sharon Yang MD        aspirin EC tablet 81 mg  81 mg Oral BID Joan Ley PA-C   81 mg at 04/16/24 2035    atorvastatin tablet 40 mg  40 mg Oral QHS Sharon Yang MD   40 mg at 04/16/24 2034    celecoxib capsule 200 mg  200 mg Oral Daily Joan Ley PA-C        famotidine tablet 20 mg  20 mg Oral BID Joan Ley PA-C   20 mg at 04/16/24 2035    hydroCHLOROthiazide tablet 25 mg  25 mg Oral Daily Sharon Yang MD        methocarbamoL tablet 750 mg  750 mg Oral TID Joan Ley PA-C   750 mg at 04/16/24 2035    morphine injection 2 mg  2 mg Intravenous Q3H PRN Joan Ley PA-C        mupirocin 2 % ointment 1 g  1 g Nasal BID Joan Ley PA-C   1 g at 04/16/24 2034    naloxone 0.4 mg/mL injection 0.02 mg  0.02 mg Intravenous PRN Joan Ley PA-C        ondansetron injection 4 mg  4 mg Intravenous Q8H PRN Joan Ley PA-C        oxyCODONE immediate release tablet 5 mg  5 mg Oral Q3H PRN Joan Ley PA-C        oxyCODONE immediate release tablet Tab 10 mg  10 mg Oral Q3H PRN  "Joan Ley PA-C        polyethylene glycol packet 17 g  17 g Oral Daily PRN Joan Ley PA-C        polyethylene glycol packet 17 g  17 g Oral Daily Joan Ley PA-C        pregabalin capsule 75 mg  75 mg Oral BID Sharon Yang MD   75 mg at 04/16/24 2035    prochlorperazine injection Soln 5 mg  5 mg Intravenous Q6H PRN Joan Ley PA-C        senna-docusate 8.6-50 mg per tablet 1 tablet  1 tablet Oral BID Joan Ley PA-C   1 tablet at 04/16/24 2035     Objective:     Vital Signs (Most Recent):  Temp: 97.8 °F (36.6 °C) (04/17/24 0416)  Pulse: 60 (04/17/24 0416)  Resp: 18 (04/17/24 0416)  BP: 118/70 (04/17/24 0416)  SpO2: 96 % (04/17/24 0416) Vital Signs (24h Range):  Temp:  [97.6 °F (36.4 °C)-98.7 °F (37.1 °C)] 97.8 °F (36.6 °C)  Pulse:  [56-67] 60  Resp:  [11-28] 18  SpO2:  [95 %-100 %] 96 %  BP: ()/(56-86) 118/70     Weight: 92.1 kg (203 lb)  Height: 5' 9" (175.3 cm)  Body mass index is 29.98 kg/m².      Intake/Output Summary (Last 24 hours) at 4/17/2024 0610  Last data filed at 4/17/2024 0300  Gross per 24 hour   Intake 2500 ml   Output 2500 ml   Net 0 ml        Ortho/SPM Exam  Gen: NAD, sitting comfortably in bed  CV: regular rate  Resp: non-labored respirations    LLE:  Dressing clean, dry, and intact  Appropriate postoperative TTP  SILT Sa/Santos/DP/SP/T  Motor intact EHL/FHL/TA/Gastroc/Quad  2+ DP, 2+ PT       Significant Labs: All pertinent labs within the past 24 hours have been reviewed.    Significant Imaging: I have reviewed and interpreted all pertinent imaging results/findings.  "

## 2024-04-17 NOTE — NURSING
Has met unit/department guidelines for discharge from each phase of the post procedure continuum. Patient discharged.  Instructions, placed in dc folder and Prescriptions given.  IV removed, tolerated well, w/ catheter intact, no redness or swelling to area. . Dressing to left hip remains CDI. Patient verbalized understanding instructions.  AAOx3, VSS, NADN, no complaints of pain noted at this time.  Wheelchair to private vehicle in care of spouse.  All personal belongings sent with pt.  Blue Bracelet given applied to pts wrist and instructions given to call # on bracelet w/any surgery related issues.

## 2024-04-17 NOTE — NURSING
Dr. Avery at bedside, notified him that patient's IV access was lost. He states it's ok for patient not to have an IV.

## 2024-04-17 NOTE — PROGRESS NOTES
[facility] [place])  Orthopedics  Progress Note    Patient Name: Royer Castillo  MRN: [id number]  Admission Date: 4/16/2024  Hospital Length of Stay: 0 days  Attending Provider: Gerard Tello MD  Primary Care Provider: Christine Peterson PA-C  Follow-up For: Procedure(s) (LRB):  ARTHROPLASTY, HIP (Left)    Post-Operative Day: 1 Day Post-Op  Subjective:     Principal Problem:Primary osteoarthritis of left hip    Principal Orthopedic Problem: same, s/p L GISSELLE 4/16/24    Interval History: No acute events overnight.  Vitals within normal limits.  Pain controlled.  Quad weakness from yesterday necessitating admission has resolved.  Patient had adequate urine output overnight.  Anticipate PT/OT this morning.    Review of patient's allergies indicates:  No Known Allergies    Current Facility-Administered Medications   Medication Dose Route Frequency Provider Last Rate Last Admin    0.9%  NaCl infusion   Intravenous Continuous Joan Ley PA-C 150 mL/hr at 04/16/24 2332 New Bag at 04/16/24 2332    acetaminophen tablet 1,000 mg  1,000 mg Oral Q6H Joan Ley PA-C   1,000 mg at 04/17/24 0511    acyclovir capsule 200 mg  200 mg Oral BID Sharon Yang MD   200 mg at 04/16/24 2035    amLODIPine tablet 10 mg  10 mg Oral Daily Sharon Yang MD        aspirin EC tablet 81 mg  81 mg Oral BID Joan Ley PA-C   81 mg at 04/16/24 2035    atorvastatin tablet 40 mg  40 mg Oral QHS Sharon Yang MD   40 mg at 04/16/24 2034    celecoxib capsule 200 mg  200 mg Oral Daily Joan Ley PA-C        famotidine tablet 20 mg  20 mg Oral BID Joan Ley PA-C   20 mg at 04/16/24 2035    hydroCHLOROthiazide tablet 25 mg  25 mg Oral Daily Sharon Yang MD        methocarbamoL tablet 750 mg  750 mg Oral TID Joan Ley PA-C   750 mg at 04/16/24 2035    morphine injection 2 mg  2 mg Intravenous Q3H PRN Joan Ley PA-C        mupirocin 2 % ointment 1 g  1 g Nasal BID Av  "Joan MARCUM PA-C   1 g at 04/16/24 2034    naloxone 0.4 mg/mL injection 0.02 mg  0.02 mg Intravenous PRN Joan Ley PA-C        ondansetron injection 4 mg  4 mg Intravenous Q8H PRN Joan Ley PA-C        oxyCODONE immediate release tablet 5 mg  5 mg Oral Q3H PRN Joan Ley PA-C        oxyCODONE immediate release tablet Tab 10 mg  10 mg Oral Q3H PRN Joan Ley PA-C        polyethylene glycol packet 17 g  17 g Oral Daily PRN Joan Ley PA-C        polyethylene glycol packet 17 g  17 g Oral Daily Joan Ley PA-C        pregabalin capsule 75 mg  75 mg Oral BID Sharon Yang MD   75 mg at 04/16/24 2035    prochlorperazine injection Soln 5 mg  5 mg Intravenous Q6H PRN Joan Ley PA-C        senna-docusate 8.6-50 mg per tablet 1 tablet  1 tablet Oral BID Joan Ley PA-C   1 tablet at 04/16/24 2035     Objective:     Vital Signs (Most Recent):  Temp: 97.8 °F (36.6 °C) (04/17/24 0416)  Pulse: 60 (04/17/24 0416)  Resp: 18 (04/17/24 0416)  BP: 118/70 (04/17/24 0416)  SpO2: 96 % (04/17/24 0416) Vital Signs (24h Range):  Temp:  [97.6 °F (36.4 °C)-98.7 °F (37.1 °C)] 97.8 °F (36.6 °C)  Pulse:  [56-67] 60  Resp:  [11-28] 18  SpO2:  [95 %-100 %] 96 %  BP: ()/(56-86) 118/70     Weight: 92.1 kg (203 lb)  Height: 5' 9" (175.3 cm)  Body mass index is 29.98 kg/m².      Intake/Output Summary (Last 24 hours) at 4/17/2024 0610  Last data filed at 4/17/2024 0300  Gross per 24 hour   Intake 2500 ml   Output 2500 ml   Net 0 ml        Ortho/SPM Exam  Gen: NAD, sitting comfortably in bed  CV: regular rate  Resp: non-labored respirations    LLE:  Dressing clean, dry, and intact  Appropriate postoperative TTP  SILT Sa/Santos/DP/SP/T  Motor intact EHL/FHL/TA/Gastroc/Quad  2+ DP, 2+ PT       Significant Labs: All pertinent labs within the past 24 hours have been reviewed.    Significant Imaging: I have reviewed and interpreted all pertinent imaging " results/findings.  Assessment/Plan:     * Primary osteoarthritis of left hip  Royer Castillo is a 63 y.o. male s/p L GISSELLE 4/16/24. Admitted for quad weakness due to delayed spinal anesthesia recovery. Quad function returned this morning. Neurovascularly intact otherwise. Doing well.    Plan:  Pain control: multimodal  PT/OT: WBAT LLE  DVT PPx: ASA 81 mg BID, FCDs at all times when not ambulating    Dispo: anticipate home pending PT/OT          Tong Avery MD  Orthopedics  [place] [facility] ([facility])

## 2024-04-17 NOTE — PLAN OF CARE
Problem: Adult Inpatient Plan of Care  Goal: Plan of Care Review  Outcome: Ongoing, Progressing  Goal: Patient-Specific Goal (Individualized)  Outcome: Ongoing, Progressing  Goal: Absence of Hospital-Acquired Illness or Injury  Outcome: Ongoing, Progressing  Goal: Optimal Comfort and Wellbeing  Outcome: Ongoing, Progressing  Goal: Readiness for Transition of Care  Outcome: Ongoing, Progressing     Problem: Pain Acute  Goal: Acceptable Pain Control and Functional Ability  Outcome: Ongoing, Progressing     Problem: Adjustment to Surgery (Hip Arthroplasty)  Goal: Optimal Coping  Outcome: Ongoing, Progressing     Problem: Bleeding (Hip Arthroplasty)  Goal: Absence of Bleeding  Outcome: Ongoing, Progressing     Problem: Bowel Motility Impaired (Hip Arthroplasty)  Goal: Effective Bowel Elimination  Outcome: Ongoing, Progressing     Problem: Fluid and Electrolyte Imbalance (Hip Arthroplasty)  Goal: Fluid and Electrolyte Balance  Outcome: Ongoing, Progressing     Problem: Functional Ability Impaired (Hip Arthroplasty)  Goal: Optimal Functional Ability  Outcome: Ongoing, Progressing     Problem: Infection (Hip Arthroplasty)  Goal: Absence of Infection Signs and Symptoms  Outcome: Ongoing, Progressing     Problem: Neurovascular Compromise (Hip Arthroplasty)  Goal: Intact Neurovascular Status  Outcome: Ongoing, Progressing     Problem: Ongoing Anesthesia Effects (Hip Arthroplasty)  Goal: Anesthesia/Sedation Recovery  Outcome: Ongoing, Progressing     Problem: Pain (Hip Arthroplasty)  Goal: Acceptable Pain Control  Outcome: Ongoing, Progressing     Problem: Postoperative Nausea and Vomiting (Hip Arthroplasty)  Goal: Nausea and Vomiting Relief  Outcome: Ongoing, Progressing     Problem: Postoperative Urinary Retention (Hip Arthroplasty)  Goal: Effective Urinary Elimination  Outcome: Ongoing, Progressing     Problem: Respiratory Compromise (Hip Arthroplasty)  Goal: Effective Oxygenation and Ventilation  Outcome: Ongoing,  Progressing     Problem: Fall Injury Risk  Goal: Absence of Fall and Fall-Related Injury  Outcome: Ongoing, Progressing

## 2024-04-17 NOTE — PROGRESS NOTES
Acute Pain Service and Perioperative Surgical Home Progress Note    Interval history  Royer Castillo is a 63 y.o., male, with HTN and HLD POD#1 s/p left hip arthroplasty. NO acute events overnight. Initially had quad weakness and inability to lift leg which has resolved this am. No acute events overnight.     Surgery:  Procedure(s) (LRB):  ARTHROPLASTY, HIP (Left)    Post Op Day #: 1    Problem List:    Active Hospital Problems    Diagnosis  POA    *Primary osteoarthritis of left hip [M16.12]  Yes      Resolved Hospital Problems   No resolved problems to display.       Subjective:       General appearance of alert, oriented, no complaints   Pain with rest: 1    Numbers   Pain with movement: 4    Numbers   Side Effects    1. Pruritis No    2. Nausea No    3. Motor Blockade No, 0=Ability to raise lower extremities off bed    4. Sedation No, 1=awake and alert    Schedule Medications:   Current Facility-Administered Medications   Medication Dose Route Frequency Provider Last Rate Last Admin    0.9%  NaCl infusion   Intravenous Continuous Joan Ley PA-C 150 mL/hr at 04/16/24 2332 New Bag at 04/16/24 2332    acetaminophen tablet 1,000 mg  1,000 mg Oral Q6H Joan Ley PA-C   1,000 mg at 04/17/24 0511    acyclovir capsule 200 mg  200 mg Oral BID Sharon Yang MD   200 mg at 04/16/24 2035    amLODIPine tablet 10 mg  10 mg Oral Daily Sharon Yang MD        aspirin EC tablet 81 mg  81 mg Oral BID Joan Ley PA-C   81 mg at 04/16/24 2035    atorvastatin tablet 40 mg  40 mg Oral QHS Sharon Yang MD   40 mg at 04/16/24 2034    celecoxib capsule 200 mg  200 mg Oral Daily Joan Ley PA-C        famotidine tablet 20 mg  20 mg Oral BID Joan Ley PA-C   20 mg at 04/16/24 2035    hydroCHLOROthiazide tablet 25 mg  25 mg Oral Daily Sharon Yang MD        methocarbamoL tablet 750 mg  750 mg Oral TID Joan Ley PA-C   750 mg at 04/16/24 2035    morphine injection 2 mg  2  mg Intravenous Q3H PRN Joan Ley PA-C        mupirocin 2 % ointment 1 g  1 g Nasal BID Joan Ley PA-C   1 g at 04/16/24 2034    naloxone 0.4 mg/mL injection 0.02 mg  0.02 mg Intravenous PRN Joan Ley PA-C        ondansetron injection 4 mg  4 mg Intravenous Q8H PRN Joan Ley PA-C        oxyCODONE immediate release tablet 5 mg  5 mg Oral Q3H PRN Joan Ley PA-C        oxyCODONE immediate release tablet Tab 10 mg  10 mg Oral Q3H PRN Joan Ley PA-C        polyethylene glycol packet 17 g  17 g Oral Daily PRN Joan Ley PA-C        polyethylene glycol packet 17 g  17 g Oral Daily Joan Ley PA-C        pregabalin capsule 75 mg  75 mg Oral BID Sharon Yang MD   75 mg at 04/16/24 2035    prochlorperazine injection Soln 5 mg  5 mg Intravenous Q6H PRN Joan Ley PA-C        senna-docusate 8.6-50 mg per tablet 1 tablet  1 tablet Oral BID Joan Ley PA-C   1 tablet at 04/16/24 2035        Continuous Infusions:  Current Facility-Administered Medications   Medication Dose Route Frequency Provider Last Rate Last Admin    0.9%  NaCl infusion   Intravenous Continuous Joan Ley PA-C 150 mL/hr at 04/16/24 2332 New Bag at 04/16/24 2332    acetaminophen tablet 1,000 mg  1,000 mg Oral Q6H Joan Ley PA-C   1,000 mg at 04/17/24 0511    acyclovir capsule 200 mg  200 mg Oral BID Sharon Yang MD   200 mg at 04/16/24 2035    amLODIPine tablet 10 mg  10 mg Oral Daily Sharon Yang MD        aspirin EC tablet 81 mg  81 mg Oral BID Joan Ley PA-C   81 mg at 04/16/24 2035    atorvastatin tablet 40 mg  40 mg Oral QHS Sharon Yang MD   40 mg at 04/16/24 2034    celecoxib capsule 200 mg  200 mg Oral Daily Joan Ley PA-C        famotidine tablet 20 mg  20 mg Oral BID Joan Ley PA-C   20 mg at 04/16/24 2035    hydroCHLOROthiazide tablet 25 mg  25 mg Oral Daily Sharon Yang MD        methocarbamoL  tablet 750 mg  750 mg Oral TID Joan Ley PA-C   750 mg at 04/16/24 2035    morphine injection 2 mg  2 mg Intravenous Q3H PRN Joan Ley PA-C        mupirocin 2 % ointment 1 g  1 g Nasal BID Joan Ley PA-C   1 g at 04/16/24 2034    naloxone 0.4 mg/mL injection 0.02 mg  0.02 mg Intravenous PRN Joan Ley PA-C        ondansetron injection 4 mg  4 mg Intravenous Q8H PRN Joan Ley PA-C        oxyCODONE immediate release tablet 5 mg  5 mg Oral Q3H PRN Joan Ley PA-C        oxyCODONE immediate release tablet Tab 10 mg  10 mg Oral Q3H PRN Joan Ley PA-C        polyethylene glycol packet 17 g  17 g Oral Daily PRN Joan Ley PA-C        polyethylene glycol packet 17 g  17 g Oral Daily Joan Ley PA-C        pregabalin capsule 75 mg  75 mg Oral BID Sharon Yang MD   75 mg at 04/16/24 2035    prochlorperazine injection Soln 5 mg  5 mg Intravenous Q6H PRN Joan Ley PA-C        senna-docusate 8.6-50 mg per tablet 1 tablet  1 tablet Oral BID Joan Ley PA-C   1 tablet at 04/16/24 2035        PRN Medications:  Current Facility-Administered Medications   Medication Dose Route Frequency Provider Last Rate Last Admin    0.9%  NaCl infusion   Intravenous Continuous Joan Ley PA-C 150 mL/hr at 04/16/24 2332 New Bag at 04/16/24 2332    acetaminophen tablet 1,000 mg  1,000 mg Oral Q6H Joan Ley PA-C   1,000 mg at 04/17/24 0511    acyclovir capsule 200 mg  200 mg Oral BID Sharon Yang MD   200 mg at 04/16/24 2035    amLODIPine tablet 10 mg  10 mg Oral Daily Sharon Yang MD        aspirin EC tablet 81 mg  81 mg Oral BID Joan Ley PA-C   81 mg at 04/16/24 2035    atorvastatin tablet 40 mg  40 mg Oral QHS Sharon Yang MD   40 mg at 04/16/24 2034    celecoxib capsule 200 mg  200 mg Oral Daily Joan Ley PA-C        famotidine tablet 20 mg  20 mg Oral BID Joan Ley PA-C   20 mg at  04/16/24 2035    hydroCHLOROthiazide tablet 25 mg  25 mg Oral Daily Sharon Yang MD        methocarbamoL tablet 750 mg  750 mg Oral TID Joan Ley PA-C   750 mg at 04/16/24 2035    morphine injection 2 mg  2 mg Intravenous Q3H PRN Joan Ley PA-C        mupirocin 2 % ointment 1 g  1 g Nasal BID Joan Ley PA-C   1 g at 04/16/24 2034    naloxone 0.4 mg/mL injection 0.02 mg  0.02 mg Intravenous PRN Joan Ley PA-C        ondansetron injection 4 mg  4 mg Intravenous Q8H PRN Joan Ley PA-C        oxyCODONE immediate release tablet 5 mg  5 mg Oral Q3H PRN Joan Ley PA-C        oxyCODONE immediate release tablet Tab 10 mg  10 mg Oral Q3H PRN Joan Ley PA-C        polyethylene glycol packet 17 g  17 g Oral Daily PRN Joan Ley PA-C        polyethylene glycol packet 17 g  17 g Oral Daily Joan Ley PA-C        pregabalin capsule 75 mg  75 mg Oral BID Sharon Yang MD   75 mg at 04/16/24 2035    prochlorperazine injection Soln 5 mg  5 mg Intravenous Q6H PRN Joan Ley PA-C        senna-docusate 8.6-50 mg per tablet 1 tablet  1 tablet Oral BID Joan Ley PA-C   1 tablet at 04/16/24 2035          Antibiotics:  Antibiotics (From admission, onward)      Start     Stop Route Frequency Ordered    04/16/24 1015  mupirocin 2 % ointment 1 g         04/21/24 0859 Nasl 2 times daily 04/16/24 1013    04/16/24 1013  vancomycin 1,500 mg in dextrose 5 % (D5W) 250 mL IVPB (Vial-Mate)  (MEDICATIONS - ANTIBIOTICS (VANCOMYCIN AND CLINDAMYCIN) FOR PCN ALLERGY OR KNOWN WITH MRSA POST-OP TOTAL HIP PATHWAY PANEL)         04/16/24 2214 IV Every 12 hours (non-standard times) 04/16/24 1013               Objective:         Vital Signs (Most Recent):  Temp: 97.8 °F (36.6 °C) (04/17/24 0416)  Pulse: 60 (04/17/24 0416)  Resp: 18 (04/17/24 0416)  BP: 118/70 (04/17/24 0416)  SpO2: 96 % (04/17/24 0416) Vital Signs Range (Last 24H):  Temp:  [97.6 °F (36.4  "°C)-98.7 °F (37.1 °C)]   Pulse:  [56-67]   Resp:  [11-28]   BP: ()/(56-86)   SpO2:  [95 %-100 %]          I & O (Last 24H):  Intake/Output Summary (Last 24 hours) at 4/17/2024 0546  Last data filed at 4/17/2024 0300  Gross per 24 hour   Intake 2500 ml   Output 2500 ml   Net 0 ml       Physical Exam:    GA: Alert, comfortable, no acute distress.   Pulmonary: Clear to auscultation . Normal respiratory ratei.  Cardiac: regular rate and rhythm.          Laboratory: reviewed in chart  CBC:   Recent Labs     04/17/24  0344   HCT 34*       BMP: No results for input(s): "NA", "K", "CO2", "CL", "BUN", "CREATININE", "GLU", "MG", "PHOS", "CALCIUM" in the last 72 hours.    No results for input(s): "PT", "INR", "PROTIME", "APTT" in the last 72 hours.          Assessment:    Royer Castillo is a 63 y.o., male, with HTN and HLD POD#1 s/p left hip arthroplasty. NO acute events overnight. Initially had quad weakness and inability to lift the left leg which has resolved this am. No acute events overnight.      Pain control adequate    Plan:  1) Pain: Multimodal pain regimen ordered which includes acetaminophen, celecoxib, pregabalin, and prn oxycodone.  Well controlled on current regimen.  Will continue to monitor.  2) HTN- well controlled- continue home medication at discharge   3) FEN/GI: Tolerating regular diet.     4) Dispo: Pt working well with PT/OT. Case management and SW following along for setting up home health and physical therapy. Plan to discharge home this am.              Evaluator JEWEL Almeida                 "

## 2024-04-17 NOTE — PLAN OF CARE
Problem: Adult Inpatient Plan of Care  Goal: Plan of Care Review  Outcome: Adequate for Care Transition  Flowsheets (Taken 4/17/2024 0724)  Plan of Care Reviewed With:   patient   spouse     Problem: Adult Inpatient Plan of Care  Goal: Patient-Specific Goal (Individualized)  Outcome: Adequate for Care Transition  Flowsheets (Taken 4/17/2024 0724)  Individualized Care Needs: manage pain, work with PT and OT     Problem: Adult Inpatient Plan of Care  Goal: Optimal Comfort and Wellbeing  Intervention: Monitor Pain and Promote Comfort  Flowsheets (Taken 4/17/2024 0724)  Pain Management Interventions:   care clustered   cold applied   diversional activity provided   medication offered   pain management plan reviewed with patient/caregiver   prescribed exercises encouraged   warm blanket provided   relaxation techniques promoted   position adjusted     Problem: Pain Acute  Goal: Acceptable Pain Control and Functional Ability  Intervention: Develop Pain Management Plan  Flowsheets (Taken 4/17/2024 0724)  Pain Management Interventions:   care clustered   cold applied   diversional activity provided   medication offered   pain management plan reviewed with patient/caregiver   prescribed exercises encouraged   warm blanket provided   relaxation techniques promoted   position adjusted     Problem: Pain Acute  Goal: Acceptable Pain Control and Functional Ability  Intervention: Optimize Psychosocial Wellbeing  Flowsheets (Taken 4/17/2024 0724)  Supportive Measures: active listening utilized     Problem: Adjustment to Surgery (Hip Arthroplasty)  Goal: Optimal Coping  Intervention: Support Psychosocial Response to Surgery and Mobility Changes  Flowsheets (Taken 4/17/2024 0724)  Supportive Measures: active listening utilized     Problem: Bleeding (Hip Arthroplasty)  Goal: Absence of Bleeding  Intervention: Monitor and Manage Bleeding  Flowsheets (Taken 4/17/2024 0724)  Bleeding Management: dressing monitored     Problem: Pain  (Hip Arthroplasty)  Goal: Acceptable Pain Control  Intervention: Prevent or Manage Pain  Flowsheets (Taken 4/17/2024 0724)  Pain Management Interventions:   care clustered   cold applied   diversional activity provided   medication offered   pain management plan reviewed with patient/caregiver   prescribed exercises encouraged   warm blanket provided   relaxation techniques promoted   position adjusted     Problem: Postoperative Nausea and Vomiting (Hip Arthroplasty)  Goal: Nausea and Vomiting Relief  Intervention: Prevent or Manage Nausea and Vomiting  Flowsheets (Taken 4/17/2024 0724)  Nausea/Vomiting Interventions: stimuli minimized     Problem: Respiratory Compromise (Hip Arthroplasty)  Goal: Effective Oxygenation and Ventilation  Intervention: Optimize Oxygenation and Ventilation  Flowsheets (Taken 4/17/2024 0724)  Airway/Ventilation Management: airway patency maintained  Head of Bed (HOB) Positioning: HOB elevated     Problem: Fall Injury Risk  Goal: Absence of Fall and Fall-Related Injury  Intervention: Promote Injury-Free Environment  Flowsheets (Taken 4/17/2024 0724)  Safety Promotion/Fall Prevention:   assistive device/personal item within reach   Fall Risk reviewed with patient/family   side rails raised x 2   instructed to call staff for mobility   lighting adjusted   medications reviewed   muscle strengthening facilitated   nonskid shoes/socks when out of bed   supervised activity   high risk medications identified   Plan of care reviewed with patient; verbalized understanding.   Medications reviewed and administered as ordered.  Rounding for safety and patient care per policy.   Safety precautions maintained. Patient working appropriately with PT/OT.  DME at bedside.  Call light within reach, bed wheels locked, bed in lowest position, side rails ^x2, safety maintained. NADN, Adequate for discharge.

## 2024-04-17 NOTE — PT/OT/SLP PROGRESS
"Occupational Therapy   Treatment and Discharge Note    Name: Royer Castillo  MRN: [id number]  Admitting Diagnosis:  Primary osteoarthritis of left hip  1 Day Post-Op    Recommendations:     Discharge Recommendations: Low Intensity Therapy  Discharge Equipment Recommendations:  bedside commode, walker, rolling  Barriers to discharge:  None    Assessment:     Royer Castillo is a 63 y.o. male with a medical diagnosis of Primary osteoarthritis of left hip.  He presents with L GISSELLE. Performance deficits affecting function are impaired self care skills, impaired functional mobility, orthopedic precautions. Pt was able to perform supine/sit T/F c S and Sit <> Stand Transfer with modified independence with rolling walker Bed <> Chair Transfer using Stand Pivot technique with modified independence with rolling walker Toilet Transfer Stand Pivot technique with modified independence with rolling walker and bedside commode.  Able to perform UB/LB dressing c modified independence c AE. Educated pt on bathing, car transfers, and cryotherapy.       Rehab Prognosis:  Good; patient would benefit from acute skilled OT services to address these deficits and reach maximum level of function.       Plan:     Patient to be seen daily to address the above listed problems via self-care/home management, therapeutic activities, therapeutic exercises  Plan of Care Expires: 04/16/24  Plan of Care Reviewed with: patient, spouse    Subjective     Chief Complaint: L GISSELLE  Patient/Family Comments/goals: "I can walk now!"  Pain/Comfort:  Pain Rating 1: 2/10    Objective:     Communicated with: RN prior to session.  Patient found supine with cryotherapy, SCD, telemetry upon OT entry to room.    General Precautions: Standard, fall    Orthopedic Precautions:LLE weight bearing as tolerated, LLE anterior precautions  Braces: N/A  Respiratory Status: Room air     Occupational Performance:     Bed Mobility:    Patient completed Supine to Sit with " supervision     Functional Mobility/Transfers:  Patient completed Sit <> Stand Transfer with contact guard assistance  with  rolling walker   Patient completed Bed <> Chair Transfer using Stand Pivot technique with contact guard assistance with rolling walker  Patient completed Toilet Transfer Stand Pivot technique with contact guard assistance with  rolling walker and bedside commode  Functional Mobility: Pt was able to walk to bathroom c CGA and RW.    Activities of Daily Living:  Grooming: modified independence to wash hands and brush teeth while standing at sink c RW.  Upper Body Dressing: modified independence to don shirt.  Lower Body Dressing: modified independence to don underwear, shorts, socks, and shoes c reacher, sock-aid, and long handled shoe horn.  Toileting: modified independence for toilet hygiene.      [facility] 6 Click ADL: 23    Patient left up in chair with all lines intact, call button in reach, RN notified, and wife present    GOALS:   Multidisciplinary Problems       Occupational Therapy Goals          Problem: Occupational Therapy    Goal Priority Disciplines Outcome Interventions   Occupational Therapy Goal     OT, PT/OT Ongoing, Progressing    Description: Goals to be met by: 4/17/24     Patient will increase functional independence with ADLs by performing:    UE Dressing with Modified [place].  LE Dressing with Modified [place] and Assistive Devices as needed.  Grooming while standing at sink with Modified [place].  Toileting from bedside commode with Modified [place] for hygiene and clothing management.   Bathing from  shower chair/bench with Modified [place].  Toilet transfer to bedside commode with Modified [place].                         Time Tracking:     OT Date of Treatment: 04/17/24  OT Start Time: 0840  OT Stop Time: 0906  OT Total Time (min): 26 min    Billable Minutes:Self Care/Home Management 13  Therapeutic Activity 13               4/17/2024

## 2024-04-18 ENCOUNTER — PATIENT MESSAGE (OUTPATIENT)
Dept: ADMINISTRATIVE | Facility: OTHER | Age: 64
End: 2024-04-18
Payer: COMMERCIAL

## 2024-04-18 ENCOUNTER — OFFICE VISIT (OUTPATIENT)
Dept: SPORTS MEDICINE | Facility: CLINIC | Age: 64
End: 2024-04-18
Payer: COMMERCIAL

## 2024-04-18 DIAGNOSIS — Z96.642 S/P HIP REPLACEMENT, LEFT: Primary | ICD-10-CM

## 2024-04-18 PROCEDURE — 3044F HG A1C LEVEL LT 7.0%: CPT | Mod: CPTII,93,, | Performed by: PHYSICIAN ASSISTANT

## 2024-04-18 PROCEDURE — 1160F RVW MEDS BY RX/DR IN RCRD: CPT | Mod: CPTII,93,, | Performed by: PHYSICIAN ASSISTANT

## 2024-04-18 PROCEDURE — 99024 POSTOP FOLLOW-UP VISIT: CPT | Mod: 93,,, | Performed by: PHYSICIAN ASSISTANT

## 2024-04-18 PROCEDURE — 1159F MED LIST DOCD IN RCRD: CPT | Mod: CPTII,93,, | Performed by: PHYSICIAN ASSISTANT

## 2024-04-18 NOTE — PROGRESS NOTES
I called the patient today regarding his surgery with Dr. Tello. The patient had a left anterior GISSELLE on 4/16/24.    Pain Scale: 1-2 / 10    Any issues with Fever: No.    Any issues with medications (specifically DVT prophylaxis): No.  Taking ASA 325mg once daily.    Any issues with bowel movements:  Passing enzo: No.                                                                 Urination: No.                                                                 Constipation: No.  No issues with the above.    Completing at home exercises: Yes:     Any concerns regarding their dressing/bandage:  No.    Patient confirmed first OP-PT appointment:  4/19/24 at [facility] at 9am    Any other concerns: No.      The patient was informed that if they have any urgent issues with their bandage, medications or any other health concerns regarding their surgery to call the 24/7 Orthopedic Post-op Hot Line at [phone]. The patient was reminded that if they have any chest pain or shortness of breath to call 911 or go to the ER.    The patient verbalized understanding and does not have any other questions

## 2024-04-18 NOTE — DISCHARGE SUMMARY
[facility])  Orthopedics  Discharge Summary      Patient Name: Royer Castillo  MRN: [id number]  Admission Date: 4/16/2024  Hospital Length of Stay: 0 days  Discharge Date and Time: 4/17/2024 11:30 AM  Attending Physician: Aura att. 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 The pain started 6 weeks ago after twisted while working as a  and is becoming progressively worse. Pain is located over (points to) groin. He reports that the pain is a 10 /10 sharp, constant, and pain with movement pain today. He reports none previous treatments. Pain is affecting ADLs and limiting desired level of activity. Denies numbness, tingling, radiation, and inability to bear weight.  Pain is 10 /10 at its worst     Mechanical symptoms: painful click and crepitus  Subjective instability: (+)   Worse with activity, climbing stairs, descending stairs, standing, walking, putting on shoes and socks, and getting in and out of cars or chairs  Better with nothing  Nocturnal symptoms: (+)     No previous surgeries or trauma on February 2, 2024 and reported on 2/4/24.    Procedure(s) (LRB):  ARTHROPLASTY, HIP (Left)      Hospital Course: On 4/16/24, the patient arrived to the [facility] for pre-operative management.  Upon completion of the pre-operative preparation, the patient was taken back to the operative theatre. The above procedure was performed without complication and the patient was transported to the post anesthesia care unit in stable condition.  After recovery from the anaesthetic agents used during the surgery, the patient attempted to ambulated with physical therapy, but had leg weakness presumed to be from delayed recovery from the spinal anesthesia.  He was then was admitted to the floor for further monitoring.  The  "following morning he had regained full strength in the left lower extremity.  The interim of the hospital stay from arrival on the floor up to discharge has been uncomplicated. The patient has tolerated regular diet.  The patient's pain has been controlled using a multimodal approach. Currently, the patient's pain is well controlled on an oral regimen.  The patient has been voiding without difficulty.  The patient began participation in physical therapy after surgery and has progressed throughout the entire hospital stay.  Currently, the patient's progress is sufficient to allow the them to be discharged to home safely.  The patient agrees with this assessment and desires a discharge today.          Significant Diagnostic Studies: No pertinent studies.    Pending Diagnostic Studies:       None          Final Active Diagnoses:    Diagnosis Date Noted POA    PRINCIPAL PROBLEM:  Primary osteoarthritis of left hip [M16.12] 03/13/2024 Yes      Problems Resolved During this Admission:      Discharged Condition: good    Disposition: Home or Self Care    Follow Up: In clinic    Patient Instructions:      WALKER FOR HOME USE   Order Comments: Rolling walker, 2 wheels.     Order Specific Question Answer Comments   Type of Walker: Adult (5'4"-6'6")    With wheels? Yes    Height: 5' 9" (1.753 m)    Weight: 92.4 kg (203 lb 11.3 oz)    Length of need (1-99 months): 99    Please check all that apply: Walker will be used for gait training.      COMMODE FOR HOME USE     Order Specific Question Answer Comments   Type: Standard    Height: 5' 9" (1.753 m)    Weight: 92.1 kg (203 lb)    Length of need (1-99 months): 99      Activity as tolerated     Lifting restrictions   Order Comments: No strenuous exercise or lifting of > 10 lbs     Weight bearing as tolerated     No driving, operating heavy equipment or signing legal documents while taking pain medication     Leave dressing on - Keep it clean, dry, and intact until clinic visit "   Order Comments: Do not remove surgical dressing for 2 weeks post-op. This will be done only by MD at initial post-op visit. If dressing is completely saturated, replace with identical dressing - silver-impregnated hydrocolloid dressing.    Do not get dressings wet. Do not shower.    If dressing continues to be saturated or there are signs of infection, please call [facility] [phone] for further instructions and to make appt to be seen.     Call MD for: fluid/liquid/drainage on dressing     Call MD for:  temperature >100.4     Call MD for:  persistent nausea and vomiting     Call MD for:  severe uncontrolled pain     Call MD for:  difficulty breathing, headache or visual disturbances     Call MD for:  redness, tenderness, or signs of infection (pain, swelling, redness, odor or green/yellow discharge around incision site)     Call MD for:  hives     Call MD for:  persistent dizziness or light-headedness     Call MD for:  extreme fatigue     Ok to shower at home, running water only, towel dry, use shower chair for safety, no soaking in a tub or pool for one month.     Medications:  Reconciled Home Medications:      Medication List        CHANGE how you take these medications      celecoxib 200 MG capsule  Commonly known as: CeleBREX  Take 1 capsule (200 mg total) by mouth 2 (two) times daily.  What changed: Another medication with the same name was removed. Continue taking this medication, and follow the directions you see here.            CONTINUE taking these medications      acyclovir 200 MG capsule  Commonly known as: ZOVIRAX  Take 1 capsule (200 mg total) by mouth 2 (two) times daily.     amLODIPine 10 MG tablet  Commonly known as: NORVASC  Take 1 tablet (10 mg total) by mouth once daily.     aspirin 325 MG EC tablet  Commonly known as: ECOTRIN  Take 1 tablet (325 mg total) by mouth once daily.     atorvastatin 40 MG tablet  Commonly known as: LIPITOR  Take 1 tablet (40 mg total) by mouth every evening.      cefadroxil 500 MG Cap  Commonly known as: DURICEF  Take 1 capsule (500 mg total) by mouth every 12 (twelve) hours.     CO Q-10 100 mg capsule  Generic drug: coenzyme Q10  Take 100 mg by mouth once daily.     hydroCHLOROthiazide 25 MG tablet  Commonly known as: HYDRODIURIL  Take 1 tablet (25 mg total) by mouth once daily.     methocarbamoL 500 MG Tab  Commonly known as: ROBAXIN  Take 1 tablet (500 mg total) by mouth 3 (three) times daily. for 10 days     multivitamin per tablet  Commonly known as: THERAGRAN  Take by mouth once daily. 2 Gummies a day     OMEGA 3 FISH OIL ORAL  Take 1 capsule by mouth once daily.     oxyCODONE 5 MG immediate release tablet  Commonly known as: ROXICODONE  Take 1-2 tablets (5-10 mg total) by mouth every 6 (six) hours as needed for Pain.     pantoprazole 40 MG tablet  Commonly known as: PROTONIX  Take 1 tablet (40 mg total) by mouth once daily.     pregabalin 75 MG capsule  Commonly known as: LYRICA  Take 1 capsule (75 mg total) by mouth 2 (two) times daily.     promethazine 25 MG tablet  Commonly known as: PHENERGAN  Take 1 tablet (25 mg total) by mouth every 6 (six) hours as needed for Nausea.              Tong Avery MD  Orthopedics  [facility])

## 2024-04-19 ENCOUNTER — PATIENT MESSAGE (OUTPATIENT)
Dept: ADMINISTRATIVE | Facility: OTHER | Age: 64
End: 2024-04-19
Payer: COMMERCIAL

## 2024-04-20 ENCOUNTER — PATIENT MESSAGE (OUTPATIENT)
Dept: ADMINISTRATIVE | Facility: OTHER | Age: 64
End: 2024-04-20
Payer: COMMERCIAL

## 2024-04-21 ENCOUNTER — PATIENT MESSAGE (OUTPATIENT)
Dept: ADMINISTRATIVE | Facility: OTHER | Age: 64
End: 2024-04-21
Payer: COMMERCIAL

## 2024-04-22 ENCOUNTER — PATIENT MESSAGE (OUTPATIENT)
Dept: ADMINISTRATIVE | Facility: OTHER | Age: 64
End: 2024-04-22
Payer: COMMERCIAL

## 2024-04-23 ENCOUNTER — PATIENT MESSAGE (OUTPATIENT)
Dept: ADMINISTRATIVE | Facility: OTHER | Age: 64
End: 2024-04-23
Payer: COMMERCIAL

## 2024-04-24 ENCOUNTER — PATIENT MESSAGE (OUTPATIENT)
Dept: ADMINISTRATIVE | Facility: OTHER | Age: 64
End: 2024-04-24
Payer: COMMERCIAL

## 2024-04-25 ENCOUNTER — PATIENT MESSAGE (OUTPATIENT)
Dept: ADMINISTRATIVE | Facility: OTHER | Age: 64
End: 2024-04-25
Payer: COMMERCIAL

## 2024-04-26 ENCOUNTER — OFFICE VISIT (OUTPATIENT)
Dept: SPORTS MEDICINE | Facility: CLINIC | Age: 64
End: 2024-04-26
Payer: COMMERCIAL

## 2024-04-26 ENCOUNTER — HOSPITAL ENCOUNTER (OUTPATIENT)
Dept: RADIOLOGY | Facility: HOSPITAL | Age: 64
Discharge: HOME | End: 2024-04-26
Attending: PHYSICIAN ASSISTANT
Payer: COMMERCIAL

## 2024-04-26 ENCOUNTER — PATIENT MESSAGE (OUTPATIENT)
Dept: ADMINISTRATIVE | Facility: OTHER | Age: 64
End: 2024-04-26
Payer: COMMERCIAL

## 2024-04-26 ENCOUNTER — PATIENT MESSAGE (OUTPATIENT)
Dept: SPORTS MEDICINE | Facility: CLINIC | Age: 64
End: 2024-04-26

## 2024-04-26 VITALS
HEART RATE: 64 BPM | DIASTOLIC BLOOD PRESSURE: 82 MMHG | SYSTOLIC BLOOD PRESSURE: 122 MMHG | WEIGHT: 206.38 LBS | HEIGHT: 69 IN | BODY MASS INDEX: 30.57 KG/M2

## 2024-04-26 DIAGNOSIS — M25.552 LEFT HIP PAIN: ICD-10-CM

## 2024-04-26 DIAGNOSIS — Z96.642 S/P HIP REPLACEMENT, LEFT: Primary | ICD-10-CM

## 2024-04-26 PROCEDURE — 3074F SYST BP LT 130 MM HG: CPT | Mod: CPTII,S$GLB,, | Performed by: PHYSICIAN ASSISTANT

## 2024-04-26 PROCEDURE — 99999 PR PBB SHADOW E&M-EST. PATIENT-LVL IV: CPT | Mod: PBBFAC,,, | Performed by: PHYSICIAN ASSISTANT

## 2024-04-26 PROCEDURE — 99024 POSTOP FOLLOW-UP VISIT: CPT | Mod: S$GLB,,, | Performed by: PHYSICIAN ASSISTANT

## 2024-04-26 PROCEDURE — 3044F HG A1C LEVEL LT 7.0%: CPT | Mod: CPTII,S$GLB,, | Performed by: PHYSICIAN ASSISTANT

## 2024-04-26 PROCEDURE — 73502 X-RAY EXAM HIP UNI 2-3 VIEWS: CPT | Mod: 26,LT,, | Performed by: RADIOLOGY

## 2024-04-26 PROCEDURE — 3079F DIAST BP 80-89 MM HG: CPT | Mod: CPTII,S$GLB,, | Performed by: PHYSICIAN ASSISTANT

## 2024-04-26 PROCEDURE — 1159F MED LIST DOCD IN RCRD: CPT | Mod: CPTII,S$GLB,, | Performed by: PHYSICIAN ASSISTANT

## 2024-04-26 PROCEDURE — 73502 X-RAY EXAM HIP UNI 2-3 VIEWS: CPT | Mod: TC,LT

## 2024-04-26 PROCEDURE — 1160F RVW MEDS BY RX/DR IN RCRD: CPT | Mod: CPTII,S$GLB,, | Performed by: PHYSICIAN ASSISTANT

## 2024-04-26 NOTE — PROGRESS NOTES
Subjective:     Chief Complaint: Royer Castillo is a 63 y.o. male who had concerns including Pain of the Left Hip.    Patient presents to clinic for 10 day post op evaluation of left hip. Pain today is 1/10. Denies nausea, vomiting, fever, chills, CP, and SOB. Hehas been attending formal PT at [facility] 2x a week. He is not taking any narcotics for pain. He has been taking ASA 325mg once daily for DVT prophylaxis. Doing well.     Date of Procedure: 4/16/2024      Procedure:   1. Left  Replacement, hip, acetabular and proximal femoral prosthesis (Total Hip arthroplasty), with or without autograft or allograft [id number]     Surgeons and Role:     * Gerard Tello MD - Primary     Assisting Surgeon:  Joan Ley PA-C     No resident or fellow was available throughout the entire procedure as a result it was medically necessary for Joan Ley PA-C to perform first assistant duties.        Pre-Operative Diagnosis: Primary osteoarthritis of left hip [M16.12]     Post-Operative Diagnosis: Post-Op Diagnosis Codes:     * Primary osteoarthritis of left hip [M16.12]      Pain  Associated symptoms include joint swelling. Pertinent negatives include no abdominal pain, chest pain, chills, congestion, coughing, fever, headaches, myalgias, nausea, numbness, rash, sore throat or vomiting.       Review of Systems   Constitutional: Negative. Negative for chills, fever, weight gain and weight loss.   HENT:  Negative for congestion and sore throat.    Eyes:  Negative for blurred vision and double vision.   Cardiovascular:  Negative for chest pain, leg swelling and palpitations.   Respiratory:  Negative for cough and shortness of breath.    Hematologic/Lymphatic: Does not bruise/bleed easily.   Skin:  Negative for itching, poor wound healing and rash.   Musculoskeletal:  Positive for joint pain and joint swelling. Negative for back pain, muscle weakness, myalgias and stiffness.   Gastrointestinal:  Negative for abdominal  pain, constipation, diarrhea, nausea and vomiting.   Genitourinary: Negative.  Negative for frequency and hematuria.   Neurological:  Negative for dizziness, headaches, numbness, paresthesias and sensory change.   Psychiatric/Behavioral:  Negative for altered mental status and depression. The patient is not nervous/anxious.    Allergic/Immunologic: Negative for hives.       Pain Related Questions  Over the past 3 days, what was your highest pain level?: 1  Over the past 3 days, what was your lowest pain level? : 1    Other  How many nights a week are you awakened by your affected body part?: 1  Was the patient's HEIGHT measured or patient reported?: Patient Reported  Was the patient's WEIGHT measured or patient reported?: Measured    Objective:     General: Royer is well-developed, well-nourished, appears stated age, in no acute distress, alert and oriented to time, place and person.     General    Vitals reviewed.  Constitutional: He is oriented to person, place, and time. He appears well-developed and well-nourished. No distress.   Cardiovascular:  Normal rate and regular rhythm.            Pulmonary/Chest: Effort normal. No respiratory distress.   Neurological: He is alert and oriented to person, place, and time.   Psychiatric: He has a normal mood and affect. His behavior is normal. Thought content normal.     General Musculoskeletal Exam   Gait: normal         Left Knee Exam     Inspection   Alignment:  normal  Effusion: absentLeft Hip Exam     Inspection   Scars: present  Swelling: present  No deformity of hip.  Quadriceps Atrophy:  negative  Erythema: absent  Bruising: absent    Range of Motion   Extension:  0 normal   Flexion:  100 normal     Other   Sensation: normal    Comments:  Aquacel bandage removed. Incision healing well with no signs of infection or necrosis. No purulent drainage. NVI. Sutures tags trimmed.               RADIOGRAPHS: 4/26/24  Left hip:  S/p left hip replacement. Implants in  satisfactory position.    Assessment:     Encounter Diagnoses   Name Primary?    Left hip pain     S/P hip replacement, left Yes        Plan:     1. Reviewed operative report with patient.  2. Suture tags trimmed. NO signs of infection.  3. May shower now without covering incisions.  4. Continue  mg once a day.  5. Continue PT per protocol.  6. RTC in 4 weeks with Dr. Gerard Tello for 6 week post op appt.        Patient questionnaires may have been collected.

## 2024-04-27 ENCOUNTER — PATIENT MESSAGE (OUTPATIENT)
Dept: ADMINISTRATIVE | Facility: OTHER | Age: 64
End: 2024-04-27
Payer: COMMERCIAL

## 2024-04-28 ENCOUNTER — PATIENT MESSAGE (OUTPATIENT)
Dept: ADMINISTRATIVE | Facility: OTHER | Age: 64
End: 2024-04-28
Payer: COMMERCIAL

## 2024-04-29 ENCOUNTER — PATIENT MESSAGE (OUTPATIENT)
Dept: ADMINISTRATIVE | Facility: OTHER | Age: 64
End: 2024-04-29
Payer: COMMERCIAL

## 2024-04-30 ENCOUNTER — PATIENT MESSAGE (OUTPATIENT)
Dept: ADMINISTRATIVE | Facility: OTHER | Age: 64
End: 2024-04-30
Payer: COMMERCIAL

## 2024-05-14 ENCOUNTER — PATIENT MESSAGE (OUTPATIENT)
Dept: ADMINISTRATIVE | Facility: OTHER | Age: 64
End: 2024-05-14
Payer: COMMERCIAL

## 2024-05-15 DIAGNOSIS — M16.12 PRIMARY OSTEOARTHRITIS OF LEFT HIP: ICD-10-CM

## 2024-05-15 RX ORDER — ASPIRIN 325 MG
325 TABLET, DELAYED RELEASE (ENTERIC COATED) ORAL DAILY
Qty: 42 TABLET | Refills: 0 | OUTPATIENT
Start: 2024-05-15 | End: 2024-06-26

## 2024-05-16 ENCOUNTER — PATIENT MESSAGE (OUTPATIENT)
Dept: ADMINISTRATIVE | Facility: OTHER | Age: 64
End: 2024-05-16
Payer: COMMERCIAL

## 2024-05-21 DIAGNOSIS — E78.2 MIXED HYPERLIPIDEMIA: ICD-10-CM

## 2024-05-22 RX ORDER — ATORVASTATIN CALCIUM 40 MG/1
40 TABLET, FILM COATED ORAL NIGHTLY
Qty: 90 TABLET | Refills: 3 | Status: SHIPPED | OUTPATIENT
Start: 2024-05-22 | End: 2025-05-22

## 2024-05-22 NOTE — TELEPHONE ENCOUNTER
Refill Routing Note   Medication(s) are not appropriate for processing by [facility] for the following reason(s):        Non-participating provider    [facility] action(s):  Route             Appointments  past 12m or future 3m with PCP    Date Provider   Last Visit   1/19/2024 Christine Peterson PA-C   Next Visit   Visit date not found Christine Peterson PA-C   ED visits in past 90 days: 0        Note composed:8:37 AM 05/22/2024

## 2024-05-29 ENCOUNTER — HOSPITAL ENCOUNTER (OUTPATIENT)
Dept: RADIOLOGY | Facility: HOSPITAL | Age: 64
Discharge: HOME | End: 2024-05-29
Attending: ORTHOPAEDIC SURGERY
Payer: OTHER GOVERNMENT

## 2024-05-29 ENCOUNTER — PATIENT MESSAGE (OUTPATIENT)
Dept: SPORTS MEDICINE | Facility: CLINIC | Age: 64
End: 2024-05-29
Payer: COMMERCIAL

## 2024-05-29 ENCOUNTER — OFFICE VISIT (OUTPATIENT)
Dept: SPORTS MEDICINE | Facility: CLINIC | Age: 64
End: 2024-05-29
Payer: COMMERCIAL

## 2024-05-29 VITALS
BODY MASS INDEX: 29.71 KG/M2 | HEART RATE: 75 BPM | HEIGHT: 69 IN | SYSTOLIC BLOOD PRESSURE: 114 MMHG | WEIGHT: 200.63 LBS | DIASTOLIC BLOOD PRESSURE: 78 MMHG

## 2024-05-29 DIAGNOSIS — Z96.642 STATUS POST TOTAL REPLACEMENT OF LEFT HIP: ICD-10-CM

## 2024-05-29 DIAGNOSIS — M25.552 LEFT HIP PAIN: Primary | ICD-10-CM

## 2024-05-29 DIAGNOSIS — Z96.642 STATUS POST TOTAL REPLACEMENT OF LEFT HIP: Primary | ICD-10-CM

## 2024-05-29 DIAGNOSIS — M25.552 LEFT HIP PAIN: ICD-10-CM

## 2024-05-29 PROBLEM — M16.12 PRIMARY OSTEOARTHRITIS OF LEFT HIP: Status: RESOLVED | Noted: 2024-03-13 | Resolved: 2024-05-29

## 2024-05-29 PROBLEM — M12.552 TRAUMATIC ARTHRITIS OF LEFT HIP: Status: RESOLVED | Noted: 2024-03-13 | Resolved: 2024-05-29

## 2024-05-29 PROCEDURE — 99214 OFFICE O/P EST MOD 30 MIN: CPT | Mod: S$GLB,,, | Performed by: ORTHOPAEDIC SURGERY

## 2024-05-29 PROCEDURE — 73521 X-RAY EXAM HIPS BI 2 VIEWS: CPT | Mod: TC

## 2024-05-29 PROCEDURE — 73521 X-RAY EXAM HIPS BI 2 VIEWS: CPT | Mod: 26,,, | Performed by: RADIOLOGY

## 2024-05-29 PROCEDURE — 99999 PR PBB SHADOW E&M-EST. PATIENT-LVL IV: CPT | Mod: PBBFAC,,, | Performed by: ORTHOPAEDIC SURGERY

## 2024-05-29 RX ORDER — AMOXICILLIN AND CLAVULANATE POTASSIUM 875; 125 MG/1; MG/1
1 TABLET, FILM COATED ORAL 2 TIMES DAILY
Qty: 8 TABLET | Refills: 0 | Status: SHIPPED | OUTPATIENT
Start: 2024-05-29

## 2024-05-29 NOTE — LETTER
Patient: Royer Castillo   YOB: 1960   Clinic Number: [id number]   Today's Date: May 29, 2024     To:    Fax Number:         Work Status Summary         Work Status: Patient able to return to full duty on 7/1/2024.     Therapy Recomendations: none at this time       Next Appointment: Follow up in about 3 months (around 8/29/2024). Royer will be seen by Dr. Gerard Tello.    Comments:       _ ___________________________         May 29, 2024    Gerard Tello MD  Signed (Provider)

## 2024-05-29 NOTE — PROGRESS NOTES
Subjective:     Chief Complaint: Royer Castillo is a 63 y.o. male who had concerns including Post-op Evaluation of the Left Hip.    Patient presents to clinic for 6 weeks post op evaluation of left hip. Pain today is 0/10. Denies nausea, vomiting, fever, chills, CP, and SOB. [place]in[place]as been attending formal PT at [facility] 2x a week. He is not taking any narcotics for pain. He is doing well.     Date of Procedure: 4/16/2024      Procedure:   1. Left  Replacement, hip, acetabular and proximal femoral prosthesis (Total Hip arthroplasty), with or without autograft or allograft [id number]     Surgeons and Role:     * Gerard Tello MD - Primary     Assisting Surgeon:  Joan Ley PA-C     No resident or fellow was available throughout the entire procedure as a result it was medically necessary for Joan Ley PA-C to perform first assistant duties.        Pre-Operative Diagnosis: Primary osteoarthritis of left hip [M16.12]     Post-Operative Diagnosis: Post-Op Diagnosis Codes:     * Primary osteoarthritis of left hip [M16.12]      Pain  Associated symptoms include joint swelling. Pertinent negatives include no abdominal pain, chest pain, chills, congestion, coughing, fever, headaches, myalgias, nausea, numbness, rash, sore throat or vomiting.       Review of Systems   Constitutional: Negative. Negative for chills, fever, night sweats, weight gain and weight loss.   HENT:  Negative for congestion, hearing loss and sore throat.    Eyes:  Negative for blurred vision, double vision and visual disturbance.   Cardiovascular:  Negative for chest pain, leg swelling and palpitations.   Respiratory:  Negative for cough and shortness of breath.    Endocrine: Negative for polyuria.   Hematologic/Lymphatic: Negative for bleeding problem. Does not bruise/bleed easily.   Skin:  Negative for itching, poor wound healing and rash.   Musculoskeletal:  Positive for joint pain and joint swelling. Negative for back pain, muscle cramps,  muscle weakness, myalgias and stiffness.   Gastrointestinal:  Negative for abdominal pain, constipation, diarrhea, melena, nausea and vomiting.   Genitourinary: Negative.  Negative for frequency and hematuria.   Neurological:  Negative for dizziness, headaches, loss of balance, numbness, paresthesias and sensory change.   Psychiatric/Behavioral:  Negative for altered mental status and depression. The patient is not nervous/anxious.    Allergic/Immunologic: Negative for hives.       Pain Related Questions  Over the past 3 days, what was your average pain during activity? (I.e. running, jogging, walking, climbing stairs, getting dressed, ect.): 1  Over the past 3 days, what was your highest pain level?: 1  Over the past 3 days, what was your lowest pain level? : 0    Other  How many nights a week are you awakened by your affected body part?: 0    Objective:     General: Royer is well-developed, well-nourished, appears stated age, in no acute distress, alert and oriented to time, place and person.     General    Vitals reviewed.  Constitutional: He is oriented to person, place, and time. He appears well-developed and well-nourished. No distress.   HENT:   Mouth/Throat: No oropharyngeal exudate.   Eyes: Right eye exhibits no discharge. Left eye exhibits no discharge.   Cardiovascular:  Normal rate and regular rhythm.            Pulmonary/Chest: Effort normal and breath sounds normal. No respiratory distress.   Neurological: He is alert and oriented to person, place, and time. He has normal reflexes. No cranial nerve deficit. Coordination normal.   Psychiatric: He has a normal mood and affect. His behavior is normal. Judgment and thought content normal.     General Musculoskeletal Exam   Gait: normal   Pelvic Obliquity: none      Right Knee Exam     Inspection   Alignment:  normal  Effusion: absent    Left Knee Exam     Inspection   Alignment:  normal  Effusion: absent    Right Hip Exam     Inspection   Scars:  absent  Swelling: absent  Bruising: absent  No deformity of hip.  Quadriceps Atrophy:  Negative  Erythema: absent    Range of Motion   Abduction:  40 normal   Adduction:  20 normal   Extension:  0 normal   Flexion:  120 normal   External rotation:  40 normal   Internal rotation:  5 normal     Tests   Pain w/ forced internal rotation (CAN): absent  Pain w/ forced external rotation (FADIR): absent  Owen: negative  Trendelenburg Test: negative  Circumduction test: negative  Stinchfield test: negative  Log Roll: negative  Snapping Hip (internal): negative  Step-down test: negative  Resisted sit-up pain: negative  Unresisted sit-up pain: negative  Resisted sit-up pain with adductor contraction pain: negative    Other   Sensation: normal  Left Hip Exam     Inspection   Scars: present  Swelling: absent  No deformity of hip.  Quadriceps Atrophy:  negative  Erythema: absent  Bruising: absent    Range of Motion   Abduction:  40 normal   Adduction:  20 normal   Extension:  0 normal   Flexion:  120 normal   External rotation:  40 normal   Internal rotation: 5 normal     Tests   Pain w/ forced internal rotation (CAN): absent  Pain w/ forced external rotation (FADIR): absent  Owen: negative  Trendelenburg Test: negative  Circumduction test: negative  Stinchfield test: negative  Log Roll: negative  Snapping Hip (internal): negative  Step-down test: negative  Resisted sit-up pain: negative  Resisted sit-up pain with adductor contraction pain: negative  Unresisted sit-up pain: negative    Other   Sensation: normal    Comments:  Aquacel bandage removed. Incision healing well with no signs of infection or necrosis. No purulent drainage. NVI. Sutures tags trimmed.           Back (L-Spine & T-Spine) / Neck (C-Spine) Exam   Back exam is normal.      Muscle Strength   Right Lower Extremity   Hip Abduction: 5/5   Hip Adduction: 5/5   Hip Flexion: 5/5   Left Lower Extremity   Hip Abduction: 5/5   Hip Adduction: 5/5   Hip Flexion: 5/5      Reflexes     Left Side  Achilles:  2+  Quadriceps:  2+    Right Side   Achilles:  2+  Quadriceps:  2+    Vascular Exam     Right Pulses  Dorsalis Pedis:      2+  Posterior Tibial:      2+        Left Pulses  Dorsalis Pedis:      2+  Posterior Tibial:      2+        Capillary Refill  Right Hand: normal capillary refill  Left Hand: normal capillary refill        Edema  Right Upper Leg: absent  Left Upper Leg: absent    RADIOGRAPHS: 4/26/24  Left hip:  S/p left hip replacement. Implants in satisfactory position.    Assessment:     Encounter Diagnoses   Name Primary?    Left hip pain Yes    Status post total replacement of left hip         Plan:     1. RTC in 3 months with Dr. Gerard Tello. Price Hip Score was filled out today in clinic. Patient will fill out Price Hip Score on return.     2. Medications: Refills of the following Rx were sent to patients preferred Pharmacy:  No Refills Needed Today    3. Physical Therapy: Continue/Begin: Continue at     4. HEP: N/A    5. Procedures/Procedural Planning:   N/A    6. DME: N/A    7. Work/Sport Status: p workers comp - patient allowed to return 7/1/2024 with full duty.     8. Visit Summary: Patient doing well 6 weeks s/p left total hip, return in 3 months for reevaluation.               Patient questionnaires may have been collected.

## 2024-06-12 ENCOUNTER — PATIENT MESSAGE (OUTPATIENT)
Dept: SPORTS MEDICINE | Facility: CLINIC | Age: 64
End: 2024-06-12
Payer: COMMERCIAL

## 2024-06-30 ENCOUNTER — PATIENT MESSAGE (OUTPATIENT)
Dept: ADMINISTRATIVE | Facility: OTHER | Age: 64
End: 2024-06-30
Payer: COMMERCIAL

## 2024-07-02 RX ORDER — PANTOPRAZOLE SODIUM 40 MG/1
40 TABLET, DELAYED RELEASE ORAL
Qty: 90 TABLET | Refills: 1 | Status: SHIPPED | OUTPATIENT
Start: 2024-07-02

## 2024-07-16 ENCOUNTER — PATIENT MESSAGE (OUTPATIENT)
Dept: ADMINISTRATIVE | Facility: OTHER | Age: 64
End: 2024-07-16
Payer: COMMERCIAL

## 2024-07-16 ENCOUNTER — PATIENT MESSAGE (OUTPATIENT)
Dept: SPORTS MEDICINE | Facility: CLINIC | Age: 64
End: 2024-07-16
Payer: COMMERCIAL

## 2024-07-17 ENCOUNTER — OFFICE VISIT (OUTPATIENT)
Dept: PRIMARY CARE CLINIC | Facility: CLINIC | Age: 64
End: 2024-07-17
Payer: COMMERCIAL

## 2024-07-17 VITALS
HEART RATE: 69 BPM | SYSTOLIC BLOOD PRESSURE: 134 MMHG | HEIGHT: 69 IN | WEIGHT: 200.81 LBS | DIASTOLIC BLOOD PRESSURE: 84 MMHG | BODY MASS INDEX: 29.74 KG/M2 | OXYGEN SATURATION: 94 % | TEMPERATURE: 98 F

## 2024-07-17 DIAGNOSIS — Z00.00 ENCOUNTER FOR MEDICAL EXAMINATION TO ESTABLISH CARE: ICD-10-CM

## 2024-07-17 DIAGNOSIS — A60.00 GENITAL HERPES SIMPLEX, UNSPECIFIED SITE: ICD-10-CM

## 2024-07-17 DIAGNOSIS — E78.2 MIXED HYPERLIPIDEMIA: ICD-10-CM

## 2024-07-17 DIAGNOSIS — K21.9 GASTROESOPHAGEAL REFLUX DISEASE, UNSPECIFIED WHETHER ESOPHAGITIS PRESENT: ICD-10-CM

## 2024-07-17 DIAGNOSIS — I10 BENIGN ESSENTIAL HYPERTENSION: Primary | ICD-10-CM

## 2024-07-17 DIAGNOSIS — R73.03 PRE-DIABETES: ICD-10-CM

## 2024-07-17 PROCEDURE — 3075F SYST BP GE 130 - 139MM HG: CPT | Mod: CPTII,S$GLB,, | Performed by: STUDENT IN AN ORGANIZED HEALTH CARE EDUCATION/TRAINING PROGRAM

## 2024-07-17 PROCEDURE — 3079F DIAST BP 80-89 MM HG: CPT | Mod: CPTII,S$GLB,, | Performed by: STUDENT IN AN ORGANIZED HEALTH CARE EDUCATION/TRAINING PROGRAM

## 2024-07-17 PROCEDURE — 1160F RVW MEDS BY RX/DR IN RCRD: CPT | Mod: CPTII,S$GLB,, | Performed by: STUDENT IN AN ORGANIZED HEALTH CARE EDUCATION/TRAINING PROGRAM

## 2024-07-17 PROCEDURE — 99214 OFFICE O/P EST MOD 30 MIN: CPT | Mod: S$GLB,,, | Performed by: STUDENT IN AN ORGANIZED HEALTH CARE EDUCATION/TRAINING PROGRAM

## 2024-07-17 PROCEDURE — 3008F BODY MASS INDEX DOCD: CPT | Mod: CPTII,S$GLB,, | Performed by: STUDENT IN AN ORGANIZED HEALTH CARE EDUCATION/TRAINING PROGRAM

## 2024-07-17 PROCEDURE — 99999 PR PBB SHADOW E&M-EST. PATIENT-LVL IV: CPT | Mod: PBBFAC,,, | Performed by: STUDENT IN AN ORGANIZED HEALTH CARE EDUCATION/TRAINING PROGRAM

## 2024-07-17 PROCEDURE — 3044F HG A1C LEVEL LT 7.0%: CPT | Mod: CPTII,S$GLB,, | Performed by: STUDENT IN AN ORGANIZED HEALTH CARE EDUCATION/TRAINING PROGRAM

## 2024-07-17 PROCEDURE — 1159F MED LIST DOCD IN RCRD: CPT | Mod: CPTII,S$GLB,, | Performed by: STUDENT IN AN ORGANIZED HEALTH CARE EDUCATION/TRAINING PROGRAM

## 2024-07-17 RX ORDER — PANTOPRAZOLE SODIUM 40 MG/1
40 TABLET, DELAYED RELEASE ORAL DAILY
Qty: 90 TABLET | Refills: 3 | Status: SHIPPED | OUTPATIENT
Start: 2024-07-17

## 2024-07-17 RX ORDER — HYDROCHLOROTHIAZIDE 25 MG/1
25 TABLET ORAL DAILY
Qty: 90 TABLET | Refills: 3 | Status: SHIPPED | OUTPATIENT
Start: 2024-07-17

## 2024-07-17 RX ORDER — ACYCLOVIR 200 MG/1
200 CAPSULE ORAL 2 TIMES DAILY
Qty: 180 CAPSULE | Refills: 3 | Status: SHIPPED | OUTPATIENT
Start: 2024-07-17

## 2024-07-17 RX ORDER — AMLODIPINE BESYLATE 10 MG/1
10 TABLET ORAL DAILY
Qty: 90 TABLET | Refills: 3 | Status: SHIPPED | OUTPATIENT
Start: 2024-07-17 | End: 2025-07-17

## 2024-07-17 NOTE — PROGRESS NOTES
Office visit  Patient: Royer Castillo   7/17/2024     Assessment:     1. Benign essential hypertension    2. Mixed hyperlipidemia    3. Pre-diabetes    4. Genital herpes simplex, unspecified site    5. Encounter for medical examination to establish care    6. GERD     Plan:         1. Benign essential hypertension  -     amLODIPine (NORVASC) 10 MG tablet; Take 1 tablet (10 mg total) by mouth once daily.  Dispense: 90 tablet; Refill: 3  -     hydroCHLOROthiazide (HYDRODIURIL) 25 MG tablet; Take 1 tablet (25 mg total) by mouth once daily.  Dispense: 90 tablet; Refill: 3        -stable, continue current medication    2. Mixed hyperlipidemia        -stable, continue atorvastatin    3. Pre-diabetes         -stable, dietary modifications and exercise    4. Genital herpes simplex, unspecified site  -     acyclovir (ZOVIRAX) 200 MG capsule; Take 1 capsule (200 mg total) by mouth 2 (two) times daily.  Dispense: 180 capsule; Refill: 3        -stable, has not had an outbreak in 12-13 years; patient instructed to stop taking medication regularly and start taking prn for outbreaks    5. Encounter for medical examination to establish care       6. GERD  -     pantoprazole (PROTONIX) 40 MG tablet; Take 1 tablet (40 mg total) by mouth once daily.  Dispense: 90 tablet; Refill: 3       -stable, continue current medication        Return to clinic in 6 months for annual physical or sooner as needed.          CHIEF COMPLAINT: establish care and multiple medical conditions    HPI: Royer Castillo is a 64 y.o. male who presents for an annual physical and to establish care.    He had a hip replacement in April 2024 and prior to the surgery he had an abnormal ECG.  He denies chest pain, shortness of breath.     Review of Systems   Constitutional:  Negative for fever and malaise/fatigue.   HENT:  Negative for congestion, ear discharge, ear pain and sore throat.    Eyes:  Negative for pain and discharge.   Respiratory:  Negative for cough  "and shortness of breath.    Cardiovascular:  Negative for chest pain and palpitations.   Gastrointestinal:  Negative for abdominal pain, constipation, diarrhea, nausea and vomiting.   Genitourinary:  Negative for dysuria, frequency and hematuria.   Musculoskeletal:  Negative for joint pain and myalgias.   Skin:  Negative for rash.   Neurological:  Negative for dizziness, seizures and headaches.         Current Outpatient Medications   Medication Instructions    acyclovir (ZOVIRAX) 200 mg, Oral, 2 times daily    amLODIPine (NORVASC) 10 mg, Oral, Daily    atorvastatin (LIPITOR) 40 mg, Oral, Nightly    coenzyme Q10 (CO Q-10) 100 mg, Oral, Daily    hydroCHLOROthiazide (HYDRODIURIL) 25 mg, Oral, Daily    omega-3 fatty acids/fish oil (OMEGA 3 FISH OIL ORAL) 1 capsule, Oral, Daily    pantoprazole (PROTONIX) 40 mg, Oral, Daily       Lab Results   Component Value Date    HGBA1C 5.9 (H) 01/19/2024    HGBA1C 5.8 (H) 01/03/2023    HGBA1C 5.9 (H) 12/21/2021     No results found for: "MICALBCREAT"  Lab Results   Component Value Date    LDLCALC 80.8 01/19/2024    LDLCALC 93.2 01/03/2023    CHOL 133 01/19/2024    HDL 36 (L) 01/19/2024    TRIG 81 01/19/2024       Lab Results   Component Value Date     01/19/2024    K 3.9 01/19/2024     01/19/2024    CO2 27 01/19/2024     01/19/2024    BUN 11 01/19/2024    CREATININE 1.0 01/19/2024    CALCIUM 9.2 01/19/2024    PROT 7.3 01/19/2024    ALBUMIN 4.1 01/19/2024    BILITOT 0.9 01/19/2024    ALKPHOS 74 01/19/2024    AST 20 01/19/2024    ALT 27 01/19/2024    ANIONGAP 11 01/19/2024    ESTGFRAFRICA >60.0 01/31/2022    EGFRNONAA >60.0 01/31/2022    WBC 5.77 01/19/2024    HGB 15.4 01/19/2024    HGB 15.7 01/03/2023    HCT 34 (L) 04/17/2024    MCV 98 01/19/2024     01/19/2024    TSH 1.393 01/19/2024    PSA 1.0 01/19/2024    PSA 0.90 01/03/2023    HEPCAB Negative 12/21/2021       Lab Results   Component Value Date    TOTALTESTOST 381 01/06/2023    YFFAUYDP60ZA 35 12/21/2021 "         Past Medical History:   Diagnosis Date    Arthritis     Cellulitis     left foot    Diverticulosis     Genital herpes     GERD (gastroesophageal reflux disease)     HTN (hypertension)     Hyperlipidemia     Left atrial dilation     Onychomycosis     Pre-diabetes      Past Surgical History:   Procedure Laterality Date    ARTHROSCOPIC CHONDROPLASTY OF KNEE JOINT Right 5/27/2021    Procedure: ARTHROSCOPY, KNEE, WITH CHONDROPLASTY;  Surgeon: Gerard Tello MD;  Location: [facility] OR;  Service: Orthopedics;  Laterality: Right;    COLONOSCOPY N/A 12/11/2019    Procedure: COLONOSCOPY;  Surgeon: Devaughn Johnson MD;  Location: [facility] ENDO (4TH FLR);  Service: Endoscopy;  Laterality: N/A;    ESOPHAGOGASTRODUODENOSCOPY N/A 3/2/2022    Procedure: EGD (ESOPHAGOGASTRODUODENOSCOPY);  Surgeon: John Rose MD;  Location: [facility] ENDO (4TH FLR);  Service: Endoscopy;  Laterality: N/A;  1/27 covid test 2/27 @ [facility]; instructions to portal-st  2/25 lvm to confirm appt-RB    HIP ARTHROPLASTY Left 4/16/2024    Procedure: ARTHROPLASTY, HIP;  Surgeon: Gerard Tello MD;  Location: [facility] OR;  Service: Orthopedics;  Laterality: Left;  General, PENG, Regional w/o catheter (spinal), johnathan 50cc    KNEE ARTHROSCOPY W/ MENISCECTOMY Right 5/27/2021    Procedure: ARTHROSCOPY, KNEE, WITH MENISCECTOMY;  Surgeon: Gerard Tello MD;  Location: [facility] OR;  Service: Orthopedics;  Laterality: Right;  JOHNATHAN 50CC    SYNOVECTOMY OF KNEE Right 5/27/2021    Procedure: SYNOVECTOMY, KNEE limited;  Surgeon: Gerard Tello MD;  Location: [facility];  Service: Orthopedics;  Laterality: Right;       Social History     Tobacco Use    Smoking status: Never     Passive exposure: Never    Smokeless tobacco: Never   Substance Use Topics    Alcohol use: No    Drug use: No       family history includes Cancer in his mother; Esophageal cancer in his father; Heart attack (age of onset: 60 - 69) in his father; Heart disease (age of onset: 64) in his sister; Heart disease (age  "of onset: 86) in his father; Hyperlipidemia in his sister and sister; Hypertension in his father and sister; Kidney disease in his sister.    Vitals:    07/17/24 1102   BP: 134/84   Pulse: 69   Temp: 98 °F (36.7 °C)   TempSrc: Oral   SpO2: (!) 94%   Weight: 91.1 kg (200 lb 13.4 oz)   Height: 5' 9" (1.753 m)   PainSc: 0-No pain     Objective:   Physical Exam  Constitutional:       General: He is not in acute distress.     Appearance: Normal appearance. He is well-developed.   HENT:      Head: Normocephalic and atraumatic.      Right Ear: Hearing and external ear normal. No decreased hearing noted. No drainage or swelling.      Left Ear: Hearing and external ear normal. No decreased hearing noted. No drainage or swelling.      Nose: Nose normal. No rhinorrhea.      Mouth/Throat:      Mouth: Mucous membranes are moist.   Eyes:      General: Lids are normal.         Right eye: No discharge.         Left eye: No discharge.      Conjunctiva/sclera: Conjunctivae normal.      Right eye: Right conjunctiva is not injected. No exudate.     Left eye: Left conjunctiva is not injected. No exudate.  Cardiovascular:      Rate and Rhythm: Normal rate and regular rhythm.      Heart sounds: Normal heart sounds. No murmur heard.     No friction rub. No gallop.   Pulmonary:      Effort: Pulmonary effort is normal. No respiratory distress.      Breath sounds: No stridor. No wheezing, rhonchi or rales.   Musculoskeletal:         General: No deformity.      Right lower leg: No edema.      Left lower leg: No edema.   Skin:     General: Skin is warm and dry.   Neurological:      General: No focal deficit present.      Mental Status: He is alert and oriented to person, place, and time.   Psychiatric:         Mood and Affect: Mood normal.         Speech: Speech normal.         Behavior: Behavior normal.             Sola Alfaro MD  Internal Medicine and Pediatrics                            "

## 2024-07-20 ENCOUNTER — PATIENT MESSAGE (OUTPATIENT)
Dept: ADMINISTRATIVE | Facility: OTHER | Age: 64
End: 2024-07-20
Payer: COMMERCIAL

## 2024-08-13 ENCOUNTER — OFFICE VISIT (OUTPATIENT)
Dept: URGENT CARE | Facility: CLINIC | Age: 64
End: 2024-08-13
Payer: COMMERCIAL

## 2024-08-13 VITALS
HEART RATE: 72 BPM | WEIGHT: 200.81 LBS | BODY MASS INDEX: 29.74 KG/M2 | TEMPERATURE: 98 F | SYSTOLIC BLOOD PRESSURE: 130 MMHG | RESPIRATION RATE: 20 BRPM | DIASTOLIC BLOOD PRESSURE: 78 MMHG | OXYGEN SATURATION: 97 % | HEIGHT: 69 IN

## 2024-08-13 DIAGNOSIS — K12.1 STOMATITIS: ICD-10-CM

## 2024-08-13 DIAGNOSIS — R22.0 SWOLLEN TONGUE: Primary | ICD-10-CM

## 2024-08-13 PROCEDURE — 99213 OFFICE O/P EST LOW 20 MIN: CPT | Mod: S$GLB,,, | Performed by: FAMILY MEDICINE

## 2024-08-13 RX ORDER — ACETAMINOPHEN 500 MG
1000 TABLET ORAL
Status: DISCONTINUED | OUTPATIENT
Start: 2024-08-13 | End: 2024-08-13

## 2024-08-13 RX ORDER — FLUCONAZOLE 200 MG/1
200 TABLET ORAL EVERY OTHER DAY
Qty: 4 TABLET | Refills: 0 | Status: SHIPPED | OUTPATIENT
Start: 2024-08-13 | End: 2024-09-12

## 2024-08-13 NOTE — PROGRESS NOTES
"Subjective:      Patient ID: Royer Castillo is a 64 y.o. male.    Vitals:  height is 5' 9" (1.753 m) and weight is 91.1 kg (200 lb 13.4 oz). His oral temperature is 97.8 °F (36.6 °C). His blood pressure is 130/78 and his pulse is 72. His respiration is 20 and oxygen saturation is 97%.     Chief Complaint: Oral Swelling    64 year old male c/o swollen tongue. Pt states that three weeks ago he ate something that possibly broke out his tongue. Pt states occasional burning, tingling, and numbness of the tongue, which is has gotten worse. Pt presents with cracks and white spot on top of the tongue. Pt states itching of the tongue (comes and goes). Pt states eye redness/watery/itching from allergies (doesn't know if it could be related or not).    Pt states different possibilities: has been on different antibiotics and medications since hip surgery on 4/16/24. Pt also states the same thing happens when he eats certain fruits.              Allergic Reaction  This is a new problem. The current episode started more than 1 week ago. The problem has been gradually worsening since onset. The patient was exposed to food. The exposure occurred at Work. Associated symptoms include eye itching, eye redness, eye watering, itching and wheezing. Pertinent negatives include no difficulty breathing, drooling, skin blistering, trouble swallowing or vomiting. Treatments tried: mouthwash and histamine. The treatment provided mild relief. There is no history of food allergies or medication allergies. Swelling is present on the tongue.       HENT:  Negative for drooling and trouble swallowing.    Eyes:  Positive for eye itching and eye redness.   Respiratory:  Positive for wheezing.    Gastrointestinal:  Negative for vomiting.   Allergic/Immunologic: Negative for food allergies.      Objective:     Physical Exam    Assessment:     1. Swollen tongue        Plan:       Swollen tongue          Medical Decision Making:   Urgent Care " Management:  Will continue Protonix for acid symptoms  Start diflucan for tongue irritation/pain  Continue acyclovir  Continue antiseptic mouth washes

## 2024-08-22 NOTE — PROGRESS NOTES
Subjective:     Chief Complaint: Royer Castillo is a 64 y.o. male who had concerns including Injury of the Left Hip and Follow-up.    Patient presents to clinic for 4 months post op evaluation of left hip. Pain today is 0/10.     Date of Procedure: 4/16/2024      Procedure:   1. Left  Replacement, hip, acetabular and proximal femoral prosthesis (Total Hip arthroplasty), with or without autograft or allograft [id number]     Surgeons and Role:     * Gerard Tello MD - Primary     Assisting Surgeon:  Joan Ley PA-C     No resident or fellow was available throughout the entire procedure as a result it was medically necessary for Joan Ley PA-C to perform first assistant duties.        Pre-Operative Diagnosis: Primary osteoarthritis of left hip [M16.12]     Post-Operative Diagnosis: Post-Op Diagnosis Codes:     * Primary osteoarthritis of left hip [M16.12]      Pain  Associated symptoms include joint swelling. Pertinent negatives include no abdominal pain, chest pain, chills, congestion, coughing, fever, headaches, myalgias, nausea, numbness, rash, sore throat or vomiting.       Review of Systems   Constitutional: Negative. Negative for chills, fever, night sweats, weight gain and weight loss.   HENT:  Negative for congestion, hearing loss and sore throat.    Eyes:  Negative for blurred vision, double vision and visual disturbance.   Cardiovascular:  Negative for chest pain, leg swelling and palpitations.   Respiratory:  Negative for cough and shortness of breath.    Endocrine: Negative for polyuria.   Hematologic/Lymphatic: Negative for bleeding problem. Does not bruise/bleed easily.   Skin:  Negative for itching, poor wound healing and rash.   Musculoskeletal:  Positive for joint pain and joint swelling. Negative for back pain, muscle cramps, muscle weakness, myalgias and stiffness.   Gastrointestinal:  Negative for abdominal pain, constipation, diarrhea, melena, nausea and vomiting.   Genitourinary:  Negative.  Negative for frequency and hematuria.   Neurological:  Negative for dizziness, headaches, loss of balance, numbness, paresthesias and sensory change.   Psychiatric/Behavioral:  Negative for altered mental status and depression. The patient is not nervous/anxious.    Allergic/Immunologic: Negative for hives.                 Objective:     General: Royer is well-developed, well-nourished, appears stated age, in no acute distress, alert and oriented to time, place and person.     General    Vitals reviewed.  Constitutional: He is oriented to person, place, and time. He appears well-developed and well-nourished. No distress.   HENT:   Mouth/Throat: No oropharyngeal exudate.   Eyes: Right eye exhibits no discharge. Left eye exhibits no discharge.   Cardiovascular:  Normal rate and regular rhythm.            Pulmonary/Chest: Effort normal and breath sounds normal. No respiratory distress.   Neurological: He is alert and oriented to person, place, and time. He has normal reflexes. No cranial nerve deficit. Coordination normal.   Psychiatric: He has a normal mood and affect. His behavior is normal. Judgment and thought content normal.     General Musculoskeletal Exam   Gait: normal   Pelvic Obliquity: none      Right Knee Exam     Inspection   Alignment:  normal  Effusion: absent    Left Knee Exam     Inspection   Alignment:  normal  Effusion: absent    Right Hip Exam     Inspection   Scars: absent  Swelling: absent  Bruising: absent  No deformity of hip.  Quadriceps Atrophy:  Negative  Erythema: absent    Range of Motion   Abduction:  40 normal   Adduction:  20 normal   Extension:  0 normal   Flexion:  120 normal   External rotation:  40 normal   Internal rotation:  5 normal     Tests   Pain w/ forced internal rotation (CAN): absent  Pain w/ forced external rotation (FADIR): absent  Owen: negative  Trendelenburg Test: negative  Circumduction test: negative  Stinchfield test: negative  Log Roll:  negative  Snapping Hip (internal): negative  Step-down test: negative  Resisted sit-up pain: negative  Unresisted sit-up pain: negative  Resisted sit-up pain with adductor contraction pain: negative    Other   Sensation: normal  Left Hip Exam     Inspection   Scars: present  Swelling: absent  No deformity of hip.  Quadriceps Atrophy:  negative  Erythema: absent  Bruising: absent    Range of Motion   Abduction:  40 normal   Adduction:  20 normal   Extension:  0 normal   Flexion:  120 normal   External rotation:  40 normal   Internal rotation: 5 normal     Tests   Pain w/ forced internal rotation (CAN): absent  Pain w/ forced external rotation (FADIR): absent  Owen: negative  Trendelenburg Test: negative  Circumduction test: negative  Stinchfield test: negative  Log Roll: negative  Snapping Hip (internal): negative  Step-down test: negative  Resisted sit-up pain: negative  Resisted sit-up pain with adductor contraction pain: negative  Unresisted sit-up pain: negative    Other   Sensation: normal    Comments:  Aquacel bandage removed. Incision healing well with no signs of infection or necrosis. No purulent drainage. NVI. Sutures tags trimmed.           Back (L-Spine & T-Spine) / Neck (C-Spine) Exam   Back exam is normal.      Muscle Strength   Right Lower Extremity   Hip Abduction: 5/5   Hip Adduction: 5/5   Hip Flexion: 5/5   Left Lower Extremity   Hip Abduction: 5/5   Hip Adduction: 5/5   Hip Flexion: 5/5     Reflexes     Left Side  Achilles:  2+  Quadriceps:  2+    Right Side   Achilles:  2+  Quadriceps:  2+    Vascular Exam     Right Pulses  Dorsalis Pedis:      2+  Posterior Tibial:      2+        Left Pulses  Dorsalis Pedis:      2+  Posterior Tibial:      2+        Capillary Refill  Right Hand: normal capillary refill  Left Hand: normal capillary refill        Edema  Right Upper Leg: absent  Left Upper Leg: absent    RADIOGRAPHS: 4/26/24  Left hip:  S/p left hip replacement. Implants in satisfactory  position.    X-Ray Hips Bilateral 2 View Incl AP Pelvis  Narrative: EXAMINATION:  XR HIPS BILATERAL 2 VIEW INCL AP PELVIS    TECHNIQUE:  Five views were obtained    COMPARISON:  Comparison is made to 05/29/2024.    FINDINGS:  Postoperative changes are again identified relating to a prior left hip arthroplasty procedure, prostheses appearing unremarkable.  No interval hip joint space narrowing on the right is seen.  No conventional radiographic evidence to specifically suggest avascular necrosis of the right femoral head.  No evidence of recent or healing fracture or development of abnormal periprosthetic lucency.  SI joints appear unremarkable.  Impression: Postoperative changes of left hip arthroplasty.  No significant detrimental interval change since 05/29/2024 is appreciated.    Electronically signed by: Marvin Deluca MD  Date:    08/26/2024  Time:    09:10        Assessment:     Encounter Diagnoses   Name Primary?    Left hip pain Yes    S/P hip replacement, left           Plan:     1. RTC in 4 months with Dr. Gerard Tello. Price Hip Score was filled out today in clinic. Patient will fill out Price Hip Score on return.     2. Medications: Refills of the following Rx were sent to patients preferred Pharmacy:  No Refills Needed Today    3. Physical Therapy: Continue/Begin: Continue at     4. HEP:HEP 61425 - Gerard Tello MD, instructed and demonstrated a CORE and gluteal HEP. The patient then demonstrated understanding of exercises and proper technique. This program was performed for 15 minutes.      5. Procedures/Procedural Planning:   N/A    6. DME: N/A    7. Work/Sport Status: p workers comp - patient allowed to return 7/1/2024 with full duty.     8. Visit Summary: Patient doing well 4 months s/p left total hip, return in 6 months for reevaluation.               Patient questionnaires may have been collected.

## 2024-08-26 ENCOUNTER — OFFICE VISIT (OUTPATIENT)
Dept: SPORTS MEDICINE | Facility: CLINIC | Age: 64
End: 2024-08-26

## 2024-08-26 ENCOUNTER — HOSPITAL ENCOUNTER (OUTPATIENT)
Dept: RADIOLOGY | Facility: HOSPITAL | Age: 64
Discharge: HOME | End: 2024-08-26
Attending: ORTHOPAEDIC SURGERY
Payer: COMMERCIAL

## 2024-08-26 VITALS
SYSTOLIC BLOOD PRESSURE: 126 MMHG | WEIGHT: 203.13 LBS | DIASTOLIC BLOOD PRESSURE: 83 MMHG | BODY MASS INDEX: 30.09 KG/M2 | HEIGHT: 69 IN | HEART RATE: 71 BPM

## 2024-08-26 DIAGNOSIS — Z96.642 S/P HIP REPLACEMENT, LEFT: ICD-10-CM

## 2024-08-26 DIAGNOSIS — M25.552 LEFT HIP PAIN: Primary | ICD-10-CM

## 2024-08-26 DIAGNOSIS — M25.552 LEFT HIP PAIN: ICD-10-CM

## 2024-08-26 PROCEDURE — 99999 PR PBB SHADOW E&M-EST. PATIENT-LVL III: CPT | Mod: PBBFAC,,, | Performed by: ORTHOPAEDIC SURGERY

## 2024-08-26 PROCEDURE — 99214 OFFICE O/P EST MOD 30 MIN: CPT | Mod: S$PBB,,, | Performed by: ORTHOPAEDIC SURGERY

## 2024-08-26 PROCEDURE — 97110 THERAPEUTIC EXERCISES: CPT | Mod: ,,, | Performed by: ORTHOPAEDIC SURGERY

## 2024-08-26 PROCEDURE — 73521 X-RAY EXAM HIPS BI 2 VIEWS: CPT | Mod: TC

## 2024-08-26 PROCEDURE — 73521 X-RAY EXAM HIPS BI 2 VIEWS: CPT | Mod: 26,,, | Performed by: RADIOLOGY

## 2024-08-26 PROCEDURE — 99213 OFFICE O/P EST LOW 20 MIN: CPT | Mod: PBBFAC,25 | Performed by: ORTHOPAEDIC SURGERY

## 2024-08-29 ENCOUNTER — PATIENT MESSAGE (OUTPATIENT)
Dept: SPORTS MEDICINE | Facility: CLINIC | Age: 64
End: 2024-08-29
Payer: COMMERCIAL

## 2024-09-11 DIAGNOSIS — I10 BENIGN ESSENTIAL HYPERTENSION: ICD-10-CM

## 2024-09-11 RX ORDER — HYDROCHLOROTHIAZIDE 25 MG/1
25 TABLET ORAL DAILY
Qty: 90 TABLET | Refills: 3 | Status: CANCELLED | OUTPATIENT
Start: 2024-09-11

## 2024-09-11 RX ORDER — AMLODIPINE BESYLATE 10 MG/1
10 TABLET ORAL DAILY
Qty: 90 TABLET | Refills: 3 | Status: CANCELLED | OUTPATIENT
Start: 2024-09-11 | End: 2025-09-11

## 2024-09-11 NOTE — TELEPHONE ENCOUNTER
No care due was identified.  [facility] Embedded Care Due Messages. Reference number: [id number].   9/11/2024 2:38:59 PM CDT

## 2024-09-27 NOTE — MR AVS SNAPSHOT
[facility]  [address]  [facility] [address]  Phone: [phone]  Fax: [phone]                  Royer Castillo   3/28/2017 10:00 AM   Office Visit    Description:  Male : 1960   Provider:  Vaishali Camacho MD   Department:  [facility]           Reason for Visit     Follow-up           Diagnoses this Visit        Comments    Benign essential hypertension    -  Primary     Pre-diabetes         Hyperlipidemia, unspecified hyperlipidemia type         Onychomycosis                To Do List           Future Appointments        Provider Department Dept Phone    3/28/2017 10:20 AM LAB, APPOINTMENT [facility] [phone]    2017 7:10 AM LAB, APPOINTMENT [place] [facility] [phone]    2017 8:10 AM Radha Cook MD [facility] [phone]      Goals (5 Years of Data)     None      Follow-Up and Disposition     Return in about 6 months (around 2017).      [facility]     [facility] -  Assistance  Registered nurses in the [facility] provide clinical advisement, health education, appointment booking, and other advisory services.  Call for this free service at [phone].             Medications           STOP taking these medications     tadalafil (CIALIS) 5 MG tablet Take 1 tablet (5 mg total) by mouth daily as needed for Erectile Dysfunction.    lisinopril 10 MG tablet Take 1 tablet (10 mg total) by mouth once daily.           Verify that the below list of medications is an accurate representation of the medications you are currently taking.  If none reported, the list may be blank. If incorrect, please contact your healthcare provider. Carry this list with you in case of emergency.           Current Medications     acyclovir (ZOVIRAX) 200 MG capsule Take 1 capsule (200 mg total) by mouth 2 (two) times daily.    terbinafine HCl  "(LAMISIL) 250 mg tablet Take 1 tablet (250 mg total) by mouth once daily.    pravastatin (PRAVACHOL) 40 MG tablet Take 1 tablet (40 mg total) by mouth once daily.           Clinical Reference Information           Your Vitals Were     BP Pulse Temp Resp Height Weight    114/72 57 98.1 °F (36.7 °C) (Oral) 17 5' 9" (1.753 m) 89.8 kg (197 lb 15.6 oz)    BMI                29.24 kg/m2          Blood Pressure          Most Recent Value    BP  114/72      Allergies as of 3/28/2017     No Known Allergies      Immunizations Administered on Date of Encounter - 3/28/2017     None      Orders Placed During Today's Visit     Future Labs/Procedures Expected by Expires    Comprehensive metabolic panel  3/28/2017 (Approximate) 5/27/2018    Hemoglobin A1c  3/28/2017 (Approximate) 5/27/2018      Instructions    Restart pravastatin after done w/ lamisil.       Language Assistance Services     ATTENTION: Language assistance services are available, free of charge. Please call [phone].      ATENCIÓN: Si inala desi, tiene a gardner disposición servicios gratuitos de asistencia lingüística. Llame al [phone].     KARLEY Ý: N?u b?n nói Ti?ng Vi?t, có các d?ch v? h? tr? ngôn ng? mi?n phí dành cho b?n. G?i s? [phone].         [facility] complies with applicable Federal civil rights laws and does not discriminate on the basis of race, color, national origin, age, disability, or sex.        " Name band;

## 2024-10-04 ENCOUNTER — PATIENT MESSAGE (OUTPATIENT)
Dept: SPORTS MEDICINE | Facility: CLINIC | Age: 64
End: 2024-10-04
Payer: COMMERCIAL

## 2024-10-13 ENCOUNTER — PATIENT MESSAGE (OUTPATIENT)
Dept: PRIMARY CARE CLINIC | Facility: CLINIC | Age: 64
End: 2024-10-13
Payer: COMMERCIAL

## 2024-10-13 DIAGNOSIS — I10 BENIGN ESSENTIAL HYPERTENSION: ICD-10-CM

## 2024-10-13 NOTE — TELEPHONE ENCOUNTER
No care due was identified.  [facility] Embedded Care Due Messages. Reference number: [id number].   10/13/2024 8:46:14 AM CDT

## 2024-10-14 RX ORDER — HYDROCHLOROTHIAZIDE 25 MG/1
25 TABLET ORAL DAILY
Qty: 30 TABLET | Refills: 11 | Status: SHIPPED | OUTPATIENT
Start: 2024-10-14

## 2024-10-14 RX ORDER — AMLODIPINE BESYLATE 10 MG/1
10 TABLET ORAL DAILY
Qty: 30 TABLET | Refills: 11 | Status: SHIPPED | OUTPATIENT
Start: 2024-10-14 | End: 2025-10-14

## 2024-10-14 NOTE — TELEPHONE ENCOUNTER
Pt stated in message from yesterday that he needs Rxs sent in 30 day supplies due to insurance.    LOV 07/17/24    NOV 1/17/24

## 2024-10-16 ENCOUNTER — PATIENT MESSAGE (OUTPATIENT)
Dept: SPORTS MEDICINE | Facility: CLINIC | Age: 64
End: 2024-10-16
Payer: COMMERCIAL

## 2024-12-27 ENCOUNTER — OFFICE VISIT (OUTPATIENT)
Dept: URGENT CARE | Facility: CLINIC | Age: 64
End: 2024-12-27
Payer: COMMERCIAL

## 2024-12-27 VITALS
HEART RATE: 61 BPM | OXYGEN SATURATION: 96 % | WEIGHT: 203 LBS | HEIGHT: 69 IN | TEMPERATURE: 98 F | RESPIRATION RATE: 19 BRPM | SYSTOLIC BLOOD PRESSURE: 137 MMHG | DIASTOLIC BLOOD PRESSURE: 84 MMHG | BODY MASS INDEX: 30.07 KG/M2

## 2024-12-27 DIAGNOSIS — J01.90 ACUTE BACTERIAL SINUSITIS: Primary | ICD-10-CM

## 2024-12-27 DIAGNOSIS — B96.89 ACUTE BACTERIAL SINUSITIS: Primary | ICD-10-CM

## 2024-12-27 RX ORDER — PREDNISONE 20 MG/1
20 TABLET ORAL DAILY
Qty: 3 TABLET | Refills: 0 | Status: SHIPPED | OUTPATIENT
Start: 2024-12-27 | End: 2024-12-30

## 2024-12-27 RX ORDER — PROMETHAZINE HYDROCHLORIDE AND DEXTROMETHORPHAN HYDROBROMIDE 6.25; 15 MG/5ML; MG/5ML
5 SYRUP ORAL EVERY 4 HOURS PRN
Qty: 180 ML | Refills: 0 | Status: SHIPPED | OUTPATIENT
Start: 2024-12-27 | End: 2025-01-06

## 2024-12-27 RX ORDER — DOXYCYCLINE 100 MG/1
100 CAPSULE ORAL 2 TIMES DAILY
Qty: 14 CAPSULE | Refills: 0 | Status: SHIPPED | OUTPATIENT
Start: 2024-12-27 | End: 2025-01-03

## 2024-12-27 NOTE — PATIENT INSTRUCTIONS
"Acute bacterial sinusitis    -     predniSONE (DELTASONE) 20 MG tablet; Take 1 tablet (20 mg total) by mouth once daily. for 3 days  Dispense: 3 tablet; Refill: 0    -     doxycycline (VIBRAMYCIN) 100 MG Cap; Take 1 capsule (100 mg total) by mouth 2 (two) times daily. for 7 days  Dispense: 14 capsule; Refill: 0    Unlike Viral Sinusitis (or viral URI), Bacterial sinusitis is treated with antibiotic medicine. This should help you start to feel better in a few days.  Take antibiotic medicine for the full 10 days, even if you feel better. This is very important to ensure the infection is treated. It is also important to prevent medicine-resistant germs from developing.     - Rest at home.     - Drink plenty of fluids so you won't get dehydrated.    Avoid taking Decongestants such as pseudoephedrine (ex. Sudafed) or phenylephrine (ex. Mucinex FastMax, Dayquil, Nyquil, or any combo cold meds that say "cold," "sinus" or "-D").      - Cough recommendations:   Warm tea with honey can help with cough. Honey is a natural cough suppressant.     NIGHTTIME:  -     (PROMETHAZINE-DM) promethazine-dextromethorphan 6.25-15 mg/5 mL Syrp; Take 5 mLs by mouth every 4 (four) hours as needed (cough).    +  Mucinex (guaifenesin) twice a day (or as directed) to help loosen mucous.       OR    DAYTIME:   Mucinex DM (guaifenesin + dextromethorphan).        - Fever/Pain recommendations:  Alternate Tylenol or Ibuprofen as directed for fever/pain.   - Motrin/Ibuprofen every 6-8 hours for pain and inflammation. Do not take ibuprofen if you have a history of GI bleeding, kidney disease, or if you take blood thinners.    - Tylenol/acetaminophen every 6-8 hours for added pain relief.  Avoid tylenol if you have a history of liver disease.       -Sore throat recommendations: Warm fluids, warm salt water gargles, throat lozenges, tea, honey, soup, or drinking something cold or frozen.  Throat lozenges or sprays help reduce pain. Gargling with warm " saltwater (1/4 teaspoon of salt in 1/2 cup of warm water) or an OTC anesthetic gargle may be useful for irritation.    Follow up with PCP if symptoms worsen or do not resolve fully.    When to seek medical advice  Call your healthcare provider right away if any of these occur:  Fever that is poorly controlled with OTC fever reducing medication  New or worsening ear pain, sinus pain, or headache  Stiff neck  You can't swallow liquids or you can't open your mouth wide because of throat pain  Signs of dehydration. These include very dark urine or no urine, sunken eyes, and dizziness.  Trouble breathing or noisy breathing  Muffled voice  Rash

## 2024-12-27 NOTE — PROGRESS NOTES
"Subjective:      Patient ID: Royer Castillo is a 64 y.o. male.    Vitals:  height is 5' 9" (1.753 m) and weight is 92.1 kg (203 lb). His oral temperature is 98.1 °F (36.7 °C). His blood pressure is 137/84 and his pulse is 61. His respiration is 19 and oxygen saturation is 96%.     Chief Complaint: Cough    Pt is a 64 y.o. male with the complaint of cough, congestion, headaches, and sore throat.  Symptoms began over 2 weeks ago.  Pt was seen at another urgent care on 12/4 after 2 days of similar symptoms and was treated for sinusitis with z-pack and decongestant cough meds.  Symptoms have not improved.      Sinusitis  The current episode started 1 to 4 weeks ago. There has been no fever. The pain is moderate. Associated symptoms include congestion, coughing, headaches, sinus pressure and a sore throat. Pertinent negatives include no chills, ear pain or shortness of breath. Past treatments include oral decongestants and antibiotics. The treatment provided no relief.     Constitution: Negative for chills, fatigue and fever.   HENT:  Positive for congestion, sinus pain, sinus pressure and sore throat. Negative for ear pain.    Cardiovascular:  Negative for chest pain.   Respiratory:  Positive for cough and sputum production. Negative for shortness of breath.    Gastrointestinal:  Negative for nausea, vomiting and diarrhea.   Neurological:  Positive for headaches.      Objective:     Physical Exam   Constitutional: He is oriented to person, place, and time.   HENT:   Head: Normocephalic.   Ears:   Right Ear: Hearing and external ear normal. No no drainage, swelling or tenderness. Tympanic membrane is not erythematous, not retracted and not bulging. No middle ear effusion.   Left Ear: Hearing and external ear normal. No no drainage, swelling or tenderness. Tympanic membrane is not erythematous, not retracted and not bulging.  No middle ear effusion.   Nose: No mucosal edema, rhinorrhea or purulent discharge. Right " sinus exhibits maxillary sinus tenderness and frontal sinus tenderness. Left sinus exhibits maxillary sinus tenderness and frontal sinus tenderness.   Mouth/Throat: Uvula is midline, oropharynx is clear and moist and mucous membranes are normal. No trismus in the jaw. No uvula swelling. No oropharyngeal exudate, posterior oropharyngeal edema or posterior oropharyngeal erythema.   Eyes: EOM are normal.   Cardiovascular: Normal rate and regular rhythm.   Pulmonary/Chest: Effort normal and breath sounds normal. No respiratory distress.   Musculoskeletal: Normal range of motion.         General: Normal range of motion.   Neurological: He is alert and oriented to person, place, and time.   Skin: Skin is warm and dry.   Nursing note and vitals reviewed.      Assessment:     1. Acute bacterial sinusitis        Plan:       Acute bacterial sinusitis  -     predniSONE (DELTASONE) 20 MG tablet; Take 1 tablet (20 mg total) by mouth once daily. for 3 days  Dispense: 3 tablet; Refill: 0  -     promethazine-dextromethorphan (PROMETHAZINE-DM) 6.25-15 mg/5 mL Syrp; Take 5 mLs by mouth every 4 (four) hours as needed (cough).  Dispense: 180 mL; Refill: 0  -     doxycycline (VIBRAMYCIN) 100 MG Cap; Take 1 capsule (100 mg total) by mouth 2 (two) times daily. for 7 days  Dispense: 14 capsule; Refill: 0      Patient Instructions   Acute bacterial sinusitis    -     predniSONE (DELTASONE) 20 MG tablet; Take 1 tablet (20 mg total) by mouth once daily. for 3 days  Dispense: 3 tablet; Refill: 0    -     doxycycline (VIBRAMYCIN) 100 MG Cap; Take 1 capsule (100 mg total) by mouth 2 (two) times daily. for 7 days  Dispense: 14 capsule; Refill: 0    Unlike Viral Sinusitis (or viral URI), Bacterial sinusitis is treated with antibiotic medicine. This should help you start to feel better in a few days.  Take antibiotic medicine for the full 10 days, even if you feel better. This is very important to ensure the infection is treated. It is also  "important to prevent medicine-resistant germs from developing.     - Rest at home.     - Drink plenty of fluids so you won't get dehydrated.    Avoid taking Decongestants such as pseudoephedrine (ex. Sudafed) or phenylephrine (ex. Mucinex FastMax, Dayquil, Nyquil, or any combo cold meds that say "cold," "sinus" or "-D").      - Cough recommendations:   Warm tea with honey can help with cough. Honey is a natural cough suppressant.     NIGHTTIME:  -     (PROMETHAZINE-DM) promethazine-dextromethorphan 6.25-15 mg/5 mL Syrp; Take 5 mLs by mouth every 4 (four) hours as needed (cough).    +  Mucinex (guaifenesin) twice a day (or as directed) to help loosen mucous.       OR    DAYTIME:   Mucinex DM (guaifenesin + dextromethorphan).        - Fever/Pain recommendations:  Alternate Tylenol or Ibuprofen as directed for fever/pain.   - Motrin/Ibuprofen every 6-8 hours for pain and inflammation. Do not take ibuprofen if you have a history of GI bleeding, kidney disease, or if you take blood thinners.    - Tylenol/acetaminophen every 6-8 hours for added pain relief.  Avoid tylenol if you have a history of liver disease.       -Sore throat recommendations: Warm fluids, warm salt water gargles, throat lozenges, tea, honey, soup, or drinking something cold or frozen.  Throat lozenges or sprays help reduce pain. Gargling with warm saltwater (1/4 teaspoon of salt in 1/2 cup of warm water) or an OTC anesthetic gargle may be useful for irritation.    Follow up with PCP if symptoms worsen or do not resolve fully.    When to seek medical advice  Call your healthcare provider right away if any of these occur:  Fever that is poorly controlled with OTC fever reducing medication  New or worsening ear pain, sinus pain, or headache  Stiff neck  You can't swallow liquids or you can't open your mouth wide because of throat pain  Signs of dehydration. These include very dark urine or no urine, sunken eyes, and dizziness.  Trouble breathing or noisy " breathing  Muffled voice  Rash

## 2025-01-09 DIAGNOSIS — K21.9 GASTROESOPHAGEAL REFLUX DISEASE, UNSPECIFIED WHETHER ESOPHAGITIS PRESENT: Primary | ICD-10-CM

## 2025-01-10 RX ORDER — PANTOPRAZOLE SODIUM 40 MG/1
40 TABLET, DELAYED RELEASE ORAL DAILY
Qty: 30 TABLET | Refills: 5 | Status: SHIPPED | OUTPATIENT
Start: 2025-01-10

## 2025-01-10 NOTE — TELEPHONE ENCOUNTER
Pt states he need Rx resent as 30 day supply because insurance will not cover 90. LOV 07/17/24. NOV 01/17/25.

## 2025-01-10 NOTE — TELEPHONE ENCOUNTER
Care Due:                  Date            Visit Type   Department     Provider  --------------------------------------------------------------------------------                                             [facility]  Last Visit: 07-      [facility]          KEVIN Alfaro                               -                              PRIMARY      [facility]  Next Visit: 01-      CARE ([facility])   KEVIN Alfaro                                                            Last  Test          Frequency    Reason                     Performed    Due Date  --------------------------------------------------------------------------------    CBC.........  12 months..  acyclovir................  01- 01-    [facility] Embedded Care Due Messages. Reference number: [id number].   1/09/2025 6:49:20 PM CST

## 2025-01-13 ENCOUNTER — HOSPITAL ENCOUNTER (OUTPATIENT)
Dept: RADIOLOGY | Facility: HOSPITAL | Age: 65
Discharge: HOME | End: 2025-01-13
Attending: ORTHOPAEDIC SURGERY
Payer: COMMERCIAL

## 2025-01-13 ENCOUNTER — OFFICE VISIT (OUTPATIENT)
Dept: SPORTS MEDICINE | Facility: CLINIC | Age: 65
End: 2025-01-13
Payer: COMMERCIAL

## 2025-01-13 VITALS
BODY MASS INDEX: 29.36 KG/M2 | HEIGHT: 69 IN | DIASTOLIC BLOOD PRESSURE: 80 MMHG | WEIGHT: 198.19 LBS | HEART RATE: 59 BPM | SYSTOLIC BLOOD PRESSURE: 135 MMHG

## 2025-01-13 DIAGNOSIS — Z96.642 S/P HIP REPLACEMENT, LEFT: Primary | ICD-10-CM

## 2025-01-13 DIAGNOSIS — Z96.642 S/P HIP REPLACEMENT, LEFT: ICD-10-CM

## 2025-01-13 PROCEDURE — 3008F BODY MASS INDEX DOCD: CPT | Mod: CPTII,S$GLB,, | Performed by: ORTHOPAEDIC SURGERY

## 2025-01-13 PROCEDURE — 73521 X-RAY EXAM HIPS BI 2 VIEWS: CPT | Mod: TC

## 2025-01-13 PROCEDURE — 99999 PR PBB SHADOW E&M-EST. PATIENT-LVL III: CPT | Mod: PBBFAC,,, | Performed by: ORTHOPAEDIC SURGERY

## 2025-01-13 PROCEDURE — 1159F MED LIST DOCD IN RCRD: CPT | Mod: CPTII,S$GLB,, | Performed by: ORTHOPAEDIC SURGERY

## 2025-01-13 PROCEDURE — 3075F SYST BP GE 130 - 139MM HG: CPT | Mod: CPTII,S$GLB,, | Performed by: ORTHOPAEDIC SURGERY

## 2025-01-13 PROCEDURE — 3079F DIAST BP 80-89 MM HG: CPT | Mod: CPTII,S$GLB,, | Performed by: ORTHOPAEDIC SURGERY

## 2025-01-13 PROCEDURE — 73521 X-RAY EXAM HIPS BI 2 VIEWS: CPT | Mod: 26,,, | Performed by: RADIOLOGY

## 2025-01-13 PROCEDURE — 99214 OFFICE O/P EST MOD 30 MIN: CPT | Mod: S$GLB,,, | Performed by: ORTHOPAEDIC SURGERY

## 2025-01-13 NOTE — PROGRESS NOTES
"Subjective:     Chief Complaint: Royer Castillo is a 64 y.o. male who had concerns including Follow-up of the Left Hip.    History of Present Illness    CHIEF COMPLAINT:  - Royer presents today for follow-up status post hip surgery to evaluate healing and function.    HPI:  Royer reports returning to work in mid-July. 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 He expresses his intention to continue working until October, saying, "I'm trying to continue working until late October." Royer reports attending the gym and working out, as well as walking. He mentions a recent scratch on his leg, stating, "I was unaware of how I obtained the scratch. I was exiting my truck and felt a burning sensation. Upon inspection of my leg, I noticed the injury." He denies any current problems when asked.    SURGICAL HISTORY:  - Royer has an implant, likely related to a previous surgery. The implant is described as "sitting in the right spot" and "really well fixed in there".    WORK STATUS:  - Royer returned to work in mid-July.  - He carries trauma gear and a pack, weighing about 80 lbs on his back.  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  - Royer sometimes needs to find support to pull himself up when getting off the floor due to the weight of the equipment.  - The work involves potentially jumping or landing, which the doctor advises to be careful about.  - Royer is trying to continue working until October, possibly to qualify for disability.           Pain Related Questions  Over the past 3 days, what was your average pain during activity? (I.e. running, jogging, walking, climbing stairs, getting dressed, ect.): 0  Over the past 3 days, what was your highest pain " level?: 0  Over the past 3 days, what was your lowest pain level? : 0    Other  How many nights a week are you awakened by your affected body part?: 0  Was the patient's HEIGHT measured or patient reported?: Patient Reported  Was the patient's WEIGHT measured or patient reported?: Measured    Past Surgical History:   Procedure Laterality Date    ARTHROSCOPIC CHONDROPLASTY OF KNEE JOINT Right 5/27/2021    Procedure: ARTHROSCOPY, KNEE, WITH CHONDROPLASTY;  Surgeon: Gerard Tello MD;  Location: [facility] OR;  Service: Orthopedics;  Laterality: Right;    COLONOSCOPY N/A 12/11/2019    Procedure: COLONOSCOPY;  Surgeon: Devaughn Johnson MD;  Location: [facility] ENDO (4TH FLR);  Service: Endoscopy;  Laterality: N/A;    ESOPHAGOGASTRODUODENOSCOPY N/A 3/2/2022    Procedure: EGD (ESOPHAGOGASTRODUODENOSCOPY);  Surgeon: John Rose MD;  Location: [facility] (4TH FLR);  Service: Endoscopy;  Laterality: N/A;  1/27 covid test 2/27 @ [facility]; instructions to portal-st  2/25 lvm to confirm appt-RB    HIP ARTHROPLASTY Left 4/16/2024    Procedure: ARTHROPLASTY, HIP;  Surgeon: Gerard Tello MD;  Location: [facility] OR;  Service: Orthopedics;  Laterality: Left;  General, PENG, Regional w/o catheter (spinal), johnathan 50cc    KNEE ARTHROSCOPY W/ MENISCECTOMY Right 5/27/2021    Procedure: ARTHROSCOPY, KNEE, WITH MENISCECTOMY;  Surgeon: Gerard Tello MD;  Location: [facility] OR;  Service: Orthopedics;  Laterality: Right;  JOHNATHAN 50CC    SYNOVECTOMY OF KNEE Right 5/27/2021    Procedure: SYNOVECTOMY, KNEE limited;  Surgeon: Gerard Tello MD;  Location: [facility] OR;  Service: Orthopedics;  Laterality: Right;       Objective:     General: Royer is well-developed, well-nourished, appears stated age, in no acute distress, alert and oriented to time, place and person.     General    Vitals reviewed.  Constitutional: He is oriented to person, place, and time. He appears well-developed and well-nourished. No distress.   HENT: Mouth/Throat: No oropharyngeal  exudate.   Eyes: Right eye exhibits no discharge. Left eye exhibits no discharge.   Cardiovascular:  Normal rate.            Pulmonary/Chest: Effort normal and breath sounds normal. No respiratory distress.   Neurological: He is alert and oriented to person, place, and time. He has normal reflexes. No cranial nerve deficit. Coordination normal.   Psychiatric: He has a normal mood and affect. His behavior is normal. Judgment and thought content normal.     General Musculoskeletal Exam   Gait: normal   Pelvic Obliquity: none      Right Knee Exam     Inspection   Alignment:  normal  Effusion: absent    Left Knee Exam     Inspection   Alignment:  normal  Effusion: absent    Right Hip Exam     Inspection   Scars: absent  Swelling: absent  Bruising: absent  No deformity of hip.  Quadriceps Atrophy:  Negative  Erythema: absent    Range of Motion   Abduction:  40 normal   Adduction:  20 normal   Extension:  0 normal   Flexion:  120 normal   External rotation:  60 normal   Internal rotation:  15 normal     Tests   Pain w/ forced internal rotation (CAN): absent  Pain w/ forced external rotation (FADIR): absent  Owen: negative  Trendelenburg Test: negative  Circumduction test: negative  Stinchfield test: negative  Log Roll: negative  Snapping Hip (internal): negative  Step-down test: negative  Resisted sit-up pain: negative  Unresisted sit-up pain: negative  Resisted sit-up pain with adductor contraction pain: negative    Other   Sensation: normal  Left Hip Exam     Inspection   Scars: present  Swelling: absent  No deformity of hip.  Quadriceps Atrophy:  negative  Erythema: absent  Bruising: absent    Range of Motion   Abduction:  40 normal   Adduction:  20 normal   Extension:  0 normal   Flexion:  120 normal   External rotation:  60 normal   Internal rotation: 15 normal     Tests   Pain w/ forced internal rotation (CAN): absent  Pain w/ forced external rotation (FADIR): absent  Owen: negative  Trendelenburg Test:  negative  Circumduction test: negative  Stinchfield test: negative  Log Roll: negative  Snapping Hip (internal): negative  Step-down test: negative  Resisted sit-up pain: negative  Resisted sit-up pain with adductor contraction pain: negative  Unresisted sit-up pain: negative    Other   Sensation: normal      Back (L-Spine & T-Spine) / Neck (C-Spine) Exam   Back exam is normal.      Muscle Strength   Right Lower Extremity   Hip Abduction: 5/5   Hip Adduction: 5/5   Hip Flexion: 5/5   Ankle Dorsiflexion:  5/5   Left Lower Extremity   Hip Abduction: 5/5   Hip Adduction: 5/5   Hip Flexion: 5/5   Ankle Dorsiflexion:  5/5     Reflexes     Left Side  Achilles:  2+  Quadriceps:  2+    Right Side   Achilles:  2+  Quadriceps:  2+    Vascular Exam     Right Pulses  Dorsalis Pedis:      2+  Posterior Tibial:      2+        Left Pulses  Dorsalis Pedis:      2+  Posterior Tibial:      2+        Capillary Refill  Right Hand: normal capillary refill  Left Hand: normal capillary refill        Edema  Right Upper Leg: absent  Left Upper Leg: absent          Radiographic findings today:  X-Ray Hips Bilateral 2 View Incl AP Pelvis  Narrative: EXAMINATION:  XR HIPS BILATERAL 2 VIEW INCL AP PELVIS    CLINICAL HISTORY:  Presence of left artificial hip joint    TECHNIQUE:  AP view of the pelvis and frogleg lateral views of both hips were performed.    COMPARISON:  Non 08/26/2024 e.    FINDINGS:  Left hip arthroplasty identified.  The position and alignment is satisfactory and unchanged as compared to the previous study.  Mild DJD.  No fracture or bone destruction identified.  Impression: See above    Electronically signed by: Marvin Epperson MD  Date:    01/13/2025  Time:    08:58         These findings were discussed and reviewed with the patient.     Assessment:     Encounter Diagnosis   Name Primary?    S/P hip replacement, left Yes        Plan:     Assessment & Plan    IMAGING ORDERS:  - Ordered yearly x-rays to ensure the implant is  still sitting in the right spot.  - Reviewed x-rays with the patient, showing the implant is well-positioned and properly healed.    RETURN TO ACTIVITY:  - Advised to be careful with jumping and landing on the affected leg by itself.  - Cautioned against heavy lifting greater than 100 lbs, particularly movements that put strain on the affected area.    PROCEDURES:  - Performed physical exam, including gait assessment, squat test, and leg lift test.    FOLLOW UP:  - Schedule appointment for yearly follow-up.           All of the patient's questions were answered and the patient will contact us if they have any questions or concerns in the interim.    This note was generated with the assistance of ambient listening technology. Verbal consent was obtained by the patient and accompanying visitor(s) for the recording of patient appointment to facilitate this note. I attest to having reviewed and edited the generated note for accuracy, though some syntax or spelling errors may persist. Please contact the author of this note for any clarification.

## 2025-01-16 ENCOUNTER — PATIENT MESSAGE (OUTPATIENT)
Dept: ADMINISTRATIVE | Facility: OTHER | Age: 65
End: 2025-01-16
Payer: COMMERCIAL

## 2025-01-17 ENCOUNTER — OFFICE VISIT (OUTPATIENT)
Dept: PRIMARY CARE CLINIC | Facility: CLINIC | Age: 65
End: 2025-01-17
Payer: COMMERCIAL

## 2025-01-17 VITALS
SYSTOLIC BLOOD PRESSURE: 130 MMHG | DIASTOLIC BLOOD PRESSURE: 78 MMHG | HEIGHT: 69 IN | HEART RATE: 71 BPM | TEMPERATURE: 98 F | OXYGEN SATURATION: 97 % | BODY MASS INDEX: 30.1 KG/M2 | WEIGHT: 203.25 LBS

## 2025-01-17 DIAGNOSIS — Z12.11 SCREEN FOR COLON CANCER: ICD-10-CM

## 2025-01-17 DIAGNOSIS — K63.5 POLYP OF COLON, UNSPECIFIED PART OF COLON, UNSPECIFIED TYPE: ICD-10-CM

## 2025-01-17 DIAGNOSIS — K21.9 GASTROESOPHAGEAL REFLUX DISEASE, UNSPECIFIED WHETHER ESOPHAGITIS PRESENT: ICD-10-CM

## 2025-01-17 DIAGNOSIS — Z00.00 ANNUAL PHYSICAL EXAM: Primary | ICD-10-CM

## 2025-01-17 DIAGNOSIS — E78.2 MIXED HYPERLIPIDEMIA: ICD-10-CM

## 2025-01-17 DIAGNOSIS — Z12.5 SCREENING FOR PROSTATE CANCER: ICD-10-CM

## 2025-01-17 DIAGNOSIS — R73.03 PRE-DIABETES: ICD-10-CM

## 2025-01-17 DIAGNOSIS — I10 ESSENTIAL HYPERTENSION: ICD-10-CM

## 2025-01-17 PROBLEM — E66.3 OVERWEIGHT (BMI 25.0-29.9): Status: ACTIVE | Noted: 2024-03-28

## 2025-01-17 PROCEDURE — 3075F SYST BP GE 130 - 139MM HG: CPT | Mod: CPTII,S$GLB,, | Performed by: STUDENT IN AN ORGANIZED HEALTH CARE EDUCATION/TRAINING PROGRAM

## 2025-01-17 PROCEDURE — 99999 PR PBB SHADOW E&M-EST. PATIENT-LVL IV: CPT | Mod: PBBFAC,,, | Performed by: STUDENT IN AN ORGANIZED HEALTH CARE EDUCATION/TRAINING PROGRAM

## 2025-01-17 PROCEDURE — 3078F DIAST BP <80 MM HG: CPT | Mod: CPTII,S$GLB,, | Performed by: STUDENT IN AN ORGANIZED HEALTH CARE EDUCATION/TRAINING PROGRAM

## 2025-01-17 PROCEDURE — 1159F MED LIST DOCD IN RCRD: CPT | Mod: CPTII,S$GLB,, | Performed by: STUDENT IN AN ORGANIZED HEALTH CARE EDUCATION/TRAINING PROGRAM

## 2025-01-17 PROCEDURE — 99396 PREV VISIT EST AGE 40-64: CPT | Mod: S$GLB,,, | Performed by: STUDENT IN AN ORGANIZED HEALTH CARE EDUCATION/TRAINING PROGRAM

## 2025-01-17 PROCEDURE — 3008F BODY MASS INDEX DOCD: CPT | Mod: CPTII,S$GLB,, | Performed by: STUDENT IN AN ORGANIZED HEALTH CARE EDUCATION/TRAINING PROGRAM

## 2025-01-17 PROCEDURE — 1160F RVW MEDS BY RX/DR IN RCRD: CPT | Mod: CPTII,S$GLB,, | Performed by: STUDENT IN AN ORGANIZED HEALTH CARE EDUCATION/TRAINING PROGRAM

## 2025-01-17 RX ORDER — ATORVASTATIN CALCIUM 40 MG/1
40 TABLET, FILM COATED ORAL NIGHTLY
Qty: 30 TABLET | Refills: 11 | Status: SHIPPED | OUTPATIENT
Start: 2025-01-17 | End: 2026-01-17

## 2025-01-17 NOTE — PROGRESS NOTES
Office visit  Patient: Royer Castillo   1/17/2025       Assessment/Plan:       1. Annual physical exam  -     TSH; Future; Expected date: 01/17/2025  -     Hemoglobin A1C; Future; Expected date: 01/17/2025  -     Lipid Panel; Future; Expected date: 01/17/2025  -     Comprehensive Metabolic Panel; Future; Expected date: 01/17/2025  -     CBC Auto Differential; Future; Expected date: 01/17/2025        -Discussed healthy habits and recommended preventative screening    2. Essential hypertension  -     Comprehensive Metabolic Panel; Future; Expected date: 01/17/2025  -     CBC Auto Differential; Future; Expected date: 01/17/2025        -stable, continue current medication     3. Mixed hyperlipidemia  -     Lipid Panel; Future; Expected date: 01/17/2025  -     atorvastatin (LIPITOR) 40 MG tablet; Take 1 tablet (40 mg total) by mouth every evening.  Dispense: 30 tablet; Refill: 11        -stable, continue current medication     4. Pre-diabetes  -     TSH; Future; Expected date: 01/17/2025  -     Hemoglobin A1C; Future; Expected date: 01/17/2025  -     CBC Auto Differential; Future; Expected date: 01/17/2025        -check A1c, continue diet changes     5. Gastroesophageal reflux disease, unspecified whether esophagitis present  -     CBC Auto Differential; Future; Expected date: 01/17/2025        -stable, continue pantoprazole; consider discontinuing it at next visit and substituting famotidine     6. Screen for colon cancer  -     Ambulatory referral/consult to Endo Procedure ; Future; Expected date: 01/18/2025    7. Polyp of colon, unspecified part of colon, unspecified type  -     Ambulatory referral/consult to Endo Procedure ; Future; Expected date: 01/18/2025        -due for repeat colonoscopy     8. Screening for prostate cancer  -     PSA, SCREENING; Future; Expected date: 01/17/2025    Return to clinic in 6 months for follow-up of hypertension and prediabetes or sooner as needed.      CHIEF  "COMPLAINT: annual physical    HPI: Royer Castillo is a 64 y.o. male who presents for an annual physical. He reports having COVID about 2 weeks ago.  He feels much better now.  He has no complaints.    Review of Systems   Constitutional:  Negative for fever and malaise/fatigue.   HENT:  Negative for congestion, ear discharge, ear pain and sore throat.    Eyes:  Negative for pain and discharge.   Respiratory:  Negative for cough and shortness of breath.    Cardiovascular:  Negative for chest pain and palpitations.   Gastrointestinal:  Negative for abdominal pain, constipation, diarrhea, nausea and vomiting.   Genitourinary:  Negative for dysuria, frequency and hematuria.   Musculoskeletal:  Negative for joint pain and myalgias.   Skin:  Negative for rash.   Neurological:  Negative for dizziness, seizures and headaches.       Current Outpatient Medications   Medication Instructions    acyclovir (ZOVIRAX) 200 mg, Oral, 2 times daily    amLODIPine (NORVASC) 10 mg, Oral, Daily    atorvastatin (LIPITOR) 40 mg, Oral, Nightly    hydroCHLOROthiazide (HYDRODIURIL) 25 mg, Oral, Daily    pantoprazole (PROTONIX) 40 mg, Oral, Daily       Lab Results   Component Value Date    HGBA1C 5.9 (H) 01/19/2024    HGBA1C 5.8 (H) 01/03/2023    HGBA1C 5.9 (H) 12/21/2021     No results found for: "MICALBCREAT"  Lab Results   Component Value Date    LDLCALC 80.8 01/19/2024    LDLCALC 93.2 01/03/2023    CHOL 133 01/19/2024    HDL 36 (L) 01/19/2024    TRIG 81 01/19/2024       Lab Results   Component Value Date     01/19/2024    K 3.9 01/19/2024     01/19/2024    CO2 27 01/19/2024     01/19/2024    BUN 11 01/19/2024    CREATININE 1.0 01/19/2024    CALCIUM 9.2 01/19/2024    PROT 7.3 01/19/2024    ALBUMIN 4.1 01/19/2024    BILITOT 0.9 01/19/2024    ALKPHOS 74 01/19/2024    AST 20 01/19/2024    ALT 27 01/19/2024    ANIONGAP 11 01/19/2024    ESTGFRAFRICA >60.0 01/31/2022    EGFRNONAA >60.0 01/31/2022    WBC 5.77 01/19/2024    " HGB 15.4 01/19/2024    HGB 15.7 01/03/2023    HCT 34 (L) 04/17/2024    MCV 98 01/19/2024     01/19/2024    TSH 1.393 01/19/2024    PSA 1.0 01/19/2024    PSA 0.90 01/03/2023    HEPCAB Negative 12/21/2021       Lab Results   Component Value Date    TOTALTESTOST 381 01/06/2023    ZLGNKBKT87WI 35 12/21/2021         Past Medical History:   Diagnosis Date    Arthritis     Cellulitis     left foot    Diverticulosis     Genital herpes     GERD (gastroesophageal reflux disease)     HTN (hypertension)     Hyperlipidemia     Left atrial dilation     Onychomycosis     Pre-diabetes      Past Surgical History:   Procedure Laterality Date    ARTHROSCOPIC CHONDROPLASTY OF KNEE JOINT Right 5/27/2021    Procedure: ARTHROSCOPY, KNEE, WITH CHONDROPLASTY;  Surgeon: Gerard Tello MD;  Location: [facility] OR;  Service: Orthopedics;  Laterality: Right;    COLONOSCOPY N/A 12/11/2019    Procedure: COLONOSCOPY;  Surgeon: Devaughn Johnson MD;  [facility]: [facility] ([facility]);  Service: Endoscopy;  Laterality: N/A;    ESOPHAGOGASTRODUODENOSCOPY N/A 3/2/2022    Procedure: EGD (ESOPHAGOGASTRODUODENOSCOPY);  Surgeon: John Rose MD;  Location: [facility] (4[facility] FLR);  Service: Endoscopy;  Laterality: N/A;  1/27 covid test 2/27 @ [place]; instructions to portal-st  2/25 lv to confirm appt-RB    HIP ARTHROPLASTY Left 4/16/2024    Procedure: ARTHROPLASTY, HIP;  Surgeon: Gerard Tello MD;  Location: [facility] OR;  Service: Orthopedics;  Laterality: Left;  General, PENG, Regional w/o catheter (spinal), johnathan 50cc    KNEE ARTHROSCOPY W/ MENISCECTOMY Right 5/27/2021    Procedure: ARTHROSCOPY, KNEE, WITH MENISCECTOMY;  Surgeon: Gerard Tello MD;  Location: [facility] OR;  Service: Orthopedics;  Laterality: Right;  JOHNATHAN 50CC    SYNOVECTOMY OF KNEE Right 5/27/2021    Procedure: SYNOVECTOMY, KNEE limited;  Surgeon: Gerard Tello MD;  Location: [facility] OR;  Service: Orthopedics;  Laterality: Right;       Social History     Tobacco Use    Smoking status: Never      "Passive exposure: Never    Smokeless tobacco: Never   Substance Use Topics    Alcohol use: No    Drug use: No       family history includes Cancer in his mother; Esophageal cancer in his father; Heart attack (age of onset: 60 - 69) in his father; Heart disease (age of onset: 64) in his sister; Heart disease (age of onset: 86) in his father; Hyperlipidemia in his sister and sister; Hypertension in his father and sister; Kidney disease in his sister.    Vitals:    01/17/25 0811   BP: 130/78   Pulse: 71   Temp: 97.9 °F (36.6 °C)   TempSrc: Oral   SpO2: 97%   Weight: 92.2 kg (203 lb 4.2 oz)   Height: 5' 9" (1.753 m)   PainSc: 0-No pain     Objective:   Physical Exam  Vitals reviewed.   Constitutional:       General: He is not in acute distress.     Appearance: Normal appearance. He is well-developed.   HENT:      Head: Normocephalic and atraumatic.      Right Ear: Hearing and external ear normal. No decreased hearing noted. No drainage or swelling.      Left Ear: Hearing and external ear normal. No decreased hearing noted. No drainage or swelling.      Nose: Nose normal. No rhinorrhea.      Mouth/Throat:      Mouth: Mucous membranes are moist.   Eyes:      General: Lids are normal.         Right eye: No discharge.         Left eye: No discharge.      Conjunctiva/sclera: Conjunctivae normal.      Right eye: Right conjunctiva is not injected. No exudate.     Left eye: Left conjunctiva is not injected. No exudate.  Cardiovascular:      Rate and Rhythm: Normal rate and regular rhythm.      Heart sounds: Normal heart sounds. No murmur heard.     No friction rub. No gallop.   Pulmonary:      Effort: Pulmonary effort is normal. No respiratory distress.      Breath sounds: No stridor. No wheezing, rhonchi or rales.   Musculoskeletal:         General: No deformity.      Right lower leg: No edema.      Left lower leg: No edema.   Skin:     General: Skin is warm and dry.   Neurological:      General: No focal deficit present.      " Mental Status: He is alert and oriented to person, place, and time.   Psychiatric:         Mood and Affect: Mood normal.         Speech: Speech normal.         Behavior: Behavior normal.             Sola Alfaro MD  Internal Medicine and Pediatrics

## 2025-01-21 ENCOUNTER — TELEPHONE (OUTPATIENT)
Dept: ENDOSCOPY | Facility: HOSPITAL | Age: 65
End: 2025-01-21
Payer: COMMERCIAL

## 2025-01-21 NOTE — TELEPHONE ENCOUNTER
Informed pt his pat appt was re-scheduled to 1/29/25 for scheduling his colonoscopy procedure.pt verbalized understanding

## 2025-01-24 ENCOUNTER — LAB VISIT (OUTPATIENT)
Dept: LAB | Facility: HOSPITAL | Age: 65
End: 2025-01-24
Attending: STUDENT IN AN ORGANIZED HEALTH CARE EDUCATION/TRAINING PROGRAM
Payer: COMMERCIAL

## 2025-01-24 DIAGNOSIS — E78.2 MIXED HYPERLIPIDEMIA: ICD-10-CM

## 2025-01-24 DIAGNOSIS — I10 ESSENTIAL HYPERTENSION: ICD-10-CM

## 2025-01-24 DIAGNOSIS — Z00.00 ANNUAL PHYSICAL EXAM: ICD-10-CM

## 2025-01-24 DIAGNOSIS — Z12.5 SCREENING FOR PROSTATE CANCER: ICD-10-CM

## 2025-01-24 DIAGNOSIS — K21.9 GASTROESOPHAGEAL REFLUX DISEASE, UNSPECIFIED WHETHER ESOPHAGITIS PRESENT: ICD-10-CM

## 2025-01-24 DIAGNOSIS — R73.03 PRE-DIABETES: ICD-10-CM

## 2025-01-24 LAB
BASOPHILS # BLD AUTO: 0.05 K/UL (ref 0–0.2)
BASOPHILS NFR BLD: 0.7 % (ref 0–1.9)
COMPLEXED PSA SERPL-MCNC: 1.4 NG/ML (ref 0–4)
DIFFERENTIAL METHOD BLD: NORMAL
EOSINOPHIL # BLD AUTO: 0.2 K/UL (ref 0–0.5)
EOSINOPHIL NFR BLD: 2.1 % (ref 0–8)
ERYTHROCYTE [DISTWIDTH] IN BLOOD BY AUTOMATED COUNT: 11.9 % (ref 11.5–14.5)
ESTIMATED AVG GLUCOSE: 123 MG/DL (ref 68–131)
HBA1C MFR BLD: 5.9 % (ref 4–5.6)
HCT VFR BLD AUTO: 46.4 % (ref 40–54)
HGB BLD-MCNC: 15.2 G/DL (ref 14–18)
IMM GRANULOCYTES # BLD AUTO: 0.03 K/UL (ref 0–0.04)
IMM GRANULOCYTES NFR BLD AUTO: 0.4 % (ref 0–0.5)
LYMPHOCYTES # BLD AUTO: 1.7 K/UL (ref 1–4.8)
LYMPHOCYTES NFR BLD: 24.3 % (ref 18–48)
MCH RBC QN AUTO: 29.5 PG (ref 27–31)
MCHC RBC AUTO-ENTMCNC: 32.8 G/DL (ref 32–36)
MCV RBC AUTO: 90 FL (ref 82–98)
MONOCYTES # BLD AUTO: 0.5 K/UL (ref 0.3–1)
MONOCYTES NFR BLD: 7.3 % (ref 4–15)
NEUTROPHILS # BLD AUTO: 4.7 K/UL (ref 1.8–7.7)
NEUTROPHILS NFR BLD: 65.2 % (ref 38–73)
NRBC BLD-RTO: 0 /100 WBC
PLATELET # BLD AUTO: 307 K/UL (ref 150–450)
PMV BLD AUTO: 11.4 FL (ref 9.2–12.9)
RBC # BLD AUTO: 5.15 M/UL (ref 4.6–6.2)
WBC # BLD AUTO: 7.13 K/UL (ref 3.9–12.7)

## 2025-01-24 PROCEDURE — 83036 HEMOGLOBIN GLYCOSYLATED A1C: CPT | Performed by: STUDENT IN AN ORGANIZED HEALTH CARE EDUCATION/TRAINING PROGRAM

## 2025-01-24 PROCEDURE — 84153 ASSAY OF PSA TOTAL: CPT | Performed by: STUDENT IN AN ORGANIZED HEALTH CARE EDUCATION/TRAINING PROGRAM

## 2025-01-24 PROCEDURE — 80053 COMPREHEN METABOLIC PANEL: CPT | Performed by: STUDENT IN AN ORGANIZED HEALTH CARE EDUCATION/TRAINING PROGRAM

## 2025-01-24 PROCEDURE — 80061 LIPID PANEL: CPT | Performed by: STUDENT IN AN ORGANIZED HEALTH CARE EDUCATION/TRAINING PROGRAM

## 2025-01-24 PROCEDURE — 85025 COMPLETE CBC W/AUTO DIFF WBC: CPT | Performed by: STUDENT IN AN ORGANIZED HEALTH CARE EDUCATION/TRAINING PROGRAM

## 2025-01-24 PROCEDURE — 84443 ASSAY THYROID STIM HORMONE: CPT | Performed by: STUDENT IN AN ORGANIZED HEALTH CARE EDUCATION/TRAINING PROGRAM

## 2025-01-25 LAB
ALBUMIN SERPL BCP-MCNC: 4.2 G/DL (ref 3.5–5.2)
ALP SERPL-CCNC: 80 U/L (ref 40–150)
ALT SERPL W/O P-5'-P-CCNC: 22 U/L (ref 10–44)
ANION GAP SERPL CALC-SCNC: 11 MMOL/L (ref 8–16)
AST SERPL-CCNC: 20 U/L (ref 10–40)
BILIRUB SERPL-MCNC: 0.5 MG/DL (ref 0.1–1)
BUN SERPL-MCNC: 19 MG/DL (ref 8–23)
CALCIUM SERPL-MCNC: 9 MG/DL (ref 8.7–10.5)
CHLORIDE SERPL-SCNC: 101 MMOL/L (ref 95–110)
CHOLEST SERPL-MCNC: 144 MG/DL (ref 120–199)
CHOLEST/HDLC SERPL: 3.5 {RATIO} (ref 2–5)
CO2 SERPL-SCNC: 28 MMOL/L (ref 23–29)
CREAT SERPL-MCNC: 1.2 MG/DL (ref 0.5–1.4)
EST. GFR  (NO RACE VARIABLE): >60 ML/MIN/1.73 M^2
GLUCOSE SERPL-MCNC: 106 MG/DL (ref 70–110)
HDLC SERPL-MCNC: 41 MG/DL (ref 40–75)
HDLC SERPL: 28.5 % (ref 20–50)
LDLC SERPL CALC-MCNC: 88.2 MG/DL (ref 63–159)
NONHDLC SERPL-MCNC: 103 MG/DL
POTASSIUM SERPL-SCNC: 4.1 MMOL/L (ref 3.5–5.1)
PROT SERPL-MCNC: 7.6 G/DL (ref 6–8.4)
SODIUM SERPL-SCNC: 140 MMOL/L (ref 136–145)
TRIGL SERPL-MCNC: 74 MG/DL (ref 30–150)
TSH SERPL DL<=0.005 MIU/L-ACNC: 2.77 UIU/ML (ref 0.4–4)

## 2025-01-28 DIAGNOSIS — A60.00 GENITAL HERPES SIMPLEX, UNSPECIFIED SITE: ICD-10-CM

## 2025-01-29 ENCOUNTER — CLINICAL SUPPORT (OUTPATIENT)
Dept: ENDOSCOPY | Facility: HOSPITAL | Age: 65
End: 2025-01-29
Attending: STUDENT IN AN ORGANIZED HEALTH CARE EDUCATION/TRAINING PROGRAM
Payer: COMMERCIAL

## 2025-01-29 ENCOUNTER — TELEPHONE (OUTPATIENT)
Dept: ENDOSCOPY | Facility: HOSPITAL | Age: 65
End: 2025-01-29

## 2025-01-29 VITALS — BODY MASS INDEX: 28.88 KG/M2 | WEIGHT: 195 LBS | HEIGHT: 69 IN

## 2025-01-29 DIAGNOSIS — K63.5 POLYP OF COLON, UNSPECIFIED PART OF COLON, UNSPECIFIED TYPE: ICD-10-CM

## 2025-01-29 DIAGNOSIS — Z12.11 SCREEN FOR COLON CANCER: ICD-10-CM

## 2025-01-29 RX ORDER — ACYCLOVIR 200 MG/1
200 CAPSULE ORAL 2 TIMES DAILY
Qty: 180 CAPSULE | Refills: 3 | Status: SHIPPED | OUTPATIENT
Start: 2025-01-29 | End: 2025-01-29

## 2025-01-29 RX ORDER — ACYCLOVIR 200 MG/1
200 CAPSULE ORAL 2 TIMES DAILY
Qty: 60 CAPSULE | Refills: 11 | Status: SHIPPED | OUTPATIENT
Start: 2025-01-29

## 2025-01-29 NOTE — TELEPHONE ENCOUNTER
Referral for procedure from PAT appointment      Spoke to patient to schedule procedure(s) Colonoscopy       Physician to perform procedure(s) Dr. LEWIS Torres  Date of Procedure (s) 03/14/25  Arrival Time 12:10 PM  Time of Procedure(s) 1:10 PM   Location of Procedure(s) [facility]  Type of Rx Prep sent to patient: PEG  Instructions provided to patient via [facility]    Patient was informed on the following information and verbalized understanding. Screening questionnaire reviewed with patient and complete. If procedure requires anesthesia, a responsible adult needs to be present to accompany the patient home, patient cannot drive after receiving anesthesia. Appointment details are tentative, especially check-in time. Patient will receive a prep-op call 7 days prior to confirm check-in time for procedure. If applicable the patient should contact their pharmacy to verify Rx for procedure prep is ready for pick-up. Patient was advised to call the scheduling department at [phone] if pharmacy states no Rx is available. Patient was advised to call the endoscopy scheduling department if any questions or concerns arise.      [facility]oscopy Scheduling Department

## 2025-01-29 NOTE — TELEPHONE ENCOUNTER
No care due was identified.  [facility] Embedded Care Due Messages. Reference number: [id number].   1/28/2025 9:47:53 PM CST

## 2025-03-14 ENCOUNTER — RESULTS FOLLOW-UP (OUTPATIENT)
Dept: PRIMARY CARE CLINIC | Facility: CLINIC | Age: 65
End: 2025-03-14

## 2025-03-14 ENCOUNTER — HOSPITAL ENCOUNTER (OUTPATIENT)
Facility: HOSPITAL | Age: 65
Discharge: HOME | End: 2025-03-14
Attending: STUDENT IN AN ORGANIZED HEALTH CARE EDUCATION/TRAINING PROGRAM | Admitting: STUDENT IN AN ORGANIZED HEALTH CARE EDUCATION/TRAINING PROGRAM
Payer: COMMERCIAL

## 2025-03-14 ENCOUNTER — ANESTHESIA (OUTPATIENT)
Dept: ENDOSCOPY | Facility: HOSPITAL | Age: 65
End: 2025-03-14
Payer: COMMERCIAL

## 2025-03-14 ENCOUNTER — ANESTHESIA EVENT (OUTPATIENT)
Dept: ENDOSCOPY | Facility: HOSPITAL | Age: 65
End: 2025-03-14
Payer: COMMERCIAL

## 2025-03-14 VITALS
OXYGEN SATURATION: 100 % | RESPIRATION RATE: 17 BRPM | DIASTOLIC BLOOD PRESSURE: 70 MMHG | WEIGHT: 195 LBS | HEART RATE: 55 BPM | HEIGHT: 70 IN | TEMPERATURE: 98 F | BODY MASS INDEX: 27.92 KG/M2 | SYSTOLIC BLOOD PRESSURE: 137 MMHG

## 2025-03-14 DIAGNOSIS — Z12.11 ENCOUNTER FOR SCREENING COLONOSCOPY: ICD-10-CM

## 2025-03-14 PROCEDURE — 45385 COLONOSCOPY W/LESION REMOVAL: CPT | Mod: 33 | Performed by: STUDENT IN AN ORGANIZED HEALTH CARE EDUCATION/TRAINING PROGRAM

## 2025-03-14 PROCEDURE — 45385 COLONOSCOPY W/LESION REMOVAL: CPT | Mod: 33,,, | Performed by: STUDENT IN AN ORGANIZED HEALTH CARE EDUCATION/TRAINING PROGRAM

## 2025-03-14 PROCEDURE — 25000003 PHARM REV CODE 250: Performed by: NURSE ANESTHETIST, CERTIFIED REGISTERED

## 2025-03-14 PROCEDURE — 88305 TISSUE EXAM BY PATHOLOGIST: CPT | Mod: 26,,, | Performed by: STUDENT IN AN ORGANIZED HEALTH CARE EDUCATION/TRAINING PROGRAM

## 2025-03-14 PROCEDURE — 37000008 HC ANESTHESIA 1ST 15 MINUTES: Performed by: STUDENT IN AN ORGANIZED HEALTH CARE EDUCATION/TRAINING PROGRAM

## 2025-03-14 PROCEDURE — 37000009 HC ANESTHESIA EA ADD 15 MINS: Performed by: STUDENT IN AN ORGANIZED HEALTH CARE EDUCATION/TRAINING PROGRAM

## 2025-03-14 PROCEDURE — 88305 TISSUE EXAM BY PATHOLOGIST: CPT | Performed by: STUDENT IN AN ORGANIZED HEALTH CARE EDUCATION/TRAINING PROGRAM

## 2025-03-14 PROCEDURE — 27201089 HC SNARE, DISP (ANY): Performed by: STUDENT IN AN ORGANIZED HEALTH CARE EDUCATION/TRAINING PROGRAM

## 2025-03-14 PROCEDURE — 63600175 PHARM REV CODE 636 W HCPCS: Performed by: NURSE ANESTHETIST, CERTIFIED REGISTERED

## 2025-03-14 PROCEDURE — 27200997: Performed by: STUDENT IN AN ORGANIZED HEALTH CARE EDUCATION/TRAINING PROGRAM

## 2025-03-14 RX ORDER — LIDOCAINE HYDROCHLORIDE 20 MG/ML
INJECTION INTRAVENOUS
Status: DISCONTINUED | OUTPATIENT
Start: 2025-03-14 | End: 2025-03-14

## 2025-03-14 RX ORDER — PROPOFOL 10 MG/ML
INJECTION, EMULSION INTRAVENOUS
Status: DISCONTINUED | OUTPATIENT
Start: 2025-03-14 | End: 2025-03-14

## 2025-03-14 RX ORDER — SODIUM CHLORIDE 9 MG/ML
INJECTION, SOLUTION INTRAVENOUS CONTINUOUS
Status: DISCONTINUED | OUTPATIENT
Start: 2025-03-14 | End: 2025-03-14 | Stop reason: HOSPADM

## 2025-03-14 RX ADMIN — SODIUM CHLORIDE: 0.9 INJECTION, SOLUTION INTRAVENOUS at 12:03

## 2025-03-14 RX ADMIN — LIDOCAINE HYDROCHLORIDE 100 MG: 20 INJECTION INTRAVENOUS at 01:03

## 2025-03-14 RX ADMIN — PROPOFOL 100 MG: 10 INJECTION, EMULSION INTRAVENOUS at 01:03

## 2025-03-14 RX ADMIN — PROPOFOL 175 MCG/KG/MIN: 10 INJECTION, EMULSION INTRAVENOUS at 01:03

## 2025-03-14 NOTE — TRANSFER OF CARE
"Anesthesia Transfer of Care Note    Patient: Royer Castillo    Procedure(s) Performed: Procedure(s) (LRB):  COLONOSCOPY, SCREENING, LOW RISK PATIENT (N/A)    Patient location: GI    Anesthesia Type: general    Transport from OR: Transported from OR on room air with adequate spontaneous ventilation    Post pain: adequate analgesia    Post assessment: no apparent anesthetic complications and tolerated procedure well    Post vital signs: stable    Level of consciousness: awake and alert    Nausea/Vomiting: no nausea/vomiting    Complications: none    Transfer of care protocol was followed    Last vitals: Visit Vitals  /78 (BP Location: Left arm, Patient Position: Lying)   Pulse (!) 56   Temp 36.5 °C (97.7 °F) (Temporal)   Resp 16   Ht 5' 10" (1.778 m)   Wt 88.5 kg (195 lb)   SpO2 (!) 94%   BMI 27.98 kg/m²     "

## 2025-03-14 NOTE — PLAN OF CARE
Discharge instructions and Provation reviewed with patient verbalizes understanding. PIV remove cannula  intact. NAD no c/o pain or discomfort.Wife was notified upon arrival to recovery via text message

## 2025-03-14 NOTE — H&P
COLONOSCOPY HISTORY AND PE    Procedure : Colonoscopy    INDICATIONS: asymptomatic screening exam, personal history of colon polyps, and most recent endoscopic exam 5 years ago    Denies family history of CRC or IBD. No recent change in bowel habits, abdominal symptoms or blood in stool. No anticoagulation.    Last Colonoscopy (12/11/19, complete):  Impression:           - Diverticulosis in the entire examined colon.                         - One 5 mm polyp in the descending colon, removed                         with a cold snare. Resected and retrieved. Clip was                         placed. +TA                        - The examination was otherwise normal on direct                         and retroflexion views.     Past Medical History:   Diagnosis Date    Arthritis     Cellulitis     left foot    Diverticulosis     Genital herpes     GERD (gastroesophageal reflux disease)     HTN (hypertension)     Hyperlipidemia     Left atrial dilation     Onychomycosis     Pre-diabetes        Past Surgical History:   Procedure Laterality Date    ARTHROSCOPIC CHONDROPLASTY OF KNEE JOINT Right 5/27/2021    Procedure: ARTHROSCOPY, KNEE, WITH CHONDROPLASTY;  Surgeon: Gerard Tello MD;  Location: [facility];  Service: Orthopedics;  Laterality: Right;    COLONOSCOPY N/A 12/11/2019    Procedure: COLONOSCOPY;  Surgeon: Devaughn Johnson MD;  [facility]  Service: Endoscopy;  Laterality: N/A;    ESOPHAGOGASTRODUODENOSCOPY N/A 3/2/2022    Procedure: EGD (ESOPHAGOGASTRODUODENOSCOPY);  Surgeon: John Rose MD;  Location: [facility] ([facility]);  Service: Endoscopy;  Laterality: N/A;  1/27 covid test 2/27 @ [place]; instructions to portal-st  2/25 lv to confirm appt-RB    HIP ARTHROPLASTY Left 4/16/2024    Procedure: ARTHROPLASTY, HIP;  Surgeon: Gerard Tello MD;  Location: [facility];  Service: Orthopedics;  Laterality: Left;  General, PENG, Regional w/o catheter (spinal), sarina 50cc    KNEE ARTHROSCOPY W/ MENISCECTOMY  Right 5/27/2021    Procedure: ARTHROSCOPY, KNEE, WITH MENISCECTOMY;  Surgeon: Gerard Tello MD;  Location: [facility] OR;  Service: Orthopedics;  Laterality: Right;  JOHNATHAN 50CC    SYNOVECTOMY OF KNEE Right 5/27/2021    Procedure: SYNOVECTOMY, KNEE limited;  Surgeon: Gerard Tello MD;  Location: [facility] OR;  Service: Orthopedics;  Laterality: Right;       Review of patient's allergies indicates:  No Known Allergies    Medications Ordered Prior to Encounter[1]    Family History   Problem Relation Name Age of Onset    Cancer Mother Agatha Castillo         ?lung cancer    Esophageal cancer Father Alpesh Castillo     Heart disease Father Alpesh Castillo 86    Heart attack Father Alpesh Castillo 60 - 69    Hypertension Father Alpesh Castillo     Heart disease Sister Diana Sin 64    Hyperlipidemia Sister Diana Sin     Kidney disease Sister Diana Sin         had a kidney transplant    Hyperlipidemia Sister Barbra     Hypertension Sister Barbra     Colon cancer Neg Hx         Social History[2]    Review of Systems -    Respiratory : no cough, shortness of breath, or wheezing  Cardiovascular  no chest pain or dyspnea on exertion  Gastrointestinal no abdominal pain, change in bowel habits, or black or bloody stools  Musculoskeletal no deformities, swelling  Neurological no TIA or stroke symptoms    Physical Exam:  General: NAD  AT NC EOMI  Mallampati Score   Neck supple, trachea midline  Lungs: nl excursions, no retractions.  Breathing comfortably  Abdomen ND soft NT.  No masses  Extremities: No CCE.      ASA:  III    PLAN  COLONOSCOPY.  The details of the procedure, the possible need for biopsy or polypectomy and the potential risks including bleeding, perforation, missed polyps were discussed in detail.    Cl Torres MD  Staff Surgeon  Colon & Rectal Surgery       [1]   No current facility-administered medications on file prior to encounter.     Current Outpatient Medications on File Prior to Encounter   Medication Sig Dispense Refill     acyclovir (ZOVIRAX) 200 MG capsule Take 1 capsule (200 mg total) by mouth 2 (two) times daily. 60 capsule 11    amLODIPine (NORVASC) 10 MG tablet Take 1 tablet (10 mg total) by mouth once daily. 30 tablet 11    atorvastatin (LIPITOR) 40 MG tablet Take 1 tablet (40 mg total) by mouth every evening. 30 tablet 11    hydroCHLOROthiazide (HYDRODIURIL) 25 MG tablet Take 1 tablet (25 mg total) by mouth once daily. 30 tablet 11    pantoprazole (PROTONIX) 40 MG tablet Take 1 tablet (40 mg total) by mouth once daily. 30 tablet 5   [2]   Social History  Socioeconomic History    Marital status:    Occupational History    Occupation:      Employer: [employer]   Tobacco Use    Smoking status: Never     Passive exposure: Never    Smokeless tobacco: Never   Substance and Sexual Activity    Alcohol use: No    Drug use: No    Sexual activity: Not Currently     Partners: Female     Birth control/protection: Partner-Vasectomy   Social History Narrative    Lives w/ wife      Social Drivers of Health     Financial Resource Strain: Low Risk  (1/16/2024)    Overall Financial Resource Strain (CARDIA)     Difficulty of Paying Living Expenses: Not hard at all   Food Insecurity: No Food Insecurity (1/16/2024)    Hunger Vital Sign     Worried About Running Out of Food in the Last Year: Never true     Ran Out of Food in the Last Year: Never true   Transportation Needs: No Transportation Needs (1/16/2024)    PRAPARE - Transportation     Lack of Transportation (Medical): No     Lack of Transportation (Non-Medical): No   Physical Activity: Sufficiently Active (1/16/2024)    Exercise Vital Sign     Days of Exercise per Week: 6 days     Minutes of Exercise per Session: 60 min   Stress: No Stress Concern Present (1/16/2024)    [place] [employer] - Occupational Stress Questionnaire     Feeling of Stress : Not at all   Housing Stability: Low Risk  (1/16/2024)    Housing Stability Vital Sign     Unable to  Pay for Housing in the Last Year: No     Number of Places Lived in the Last Year: 1     Unstable Housing in the Last Year: No

## 2025-03-14 NOTE — ANESTHESIA PREPROCEDURE EVALUATION
03/14/2025  Royer Castillo is a 64 y.o., male.      Pre-op Assessment    I have reviewed the Patient Summary Reports.    I have reviewed the NPO Status.   I have reviewed the Medications.     Review of Systems  Anesthesia Hx:  No problems with previous Anesthesia                Hematology/Oncology:  Hematology Normal   Oncology Normal                                   EENT/Dental:  EENT/Dental Normal           Cardiovascular:  Exercise tolerance: good   Hypertension                                          Pulmonary:  Pulmonary Normal                       Renal/:  Renal/ Normal                 Hepatic/GI:     GERD                Musculoskeletal:  Arthritis               Neurological:  Neurology Normal                                      Endocrine:  Endocrine Normal            Dermatological:  Skin Normal    Psych:  Psychiatric Normal                Physical Exam  General: Well nourished, Cooperative, Alert and Oriented    Airway:  Mallampati: II   Mouth Opening: Normal  TM Distance: Normal  Tongue: Normal  Neck ROM: Normal ROM    Dental:  Intact    Chest/Lungs:  Normal Respiratory Rate    Anesthesia Plan  Type of Anesthesia, risks & benefits discussed:    Anesthesia Type: Gen Natural Airway  Intra-op Monitoring Plan: Standard ASA Monitors  Post Op Pain Control Plan: multimodal analgesia  Induction:  IV  Informed Consent: Informed consent signed with the Patient and all parties understand the risks and agree with anesthesia plan.  All questions answered.   ASA Score: 3  Day of Surgery Review of History & Physical: H&P Update referred to the surgeon/provider.    Ready For Surgery From Anesthesia Perspective.   .

## 2025-03-17 NOTE — ANESTHESIA POSTPROCEDURE EVALUATION
Anesthesia Post Evaluation    Patient: Royer Castillo    Procedure(s) Performed: Procedure(s) (LRB):  COLONOSCOPY, SCREENING, LOW RISK PATIENT (N/A)    Final Anesthesia Type: general      Patient location during evaluation: PACU  Patient participation: Yes- Able to Participate  Level of consciousness: awake and alert  Post-procedure vital signs: reviewed and stable  Pain management: adequate  Airway patency: patent    PONV status at discharge: No PONV  Anesthetic complications: no      Cardiovascular status: blood pressure returned to baseline  Respiratory status: unassisted  Hydration status: euvolemic  Follow-up not needed.              Vitals Value Taken Time   /70 03/14/25 14:38   Temp 36.5 °C (97.7 °F) 03/14/25 13:57   Pulse 55 03/14/25 14:38   Resp 17 03/14/25 14:38   SpO2 100 % 03/14/25 14:38         Event Time   Out of Recovery 14:41:00         Pain/Jim Score: No data recorded

## 2025-03-20 LAB
FINAL PATHOLOGIC DIAGNOSIS: NORMAL
GROSS: NORMAL
Lab: NORMAL

## 2025-04-02 ENCOUNTER — OFFICE VISIT (OUTPATIENT)
Dept: URGENT CARE | Facility: CLINIC | Age: 65
End: 2025-04-02
Payer: COMMERCIAL

## 2025-04-02 VITALS
RESPIRATION RATE: 18 BRPM | HEIGHT: 70 IN | OXYGEN SATURATION: 97 % | TEMPERATURE: 98 F | SYSTOLIC BLOOD PRESSURE: 134 MMHG | DIASTOLIC BLOOD PRESSURE: 81 MMHG | WEIGHT: 195 LBS | HEART RATE: 72 BPM | BODY MASS INDEX: 27.92 KG/M2
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
## 2025-04-02 DIAGNOSIS — M25.512 ACUTE PAIN OF LEFT SHOULDER: ICD-10-CM

## 2025-04-02 DIAGNOSIS — S43.402A SPRAIN OF LEFT SHOULDER, UNSPECIFIED SHOULDER SPRAIN TYPE, INITIAL ENCOUNTER: Primary | ICD-10-CM

## 2025-04-02 DIAGNOSIS — Z02.6 ENCOUNTER RELATED TO WORKER'S COMPENSATION CLAIM: ICD-10-CM

## 2025-04-02 RX ORDER — IBUPROFEN 800 MG/1
800 TABLET ORAL EVERY 6 HOURS PRN
Qty: 30 TABLET | Refills: 0 | Status: SHIPPED | OUTPATIENT
Start: 2025-04-02

## 2025-04-02 RX ORDER — ACETAMINOPHEN 500 MG
1000 TABLET ORAL
Status: COMPLETED | OUTPATIENT
Start: 2025-04-02 | End: 2025-04-02

## 2025-04-02 RX ORDER — IBUPROFEN 200 MG
800 TABLET ORAL
Status: COMPLETED | OUTPATIENT
Start: 2025-04-02 | End: 2025-04-02

## 2025-04-02 RX ADMIN — Medication 1000 MG: at 03:04

## 2025-04-02 RX ADMIN — Medication 800 MG: at 03:04

## 2025-04-02 NOTE — LETTER
[facility] [address] [address]  Phone: [phone]  Fax: [phone]  [facility]: [email or web address]    Pt Name: Royer Castillo  Injury Date:     Employee ID: [id number] Date of First Treatment: 04/02/2025   Company: [employer]      Appointment Time: 02:40 PM Arrived: 2:58 PM   Provider: Royer Monson PA-C Time Out: 4:06 PM     Office Treatment:   1. Sprain of left shoulder, unspecified shoulder sprain type, initial encounter    2. Encounter related to worker's compensation claim    3. Acute pain of left shoulder    4. Work related injury      Medications Ordered This Encounter   Medications    acetaminophen tablet 1,000 mg    ibuprofen (ADVIL,MOTRIN) 800 MG tablet    ibuprofen tablet 800 mg      Patient Instructions: Apply ice 24-48 hours then apply heat/warm soaks      Restrictions: Disabled until next office visit     Return Appointment: 4/9/2025 at 8:30 AM    MARIEL

## 2025-04-03 ENCOUNTER — PATIENT MESSAGE (OUTPATIENT)
Dept: SPORTS MEDICINE | Facility: CLINIC | Age: 65
End: 2025-04-03
Payer: COMMERCIAL

## 2025-04-11 ENCOUNTER — TELEPHONE (OUTPATIENT)
Dept: SPORTS MEDICINE | Facility: CLINIC | Age: 65
End: 2025-04-11
Payer: COMMERCIAL

## 2025-04-18 DIAGNOSIS — A60.00 GENITAL HERPES SIMPLEX, UNSPECIFIED SITE: ICD-10-CM

## 2025-04-21 RX ORDER — ACYCLOVIR 200 MG/1
200 CAPSULE ORAL 2 TIMES DAILY
Qty: 60 CAPSULE | Refills: 11 | Status: SHIPPED | OUTPATIENT
Start: 2025-04-21

## 2025-04-30 ENCOUNTER — OFFICE VISIT (OUTPATIENT)
Dept: SPORTS MEDICINE | Facility: CLINIC | Age: 65
End: 2025-04-30
Payer: COMMERCIAL

## 2025-04-30 ENCOUNTER — HOSPITAL ENCOUNTER (OUTPATIENT)
Dept: RADIOLOGY | Facility: HOSPITAL | Age: 65
Discharge: HOME | End: 2025-04-30
Attending: ORTHOPAEDIC SURGERY
Payer: COMMERCIAL

## 2025-04-30 VITALS
BODY MASS INDEX: 29.7 KG/M2 | HEIGHT: 70 IN | HEART RATE: 62 BPM | WEIGHT: 207.44 LBS | SYSTOLIC BLOOD PRESSURE: 139 MMHG | DIASTOLIC BLOOD PRESSURE: 88 MMHG

## 2025-04-30 DIAGNOSIS — M25.512 LEFT SHOULDER PAIN, UNSPECIFIED CHRONICITY: ICD-10-CM

## 2025-04-30 DIAGNOSIS — M25.512 ACUTE PAIN OF LEFT SHOULDER: ICD-10-CM

## 2025-04-30 DIAGNOSIS — M25.512 LEFT SHOULDER PAIN, UNSPECIFIED CHRONICITY: Primary | ICD-10-CM

## 2025-04-30 DIAGNOSIS — Z96.642 S/P HIP REPLACEMENT, LEFT: ICD-10-CM

## 2025-04-30 PROCEDURE — 73030 X-RAY EXAM OF SHOULDER: CPT | Mod: TC,LT

## 2025-04-30 PROCEDURE — 73030 X-RAY EXAM OF SHOULDER: CPT | Mod: 26,LT,, | Performed by: RADIOLOGY

## 2025-04-30 PROCEDURE — 99215 OFFICE O/P EST HI 40 MIN: CPT | Mod: S$GLB,,, | Performed by: ORTHOPAEDIC SURGERY

## 2025-04-30 PROCEDURE — 99999 PR PBB SHADOW E&M-EST. PATIENT-LVL IV: CPT | Mod: PBBFAC,,, | Performed by: ORTHOPAEDIC SURGERY

## 2025-04-30 NOTE — PROGRESS NOTES
Subjective:     Chief Complaint: Royer Castillo is a 64 y.o. male who had concerns including Pain of the Left Shoulder.    History of Present Illness    CHIEF COMPLAINT:  - Left shoulder pain and limited range of motion    HPI:  Royer presents with left shoulder pain that began on April 2nd while lifting and moving equipment at work. He has no range of motion and is unable to move the shoulder. Pain is described as a constant sensation of carrying something. The sling he is wearing is causing discomfort on his neck. He takes ibuprofen 800 mg as needed for pain management. He has not started PT for his current shoulder injury.    His work as a  involves moving heavy equipment. He is planning to retire in October or November of this year.    He mentions having a hip replacement in the past. He denies being claustrophobic or needing sedation for MRI procedures. He also denies any fractures.    SURGICAL HISTORY:  - Left hip replacement    WORK STATUS:  - Employed as a   - Job duties include moving heavy equipment  - Currently held out of work due to left shoulder injury on April 2, 2023  - Injury affecting ability to work due to no range of motion in affected shoulder  - Plan to retire in October or November at the latest           Pain Related Questions  Over the past 3 days, what was your average pain during activity? (I.e. running, jogging, walking, climbing stairs, getting dressed, ect.): 5  Over the past 3 days, what was your highest pain level?: 6  Over the past 3 days, what was your lowest pain level? : 5    Other  How many nights a week are you awakened by your affected body part?: 7  Was the patient's HEIGHT measured or patient reported?: Patient Reported  Was the patient's WEIGHT measured or patient reported?: Measured    Past Medical History[1]     Past Surgical History:   Procedure Laterality Date    ARTHROSCOPIC CHONDROPLASTY OF KNEE JOINT Right 5/27/2021    Procedure:  ARTHROSCOPY, KNEE, WITH CHONDROPLASTY;  Surgeon: Gerard Tello MD;  Location: [facility] OR;  Service: Orthopedics;  Laterality: Right;    COLONOSCOPY N/A 12/11/2019    Procedure: COLONOSCOPY;  Surgeon: Devaughn Johnson MD;  Location: [facility] (4TH FLR);  Service: Endoscopy;  Laterality: N/A;    COLONOSCOPY, SCREENING, LOW RISK PATIENT N/A 3/14/2025    Procedure: COLONOSCOPY, SCREENING, LOW RISK PATIENT;  Surgeon: Cl Torres MD;  Location: [facility] (4TH FLR);  Service: Colon and Rectal;  Laterality: N/A;  1/29 ref by Dr. KAYLA Alfaro, Peg, portal. colt  3/6 precall complete. kw    ESOPHAGOGASTRODUODENOSCOPY N/A 3/2/2022    Procedure: EGD (ESOPHAGOGASTRODUODENOSCOPY);  Surgeon: John Rose MD;  Location: [facility] (4TH FLR);  Service: Endoscopy;  Laterality: N/A;  1/27 covid test 2/27 @ [facility]; instructions to portal-st  2/25 [facility] to confirm appt-RB    HIP ARTHROPLASTY Left 4/16/2024    Procedure: ARTHROPLASTY, HIP;  Surgeon: Gerard Tello MD;  Location: [facility] OR;  Service: Orthopedics;  Laterality: Left;  General, PENG, Regional w/o catheter (spinal), johnathan 50cc    KNEE ARTHROSCOPY W/ MENISCECTOMY Right 5/27/2021    Procedure: ARTHROSCOPY, KNEE, WITH MENISCECTOMY;  Surgeon: Gerard Tello MD;  Location: [facility] OR;  Service: Orthopedics;  Laterality: Right;  JOHNATHAN 50CC    SYNOVECTOMY OF KNEE Right 5/27/2021    Procedure: SYNOVECTOMY, KNEE limited;  Surgeon: Gerard Tello MD;  Location: [facility] OR;  Service: Orthopedics;  Laterality: Right;       Objective:     General: Royer is well-developed, well-nourished, appears stated age, in no acute distress, alert and oriented to time, place and person.     General    Vitals reviewed.  Constitutional: He is oriented to person, place, and time. He appears well-developed and well-nourished. No distress.   HENT:   Nose: Nose normal. Mouth/Throat: No oropharyngeal exudate.   Eyes: Pupils are equal, round, and reactive to light. Right eye exhibits no discharge. Left eye  exhibits no discharge.   Cardiovascular:  Normal rate and intact distal pulses.            Pulmonary/Chest: Effort normal and breath sounds normal. No respiratory distress.   Neurological: He is alert and oriented to person, place, and time. He has normal reflexes. He displays normal reflexes. No cranial nerve deficit. Coordination normal.   Psychiatric: He has a normal mood and affect. His behavior is normal. Judgment and thought content normal.         Right Shoulder Exam     Inspection/Observation   Swelling: absent  Bruising: absent  Scars: absent  Deformity: absent  Scapular Winging: absent  Scapular Dyskinesia: negative  Atrophy: absent    Tenderness   The patient is experiencing no tenderness.    Range of Motion   Active abduction:  90 normal   Passive abduction:  100 normal   Extension:  0 normal   Forward Flexion:  180 normal   Forward Elevation: 180 normal  Adduction: 40 normal  External Rotation 0 degrees:  50 normal   External Rotation 90 degrees: 90 normal  Internal rotation 0 degrees:  T8 normal   Internal rotation 90 degrees:  30 normal     Tests & Signs   Apprehension: negative  Cross arm: negative  Drop arm: negative  Acuña test: negative  Impingement: negative  Sulcus: absent  Anterosuperior Escape: negative  Lag Sign 0 degrees: negative  Lag Sign 90 degrees: negative  Lift Off Sign: negative  Belly Press: negative  Active Compression Test (Codington's Sign): negative  Yergason's Test: negative  Speed's Test: negative  Anterior Drawer Test: 1+   Posterior Drawer Test: 1+  Relocation 90 degrees: negative  Relocation > 90 degrees: negative  Bear Hug: negative  Moving Valgus: negative  Jerk Test: negative    Other   Sensation: normal    Left Shoulder Exam     Inspection/Observation   Swelling: present  Bruising: absent  Scars: absent  Deformity: absent  Scapular Winging: absent  Scapular Dyskinesia: negative  Atrophy: absent    Tenderness   The patient is tender to palpation of the greater  tuberosity.    Range of Motion   Active abduction:  0 abnormal   Passive abduction:  0 abnormal   Extension:  0 normal   Forward Flexion:  30 abnormal   Forward Elevation: 30 abnormal  Adduction: 0 abnormal  External Rotation 0 degrees:  0 abnormal   External Rotation 90 degrees: 0 abnormal  Internal rotation 0 degrees:  Sacrum normal   Internal rotation 90 degrees:  0 normal     Tests & Signs   Apprehension: negative  Cross arm: negative  Drop arm: negative  Acuña test: positive  Impingement: positive  Sulcus: absent  Rotator Cuff Painful Arc/Range: severe  Anterosuperior Escape: negative  Lag Sign 0 degrees: negative  Lag Sign 90 degrees: negative  Lift Off Sign: negative  Belly Press: negative  Active Compression Test (Iroquois's Sign): positive  Yergasons's Test: negative  Speed's Test: positive  Anterior Drawer Test: 1+  Posterior Drawer Test: 1+  Relocation 90 degrees: negative  Relocation > 90 degrees: negative  Bear Hug: negative  Moving Valgus: negative  Jerk Test: negative    Other   Sensation: normal       Muscle Strength   Right Upper Extremity   Shoulder Abduction: 5/5   Shoulder Internal Rotation: 5/5   Shoulder External Rotation: 5/5   Supraspinatus: 5/5   Subscapularis: 5/5   Biceps: 5/5   Left Upper Extremity  Shoulder Abduction: 5/5   Shoulder Internal Rotation: 5/5   Shoulder External Rotation: 4/5   Supraspinatus: 4/5   Subscapularis: 4/5   Biceps: 5/5     Reflexes     Left Side  Biceps:  2+  Triceps:  2+  Brachioradialis:  2+    Right Side   Biceps:  2+  Triceps:  2+  Brachioradialis:  2+    Vascular Exam     Right Pulses      Radial:                    2+      Left Pulses      Radial:                    2+      Capillary Refill  Right Hand: normal capillary refill  Left Hand: normal capillary refill          Radiographic findings:    X-Ray Shoulder 2 or More Views Left  Narrative: EXAMINATION:  XR SHOULDER COMPLETE 2 OR MORE VIEWS LEFT    CLINICAL HISTORY:  Pain in left  shoulder    TECHNIQUE:  Two or three views of the left shoulder were performed.    COMPARISON:  N 04/02/2025 one    FINDINGS:  DJD with osteophytosis and significant narrowing of the glenohumeral joint space.  No fracture or dislocation.  No bone destruction identified.  Impression: See above    Electronically signed by: Marvin Epperson MD  Date:    04/30/2025  Time:    09:34            These findings were discussed and reviewed with the patient.     Assessment:     Encounter Diagnoses   Name Primary?    Left shoulder pain, unspecified chronicity Yes    Acute pain of left shoulder     S/P hip replacement, left         Plan:     Assessment & Plan    IMAGING:  - Ordered MRI Left Shoulder to evaluate rotator cuff integrity.    REFERRALS:  - Referred to Zach CONNOR for shoulder.    PROCEDURES:  - Royer informed about lifting restrictions (25-50 lbs overhead) associated with left shoulder replacement.  - Royer expressed interest in delaying surgery until after senior care in October/November.    FOLLOW UP:  - Follow up after MRI.  - Royer to be referred to one of partners for further treatment, as leaving the practice on July 7th.           All of the patient's questions were answered and the patient will contact us if they have any questions or concerns in the interim.    This note was generated with the assistance of ambient listening technology. Verbal consent was obtained by the patient and accompanying visitor(s) for the recording of patient appointment to facilitate this note. I attest to having reviewed and edited the generated note for accuracy, though some syntax or spelling errors may persist. Please contact the author of this note for any clarification.               [1]  Past Medical History:  Diagnosis Date    Arthritis     Cellulitis     left foot    Diverticulosis     Genital herpes     GERD (gastroesophageal reflux disease)     HTN (hypertension)     Hyperlipidemia     Left atrial dilation      Onychomycosis     Pre-diabetes

## 2025-04-30 NOTE — LETTER
Patient: Royer Castillo   YOB: 1960   Clinic Number: [id number]   Today's Date: April 30, 2025     To:    Fax Number:         Work Status Summary     Date of Injury: 4/2/2025  Diagnosis/ICD-10: left shoulder acute pain / M25.512     Work Status: NOT ABLE to work at present. Estimated release to return to work is unknown pending left shoulder MRI.    Therapy Recomendations: Physical therapy 2 times a week for 6 weeks.         Next Appointment: No follow-ups on file. Royer will be seen by Dr. Gerard Tello.    Comments: MRI ordered left shoulder to evaluate the rotator cuff and will start PT.       ____________________________         April 30, 2025    Gerard Tello MD  Signed (Provider)

## 2025-05-04 ENCOUNTER — HOSPITAL ENCOUNTER (OUTPATIENT)
Dept: RADIOLOGY | Facility: HOSPITAL | Age: 65
Discharge: HOME | End: 2025-05-04
Attending: ORTHOPAEDIC SURGERY

## 2025-05-04 DIAGNOSIS — M25.512 ACUTE PAIN OF LEFT SHOULDER: ICD-10-CM

## 2025-05-04 DIAGNOSIS — M25.512 LEFT SHOULDER PAIN, UNSPECIFIED CHRONICITY: ICD-10-CM

## 2025-05-04 PROCEDURE — 73221 MRI JOINT UPR EXTREM W/O DYE: CPT | Mod: 26,LT,, | Performed by: RADIOLOGY

## 2025-05-04 PROCEDURE — 73221 MRI JOINT UPR EXTREM W/O DYE: CPT | Mod: TC,LT

## 2025-05-06 ENCOUNTER — RESULTS FOLLOW-UP (OUTPATIENT)
Dept: SPORTS MEDICINE | Facility: CLINIC | Age: 65
End: 2025-05-06

## 2025-05-06 ENCOUNTER — PATIENT MESSAGE (OUTPATIENT)
Dept: SPORTS MEDICINE | Facility: CLINIC | Age: 65
End: 2025-05-06
Payer: COMMERCIAL

## 2025-05-07 ENCOUNTER — PATIENT MESSAGE (OUTPATIENT)
Dept: SPORTS MEDICINE | Facility: CLINIC | Age: 65
End: 2025-05-07
Payer: COMMERCIAL

## 2025-05-07 ENCOUNTER — OFFICE VISIT (OUTPATIENT)
Dept: SPORTS MEDICINE | Facility: CLINIC | Age: 65
End: 2025-05-07

## 2025-05-07 DIAGNOSIS — M25.512 LEFT SHOULDER PAIN, UNSPECIFIED CHRONICITY: ICD-10-CM

## 2025-05-07 DIAGNOSIS — M25.512 ACUTE PAIN OF LEFT SHOULDER: Primary | ICD-10-CM

## 2025-05-07 DIAGNOSIS — M75.102 ROTATOR CUFF SYNDROME OF LEFT SHOULDER: ICD-10-CM

## 2025-05-07 PROCEDURE — 99214 OFFICE O/P EST MOD 30 MIN: CPT | Mod: 95,,, | Performed by: ORTHOPAEDIC SURGERY

## 2025-05-07 RX ORDER — MELOXICAM 15 MG/1
15 TABLET ORAL DAILY
Qty: 30 TABLET | Refills: 2 | Status: SHIPPED | OUTPATIENT
Start: 2025-05-07

## 2025-05-07 NOTE — PROGRESS NOTES
Telemedicine/Virtual Visit Documentation:     The patient location is: home  The chief complaint leading to consultation is: Pt presents for MRI results and follow-up.       Visit type: audiovisual    Face to Face time with patient:   15 minutes of total time spent on the encounter, which includes face to face time and non-face to face time preparing to see the patient (eg, review of tests), Obtaining and/or reviewing separately obtained history, Documenting clinical information in the electronic or other health record, Independently interpreting results (not separately reported) and communicating results to the patient/family/caregiver, or Care coordination (not separately reported).         Each patient to whom he or she provides medical services by telemedicine is:  (1) informed of the relationship between the physician and patient and the respective role of any other health care provider with respect to management of the patient; and (2) notified that he or she may decline to receive medical services by telemedicine and may withdraw from such care at any time.    H&P  Orthopaedics      SUBJECTIVE:     History of Present Illness:  History of Present Illness    CHIEF COMPLAINT:  - Rotator cuff inflammation and slight tearing    HPI:  Royer presents for follow-up of rotator cuff issue. An MRI revealed inflammation and slight tearing of the rotator cuff. He is currently undergoing PT for this condition.    PREVIOUS TREATMENTS:  - PT: Currently undergoing for rotator cuff inflammation    WORK STATUS:  - Held out of all duties for one month          Review of patient's allergies indicates:  No Known Allergies    Past Medical History:   Diagnosis Date    Arthritis     Cellulitis     left foot    Diverticulosis     Genital herpes     GERD (gastroesophageal reflux disease)     HTN (hypertension)     Hyperlipidemia     Left atrial dilation     Onychomycosis     Pre-diabetes      Past Surgical History:   Procedure  Laterality Date    ARTHROSCOPIC CHONDROPLASTY OF KNEE JOINT Right 5/27/2021    Procedure: ARTHROSCOPY, KNEE, WITH CHONDROPLASTY;  Surgeon: Gerard Tello MD;  Location: [facility] OR;  Service: Orthopedics;  Laterality: Right;    COLONOSCOPY N/A 12/11/2019    Procedure: COLONOSCOPY;  Surgeon: Devaughn Johnson MD;  Location: [facility] (4TH FLR);  Service: Endoscopy;  Laterality: N/A;    COLONOSCOPY, SCREENING, LOW RISK PATIENT N/A 3/14/2025    Procedure: COLONOSCOPY, SCREENING, LOW RISK PATIENT;  Surgeon: Cl Torres MD;  Location: [facility] (4TH FLR);  Service: Colon and Rectal;  Laterality: N/A;  1/29 ref by Dr. KAYLA Alfaro, Peg, portal. colt  3/6 precall complete. kw    ESOPHAGOGASTRODUODENOSCOPY N/A 3/2/2022    Procedure: EGD (ESOPHAGOGASTRODUODENOSCOPY);  Surgeon: John Rose MD;  Location: [facility] (4TH FLR);  Service: Endoscopy;  Laterality: N/A;  1/27 covid test 2/27 @ [facility]; instructions to portal-st  2/25 [facility] to confirm appt-RB    HIP ARTHROPLASTY Left 4/16/2024    Procedure: ARTHROPLASTY, HIP;  Surgeon: Gerard Tello MD;  Location: [facility] OR;  Service: Orthopedics;  Laterality: Left;  General, PENG, Regional w/o catheter (spinal), jhonathan 50cc    KNEE ARTHROSCOPY W/ MENISCECTOMY Right 5/27/2021    Procedure: ARTHROSCOPY, KNEE, WITH MENISCECTOMY;  Surgeon: Gerard Tello MD;  Location: [facility] OR;  Service: Orthopedics;  Laterality: Right;  JOHNATHAN 50CC    SYNOVECTOMY OF KNEE Right 5/27/2021    Procedure: SYNOVECTOMY, KNEE limited;  Surgeon: Gerard Tello MD;  Location: [facility] OR;  Service: Orthopedics;  Laterality: Right;     Family History   Problem Relation Name Age of Onset    Cancer Mother Agatha Flot         ?lung cancer    Esophageal cancer Father Alpesh Flot     Heart disease Father Alpesh Flot 86    Heart attack Father Alpesh Flot 60 - 69    Hypertension Father Alpesh [place]     Heart disease Sister Diana Sin 64    Hyperlipidemia Sister Diana Sin     Kidney disease Sister Diana Sin         had a  kidney transplant    Hyperlipidemia Sister Barbra     Hypertension Sister Barbra     Colon cancer Neg Hx       Social History[1]         OBJECTIVE:     Physical Exam:  Gen:  No acute distress  CV:  Peripherally well-perfused.  Pulses 2+ bilaterally.  Lungs:  Normal respiratory effort.  Abdomen:  Soft, non-tender, non-distended  Head/Neck:  Normocephalic.  Atraumatic. No TTP, AROM and PROM intact without pain  Neuro:  CN intact without deficit, SILT throughout B/L Upper & Lower Extremities    MRI Shoulder Without Contrast Left  Narrative: EXAMINATION:  MRI SHOULDER WITHOUT CONTRAST LEFT    CLINICAL HISTORY:  Shoulder pain, rotator cuff disorder suspected, xray done;    TECHNIQUE:  Routine MRI evaluation of the left shoulder performed without contrast.    COMPARISON:  Radiographs 04/30/2025.    FINDINGS:  ROTATOR CUFF: Supraspinatus tendinosis with articular surface fraying and a small (0.7 cm AP), partial-thickness (approximately 50%) interstitial/concealed anterior footprint tear.  Infraspinatus tendinosis with a small (0.7 cm AP), partial-thickness (approximately 50%) interstitial/concealed footprint tear.  Mild subscapularis tendinosis.  Teres minor tendon is normal.  Severe volume loss and fatty degeneration of the teres minor.    LABRUM: Circumferential abnormal morphology and signal intensity of the glenoid labrum.  No paralabral cyst.    BICEPS: Biceps tendinosis with low-grade partial-thickness intrasubstance tearing    BONES: No acute fractures.  No avascular necrosis.  No marrow infiltrative process.    AC JOINT: AC joint arthrosis.    CARTILAGE: Full-thickness chondral loss throughout the glenoid and humeral head with scattered subchondral marrow edema and cystic change and prominent osteophyte formation.    MISCELLANEOUS: Small joint effusion with synovitis and scattered debris.  Rotator interval demonstrates preserved signal intensity.  Trace signal hyperintensity and fluid within the subacromial/subdeltoid  bursa.  No axillary lymphadenopathy.  Impression: 1. Severe glenohumeral osteoarthritis with generalized full-thickness chondral loss.  2. Supraspinatus and infraspinatus tendinosis with small, partial-thickness intrasubstance/concealed footprint tears.  3. Mild subscapularis tendinosis.  4. Circumferential degenerative labral tearing.  5. Biceps tendinosis with partial-thickness intrasubstance tearing.  6. AC joint arthrosis.  7. Severe volume loss and fatty degeneration of the teres minor, likely sequela of chronic denervation.    Electronically signed by: Yung Lucia MD  Date:    05/04/2025  Time:    19:56       ASSESSMENT/PLAN:     A/P: Royer Castillo is a 64 y.o. left shoulder rotaor cuff tendinosis and small partial tearing noted; biceps tendinosis as well as AC DJD. Currently performing PT and will return in one month for reevaluation and possible injection if not improved with PT and meloxicam started today.    Plan:  Assessment & Plan    MEDICATIONS:  - Started meloxicam for rotator cuff inflammation.    PROCEDURES:  - # Procedures  - Discussed possible shoulder injection in 1 month if condition does not improve.  - Discussed potential surgery with Dr. Oziel Wagner in 3 months if condition does not improve.    FOLLOW UP:  - Follow up in 1 month for possible injection.  - Office will contact patient to schedule follow-up visit.    PATIENT INSTRUCTIONS:  - Avoid all duties for 1 month.                  [1]   Social History  Tobacco Use    Smoking status: Never     Passive exposure: Never    Smokeless tobacco: Never   Substance Use Topics    Alcohol use: No    Drug use: No

## 2025-06-04 ENCOUNTER — OFFICE VISIT (OUTPATIENT)
Dept: SPORTS MEDICINE | Facility: CLINIC | Age: 65
End: 2025-06-04
Payer: COMMERCIAL

## 2025-06-04 ENCOUNTER — TELEPHONE (OUTPATIENT)
Dept: SPORTS MEDICINE | Facility: CLINIC | Age: 65
End: 2025-06-04
Payer: COMMERCIAL

## 2025-06-04 VITALS
WEIGHT: 208.75 LBS | SYSTOLIC BLOOD PRESSURE: 166 MMHG | HEART RATE: 120 BPM | HEIGHT: 70 IN | DIASTOLIC BLOOD PRESSURE: 103 MMHG | BODY MASS INDEX: 29.89 KG/M2

## 2025-06-04 DIAGNOSIS — M12.812 ROTATOR CUFF ARTHROPATHY OF LEFT SHOULDER: Primary | ICD-10-CM

## 2025-06-04 DIAGNOSIS — G89.29 CHRONIC LEFT SHOULDER PAIN: ICD-10-CM
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
## 2025-06-04 DIAGNOSIS — M25.512 CHRONIC LEFT SHOULDER PAIN: ICD-10-CM

## 2025-06-04 PROCEDURE — 99215 OFFICE O/P EST HI 40 MIN: CPT | Mod: PBBFAC | Performed by: ORTHOPAEDIC SURGERY

## 2025-06-04 PROCEDURE — 99999 PR PBB SHADOW E&M-EST. PATIENT-LVL V: CPT | Mod: PBBFAC,,, | Performed by: ORTHOPAEDIC SURGERY

## 2025-06-04 PROCEDURE — 99214 OFFICE O/P EST MOD 30 MIN: CPT | Mod: S$GLB,,, | Performed by: ORTHOPAEDIC SURGERY

## 2025-06-05 ENCOUNTER — PATIENT MESSAGE (OUTPATIENT)
Dept: SPORTS MEDICINE | Facility: CLINIC | Age: 65
End: 2025-06-05
Payer: COMMERCIAL

## 2025-06-17 ENCOUNTER — RESULTS FOLLOW-UP (OUTPATIENT)
Dept: SPORTS MEDICINE | Facility: CLINIC | Age: 65
End: 2025-06-17

## 2025-06-17 ENCOUNTER — HOSPITAL ENCOUNTER (OUTPATIENT)
Dept: RADIOLOGY | Facility: HOSPITAL | Age: 65
Discharge: HOME | End: 2025-06-17
Attending: ORTHOPAEDIC SURGERY
Payer: OTHER MISCELLANEOUS

## 2025-06-17 ENCOUNTER — HOSPITAL ENCOUNTER (OUTPATIENT)
Dept: RADIOLOGY | Facility: HOSPITAL | Age: 65
Discharge: HOME | End: 2025-06-17
Attending: ORTHOPAEDIC SURGERY
Payer: COMMERCIAL

## 2025-06-17 DIAGNOSIS — M12.812 ROTATOR CUFF ARTHROPATHY OF LEFT SHOULDER: ICD-10-CM

## 2025-06-17 DIAGNOSIS — M25.512 CHRONIC LEFT SHOULDER PAIN: ICD-10-CM

## 2025-06-17 DIAGNOSIS — G89.29 CHRONIC LEFT SHOULDER PAIN: ICD-10-CM
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
## 2025-06-17 PROCEDURE — 73200 CT UPPER EXTREMITY W/O DYE: CPT | Mod: TC,LT

## 2025-06-17 PROCEDURE — 73221 MRI JOINT UPR EXTREM W/O DYE: CPT | Mod: TC,LT

## 2025-06-17 PROCEDURE — 73200 CT UPPER EXTREMITY W/O DYE: CPT | Mod: 26,LT,, | Performed by: RADIOLOGY

## 2025-06-17 PROCEDURE — 73221 MRI JOINT UPR EXTREM W/O DYE: CPT | Mod: 26,LT,, | Performed by: RADIOLOGY

## 2025-06-19 ENCOUNTER — TELEPHONE (OUTPATIENT)
Dept: URGENT CARE | Facility: CLINIC | Age: 65
End: 2025-06-19
Payer: COMMERCIAL

## 2025-06-20 ENCOUNTER — OFFICE VISIT (OUTPATIENT)
Dept: URGENT CARE | Facility: CLINIC | Age: 65
End: 2025-06-20
Payer: COMMERCIAL

## 2025-06-20 VITALS
RESPIRATION RATE: 17 BRPM | SYSTOLIC BLOOD PRESSURE: 136 MMHG | WEIGHT: 208 LBS | OXYGEN SATURATION: 96 % | BODY MASS INDEX: 29.78 KG/M2 | HEIGHT: 70 IN | DIASTOLIC BLOOD PRESSURE: 92 MMHG | HEART RATE: 70 BPM

## 2025-06-20 DIAGNOSIS — M25.512 CHRONIC LEFT SHOULDER PAIN: ICD-10-CM

## 2025-06-20 DIAGNOSIS — M12.812 ROTATOR CUFF ARTHROPATHY OF LEFT SHOULDER: Primary | ICD-10-CM

## 2025-06-20 DIAGNOSIS — G89.29 CHRONIC LEFT SHOULDER PAIN: ICD-10-CM

## 2025-06-20 DIAGNOSIS — Z02.6 ENCOUNTER RELATED TO WORKER'S COMPENSATION CLAIM: ICD-10-CM
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
## 2025-06-20 RX ORDER — CYCLOBENZAPRINE HCL 10 MG
10 TABLET ORAL NIGHTLY
Qty: 30 TABLET | Refills: 1 | Status: SHIPPED | OUTPATIENT
Start: 2025-06-20 | End: 2025-08-19

## 2025-06-20 NOTE — Clinical Note
[facility]  [address] [address]  Phone: [phone]  Fax: [phone]  [facility]: [email or web address]    Pt Name: Royer Castillo  Injury Date: 04/02/2025   Employee ID:  Date of First Treatment: 06/20/2025   Company: [employer]      Appointment Time: 10:45 AM Arrived: ***   Provider: Kerline Whitley MD Time Out:***     Office Treatment: 1. Rotator cuff arthropathy of left shoulder    2. Work related injury    3. Chronic left shoulder pain    4. Encounter related to worker's compensation claim      Medications Ordered This Encounter   Medications    cyclobenzaprine (FLEXERIL) 10 MG tablet      Patient Instructions: Continue Physical Therapy, Daily home exercises/warm soaks    Restrictions: Disabled until next office visit, Discharged to Ortho/Neuro/Opthomologist/Surgeon (Continue to follow with Orthopedics, restrictions per Orthopedics going forward.)     Return Appointment: Visit date not found at ***

## 2025-06-20 NOTE — LETTER
[facility]  [address] [address]  Phone: [phone]  Fax: [phone]  [facility]: [email or web address]    Pt Name: Royer Castillo  Injury Date: 04/02/2025   Employee ID: [id number] Date of Treatment: 06/20/2025   Company: [employer] EM[employer]YEES      Appointment Time: 11:00 AM Arrived: 10:49 AM   Provider: Kerline Whitley MD Time Out:11:12 AM     Office Treatment:   1. Rotator cuff arthropathy of left shoulder    2. Work related injury    3. Chronic left shoulder pain    4. Encounter related to worker's compensation claim      Medications Ordered This Encounter   Medications    cyclobenzaprine (FLEXERIL) 10 MG tablet      Patient Instructions: Continue Physical Therapy, Daily home exercises/warm soaks    Restrictions: Disabled until next office visit, Discharged to Ortho/Neuro/Opthomologist/Surgeon (Continue to follow with Orthopedics, restrictions per Orthopedics going forward.)     Return Appointment:   Follow up if symptoms worsen or fail to improve.  SLB

## 2025-06-20 NOTE — PROGRESS NOTES
Subjective:      Patient ID: Royer Castillo is a 65 y.o. male.    Chief Complaint: Shoulder Pain    Patient's place of employment - [employer]  Patient's job title -   Date of injury - 04/02/2025  Body part injured - Left Shoulder   Current work status per last visit - Disabled til July 1,2025  Improved, same, or worse - Same  Pain Scale right now (1-10) -  5/10  SB.       Musculoskeletal:  Positive for joint pain and abnormal ROM of joint. Negative for muscle cramps and muscle ache.   Neurological:  Negative for numbness and tingling.       See MA note above. Begin MD note:    Royer Castillo is a RHD 65 y.o. presenting for follow-up of left shoulder injury.  He is currently under the care of Orthopedics, pending evaluation for surgery for the left shoulder. He has an appointment with Dr. Wagner on July 1st.   He has been out of work since the DOI, April 2nd, 2025. He is in PT, he does note some improvements in strength but continues to have persistent deep pain in the shoulder. He is using meloxicam daily and heating pad occasionally. He does note increased tension in his neck and a feeling of pulled muscle on the right side neck. He has difficulty sleeping secondary to the shoulder pain.   He was sent back to [facility] because Dr. 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      Objective:     Physical Exam  Vitals and nursing note reviewed.   Constitutional:       General: He is not in acute distress.     Appearance: He is not ill-appearing.   HENT:      Head: Normocephalic.   Eyes:      Conjunctiva/sclera: Conjunctivae normal.   Pulmonary:      Effort: Pulmonary effort is normal. No respiratory distress.   Musculoskeletal:         General: Tenderness and signs of injury present.      Left shoulder: Tenderness present. Decreased range of motion. Decreased strength.        Arms:       Cervical back: Pain with movement present.      Comments: Left shoulder abduction is restricted  to slightly less than 90°, with notable trapezius and neck tightness.  Tender to palpation of the posterior aspect of the shoulder, proximal trapezius, AC joint.   strength is 5/5.   Skin:     General: Skin is warm.      Coloration: Skin is not pale.   Neurological:      General: No focal deficit present.      Mental Status: He is alert and oriented to person, place, and time.      GCS: GCS eye subscore is 4. GCS verbal subscore is 5. GCS motor subscore is 6.      Motor: Motor function is intact.      Coordination: Coordination is intact.   Psychiatric:         Attention and Perception: Attention normal.         Mood and Affect: Mood normal.         Speech: Speech normal.         Behavior: Behavior normal. Behavior is cooperative.         Thought Content: Thought content normal.          Imaging  CT Shoulder Without Contrast Left  Result Date: 6/17/2025  EXAMINATION: CT SHOULDER WITHOUT CONTRAST LEFT CLINICAL HISTORY: Shoulder pain, chronic, osteoarthritis suspected;  Other specific arthropathies, not elsewhere classified, left shoulder TECHNIQUE: CT left shoulder performed without contrast per preoperative planning protocol.  Coronal and sagittal reformats provided. COMPARISON: MRI 06/17/2025 FINDINGS: There is severe loss of glenohumeral cartilage space with subchondral sclerosis, cystic change, and osteophyte production.  Small effusion noted. Fluid distends the biceps tendon sheath. There is moderate to severe atrophy of the teres minor muscle.  Rotator cuff muscle bulk otherwise maintained. No fracture or dislocation.  No lytic or blastic lesion. Moderate acromioclavicular arthropathy noted. No left axillary lymphadenopathy.  Visualized portion the left lung is clear.  Coronary artery calcification noted.     1. Severe left glenohumeral osteoarthritis. 2. Moderate to severe atrophy of the left teres minor muscle. 3. Coronary artery calcification. Electronically signed by: Del Crocker MD  Date:    06/17/2025 Time:    11:01    MRI Shoulder Without Contrast Left  Result Date: 6/17/2025  EXAMINATION: MRI SHOULDER WITHOUT CONTRAST LEFT CLINICAL HISTORY: Shoulder pain, chronic, osteoarthritis suspected;  Other specific arthropathies, not elsewhere classified, left shoulder TECHNIQUE: MRI left shoulder performed without contrast per routine protocol. COMPARISON: 05/04/2025 FINDINGS: Rotator cuff: There is a subcentimeter intrasubstance tear of the insertional infraspinatus tendon.  There is supraspinatus tendinosis.  Subscapularis and teres minor tendons are unremarkable.  There is moderate atrophy of the teres minor muscle. Labrum: Circumferential fraying. Biceps: Long head biceps intra-articular tendinosis.  Fluid distends the tendon sheath. Bone: No fracture or marrow infiltrative process. Acromioclavicular joint: Moderate arthropathy. Cartilage: Extensive full-thickness cartilage loss throughout the glenohumeral joint with subchondral edema, cystic change, and osteophyte production.  Small glenohumeral joint effusion. Miscellaneous: Mild thickening of the subacromial subdeltoid bursa.     1. Severe glenohumeral osteoarthritis. 2. Rotator cuff tendinosis.  Subcentimeter intrasubstance tear of the insertional infraspinatus tendon.  Moderate atrophy of teres minor muscle. 3. Moderate acromioclavicular arthropathy. 4. Long head biceps tendinosis and tenosynovitis. Electronically signed by: Del Crocker MD Date:    06/17/2025 Time:    10:20      Assessment:      1. Rotator cuff arthropathy of left shoulder    2. Work related injury    3. Chronic left shoulder pain    4. Encounter related to worker's compensation claim      Plan:     I reviewed the prior treatment (PT, Orthopedic, [facility]) notes related to this injury. He is pending evaluation for possible shoulder replacement. Script for muscle relaxer was provided to address increased cervical muscle tension and aid sleep. He will continue to follow  with Orthopedics. Remain out of work until treatment completed unless there is a desk job available. Paperwork completed.     Medications Ordered This Encounter   Medications    cyclobenzaprine (FLEXERIL) 10 MG tablet     Sig: Take 1 tablet (10 mg total) by mouth every evening. Take muscle relaxer 1.5-2 hrs before bedtime  If you have a lot of drowsiness with using the muscle relaxer, only take before bedtime and make sure to avoid driving or operating heavy machinery within 8 hours of using the medication.     Dispense:  30 tablet     Refill:  1     Diagnoses and plan discussed with the patient, all questions and concerns were addressed prior to discharge. Follow-up as needed if any reoccurrence of symptoms related to the present condition. Plan was developed with active input from the patient and they verbalized understanding of and agreement with the POC.   Note was dictated with voice recognition software, please excuse any grammatical errors.    I spent a total of 30 minutes on the day of the visit.  This includes face to face time and non-face to face time preparing to see the patient (eg, review of tests), obtaining and/or reviewing separately obtained history, documenting clinical information in the electronic or other health record, independently interpreting results and communicating results to the patient/family/caregiver, or care coordinator.    Patient Instructions: Continue Physical Therapy, Daily home exercises/warm soaks   Restrictions: Disabled until next office visit, Discharged to Ortho/Neuro/Opthomologist/Surgeon (Continue to follow with Orthopedics, restrictions per Orthopedics going forward.)  Follow up if symptoms worsen or fail to improve.

## 2025-06-30 ENCOUNTER — OFFICE VISIT (OUTPATIENT)
Dept: SPORTS MEDICINE | Facility: CLINIC | Age: 65
End: 2025-06-30

## 2025-06-30 DIAGNOSIS — M25.612 DECREASED RANGE OF MOTION OF LEFT SHOULDER: ICD-10-CM

## 2025-06-30 DIAGNOSIS — M75.102 ROTATOR CUFF SYNDROME OF LEFT SHOULDER: ICD-10-CM

## 2025-06-30 DIAGNOSIS — M19.012 ARTHRITIS OF LEFT SHOULDER: ICD-10-CM

## 2025-06-30 DIAGNOSIS — M25.512 CHRONIC LEFT SHOULDER PAIN: ICD-10-CM

## 2025-06-30 DIAGNOSIS — G89.29 CHRONIC LEFT SHOULDER PAIN: ICD-10-CM

## 2025-06-30 DIAGNOSIS — Z96.642 S/P HIP REPLACEMENT, LEFT: Primary | ICD-10-CM

## 2025-06-30 PROCEDURE — 99214 OFFICE O/P EST MOD 30 MIN: CPT | Mod: 95,,, | Performed by: ORTHOPAEDIC SURGERY

## 2025-06-30 NOTE — PROGRESS NOTES
Telemedicine/Virtual Visit Documentation:     The patient location is: home  The chief complaint leading to consultation is: Pt presents for MRI results and follow-up.       Visit type: audiovisual    Face to Face time with patient:   15 minutes of total time spent on the encounter, which includes face to face time and non-face to face time preparing to see the patient (eg, review of tests), Obtaining and/or reviewing separately obtained history, Documenting clinical information in the electronic or other health record, Independently interpreting results (not separately reported) and communicating results to the patient/family/caregiver, or Care coordination (not separately reported).         Each patient to whom he or she provides medical services by telemedicine is:  (1) informed of the relationship between the physician and patient and the respective role of any other health care provider with respect to management of the patient; and (2) notified that he or she may decline to receive medical services by telemedicine and may withdraw from such care at any time.    H&P  Orthopaedics      SUBJECTIVE:     History of Present Illness:  History of Present Illness             Returns for discussion of MRI and CT scan result refused having failed physical therapy.  Review of patient's allergies indicates:  No Known Allergies    Past Medical History:   Diagnosis Date    Arthritis     Cellulitis     left foot    Diverticulosis     Genital herpes     GERD (gastroesophageal reflux disease)     HTN (hypertension)     Hyperlipidemia     Left atrial dilation     Onychomycosis     Pre-diabetes      Past Surgical History:   Procedure Laterality Date    ARTHROSCOPIC CHONDROPLASTY OF KNEE JOINT Right 5/27/2021    Procedure: ARTHROSCOPY, KNEE, WITH CHONDROPLASTY;  Surgeon: Gerard Tello MD;  Location: [facility];  Service: Orthopedics;  Laterality: Right;    COLONOSCOPY N/A 12/11/2019    Procedure: COLONOSCOPY;  Surgeon: Devaughn Johnson  MD;  [facility]  Service: Endoscopy;  Laterality: N/A;    COLONOSCOPY, SCREENING, LOW RISK PATIENT N/A 3/14/2025    Procedure: COLONOSCOPY, SCREENING, LOW RISK PATIENT;  Surgeon: lC Torres MD;  Location: [facility] ENDO (4TH FLR);  Service: Colon and Rectal;  Laterality: N/A;  1/29 ref by Dr. KAYLA Alfaro, Peg, portal. colt  3/6 precall complete. kw    ESOPHAGOGASTRODUODENOSCOPY N/A 3/2/2022    Procedure: EGD (ESOPHAGOGASTRODUODENOSCOPY);  Surgeon: John Rose MD;  Location: [facility] ENDO (4TH FLR);  Service: Endoscopy;  Laterality: N/A;  1/27 covid test 2/27 @ [facility]; instructions to portal-st  2/25 lvm to confirm appt-RB    HIP ARTHROPLASTY Left 4/16/2024    Procedure: ARTHROPLASTY, HIP;  Surgeon: Gerard Tello MD;  Location: [facility] OR;  Service: Orthopedics;  Laterality: Left;  General, PENG, Regional w/o catheter (spinal), johnathan 50cc    KNEE ARTHROSCOPY W/ MENISCECTOMY Right 5/27/2021    Procedure: ARTHROSCOPY, KNEE, WITH MENISCECTOMY;  Surgeon: Gerard Tello MD;  Location: [facility] OR;  Service: Orthopedics;  Laterality: Right;  JOHNATHAN 50CC    SYNOVECTOMY OF KNEE Right 5/27/2021    Procedure: SYNOVECTOMY, KNEE limited;  Surgeon: Gerrad Tello MD;  Location: [facility] OR;  Service: Orthopedics;  Laterality: Right;     Family History   Problem Relation Name Age of Onset    Cancer Mother Agatha Castillo         ?lung cancer    Esophageal cancer Father Alpesh Castillo     Heart disease Father Alpesh Vergarat 86    Heart attack Father Alpesh Vergarat 60 - 69    Hypertension Father Alpesh Castillo     Heart disease Sister Diana Sin 64    Hyperlipidemia Sister Diana Sin     Kidney disease Sister Diana Sin         had a kidney transplant    Hyperlipidemia Sister Barbra     Hypertension Sister Barbra     Colon cancer Neg Hx       Social History[1]         OBJECTIVE:     Physical Exam:  Gen:  No acute distress  CV:  Peripherally well-perfused.  Pulses 2+ bilaterally.  Lungs:  Normal respiratory effort.  Abdomen:  Soft,  non-tender, non-distended  Head/Neck:  Normocephalic.  Atraumatic. No TTP, AROM and PROM intact without pain  Neuro:  CN intact without deficit, SILT throughout B/L Upper & Lower Extremities    CT Shoulder Without Contrast Left  Narrative: EXAMINATION:  CT SHOULDER WITHOUT CONTRAST LEFT    CLINICAL HISTORY:  Shoulder pain, chronic, osteoarthritis suspected;  Other specific arthropathies, not elsewhere classified, left shoulder    TECHNIQUE:  CT left shoulder performed without contrast per preoperative planning protocol.  Coronal and sagittal reformats provided.    COMPARISON:  MRI 06/17/2025    FINDINGS:  There is severe loss of glenohumeral cartilage space with subchondral sclerosis, cystic change, and osteophyte production.  Small effusion noted.    Fluid distends the biceps tendon sheath.    There is moderate to severe atrophy of the teres minor muscle.  Rotator cuff muscle bulk otherwise maintained.    No fracture or dislocation.  No lytic or blastic lesion.    Moderate acromioclavicular arthropathy noted.    No left axillary lymphadenopathy.  Visualized portion the left lung is clear.  Coronary artery calcification noted.  Impression: 1. Severe left glenohumeral osteoarthritis.  2. Moderate to severe atrophy of the left teres minor muscle.  3. Coronary artery calcification.    Electronically signed by: Del Crocker MD  Date:    06/17/2025  Time:    11:01  MRI Shoulder Without Contrast Left  Narrative: EXAMINATION:  MRI SHOULDER WITHOUT CONTRAST LEFT    CLINICAL HISTORY:  Shoulder pain, chronic, osteoarthritis suspected;  Other specific arthropathies, not elsewhere classified, left shoulder    TECHNIQUE:  MRI left shoulder performed without contrast per routine protocol.    COMPARISON:  05/04/2025    FINDINGS:  Rotator cuff: There is a subcentimeter intrasubstance tear of the insertional infraspinatus tendon.  There is supraspinatus tendinosis.  Subscapularis and teres minor tendons are unremarkable.  There is  moderate atrophy of the teres minor muscle.    Labrum: Circumferential fraying.    Biceps: Long head biceps intra-articular tendinosis.  Fluid distends the tendon sheath.    Bone: No fracture or marrow infiltrative process.    Acromioclavicular joint: Moderate arthropathy.    Cartilage: Extensive full-thickness cartilage loss throughout the glenohumeral joint with subchondral edema, cystic change, and osteophyte production.  Small glenohumeral joint effusion.    Miscellaneous: Mild thickening of the subacromial subdeltoid bursa.  Impression: 1. Severe glenohumeral osteoarthritis.  2. Rotator cuff tendinosis.  Subcentimeter intrasubstance tear of the insertional infraspinatus tendon.  Moderate atrophy of teres minor muscle.  3. Moderate acromioclavicular arthropathy.  4. Long head biceps tendinosis and tenosynovitis.    Electronically signed by: Del Crocker MD  Date:    06/17/2025  Time:    10:20       ASSESSMENT/PLAN:     A/P: Royer Castillo is a 65 y.o. Left shoulder not improving with PT; repeat MRI and new CT demonstrate primary glenohumeral arthritis with rotator cuff tendinopathy. I discussed the risk and benefits of TSA vs. RSA. He will meet with Dr. Wagner tomorrow for final planning of the procedure.    Plan:  Assessment & Plan            Demonstration of rotator cuff tendinopathy on MRI in April as well as most recent MRI this month.  There is no full-thickness tear demonstrated but intrasubstance tearing noted.  He has primary glenohumeral arthritis by CT scan history MRI and history.  He is retiring  as result it was used to a very active lifestyle however he may be open to reverse replacement as opposed to a total shoulder replacement due to less involved postop rehabilitation program.  However, with the intact rotator cuff that discussion will be left up to Dr. Oziel Wagner planned incisions made at that time.           [1]   Social History  Tobacco Use    Smoking status: Never      Passive exposure: Never    Smokeless tobacco: Never   Substance Use Topics    Alcohol use: No    Drug use: No

## 2025-07-01 ENCOUNTER — OFFICE VISIT (OUTPATIENT)
Dept: SPORTS MEDICINE | Facility: CLINIC | Age: 65
End: 2025-07-01
Payer: COMMERCIAL

## 2025-07-01 ENCOUNTER — PATIENT MESSAGE (OUTPATIENT)
Dept: SPORTS MEDICINE | Facility: CLINIC | Age: 65
End: 2025-07-01
Payer: COMMERCIAL

## 2025-07-01 VITALS
SYSTOLIC BLOOD PRESSURE: 121 MMHG | BODY MASS INDEX: 29.29 KG/M2 | WEIGHT: 204.56 LBS | HEART RATE: 74 BPM | DIASTOLIC BLOOD PRESSURE: 81 MMHG | HEIGHT: 70 IN
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
## 2025-07-01 DIAGNOSIS — M12.812 ROTATOR CUFF ARTHROPATHY OF LEFT SHOULDER: ICD-10-CM

## 2025-07-01 DIAGNOSIS — M19.012 ARTHRITIS OF LEFT SHOULDER: Primary | ICD-10-CM

## 2025-07-01 PROCEDURE — 99214 OFFICE O/P EST MOD 30 MIN: CPT | Mod: S$GLB,,, | Performed by: ORTHOPAEDIC SURGERY

## 2025-07-01 PROCEDURE — 99999 PR PBB SHADOW E&M-EST. PATIENT-LVL IV: CPT | Mod: PBBFAC,,, | Performed by: ORTHOPAEDIC SURGERY

## 2025-07-01 PROCEDURE — 99214 OFFICE O/P EST MOD 30 MIN: CPT | Mod: PBBFAC | Performed by: ORTHOPAEDIC SURGERY

## 2025-07-01 NOTE — PROGRESS NOTES
CC: LEFT shoulder pain     65 y.o. Male with a 3 month history of left shoulder pain. He states he injured his shoulder on 4/2/2025, when attempting to move equipment. Patient works as a . He states he is planning to retire by the end of this year.    He endorses pain localized to the anterior shoulder without significant radiation down the arm. He does have some radiation of pain into the trapezius musculature. He denies numbness, tingling or radicular symptoms. He states the pain does occasionally wake him at night. He has markedly decreased range of motion and strength which has prevented him from doing activities.    He has tried NSAIDs, 3 months of PT with no significant benefit. He has not tried injections, but states that he has had a previous reaction to a steroid injection in his hip.     He states that the pain is severe and not responding to any conservative care.      He reports that the pain and weakness is worse with overhead activity. It also bothers him at night.    Is affecting ADLs.  Pain is 7/10 at it's worst.    Denies smoking history, diabetes.      Past Medical History:   Diagnosis Date    Arthritis     Cellulitis     left foot    Diverticulosis     Genital herpes     GERD (gastroesophageal reflux disease)     HTN (hypertension)     Hyperlipidemia     Left atrial dilation     Onychomycosis     Pre-diabetes        Past Surgical History:   Procedure Laterality Date    ARTHROSCOPIC CHONDROPLASTY OF KNEE JOINT Right 5/27/2021    Procedure: ARTHROSCOPY, KNEE, WITH CHONDROPLASTY;  Surgeon: Gerard Tello MD;  Location: [facility];  Service: Orthopedics;  Laterality: Right;    COLONOSCOPY N/A 12/11/2019    Procedure: COLONOSCOPY;  Surgeon: Devaughn Johnson MD;  Location: [facility] ([address] [place] [address]);  Service: Endoscopy;  Laterality: N/A;    COLONOSCOPY, SCREENING, LOW RISK PATIENT N/A 3/14/2025    Procedure: COLONOSCOPY, SCREENING, LOW RISK PATIENT;  Surgeon: Cl Torres MD;  Location: [facility]  (4TH FLR);  Service: Colon and Rectal;  Laterality: N/A;  1/29 ref by Dr. KAYLA Alfaro, Peg, portal. colt  3/6 precall complete. kw    ESOPHAGOGASTRODUODENOSCOPY N/A 3/2/2022    Procedure: EGD (ESOPHAGOGASTRODUODENOSCOPY);  Surgeon: John Rose MD;  Location: [facility] (4TH FLR);  Service: Endoscopy;  Laterality: N/A;  1[facility]; instructions to portal-st  2/25 lvm to confirm appt-RB    HIP ARTHROPLASTY Left 4/16/2024    Procedure: ARTHROPLASTY, HIP;  Surgeon: Gerard Tello MD;  Location: [facility] OR;  Service: Orthopedics;  Laterality: Left;  General, PENG, Regional w/o catheter (spinal), johnathan 50cc    KNEE ARTHROSCOPY W/ MENISCECTOMY Right 5/27/2021    Procedure: ARTHROSCOPY, KNEE, WITH MENISCECTOMY;  Surgeon: Gerard Tello MD;  Location: [facility] OR;  Service: Orthopedics;  Laterality: Right;  JOHNATHAN 50CC    SYNOVECTOMY OF KNEE Right 5/27/2021    Procedure: SYNOVECTOMY, KNEE limited;  Surgeon: Gerard Tello MD;  Location: [facility] OR;  Service: Orthopedics;  Laterality: Right;       Family History   Problem Relation Name Age of Onset    Cancer Mother Agatha Castillo         ?lung cancer    Esophageal cancer Father Alpesh Castillo     Heart disease Father Alpesh Castillo 86    Heart attack Father Alpesh Castillo 60 - 69    Hypertension Father Alpesh Castillo     Heart disease Sister Diana Sin 64    Hyperlipidemia Sister Diana Sin     Kidney disease Sister Diana Sin         had a kidney transplant    Hyperlipidemia Sister Barbra     Hypertension Sister Barbra     Colon cancer Neg Hx         Current Medications[1]    Review of patient's allergies indicates:  No Known Allergies       REVIEW OF SYSTEMS:  Constitution: Negative. Negative for chills, fever and night sweats.   HENT: Negative for congestion and headaches.    Eyes: Negative for blurred vision, left vision loss and right vision loss.   Cardiovascular: Negative for chest pain and syncope.   Respiratory: Negative for cough and shortness of breath.    Endocrine:  "Negative for polydipsia, polyphagia and polyuria.   Hematologic/Lymphatic: Negative for bleeding problem. Does not bruise/bleed easily.   Skin: Negative for dry skin, itching and rash.   Musculoskeletal: Negative for falls.  Positive for left shoulder pain and muscle weakness.   Gastrointestinal: Negative for abdominal pain and bowel incontinence.   Genitourinary: Negative for bladder incontinence and nocturia.   Neurological: Negative for disturbances in coordination, loss of balance and seizures.   Psychiatric/Behavioral: Negative for depression. The patient does not have insomnia.    Allergic/Immunologic: Negative for hives and persistent infections.      PHYSICAL EXAMINATION:  Vitals:  /81 (Patient Position: Sitting)   Pulse 74   Ht 5' 10" (1.778 m)   Wt 92.8 kg (204 lb 9.4 oz)   BMI 29.36 kg/m²    General: The patient is alert and oriented x 3.  Mood is pleasant.  Observation of ears, eyes and nose reveal no gross abnormalities.  No labored breathing observed.  Gait is coordinated. Patient can toe walk and heel walk without difficulty.      LEFT Shoulder / Upper Extremity Exam    OBSERVATION:     Swelling  none  Deformity  none   Discoloration  none   Scapular winging none   Scars   none  Atrophy  none    TENDERNESS / CREPITUS (T/C):          T/C      T/C   Clavicle   -/-  SUPRAspinatus    -/-     AC Jt.    -/-  INFRAspinatus  -/-    SC Jt.    -/-  Deltoid    -/-      G. Tuberosity  -/-  LH BICEP groove  +/-   Acromion:  -/-  Midline Neck   -/-     Scapular Spine -/-  Trapezium   -/-   SMA Scapula  -/-  GH jt. line    +/-     Scapulothoracic  -/-         ROM: (* = with pain)  Right shoulder   Left shoulder        AROM (PROM)   AROM (PROM)   FE    170° (175°)     110° (120°)     ER at 0°    60°  (65°)    10°  (15°)   ER at 90° ABD  90°  (90°)    90°  (90°)   IR at 90°  ABD   NA  (40°)     NA  (40°)      IR (spine level)   T10     T10    STRENGTH: (* = with pain) Right shoulder   Left shoulder "    SCAPTION   5/5    5/5 *    IR    5/5    5/5   ER    5/5    5/5   BICEPS   5/5    5/5   Deltoid    5/5 5/5     SIGNS:  Painful side       NEER   -    OSERGIOS  +    BANERJEE   -    SPEEDS  +     DROP ARM   -   BELLY PRESS neg   Superior escape none    LIFT-OFF  neg   X-Body ADD    neg    MOVING VALGUS neg        STABILITY TESTING    Right shoulder   Left shoulder    Translation     Anterior  up face     up face    Posterior  up face    up face    Sulcus   < 10mm    < 10 mm     Signs   Apprehension   neg      neg       Relocation   no change     no change      Jerk test  neg     neg    EXTREMITY NEURO-VASCULAR EXAM:    Sensation grossly intact to light touch all dermatomal regions.    DTR 2+ Biceps, Triceps, BR and Negative Hugos sign   Grossly intact motor function at Elbow, Wrist and Hand   Distal pulses radial and ulnar 2+, brisk cap refill, symmetric.      NECK:  Painless FROM and spinous processes non-tender. Negative Spurlings sign.        XRAYS:  Xrays including AP, Outlet and Axillary Lateral of Left shoulder 4/30/2025, reviewed today.   No fracture dislocation or other pathology   Acromion type 2   Proximal migration of humeral head: None   GH arthritis: Severe with osteophyte formation    MRI left shoulder 6/17/2025, reviewed today shows:   Intact supraspinatus, subscapularis tendons    CT left shoulder 6/17/2025, reviewed today shows:   Severe glenohumeral osteoarthritis with osteophyte formation and cystic changes of glenoid and proximal humerus   Walch B2 glenoid morphology          ASSESSMENT:   Left shoulder pain, possible:  1. Arthritis of left shoulder    2. Rotator cuff arthropathy of left shoulder    3. Work related injury    4. Pain in unspecified shoulder        PLAN:      Plan for RTSA left shoulder; CT with tournier protocol for possible wedge.   Follow up for preop    All questions were answered, patient will contact us for questions or concerns in the interim.            [1]    Current Outpatient Medications:     acyclovir (ZOVIRAX) 200 MG capsule, TAKE 1 CAPSULE(200 MG) BY MOUTH TWICE DAILY, Disp: 60 capsule, Rfl: 11    amLODIPine (NORVASC) 10 MG tablet, Take 1 tablet (10 mg total) by mouth once daily., Disp: 30 tablet, Rfl: 11    atorvastatin (LIPITOR) 40 MG tablet, Take 1 tablet (40 mg total) by mouth every evening., Disp: 30 tablet, Rfl: 11    cyclobenzaprine (FLEXERIL) 10 MG tablet, Take 1 tablet (10 mg total) by mouth every evening. Take muscle relaxer 1.5-2 hrs before bedtime If you have a lot of drowsiness with using the muscle relaxer, only take before bedtime and make sure to avoid driving or operating heavy machinery within 8 hours of using the medication., Disp: 30 tablet, Rfl: 1    hydroCHLOROthiazide (HYDRODIURIL) 25 MG tablet, Take 1 tablet (25 mg total) by mouth once daily., Disp: 30 tablet, Rfl: 11    ibuprofen (ADVIL,MOTRIN) 800 MG tablet, Take 1 tablet (800 mg total) by mouth every 6 (six) hours as needed for Pain. Take with meals., Disp: 30 tablet, Rfl: 0    meloxicam (MOBIC) 15 MG tablet, Take 1 tablet (15 mg total) by mouth once daily., Disp: 30 tablet, Rfl: 2    pantoprazole (PROTONIX) 40 MG tablet, Take 1 tablet (40 mg total) by mouth once daily., Disp: 30 tablet, Rfl: 5

## 2025-07-03 ENCOUNTER — PATIENT MESSAGE (OUTPATIENT)
Dept: SPORTS MEDICINE | Facility: CLINIC | Age: 65
End: 2025-07-03
Payer: COMMERCIAL

## 2025-07-11 ENCOUNTER — PATIENT MESSAGE (OUTPATIENT)
Dept: PRIMARY CARE CLINIC | Facility: CLINIC | Age: 65
End: 2025-07-11
Payer: COMMERCIAL

## 2025-07-11 DIAGNOSIS — K21.9 GASTROESOPHAGEAL REFLUX DISEASE, UNSPECIFIED WHETHER ESOPHAGITIS PRESENT: ICD-10-CM

## 2025-07-11 NOTE — TELEPHONE ENCOUNTER
No care due was identified.  [facility] Embedded Care Due Messages. Reference number: [id number].   7/11/2025 2:30:49 PM CDT

## 2025-07-12 RX ORDER — PANTOPRAZOLE SODIUM 40 MG/1
40 TABLET, DELAYED RELEASE ORAL DAILY
Qty: 90 TABLET | Refills: 1 | Status: SHIPPED | OUTPATIENT
Start: 2025-07-12

## 2025-07-14 ENCOUNTER — HOSPITAL ENCOUNTER (OUTPATIENT)
Dept: RADIOLOGY | Facility: HOSPITAL | Age: 65
Discharge: HOME | End: 2025-07-14
Attending: ORTHOPAEDIC SURGERY
Payer: COMMERCIAL

## 2025-07-14 DIAGNOSIS — M19.012 ARTHRITIS OF LEFT SHOULDER: ICD-10-CM

## 2025-07-14 DIAGNOSIS — M12.812 ROTATOR CUFF ARTHROPATHY OF LEFT SHOULDER: ICD-10-CM

## 2025-07-14 PROCEDURE — 73200 CT UPPER EXTREMITY W/O DYE: CPT | Mod: 26,LT,, | Performed by: RADIOLOGY

## 2025-07-14 PROCEDURE — 73200 CT UPPER EXTREMITY W/O DYE: CPT | Mod: TC,LT

## 2025-07-15 ENCOUNTER — OFFICE VISIT (OUTPATIENT)
Dept: SPORTS MEDICINE | Facility: CLINIC | Age: 65
End: 2025-07-15
Payer: COMMERCIAL

## 2025-07-15 VITALS
WEIGHT: 206.38 LBS | SYSTOLIC BLOOD PRESSURE: 131 MMHG | HEIGHT: 70 IN | HEART RATE: 71 BPM | DIASTOLIC BLOOD PRESSURE: 83 MMHG | BODY MASS INDEX: 29.54 KG/M2
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
## 2025-07-15 DIAGNOSIS — M12.812 ROTATOR CUFF ARTHROPATHY OF LEFT SHOULDER: ICD-10-CM

## 2025-07-15 DIAGNOSIS — M19.012 ARTHRITIS OF LEFT SHOULDER: ICD-10-CM

## 2025-07-15 PROCEDURE — 99999 PR PBB SHADOW E&M-EST. PATIENT-LVL III: CPT | Mod: PBBFAC,,, | Performed by: ORTHOPAEDIC SURGERY

## 2025-07-15 PROCEDURE — 99213 OFFICE O/P EST LOW 20 MIN: CPT | Mod: PBBFAC | Performed by: ORTHOPAEDIC SURGERY

## 2025-07-15 PROCEDURE — 99214 OFFICE O/P EST MOD 30 MIN: CPT | Mod: S$PBB,,, | Performed by: ORTHOPAEDIC SURGERY

## 2025-07-15 NOTE — PROGRESS NOTES
CC: LEFT shoulder pain    65 y.o. male presents for CT review of left shoulder; patient had his CT external per his work comp..  Concern for OA. Patient does not report any new incidents or injuries since their last appointment. Pain and symptoms remain unchanged since his last appointment. Here today to discuss treatment options.       Initial Hx:    65 y.o. Male with a 3 month history of left shoulder pain. He states he injured his shoulder on 4/2/2025, when attempting to move equipment. Patient works as a . He states he is planning to retire by the end of this year.    He endorses pain localized to the anterior shoulder without significant radiation down the arm. He does have some radiation of pain into the trapezius musculature. He denies numbness, tingling or radicular symptoms. He states the pain does occasionally wake him at night. He has markedly decreased range of motion and strength which has prevented him from doing activities.    He has tried NSAIDs, 3 months of PT with no significant benefit. He has not tried injections, but states that he has had a previous reaction to a steroid injection in his hip.     He states that the pain is severe and not responding to any conservative care.      He reports that the pain and weakness is worse with overhead activity. It also bothers him at night.    Is affecting ADLs.  Pain is 7/10 at it's worst.    Denies smoking history, diabetes.      Past Medical History:   Diagnosis Date    Arthritis     Cellulitis     left foot    Diverticulosis     Genital herpes     GERD (gastroesophageal reflux disease)     HTN (hypertension)     Hyperlipidemia     Left atrial dilation     Onychomycosis     Pre-diabetes        Past Surgical History:   Procedure Laterality Date    ARTHROSCOPIC CHONDROPLASTY OF KNEE JOINT Right 5/27/2021    Procedure: ARTHROSCOPY, KNEE, WITH CHONDROPLASTY;  Surgeon: Gerard Tello MD;  Location: [facility];  Service: Orthopedics;  Laterality:  Right;    COLONOSCOPY N/A 12/11/2019    Procedure: COLONOSCOPY;  Surgeon: Devaughn Johnson MD;  Location: [facility] (4TH FLR);  Service: Endoscopy;  Laterality: N/A;    COLONOSCOPY, SCREENING, LOW RISK PATIENT N/A 3/14/2025    Procedure: COLONOSCOPY, SCREENING, LOW RISK PATIENT;  Surgeon: Cl Torres MD;  Location: [facility] (4TH FLR);  Service: Colon and Rectal;  Laterality: N/A;  1/29 ref by Dr. KAYLA Alfaro, Peg, portal. colt  3/6 precall complete. kw    ESOPHAGOGASTRODUODENOSCOPY N/A 3/2/2022    Procedure: EGD (ESOPHAGOGASTRODUODENOSCOPY);  Surgeon: John Rose MD;  Location: [facility] (4TH FLR);  Service: Endoscopy;  Laterality: N/A;  1/27 covid test 2/27 @ [place]; instructions to portal-st  2/25 lvm to confirm appt-RB    HIP ARTHROPLASTY Left 4/16/2024    Procedure: ARTHROPLASTY, HIP;  Surgeon: Gerard Tello MD;  Location: [facility] OR;  Service: Orthopedics;  Laterality: Left;  General, PENG, Regional w/o catheter (spinal), johnathan 50cc    KNEE ARTHROSCOPY W/ MENISCECTOMY Right 5/27/2021    Procedure: ARTHROSCOPY, KNEE, WITH MENISCECTOMY;  Surgeon: Gerard Tello MD;  Location: [facility] OR;  Service: Orthopedics;  Laterality: Right;  JOHNATHAN 50CC    SYNOVECTOMY OF KNEE Right 5/27/2021    Procedure: SYNOVECTOMY, KNEE limited;  Surgeon: Gerard Tello MD;  Location: [facility] OR;  Service: Orthopedics;  Laterality: Right;       Family History   Problem Relation Name Age of Onset    Cancer Mother Agatha Flot         ?lung cancer    Esophageal cancer Father Alpesh Flot     Heart disease Father Alpesh Flot 86    Heart attack Father Alpesh Flot 60 - 69    Hypertension Father Alpesh Flot     Heart disease Sister Diana Sin 64    Hyperlipidemia Sister Diana Sin     Kidney disease Sister Diana Sin         had a kidney transplant    Hyperlipidemia Sister Barbra     Hypertension Sister Barbra     Colon cancer Neg Hx         Current Medications[1]    Review of patient's allergies indicates:  No Known Allergies       REVIEW OF  "SYSTEMS:  Constitution: Negative. Negative for chills, fever and night sweats.   HENT: Negative for congestion and headaches.    Eyes: Negative for blurred vision, left vision loss and right vision loss.   Cardiovascular: Negative for chest pain and syncope.   Respiratory: Negative for cough and shortness of breath.    Endocrine: Negative for polydipsia, polyphagia and polyuria.   Hematologic/Lymphatic: Negative for bleeding problem. Does not bruise/bleed easily.   Skin: Negative for dry skin, itching and rash.   Musculoskeletal: Negative for falls.  Positive for left shoulder pain and muscle weakness.   Gastrointestinal: Negative for abdominal pain and bowel incontinence.   Genitourinary: Negative for bladder incontinence and nocturia.   Neurological: Negative for disturbances in coordination, loss of balance and seizures.   Psychiatric/Behavioral: Negative for depression. The patient does not have insomnia.    Allergic/Immunologic: Negative for hives and persistent infections.      PHYSICAL EXAMINATION:  Vitals:  /83   Pulse 71   Ht 5' 10" (1.778 m)   Wt 93.6 kg (206 lb 5.6 oz)   BMI 29.61 kg/m²    General: The patient is alert and oriented x 3.  Mood is pleasant.  Observation of ears, eyes and nose reveal no gross abnormalities.  No labored breathing observed.  Gait is coordinated. Patient can toe walk and heel walk without difficulty.      LEFT Shoulder / Upper Extremity Exam    OBSERVATION:     Swelling  none  Deformity  none   Discoloration  none   Scapular winging none   Scars   none  Atrophy  none    TENDERNESS / CREPITUS (T/C):          T/C      T/C   Clavicle   -/-  SUPRAspinatus    -/-     AC Jt.    -/-  INFRAspinatus  -/-    SC Jt.    -/-  Deltoid    -/-      G. Tuberosity  -/-  LH BICEP groove  +/-   Acromion:  -/-  Midline Neck   -/-     Scapular Spine -/-  Trapezium   -/-   SMA Scapula  -/-  GH jt. line    +/-     Scapulothoracic  -/-         ROM: (* = with pain)  Right shoulder   Left " shoulder        AROM (PROM)   AROM (PROM)   FE    170° (175°)     110° (120°)     ER at 0°    60°  (65°)    10°  (15°)   ER at 90° ABD  90°  (90°)    90°  (90°)   IR at 90°  ABD   NA  (40°)     NA  (40°)      IR (spine level)   T10     T10    STRENGTH: (* = with pain) Right shoulder   Left shoulder    SCAPTION   5/5    5/5 *    IR    5/5    5/5   ER    5/5    5/5   BICEPS   5/5    5/5   Deltoid    5/5    5/5     SIGNS:  Painful side       NEER   -    OSERGIOS  +    BANERJEE   -    SPEEDS  +     DROP ARM   -   BELLY PRESS neg   Superior escape none    LIFT-OFF  neg   X-Body ADD    neg    MOVING VALGUS neg        STABILITY TESTING    Right shoulder   Left shoulder    Translation     Anterior  up face     up face    Posterior  up face    up face    Sulcus   < 10mm    < 10 mm     Signs   Apprehension   neg      neg       Relocation   no change     no change      Jerk test  neg     neg    EXTREMITY NEURO-VASCULAR EXAM:    Sensation grossly intact to light touch all dermatomal regions.    DTR 2+ Biceps, Triceps, BR and Negative Hugos sign   Grossly intact motor function at Elbow, Wrist and Hand   Distal pulses radial and ulnar 2+, brisk cap refill, symmetric.      NECK:  Painless FROM and spinous processes non-tender. Negative Spurlings sign.        XRAYS:  Xrays including AP, Outlet and Axillary Lateral of Left shoulder 4/30/2025, reviewed today.   No fracture dislocation or other pathology   Acromion type 2   Proximal migration of humeral head: None   GH arthritis: Severe with osteophyte formation    MRI left shoulder 6/17/2025, reviewed today shows:   Intact supraspinatus, subscapularis tendons    CT left shoulder 6/17/2025, reviewed today shows:   Severe glenohumeral osteoarthritis with osteophyte formation and cystic changes of glenoid and proximal humerus   Walch B2 glenoid morphology      External CT -   CT, Left Shoulder s/ Contrast   CLINICAL INDICATION  Shoulder pain, rotator cuff tear, preoperative  planning   COMPARISON  No relevant imaging examinations are available for review.   PROCEDURE DETAILS  CT left shoulder without contrast.  Coronal and sagittal reformatted images generated.  3-D volume rendered images obtained.    Dose modulation software was used for image acquisition to adjust technical parameters that would ensure the radiation dose is the lowest required to produce diagnostic images based on the thickness and density of the body part being scanned.  DOSAGE: CTDI vol 24 mGy     mGy x cm   FINDINGS  Severe glenohumeral osteoarthrosis with loss of joint space, cartilage loss and marginal osteophytes with bone-on-bone appearance, remodelling of the humeral head and the glenoid, subchondral cystic changes and sclerosis on both sides of the articulation, posterior offset humeral head relative to the glenoid, capsular laxity.    Acromioclavicular osteoarthrosis.    No acute fracture.    The muscles are normal.  No soft tissue mass or fluid collection.    Scans through the left lung are normal.   IMPRESSION   1. Severe glenohumeral osteoarthrosis.         ASSESSMENT:   Left shoulder pain, possible:  1. Work related injury    2. Arthritis of left shoulder    3. Rotator cuff arthropathy of left shoulder          PLAN:      Plan for RTSA left shoulder; CT with tournier protocol for possible wedge.   Follow up for preop    All questions were answered, patient will contact us for questions or concerns in the interim.              [1]   Current Outpatient Medications:     acyclovir (ZOVIRAX) 200 MG capsule, TAKE 1 CAPSULE(200 MG) BY MOUTH TWICE DAILY, Disp: 60 capsule, Rfl: 11    amLODIPine (NORVASC) 10 MG tablet, Take 1 tablet (10 mg total) by mouth once daily., Disp: 30 tablet, Rfl: 11    atorvastatin (LIPITOR) 40 MG tablet, Take 1 tablet (40 mg total) by mouth every evening., Disp: 30 tablet, Rfl: 11    cyclobenzaprine (FLEXERIL) 10 MG tablet, Take 1 tablet (10 mg total) by mouth every evening. Take  muscle relaxer 1.5-2 hrs before bedtime If you have a lot of drowsiness with using the muscle relaxer, only take before bedtime and make sure to avoid driving or operating heavy machinery within 8 hours of using the medication., Disp: 30 tablet, Rfl: 1    hydroCHLOROthiazide (HYDRODIURIL) 25 MG tablet, Take 1 tablet (25 mg total) by mouth once daily., Disp: 30 tablet, Rfl: 11    ibuprofen (ADVIL,MOTRIN) 800 MG tablet, Take 1 tablet (800 mg total) by mouth every 6 (six) hours as needed for Pain. Take with meals., Disp: 30 tablet, Rfl: 0    meloxicam (MOBIC) 15 MG tablet, Take 1 tablet (15 mg total) by mouth once daily., Disp: 30 tablet, Rfl: 2    pantoprazole (PROTONIX) 40 MG tablet, Take 1 tablet (40 mg total) by mouth once daily., Disp: 90 tablet, Rfl: 1

## 2025-08-07 ENCOUNTER — PATIENT MESSAGE (OUTPATIENT)
Dept: SPORTS MEDICINE | Facility: CLINIC | Age: 65
End: 2025-08-07
Payer: COMMERCIAL

## 2025-08-15 ENCOUNTER — PATIENT MESSAGE (OUTPATIENT)
Dept: SPORTS MEDICINE | Facility: CLINIC | Age: 65
End: 2025-08-15
Payer: COMMERCIAL

## 2025-08-18 ENCOUNTER — PATIENT MESSAGE (OUTPATIENT)
Dept: PRIMARY CARE CLINIC | Facility: CLINIC | Age: 65
End: 2025-08-18
Payer: COMMERCIAL

## 2025-08-19 ENCOUNTER — PATIENT MESSAGE (OUTPATIENT)
Dept: ADMINISTRATIVE | Facility: OTHER | Age: 65
End: 2025-08-19
Payer: COMMERCIAL

## 2025-08-19 DIAGNOSIS — M19.012 ARTHRITIS OF LEFT SHOULDER: Primary | ICD-10-CM

## 2025-08-19 DIAGNOSIS — M12.812 ROTATOR CUFF ARTHROPATHY OF LEFT SHOULDER: ICD-10-CM

## 2025-08-20 ENCOUNTER — LAB VISIT (OUTPATIENT)
Dept: LAB | Facility: HOSPITAL | Age: 65
End: 2025-08-20
Attending: STUDENT IN AN ORGANIZED HEALTH CARE EDUCATION/TRAINING PROGRAM
Payer: COMMERCIAL

## 2025-08-20 ENCOUNTER — OFFICE VISIT (OUTPATIENT)
Dept: PRIMARY CARE CLINIC | Facility: CLINIC | Age: 65
End: 2025-08-20
Payer: COMMERCIAL

## 2025-08-20 VITALS
HEIGHT: 70 IN | OXYGEN SATURATION: 97 % | HEART RATE: 65 BPM | WEIGHT: 205 LBS | DIASTOLIC BLOOD PRESSURE: 80 MMHG | SYSTOLIC BLOOD PRESSURE: 122 MMHG | BODY MASS INDEX: 29.35 KG/M2

## 2025-08-20 DIAGNOSIS — M19.112 OSTEOARTHRITIS OF LEFT SHOULDER DUE TO ROTATOR CUFF INJURY: ICD-10-CM

## 2025-08-20 DIAGNOSIS — I10 BENIGN ESSENTIAL HYPERTENSION: ICD-10-CM

## 2025-08-20 DIAGNOSIS — Z01.818 PREOPERATIVE EXAMINATION: ICD-10-CM

## 2025-08-20 DIAGNOSIS — K21.9 GASTROESOPHAGEAL REFLUX DISEASE, UNSPECIFIED WHETHER ESOPHAGITIS PRESENT: ICD-10-CM

## 2025-08-20 DIAGNOSIS — R73.03 PRE-DIABETES: ICD-10-CM

## 2025-08-20 DIAGNOSIS — S46.002S OSTEOARTHRITIS OF LEFT SHOULDER DUE TO ROTATOR CUFF INJURY: ICD-10-CM

## 2025-08-20 DIAGNOSIS — Z01.818 PREOPERATIVE EXAMINATION: Primary | ICD-10-CM

## 2025-08-20 LAB
ABSOLUTE EOSINOPHIL (OHS): 0.11 K/UL
ABSOLUTE MONOCYTE (OHS): 0.63 K/UL (ref 0.3–1)
ABSOLUTE NEUTROPHIL COUNT (OHS): 4.52 K/UL (ref 1.8–7.7)
ALBUMIN SERPL BCP-MCNC: 4.3 G/DL (ref 3.5–5.2)
ALP SERPL-CCNC: 84 UNIT/L (ref 40–150)
ALT SERPL W/O P-5'-P-CCNC: 24 UNIT/L (ref 0–55)
ANION GAP (OHS): 12 MMOL/L (ref 8–16)
AST SERPL-CCNC: 23 UNIT/L (ref 0–50)
BASOPHILS # BLD AUTO: 0.03 K/UL
BASOPHILS NFR BLD AUTO: 0.4 %
BILIRUB SERPL-MCNC: 1 MG/DL (ref 0.1–1)
BUN SERPL-MCNC: 16 MG/DL (ref 8–23)
CALCIUM SERPL-MCNC: 9.5 MG/DL (ref 8.7–10.5)
CHLORIDE SERPL-SCNC: 100 MMOL/L (ref 95–110)
CO2 SERPL-SCNC: 27 MMOL/L (ref 23–29)
CREAT SERPL-MCNC: 1.1 MG/DL (ref 0.5–1.4)
ERYTHROCYTE [DISTWIDTH] IN BLOOD BY AUTOMATED COUNT: 11.9 % (ref 11.5–14.5)
GFR SERPLBLD CREATININE-BSD FMLA CKD-EPI: >60 ML/MIN/1.73/M2
GLUCOSE SERPL-MCNC: 104 MG/DL (ref 70–110)
HCT VFR BLD AUTO: 45.3 % (ref 40–54)
HGB BLD-MCNC: 15.4 GM/DL (ref 14–18)
IMM GRANULOCYTES # BLD AUTO: 0.02 K/UL (ref 0–0.04)
IMM GRANULOCYTES NFR BLD AUTO: 0.3 % (ref 0–0.5)
LYMPHOCYTES # BLD AUTO: 1.66 K/UL (ref 1–4.8)
MCH RBC QN AUTO: 30.4 PG (ref 27–31)
MCHC RBC AUTO-ENTMCNC: 34 G/DL (ref 32–36)
MCV RBC AUTO: 89 FL (ref 82–98)
NUCLEATED RBC (/100WBC) (OHS): 0 /100 WBC
OHS QRS DURATION: 84 MS
OHS QTC CALCULATION: 423 MS
PLATELET # BLD AUTO: 263 K/UL (ref 150–450)
PMV BLD AUTO: 11.3 FL (ref 9.2–12.9)
POTASSIUM SERPL-SCNC: 3.9 MMOL/L (ref 3.5–5.1)
PROT SERPL-MCNC: 7.3 GM/DL (ref 6–8.4)
RBC # BLD AUTO: 5.07 M/UL (ref 4.6–6.2)
RELATIVE EOSINOPHIL (OHS): 1.6 %
RELATIVE LYMPHOCYTE (OHS): 23.8 % (ref 18–48)
RELATIVE MONOCYTE (OHS): 9 % (ref 4–15)
RELATIVE NEUTROPHIL (OHS): 64.9 % (ref 38–73)
SODIUM SERPL-SCNC: 139 MMOL/L (ref 136–145)
WBC # BLD AUTO: 6.97 K/UL (ref 3.9–12.7)

## 2025-08-20 PROCEDURE — 93010 ELECTROCARDIOGRAM REPORT: CPT | Mod: S$GLB,,, | Performed by: STUDENT IN AN ORGANIZED HEALTH CARE EDUCATION/TRAINING PROGRAM

## 2025-08-20 PROCEDURE — 99999 PR PBB SHADOW E&M-EST. PATIENT-LVL III: CPT | Mod: PBBFAC,,, | Performed by: STUDENT IN AN ORGANIZED HEALTH CARE EDUCATION/TRAINING PROGRAM

## 2025-08-20 PROCEDURE — 36415 COLL VENOUS BLD VENIPUNCTURE: CPT | Mod: PN

## 2025-08-20 PROCEDURE — 85025 COMPLETE CBC W/AUTO DIFF WBC: CPT

## 2025-08-20 PROCEDURE — 82040 ASSAY OF SERUM ALBUMIN: CPT

## 2025-08-20 RX ORDER — PANTOPRAZOLE SODIUM 40 MG/1
40 TABLET, DELAYED RELEASE ORAL DAILY
Qty: 30 TABLET | Refills: 11 | Status: SHIPPED | OUTPATIENT
Start: 2025-08-20

## 2025-08-20 RX ORDER — HYDROCHLOROTHIAZIDE 25 MG/1
25 TABLET ORAL DAILY
Qty: 30 TABLET | Refills: 11 | Status: SHIPPED | OUTPATIENT
Start: 2025-08-20

## 2025-08-20 RX ORDER — AMLODIPINE BESYLATE 10 MG/1
10 TABLET ORAL DAILY
Qty: 30 TABLET | Refills: 11 | Status: SHIPPED | OUTPATIENT
Start: 2025-08-20 | End: 2026-08-20

## 2025-08-26 ENCOUNTER — PATIENT MESSAGE (OUTPATIENT)
Dept: PREADMISSION TESTING | Facility: HOSPITAL | Age: 65
End: 2025-08-26
Payer: COMMERCIAL

## 2025-08-28 ENCOUNTER — TELEPHONE (OUTPATIENT)
Dept: SPORTS MEDICINE | Facility: CLINIC | Age: 65
End: 2025-08-28
Payer: COMMERCIAL

## 2025-08-28 ENCOUNTER — PATIENT MESSAGE (OUTPATIENT)
Dept: SPORTS MEDICINE | Facility: CLINIC | Age: 65
End: 2025-08-28
Payer: COMMERCIAL

## 2025-09-02 ENCOUNTER — OFFICE VISIT (OUTPATIENT)
Dept: SPORTS MEDICINE | Facility: CLINIC | Age: 65
End: 2025-09-02
Payer: COMMERCIAL

## 2025-09-02 VITALS
HEIGHT: 70 IN | SYSTOLIC BLOOD PRESSURE: 121 MMHG | DIASTOLIC BLOOD PRESSURE: 81 MMHG | BODY MASS INDEX: 29.82 KG/M2 | HEART RATE: 69 BPM | WEIGHT: 208.31 LBS

## 2025-09-02 DIAGNOSIS — M12.812 ROTATOR CUFF ARTHROPATHY OF LEFT SHOULDER: Primary | ICD-10-CM

## 2025-09-02 PROCEDURE — 99214 OFFICE O/P EST MOD 30 MIN: CPT | Mod: S$GLB,,, | Performed by: PHYSICIAN ASSISTANT

## 2025-09-02 PROCEDURE — 99999 PR PBB SHADOW E&M-EST. PATIENT-LVL IV: CPT | Mod: PBBFAC,,, | Performed by: PHYSICIAN ASSISTANT

## 2025-09-02 RX ORDER — MUPIROCIN 20 MG/G
1 OINTMENT TOPICAL 2 TIMES DAILY
OUTPATIENT
Start: 2025-09-02 | End: 2025-09-07

## 2025-09-02 RX ORDER — MUPIROCIN 20 MG/G
OINTMENT TOPICAL
OUTPATIENT
Start: 2025-09-02

## 2025-09-02 RX ORDER — PREGABALIN 150 MG/1
150 CAPSULE ORAL NIGHTLY
OUTPATIENT
Start: 2025-09-02

## 2025-09-02 RX ORDER — AMOXICILLIN 250 MG
1 CAPSULE ORAL 2 TIMES DAILY
OUTPATIENT
Start: 2025-09-02

## 2025-09-02 RX ORDER — NAPROXEN SODIUM 220 MG/1
81 TABLET, FILM COATED ORAL 2 TIMES DAILY
Qty: 28 TABLET | Refills: 0 | Status: SHIPPED | OUTPATIENT
Start: 2025-09-02

## 2025-09-02 RX ORDER — OXYCODONE HYDROCHLORIDE 5 MG/1
5 TABLET ORAL
Refills: 0 | OUTPATIENT
Start: 2025-09-02

## 2025-09-02 RX ORDER — SODIUM CHLORIDE 9 MG/ML
INJECTION, SOLUTION INTRAVENOUS CONTINUOUS
OUTPATIENT
Start: 2025-09-02

## 2025-09-02 RX ORDER — ACETAMINOPHEN 500 MG
1000 TABLET ORAL EVERY 6 HOURS
OUTPATIENT
Start: 2025-09-02 | End: 2025-09-04

## 2025-09-02 RX ORDER — PROCHLORPERAZINE EDISYLATE 5 MG/ML
5 INJECTION INTRAMUSCULAR; INTRAVENOUS EVERY 6 HOURS PRN
OUTPATIENT
Start: 2025-09-02

## 2025-09-02 RX ORDER — OXYCODONE AND ACETAMINOPHEN 10; 325 MG/1; MG/1
1 TABLET ORAL EVERY 6 HOURS PRN
Qty: 28 TABLET | Refills: 0 | Status: SHIPPED | OUTPATIENT
Start: 2025-09-02

## 2025-09-02 RX ORDER — ACETAMINOPHEN 500 MG
1000 TABLET ORAL
OUTPATIENT
Start: 2025-09-02

## 2025-09-02 RX ORDER — DEXTROSE MONOHYDRATE AND SODIUM CHLORIDE 5; .9 G/100ML; G/100ML
INJECTION, SOLUTION INTRAVENOUS CONTINUOUS
OUTPATIENT
Start: 2025-09-02

## 2025-09-02 RX ORDER — PROMETHAZINE HYDROCHLORIDE 25 MG/1
25 TABLET ORAL EVERY 6 HOURS PRN
Qty: 20 TABLET | Refills: 0 | Status: SHIPPED | OUTPATIENT
Start: 2025-09-02

## 2025-09-02 RX ORDER — ONDANSETRON 8 MG/1
8 TABLET, ORALLY DISINTEGRATING ORAL EVERY 8 HOURS PRN
OUTPATIENT
Start: 2025-09-02

## 2025-09-02 RX ORDER — POLYETHYLENE GLYCOL 3350 17 G/17G
17 POWDER, FOR SOLUTION ORAL DAILY
OUTPATIENT
Start: 2025-09-02

## 2025-09-02 RX ORDER — CELECOXIB 200 MG/1
400 CAPSULE ORAL
OUTPATIENT
Start: 2025-09-02

## 2025-09-02 RX ORDER — FAMOTIDINE 20 MG/1
20 TABLET, FILM COATED ORAL 2 TIMES DAILY
OUTPATIENT
Start: 2025-09-02

## 2025-09-02 RX ORDER — CELECOXIB 200 MG/1
200 CAPSULE ORAL DAILY
OUTPATIENT
Start: 2025-09-03

## 2025-09-02 RX ORDER — TRAMADOL HYDROCHLORIDE 50 MG/1
50 TABLET, FILM COATED ORAL EVERY 6 HOURS PRN
Qty: 20 TABLET | Refills: 0 | Status: SHIPPED | OUTPATIENT
Start: 2025-09-02

## (undated) DEVICE — UNDERGLOVES BIOGEL PI SIZE 8.5

## (undated) DEVICE — SOL NACL 0.9% INJ 250ML BG

## (undated) DEVICE — NDL SAFETY 22G X 1.5 ECLIPSE

## (undated) DEVICE — PAD CAST SPECIALIST STRL 6

## (undated) DEVICE — PAD COLD THERAPY KNEE WRAP ON

## (undated) DEVICE — ELECTRODE 90 DEGREE ANGLE

## (undated) DEVICE — GLOVE BIOGEL SKINSENSE PI 7.0

## (undated) DEVICE — DRAPE STERI INSTRUMENT 1018

## (undated) DEVICE — SPONGE COTTON TRAY 4X4IN

## (undated) DEVICE — DRESSING AQUACEL ADH 4X10IN

## (undated) DEVICE — SUT MCRYL PLUS 4-0 PS2 27IN

## (undated) DEVICE — APPLICATOR CHLORAPREP ORN 26ML

## (undated) DEVICE — GAUZE SPONGE 4X4 12PLY

## (undated) DEVICE — DRESSING XEROFORM FOIL PK 1X8

## (undated) DEVICE — KIT IRR SUCTION HND PIECE

## (undated) DEVICE — TRAY TOTAL HIP CUSTOM

## (undated) DEVICE — BNDG COFLEX FOAM LF2 ST 6X5YD

## (undated) DEVICE — UNDERGLOVES BIOGEL PI SZ 7 LF

## (undated) DEVICE — ADHESIVE DERMABOND ADVANCED

## (undated) DEVICE — GOWN B1 X-LG X-LONG

## (undated) DEVICE — GOWN SMARTGOWN LVL4 X-LONG XL

## (undated) DEVICE — DRAPE STERI-DRAPE 1000 17X11IN

## (undated) DEVICE — SOL IRR STRL WATER 500ML

## (undated) DEVICE — GLOVE BIOGEL SKINSENSE PI 8.5

## (undated) DEVICE — PAD ELECTRODE STER 1.5X3

## (undated) DEVICE — BLADE SAG DUAL 18MMX1.27MMX90M

## (undated) DEVICE — SUT STRATAFIX 1 PDS CT-1

## (undated) DEVICE — DRAPE TOP 53X102IN

## (undated) DEVICE — Device

## (undated) DEVICE — PUMP COLD THERAPY

## (undated) DEVICE — SOL IRR NACL .9% 3000ML

## (undated) DEVICE — SEE MEDLINE ITEM 157150

## (undated) DEVICE — SOL NACL IRR 1000ML BTL

## (undated) DEVICE — DRAPE PLASTIC U 60X72

## (undated) DEVICE — SUT VICRYL+ 1 CT1 18IN

## (undated) DEVICE — PILLOW ABDUCTION FOAM MED

## (undated) DEVICE — SYR 30CC LUER LOCK

## (undated) DEVICE — SEALER BIPOLAR TISSUE 6.0

## (undated) DEVICE — WAVEGUIDE Y TAPER TIP

## (undated) DEVICE — COVER LIGHT HANDLE 80/CA

## (undated) DEVICE — SEE MEDLINE ITEM 152530

## (undated) DEVICE — DRESSING XEROFORM 1X8IN

## (undated) DEVICE — HOOD T7 W/ PEEL AWAY LENS

## (undated) DEVICE — TOURNIQUET SB QC DP 34X4IN

## (undated) DEVICE — SUT MONOCRYL 3-0 PS-2 UND

## (undated) DEVICE — SUT 4-0 ETHILON 18 PS-2

## (undated) DEVICE — ELECTRODE REM PLYHSV RETURN 9

## (undated) DEVICE — SHAVER ULTRAFFR 4.2MM

## (undated) DEVICE — KIT PT CARE HANA PROFX SSXT

## (undated) DEVICE — ELECTRODE BLADE TEFLON 6

## (undated) DEVICE — DRAPE IOBAN 2 STERI

## (undated) DEVICE — PAD ABDOMINAL STERILE 8X10IN

## (undated) DEVICE — BLADE 4.2MM PREBENT ULTRACUT

## (undated) DEVICE — SUT VICRYL PLUS 3-0 SH 18IN

## (undated) DEVICE — NDL 22GA X1 1/2 REG BEVEL

## (undated) DEVICE — PAD ABD 8X10 STERILE

## (undated) DEVICE — SOL NACL IRR 3000ML

## (undated) DEVICE — DRAPE C-ARM ELAS CLIP 42X120IN

## (undated) DEVICE — WRAP SHLDR HIP ACCU THRM PACK

## (undated) DEVICE — DRAPE STERI U-SHAPED 47X51IN

## (undated) DEVICE — TUBE SET INFLOW/OUTFLOW

## (undated) DEVICE — DRAPE EMERALD 87X114.75X113

## (undated) DEVICE — COVER CAMERA OPERATING ROOM